# Patient Record
Sex: MALE | Race: WHITE | Employment: UNEMPLOYED | URBAN - METROPOLITAN AREA
[De-identification: names, ages, dates, MRNs, and addresses within clinical notes are randomized per-mention and may not be internally consistent; named-entity substitution may affect disease eponyms.]

---

## 2017-05-06 ENCOUNTER — HOSPITAL ENCOUNTER (EMERGENCY)
Age: 57
Discharge: HOME | End: 2017-05-06
Attending: EMERGENCY MEDICINE
Payer: MEDICARE

## 2017-05-06 ENCOUNTER — APPOINTMENT (OUTPATIENT)
Dept: GENERAL RADIOLOGY | Age: 57
End: 2017-05-06
Attending: EMERGENCY MEDICINE
Payer: MEDICARE

## 2017-05-06 VITALS
HEART RATE: 73 BPM | TEMPERATURE: 98.1 F | HEIGHT: 72 IN | RESPIRATION RATE: 20 BRPM | OXYGEN SATURATION: 96 % | BODY MASS INDEX: 42.66 KG/M2 | DIASTOLIC BLOOD PRESSURE: 71 MMHG | SYSTOLIC BLOOD PRESSURE: 162 MMHG | WEIGHT: 315 LBS

## 2017-05-06 DIAGNOSIS — R19.7 DIARRHEA, UNSPECIFIED TYPE: Primary | ICD-10-CM

## 2017-05-06 DIAGNOSIS — J44.9 CHRONIC OBSTRUCTIVE PULMONARY DISEASE, UNSPECIFIED COPD TYPE (HCC): ICD-10-CM

## 2017-05-06 LAB
ALBUMIN SERPL BCP-MCNC: 3 G/DL (ref 3.5–5)
ALBUMIN/GLOB SERPL: 0.7 {RATIO} (ref 1.1–2.2)
ALP SERPL-CCNC: 78 U/L (ref 45–117)
ALT SERPL-CCNC: 24 U/L (ref 12–78)
ANION GAP BLD CALC-SCNC: 8 MMOL/L (ref 5–15)
APPEARANCE UR: CLEAR
AST SERPL W P-5'-P-CCNC: 20 U/L (ref 15–37)
BACTERIA URNS QL MICRO: NEGATIVE /HPF
BASOPHILS # BLD AUTO: 0.1 K/UL (ref 0–0.1)
BASOPHILS # BLD: 1 % (ref 0–1)
BILIRUB SERPL-MCNC: 0.4 MG/DL (ref 0.2–1)
BILIRUB UR QL: NEGATIVE
BNP SERPL-MCNC: 472 PG/ML (ref 0–125)
BUN SERPL-MCNC: 14 MG/DL (ref 6–20)
BUN/CREAT SERPL: 14 (ref 12–20)
CALCIUM SERPL-MCNC: 9 MG/DL (ref 8.5–10.1)
CHLORIDE SERPL-SCNC: 94 MMOL/L (ref 97–108)
CO2 SERPL-SCNC: 34 MMOL/L (ref 21–32)
COLOR UR: ABNORMAL
CREAT SERPL-MCNC: 0.97 MG/DL (ref 0.7–1.3)
DIFFERENTIAL METHOD BLD: NORMAL
EOSINOPHIL # BLD: 0.2 K/UL (ref 0–0.4)
EOSINOPHIL NFR BLD: 2 % (ref 0–7)
EPITH CASTS URNS QL MICRO: ABNORMAL /LPF
ERYTHROCYTE [DISTWIDTH] IN BLOOD BY AUTOMATED COUNT: 13.6 % (ref 11.5–14.5)
GLOBULIN SER CALC-MCNC: 4.1 G/DL (ref 2–4)
GLUCOSE SERPL-MCNC: 178 MG/DL (ref 65–100)
GLUCOSE UR STRIP.AUTO-MCNC: NEGATIVE MG/DL
HCT VFR BLD AUTO: 46.6 % (ref 36.6–50.3)
HGB BLD-MCNC: 16.4 G/DL (ref 12.1–17)
HGB UR QL STRIP: ABNORMAL
KETONES UR QL STRIP.AUTO: NEGATIVE MG/DL
LEUKOCYTE ESTERASE UR QL STRIP.AUTO: NEGATIVE
LYMPHOCYTES # BLD AUTO: 23 % (ref 12–49)
LYMPHOCYTES # BLD: 2.3 K/UL
MCH RBC QN AUTO: 33.1 PG (ref 26–34)
MCHC RBC AUTO-ENTMCNC: 35.2 G/DL (ref 30–36.5)
MCV RBC AUTO: 94.1 FL (ref 80–99)
MONOCYTES # BLD: 0.8 K/UL (ref 0–1)
MONOCYTES NFR BLD AUTO: 7 % (ref 5–13)
NEUTS SEG # BLD: 6.9 K/UL (ref 1.8–8)
NEUTS SEG NFR BLD AUTO: 67 % (ref 32–75)
NITRITE UR QL STRIP.AUTO: NEGATIVE
PH UR STRIP: 7.5 [PH] (ref 5–8)
PLATELET # BLD AUTO: 160 K/UL (ref 150–400)
POTASSIUM SERPL-SCNC: 3.9 MMOL/L (ref 3.5–5.1)
PROT SERPL-MCNC: 7.1 G/DL (ref 6.4–8.2)
PROT UR STRIP-MCNC: 100 MG/DL
RBC # BLD AUTO: 4.95 M/UL (ref 4.1–5.7)
RBC #/AREA URNS HPF: ABNORMAL /HPF (ref 0–5)
SODIUM SERPL-SCNC: 136 MMOL/L (ref 136–145)
SP GR UR REFRACTOMETRY: 1.01 (ref 1–1.03)
TROPONIN I BLD-MCNC: <0.04 NG/ML (ref 0–0.08)
UROBILINOGEN UR QL STRIP.AUTO: 0.2 EU/DL (ref 0.2–1)
WBC # BLD AUTO: 10.2 K/UL (ref 4.1–11.1)
WBC URNS QL MICRO: ABNORMAL /HPF (ref 0–4)

## 2017-05-06 PROCEDURE — 81001 URINALYSIS AUTO W/SCOPE: CPT | Performed by: EMERGENCY MEDICINE

## 2017-05-06 PROCEDURE — 83880 ASSAY OF NATRIURETIC PEPTIDE: CPT | Performed by: EMERGENCY MEDICINE

## 2017-05-06 PROCEDURE — 85025 COMPLETE CBC W/AUTO DIFF WBC: CPT | Performed by: EMERGENCY MEDICINE

## 2017-05-06 PROCEDURE — 80053 COMPREHEN METABOLIC PANEL: CPT | Performed by: EMERGENCY MEDICINE

## 2017-05-06 PROCEDURE — 36415 COLL VENOUS BLD VENIPUNCTURE: CPT | Performed by: EMERGENCY MEDICINE

## 2017-05-06 PROCEDURE — 84484 ASSAY OF TROPONIN QUANT: CPT

## 2017-05-06 PROCEDURE — 99283 EMERGENCY DEPT VISIT LOW MDM: CPT

## 2017-05-06 PROCEDURE — 71020 XR CHEST PA LAT: CPT

## 2017-05-06 NOTE — ED TRIAGE NOTES
Triage note: Pt reports he started Monday morning w/ diarrhea and feeling like he can't get a deep breath. Pt reports this morning he woke up feeling better but still feels like he can't take a deep breath. Pt reports after having diarrhea on Monday he then started w/ constipation but is now back to diarrhea.

## 2017-05-06 NOTE — ED PROVIDER NOTES
HPI Comments: Bart Coyle is a 60 yo morbidly obese male with history of DM, HTN, CHF and COPD who presents to the ED with complaints of diarrhea and shortness of breath. He states that for the past 5 days he has had the feeling that he needed to take deep breaths. This seemed to start on Monday when he had diarrhea. The next day the diarrhea resolved but he continued to have shortness of breath. Yesterday he spoke with his PCP's nurse who recommended that he come to the ED but he did not want to so he waited til today. Last night he took an extra 2 doses of bumex (he normally takes 1/2 of a 2mg tablet at day) and today his shortness of breath felt much better until this evening when he started to have diarrhea again. His wife has also noticed that his urine is dark brown. He has home O2 which he states he only uses as needed but he has been wearing most of this week. Past Medical History:   Diagnosis Date    COPD     [facility] [address] [facility]     Heart failure ([facility] [address] [facility]     Hypertension     Morbidly obese ([facility] [address]        Past Surgical History:   Procedure Laterality Date    HX ORTHOPAEDIC      right leg surgery         History reviewed. No pertinent family history. Social History     Social History    Marital status:      Spouse name: N/A    Number of children: N/A    Years of education: N/A     Occupational History    Not on file. Social History Main Topics    Smoking status: Former Smoker    Smokeless tobacco: Never Used    Alcohol use No    Drug use: No    Sexual activity: Not on file     Other Topics Concern    Not on file     Social History Narrative         ALLERGIES: Codeine; Pcn [penicillins]; and Morphine    Review of Systems   Constitutional: Positive for fatigue. Negative for fever. HENT: Negative for sore throat. Eyes: Negative for visual disturbance. Respiratory: Positive for shortness of breath. Negative for cough. Cardiovascular: Negative for chest pain. Gastrointestinal: Positive for diarrhea. Negative for abdominal pain. Genitourinary: Negative for dysuria. Musculoskeletal: Negative for back pain. Skin: Negative for rash. Neurological: Negative for headaches. Vitals:    05/06/17 0136 05/06/17 0351   BP: 148/78 162/71   Pulse: 74 73   Resp: 24 20   Temp: 98.1 °F (36.7 °C)    SpO2: 94% 96%   Weight: 153.8 kg (339 lb)    Height: 6' (1.829 m)             Physical Exam   Constitutional: He appears well-developed. No distress. obese   HENT:   Head: Normocephalic and atraumatic. Mouth/Throat: Oropharynx is clear and moist.   Eyes: Conjunctivae and EOM are normal.   Neck: Normal range of motion and phonation normal.   Cardiovascular: Normal rate and intact distal pulses. Pulmonary/Chest: Effort normal and breath sounds normal. No respiratory distress. He has no wheezes. He has no rales. Abdominal: He exhibits no distension. There is no tenderness. There is no rebound. Musculoskeletal: Normal range of motion. He exhibits no tenderness. Neurological: He is alert. He is not disoriented. He exhibits normal muscle tone. Skin: Skin is warm and dry. Nursing note and vitals reviewed. MDM  ED Course   PAtient in no respiratory distress. LAbs including CBC, CMP and trop normal.  BNP mildly elevated but c/w previous values. CXR normal.  No episodes of diarrhea in ED and no wheezing.       Procedures

## 2017-05-06 NOTE — ED NOTES
Pt d/c'd by Dr. Nathanael Ralph. D/c instructions in wife's hand. Pt in motorized wheelchair out of ED w/ wife and son. Pt appears in no apparent distress at time of d/c.

## 2017-05-06 NOTE — DISCHARGE INSTRUCTIONS
Chronic Obstructive Pulmonary Disease (COPD): Care Instructions  Your Care Instructions    Chronic obstructive pulmonary disease (COPD) is a general term for a group of lung diseases, including emphysema and chronic bronchitis. People with COPD have decreased airflow in and out of the lungs, which makes it hard to breathe. The airways also can get clogged with thick mucus. Cigarette smoking is a major cause of COPD. Although there is no cure for COPD, you can slow its progress. Following your treatment plan and taking care of yourself can help you feel better and live longer. Follow-up care is a key part of your treatment and safety. Be sure to make and go to all appointments, and call your doctor if you are having problems. It's also a good idea to know your test results and keep a list of the medicines you take. How can you care for yourself at home? Staying healthy  · Do not smoke. This is the most important step you can take to prevent more damage to your lungs. If you need help quitting, talk to your doctor about stop-smoking programs and medicines. These can increase your chances of quitting for good. · Avoid colds and flu. Get a pneumococcal vaccine shot. If you have had one before, ask your doctor whether you need a second dose. Get the flu vaccine every fall. If you must be around people with colds or the flu, wash your hands often. · Avoid secondhand smoke, air pollution, and high altitudes. Also avoid cold, dry air and hot, humid air. Stay at home with your windows closed when air pollution is bad. Medicines and oxygen therapy  · Take your medicines exactly as prescribed. Call your doctor if you think you are having a problem with your medicine. · You may be taking medicines such as:  ¨ Bronchodilators. These help open your airways and make breathing easier. Bronchodilators are either short-acting (work for 6 to 9 hours) or long-acting (work for 24 hours).  You inhale most bronchodilators, so they start to act quickly. Always carry your quick-relief inhaler with you in case you need it while you are away from home. ¨ Corticosteroids (prednisone, budesonide). These reduce airway inflammation. They come in pill or inhaled form. You must take these medicines every day for them to work well. · A spacer may help you get more inhaled medicine to your lungs. Ask your doctor or pharmacist if a spacer is right for you. If it is, ask how to use it properly. · Do not take any vitamins, over-the-counter medicine, or herbal products without talking to your doctor first.  · If your doctor prescribed antibiotics, take them as directed. Do not stop taking them just because you feel better. You need to take the full course of antibiotics. · Oxygen therapy boosts the amount of oxygen in your blood and helps you breathe easier. Use the flow rate your doctor has recommended, and do not change it without talking to your doctor first.  Activity  · Get regular exercise. Walking is an easy way to get exercise. Start out slowly, and walk a little more each day. · Pay attention to your breathing. You are exercising too hard if you cannot talk while you are exercising. · Take short rest breaks when doing household chores and other activities. · Learn breathing methods--such as breathing through pursed lips--to help you become less short of breath. · If your doctor has not set you up with a pulmonary rehabilitation program, talk to him or her about whether rehab is right for you. Rehab includes exercise programs, education about your disease and how to manage it, help with diet and other changes, and emotional support. Diet  · Eat regular, healthy meals. Use bronchodilators about 1 hour before you eat to make it easier to eat. Eat several small meals instead of three large ones. Drink beverages at the end of the meal. Avoid foods that are hard to chew.   · Eat foods that contain protein so that you do not lose muscle mass.  Mental health  · Talk to your family, friends, or a therapist about your feelings. It is normal to feel frightened, angry, hopeless, helpless, and even guilty. Talking openly about bad feelings can help you cope. If these feelings last, talk to your doctor. When should you call for help? Call 911 anytime you think you may need emergency care. For example, call if:  · You have severe trouble breathing. Call your doctor now or seek immediate medical care if:  · You have new or worse trouble breathing. · You cough up blood. · You have a fever. Watch closely for changes in your health, and be sure to contact your doctor if:  · You cough more deeply or more often, especially if you notice more mucus or a change in the color of your mucus. · You have new or worse swelling in your legs or belly. · You are not getting better as expected. Where can you learn more? Go to http://kendyBueroservice24kristina.info/. Ej Yang in the search box to learn more about \"Chronic Obstructive Pulmonary Disease (COPD): Care Instructions. \"  Current as of: May 23, 2016  Content Version: 11.2  © 0116-3436 [employer]. Care instructions adapted under license by [employer] (which disclaims liability or warranty for this information). If you have questions about a medical condition or this instruction, always ask your healthcare professional. [place] [address] any warranty or liability for your use of this information. Diarrhea: Care Instructions  Your Care Instructions    Diarrhea is loose, watery stools (bowel movements). The exact cause is often hard to find. Sometimes diarrhea is your body's way of getting rid of what caused an upset stomach. Viruses, food poisoning, and many medicines can cause diarrhea. Some people get diarrhea in response to emotional stress, anxiety, or certain foods. Almost everyone has diarrhea now and then.  It usually isn't serious, and your stools will return to normal soon. The important thing to do is replace the fluids you have lost, so you can prevent dehydration. The doctor has checked you carefully, but problems can develop later. If you notice any problems or new symptoms, get medical treatment right away. Follow-up care is a key part of your treatment and safety. Be sure to make and go to all appointments, and call your doctor if you are having problems. It's also a good idea to know your test results and keep a list of the medicines you take. How can you care for yourself at home? · Watch for signs of dehydration, which means your body has lost too much water. Dehydration is a serious condition and should be treated right away. Signs of dehydration are:  ¨ Increasing thirst and dry eyes and mouth. ¨ Feeling faint or lightheaded. ¨ Darker urine, and a smaller amount of urine than normal.  · To prevent dehydration, drink plenty of fluids, enough so that your urine is light yellow or clear like water. Choose water and other caffeine-free clear liquids until you feel better. If you have kidney, heart, or liver disease and have to limit fluids, talk with your doctor before you increase the amount of fluids you drink. · Begin eating small amounts of mild foods the next day, if you feel like it. ¨ Try yogurt that has live cultures of Lactobacillus. (Check the label.)  ¨ Avoid spicy foods, fruits, alcohol, and caffeine until 48 hours after all symptoms are gone. ¨ Avoid chewing gum that contains sorbitol. ¨ Avoid dairy products (except for yogurt with Lactobacillus) while you have diarrhea and for 3 days after symptoms are gone. · The doctor may recommend that you take over-the-counter medicine, such as loperamide (Imodium), if you still have diarrhea after 6 hours. Read and follow all instructions on the label. Do not use this medicine if you have bloody diarrhea, a high fever, or other signs of serious illness.  Call your doctor if you think you are having a problem with your medicine. When should you call for help? Call 911 anytime you think you may need emergency care. For example, call if:  · You passed out (lost consciousness). · Your stools are maroon or very bloody. Call your doctor now or seek immediate medical care if:  · You are dizzy or lightheaded, or you feel like you may faint. · Your stools are black and look like tar, or they have streaks of blood. · You have new or worse belly pain. · You have symptoms of dehydration, such as:  ¨ Dry eyes and a dry mouth. ¨ Passing only a little dark urine. ¨ Feeling thirstier than usual.  · You have a new or higher fever. Watch closely for changes in your health, and be sure to contact your doctor if:  · Your diarrhea is getting worse. · You see pus in the diarrhea. · You are not getting better after 2 days (48 hours). Where can you learn more? Go to http://kendy-kristina.info/. Enter P129 in the search box to learn more about \"Diarrhea: Care Instructions. \"  Current as of: May 27, 2016  Content Version: 11.2  © 8035-2540 [employer]. Care instructions adapted under license by [employer] (which disclaims liability or warranty for this information). If you have questions about a medical condition or this instruction, always ask your healthcare professional. [place] [address] any warranty or liability for your use of this information. We hope that we have addressed all of your medical concerns. The examination and treatment you received in the Emergency Department were for an emergent problem and were not intended as complete care. It is important that you follow up with your healthcare provider(s) for ongoing care. If your symptoms worsen or do not improve as expected, and you are unable to reach your usual health care provider(s), you should return to the Emergency Department.       Today's healthcare is undergoing tremendous change, and patient satisfaction surveys are one of the many tools to assess the quality of medical care. You may receive a survey from the [employer] regarding your experience in the Emergency Department. I hope that your experience has been completely positive, particularly the medical care that I provided. As such, please participate in the survey; anything less than excellent does not meet my expectations or intentions. [facility] and [facility] participate in nationally recognized quality of care measures. If your blood pressure is greater than 120/80, as reported below, we urge that you seek medical care to address the potential of high blood pressure, commonly known as hypertension. Hypertension can be hereditary or can be caused by certain medical conditions, pain, stress, or \"white coat syndrome. \"       Please make an appointment with your health care provider(s) for follow up of your Emergency Department visit. VITALS:   Patient Vitals for the past 8 hrs:   Temp Pulse Resp BP SpO2   05/06/17 0136 98.1 °F (36.7 °C) 74 24 148/78 94 %          Thank you for allowing us to provide you with medical care today. We realize that you have many choices for your emergency care needs. Please choose us in the future for any continued health care needs. Nupur Clemente, [address]   Office: [phone]            Recent Results (from the past 24 hour(s))   CBC WITH AUTOMATED DIFF    Collection Time: 05/06/17  2:04 AM   Result Value Ref Range    WBC 10.2 4.1 - 11.1 K/uL    RBC 4.95 4.10 - 5.70 M/uL    HGB 16.4 12.1 - 17.0 g/dL    HCT 46.6 36.6 - 50.3 %    MCV 94.1 80.0 - 99.0 FL    MCH 33.1 26.0 - 34.0 PG    MCHC 35.2 30.0 - 36.5 g/dL    RDW 13.6 11.5 - 14.5 %    PLATELET 252 663 - 056 K/uL    NEUTROPHILS 67 32 - 75 %    LYMPHOCYTES 23 12 - 49 %    MONOCYTES 7 5 - 13 % EOSINOPHILS 2 0 - 7 %    BASOPHILS 1 0 - 1 %    ABS. NEUTROPHILS 6.9 1.8 - 8.0 K/UL    ABS. LYMPHOCYTES 2.3 K/UL    ABS. MONOCYTES 0.8 0.0 - 1.0 K/UL    ABS. EOSINOPHILS 0.2 0.0 - 0.4 K/UL    ABS. BASOPHILS 0.1 0.0 - 0.1 K/UL    DF AUTOMATED     METABOLIC PANEL, COMPREHENSIVE    Collection Time: 05/06/17  2:04 AM   Result Value Ref Range    Sodium 136 136 - 145 mmol/L    Potassium 3.9 3.5 - 5.1 mmol/L    Chloride 94 (L) 97 - 108 mmol/L    CO2 34 (H) 21 - 32 mmol/L    Anion gap 8 5 - 15 mmol/L    Glucose 178 (H) 65 - 100 mg/dL    BUN 14 6 - 20 MG/DL    Creatinine 0.97 0.70 - 1.30 MG/DL    BUN/Creatinine ratio 14 12 - 20      GFR est AA >60 >60 ml/min/1.73m2    GFR est non-AA >60 >60 ml/min/1.73m2    Calcium 9.0 8.5 - 10.1 MG/DL    Bilirubin, total 0.4 0.2 - 1.0 MG/DL    ALT (SGPT) 24 12 - 78 U/L    AST (SGOT) 20 15 - 37 U/L    Alk. phosphatase 78 45 - 117 U/L    Protein, total 7.1 6.4 - 8.2 g/dL    Albumin 3.0 (L) 3.5 - 5.0 g/dL    Globulin 4.1 (H) 2.0 - 4.0 g/dL    A-G Ratio 0.7 (L) 1.1 - 2.2     PRO-BNP    Collection Time: 05/06/17  2:04 AM   Result Value Ref Range    NT pro- (H) 0 - 125 PG/ML   POC TROPONIN-I    Collection Time: 05/06/17  2:06 AM   Result Value Ref Range    Troponin-I (POC) <0.04 0.00 - 0.08 ng/mL   URINALYSIS W/MICROSCOPIC    Collection Time: 05/06/17  2:22 AM   Result Value Ref Range    Color YELLOW/STRAW      Appearance CLEAR CLEAR      Specific gravity 1.015 1.003 - 1.030      pH (UA) 7.5 5.0 - 8.0      Protein 100 (A) NEG mg/dL    Glucose NEGATIVE  NEG mg/dL    Ketone NEGATIVE  NEG mg/dL    Bilirubin NEGATIVE  NEG      Blood MODERATE (A) NEG      Urobilinogen 0.2 0.2 - 1.0 EU/dL    Nitrites NEGATIVE  NEG      Leukocyte Esterase NEGATIVE  NEG      WBC 5-10 0 - 4 /hpf    RBC 5-10 0 - 5 /hpf    Epithelial cells FEW FEW /lpf    Bacteria NEGATIVE  NEG /hpf       Xr Chest Pa Lat    Result Date: 5/6/2017  INDICATION: shortness of breath EXAM: CXR 2 Views. COMPARISON: 2/25/2015.  FINDINGS: Frontal and lateral views of the chest show the lungs are free of acute disease. Heart size is normal. There is no overt pulmonary edema. There is no evident pneumothorax, adenopathy or pleural effusion. IMPRESSION: No acute disease. No significant interval change.

## 2017-06-18 ENCOUNTER — APPOINTMENT (OUTPATIENT)
Dept: GENERAL RADIOLOGY | Age: 57
End: 2017-06-18
Attending: EMERGENCY MEDICINE
Payer: MEDICARE

## 2017-06-18 ENCOUNTER — HOSPITAL ENCOUNTER (EMERGENCY)
Age: 57
Discharge: STILL ADMITTED OR ADMINISTRATIVE | End: 2017-06-18
Attending: EMERGENCY MEDICINE
Payer: MEDICARE

## 2017-06-18 ENCOUNTER — HOSPITAL ENCOUNTER (INPATIENT)
Age: 57
LOS: 5 days | Discharge: HOME HEALTH | DRG: 291 | End: 2017-06-23
Attending: INTERNAL MEDICINE | Admitting: FAMILY MEDICINE
Payer: MEDICARE

## 2017-06-18 VITALS
TEMPERATURE: 98.1 F | HEIGHT: 72 IN | WEIGHT: 315 LBS | BODY MASS INDEX: 42.66 KG/M2 | HEART RATE: 79 BPM | DIASTOLIC BLOOD PRESSURE: 106 MMHG | SYSTOLIC BLOOD PRESSURE: 140 MMHG | OXYGEN SATURATION: 94 % | RESPIRATION RATE: 21 BRPM

## 2017-06-18 DIAGNOSIS — I50.21 ACUTE SYSTOLIC CONGESTIVE HEART FAILURE (HCC): Primary | ICD-10-CM

## 2017-06-18 PROBLEM — E87.5 ACUTE HYPERKALEMIA: Status: ACTIVE | Noted: 2017-06-18

## 2017-06-18 PROBLEM — E11.65 TYPE 2 DIABETES MELLITUS WITH HYPERGLYCEMIA (HCC): Status: ACTIVE | Noted: 2017-06-18

## 2017-06-18 PROBLEM — I50.9 ACUTE CHF (CONGESTIVE HEART FAILURE) (HCC): Status: ACTIVE | Noted: 2017-06-18

## 2017-06-18 LAB
ALBUMIN SERPL BCP-MCNC: 2.6 G/DL (ref 3.5–5)
ALBUMIN/GLOB SERPL: 0.6 {RATIO} (ref 1.1–2.2)
ALP SERPL-CCNC: 79 U/L (ref 45–117)
ALT SERPL-CCNC: 50 U/L (ref 12–78)
ANION GAP BLD CALC-SCNC: 15 MMOL/L (ref 5–15)
ANION GAP BLD CALC-SCNC: 5 MMOL/L (ref 5–15)
ANION GAP BLD CALC-SCNC: 8 MMOL/L (ref 5–15)
ARTERIAL PATENCY WRIST A: YES
AST SERPL W P-5'-P-CCNC: 28 U/L (ref 15–37)
BASE EXCESS BLD CALC-SCNC: 3 MMOL/L
BASOPHILS # BLD AUTO: 0 K/UL (ref 0–0.1)
BASOPHILS # BLD AUTO: 0.1 K/UL (ref 0–0.1)
BASOPHILS # BLD: 0 % (ref 0–1)
BASOPHILS # BLD: 1 % (ref 0–1)
BDY SITE: ABNORMAL
BILIRUB SERPL-MCNC: 0.4 MG/DL (ref 0.2–1)
BNP SERPL-MCNC: 1648 PG/ML (ref 0–125)
BUN BLD-MCNC: 36 MG/DL (ref 9–20)
BUN SERPL-MCNC: 30 MG/DL (ref 6–20)
BUN SERPL-MCNC: 30 MG/DL (ref 6–20)
BUN/CREAT SERPL: 25 (ref 12–20)
BUN/CREAT SERPL: 31 (ref 12–20)
CA-I BLD-MCNC: 1.12 MMOL/L (ref 1.12–1.32)
CALCIUM SERPL-MCNC: 8.5 MG/DL (ref 8.5–10.1)
CALCIUM SERPL-MCNC: 8.9 MG/DL (ref 8.5–10.1)
CHLORIDE BLD-SCNC: 90 MMOL/L (ref 98–107)
CHLORIDE SERPL-SCNC: 93 MMOL/L (ref 97–108)
CHLORIDE SERPL-SCNC: 95 MMOL/L (ref 97–108)
CK MB CFR SERPL CALC: 3.8 % (ref 0–2.5)
CK MB SERPL-MCNC: 1.9 NG/ML (ref 5–25)
CK SERPL-CCNC: 50 U/L (ref 39–308)
CO2 BLD-SCNC: 36 MMOL/L (ref 21–32)
CO2 SERPL-SCNC: 33 MMOL/L (ref 21–32)
CO2 SERPL-SCNC: 34 MMOL/L (ref 21–32)
CREAT BLD-MCNC: 1 MG/DL (ref 0.6–1.3)
CREAT SERPL-MCNC: 0.97 MG/DL (ref 0.7–1.3)
CREAT SERPL-MCNC: 1.19 MG/DL (ref 0.7–1.3)
DIFFERENTIAL METHOD BLD: NORMAL
EOSINOPHIL # BLD: 0.1 K/UL (ref 0–0.4)
EOSINOPHIL # BLD: 0.2 K/UL (ref 0–0.4)
EOSINOPHIL NFR BLD: 1 % (ref 0–7)
EOSINOPHIL NFR BLD: 2 % (ref 0–7)
ERYTHROCYTE [DISTWIDTH] IN BLOOD BY AUTOMATED COUNT: 13.7 % (ref 11.5–14.5)
ERYTHROCYTE [DISTWIDTH] IN BLOOD BY AUTOMATED COUNT: 14.2 % (ref 11.5–14.5)
EST. AVERAGE GLUCOSE BLD GHB EST-MCNC: 192 MG/DL
GAS FLOW.O2 O2 DELIVERY SYS: ABNORMAL L/MIN
GLOBULIN SER CALC-MCNC: 4.1 G/DL (ref 2–4)
GLUCOSE BLD STRIP.AUTO-MCNC: 276 MG/DL (ref 65–100)
GLUCOSE BLD-MCNC: 330 MG/DL (ref 65–100)
GLUCOSE SERPL-MCNC: 248 MG/DL (ref 65–100)
GLUCOSE SERPL-MCNC: 417 MG/DL (ref 65–100)
HBA1C MFR BLD: 8.3 % (ref 4.2–6.3)
HCO3 BLD-SCNC: 28.9 MMOL/L (ref 22–26)
HCT VFR BLD AUTO: 42.3 % (ref 36.6–50.3)
HCT VFR BLD AUTO: 45.2 % (ref 36.6–50.3)
HCT VFR BLD CALC: 45 % (ref 36.6–50.3)
HGB BLD-MCNC: 14.2 G/DL (ref 12.1–17)
HGB BLD-MCNC: 15.2 G/DL (ref 12.1–17)
HGB BLD-MCNC: 15.3 GM/DL (ref 12.1–17)
LACTATE SERPL-SCNC: 2.3 MMOL/L (ref 0.4–2)
LYMPHOCYTES # BLD AUTO: 18 % (ref 12–49)
LYMPHOCYTES # BLD AUTO: 25 % (ref 12–49)
LYMPHOCYTES # BLD: 1.8 K/UL
LYMPHOCYTES # BLD: 2 K/UL (ref 0.8–3.5)
MAGNESIUM SERPL-MCNC: 1.7 MG/DL (ref 1.6–2.4)
MCH RBC QN AUTO: 32.5 PG (ref 26–34)
MCH RBC QN AUTO: 33.2 PG (ref 26–34)
MCHC RBC AUTO-ENTMCNC: 33.6 G/DL (ref 30–36.5)
MCHC RBC AUTO-ENTMCNC: 33.6 G/DL (ref 30–36.5)
MCV RBC AUTO: 96.8 FL (ref 80–99)
MCV RBC AUTO: 98.7 FL (ref 80–99)
MONOCYTES # BLD: 0.5 K/UL (ref 0–1)
MONOCYTES # BLD: 0.6 K/UL (ref 0–1)
MONOCYTES NFR BLD AUTO: 6 % (ref 5–13)
MONOCYTES NFR BLD AUTO: 7 % (ref 5–13)
NEUTS SEG # BLD: 5.3 K/UL (ref 1.8–8)
NEUTS SEG # BLD: 7.3 K/UL (ref 1.8–8)
NEUTS SEG NFR BLD AUTO: 66 % (ref 32–75)
NEUTS SEG NFR BLD AUTO: 74 % (ref 32–75)
O2/TOTAL GAS SETTING VFR VENT: 21 %
PCO2 BLD: 51.3 MMHG (ref 35–45)
PH BLD: 7.36 [PH] (ref 7.35–7.45)
PLATELET # BLD AUTO: 144 K/UL (ref 150–400)
PLATELET # BLD AUTO: 170 K/UL (ref 150–400)
PO2 BLD: 46 MMHG (ref 80–100)
POTASSIUM BLD-SCNC: 4.6 MMOL/L (ref 3.5–5.1)
POTASSIUM SERPL-SCNC: 4.2 MMOL/L (ref 3.5–5.1)
POTASSIUM SERPL-SCNC: 5.6 MMOL/L (ref 3.5–5.1)
PROT SERPL-MCNC: 6.7 G/DL (ref 6.4–8.2)
RBC # BLD AUTO: 4.37 M/UL (ref 4.1–5.7)
RBC # BLD AUTO: 4.58 M/UL (ref 4.1–5.7)
SAO2 % BLD: 79 % (ref 92–97)
SERVICE CMNT-IMP: ABNORMAL
SERVICE CMNT-IMP: ABNORMAL
SODIUM BLD-SCNC: 135 MMOL/L (ref 136–145)
SODIUM SERPL-SCNC: 133 MMOL/L (ref 136–145)
SODIUM SERPL-SCNC: 135 MMOL/L (ref 136–145)
SPECIMEN TYPE: ABNORMAL
TOTAL RESP. RATE, ITRR: 31
TROPONIN I BLD-MCNC: <0.04 NG/ML (ref 0–0.08)
TROPONIN I SERPL-MCNC: <0.04 NG/ML
WBC # BLD AUTO: 8 K/UL (ref 4.1–11.1)
WBC # BLD AUTO: 9.9 K/UL (ref 4.1–11.1)

## 2017-06-18 PROCEDURE — 80053 COMPREHEN METABOLIC PANEL: CPT | Performed by: EMERGENCY MEDICINE

## 2017-06-18 PROCEDURE — 51702 INSERT TEMP BLADDER CATH: CPT

## 2017-06-18 PROCEDURE — 84484 ASSAY OF TROPONIN QUANT: CPT

## 2017-06-18 PROCEDURE — 36600 WITHDRAWAL OF ARTERIAL BLOOD: CPT

## 2017-06-18 PROCEDURE — 83605 ASSAY OF LACTIC ACID: CPT | Performed by: FAMILY MEDICINE

## 2017-06-18 PROCEDURE — 96374 THER/PROPH/DIAG INJ IV PUSH: CPT

## 2017-06-18 PROCEDURE — 82550 ASSAY OF CK (CPK): CPT | Performed by: FAMILY MEDICINE

## 2017-06-18 PROCEDURE — 93005 ELECTROCARDIOGRAM TRACING: CPT

## 2017-06-18 PROCEDURE — 77030034850

## 2017-06-18 PROCEDURE — 96375 TX/PRO/DX INJ NEW DRUG ADDON: CPT

## 2017-06-18 PROCEDURE — 74011250636 HC RX REV CODE- 250/636: Performed by: EMERGENCY MEDICINE

## 2017-06-18 PROCEDURE — 83036 HEMOGLOBIN GLYCOSYLATED A1C: CPT | Performed by: FAMILY MEDICINE

## 2017-06-18 PROCEDURE — 36415 COLL VENOUS BLD VENIPUNCTURE: CPT | Performed by: FAMILY MEDICINE

## 2017-06-18 PROCEDURE — 85025 COMPLETE CBC W/AUTO DIFF WBC: CPT | Performed by: FAMILY MEDICINE

## 2017-06-18 PROCEDURE — 65660000000 HC RM CCU STEPDOWN

## 2017-06-18 PROCEDURE — 84484 ASSAY OF TROPONIN QUANT: CPT | Performed by: FAMILY MEDICINE

## 2017-06-18 PROCEDURE — 99285 EMERGENCY DEPT VISIT HI MDM: CPT

## 2017-06-18 PROCEDURE — 83735 ASSAY OF MAGNESIUM: CPT | Performed by: FAMILY MEDICINE

## 2017-06-18 PROCEDURE — 77030005520 HC CATH URETH FOL38 BARD -A

## 2017-06-18 PROCEDURE — 80047 BASIC METABLC PNL IONIZED CA: CPT

## 2017-06-18 PROCEDURE — 74011636637 HC RX REV CODE- 636/637: Performed by: EMERGENCY MEDICINE

## 2017-06-18 PROCEDURE — 80048 BASIC METABOLIC PNL TOTAL CA: CPT | Performed by: FAMILY MEDICINE

## 2017-06-18 PROCEDURE — 36415 COLL VENOUS BLD VENIPUNCTURE: CPT | Performed by: EMERGENCY MEDICINE

## 2017-06-18 PROCEDURE — 82803 BLOOD GASES ANY COMBINATION: CPT

## 2017-06-18 PROCEDURE — 71010 XR CHEST PORT: CPT

## 2017-06-18 PROCEDURE — 82962 GLUCOSE BLOOD TEST: CPT

## 2017-06-18 PROCEDURE — 83880 ASSAY OF NATRIURETIC PEPTIDE: CPT | Performed by: EMERGENCY MEDICINE

## 2017-06-18 PROCEDURE — 85025 COMPLETE CBC W/AUTO DIFF WBC: CPT | Performed by: EMERGENCY MEDICINE

## 2017-06-18 RX ORDER — METFORMIN HYDROCHLORIDE 500 MG/1
1500 TABLET ORAL
Status: DISCONTINUED | OUTPATIENT
Start: 2017-06-19 | End: 2017-06-18

## 2017-06-18 RX ORDER — GUAIFENESIN 100 MG/5ML
81 LIQUID (ML) ORAL DAILY
Status: DISCONTINUED | OUTPATIENT
Start: 2017-06-19 | End: 2017-06-23 | Stop reason: HOSPADM

## 2017-06-18 RX ORDER — GLIMEPIRIDE 2 MG/1
4 TABLET ORAL ONCE
Status: COMPLETED | OUTPATIENT
Start: 2017-06-19 | End: 2017-06-19

## 2017-06-18 RX ORDER — FUROSEMIDE 10 MG/ML
40 INJECTION INTRAMUSCULAR; INTRAVENOUS
Status: COMPLETED | OUTPATIENT
Start: 2017-06-18 | End: 2017-06-18

## 2017-06-18 RX ORDER — GABAPENTIN 300 MG/1
300 CAPSULE ORAL 3 TIMES DAILY
Status: DISCONTINUED | OUTPATIENT
Start: 2017-06-19 | End: 2017-06-20

## 2017-06-18 RX ORDER — PANTOPRAZOLE SODIUM 40 MG/1
40 TABLET, DELAYED RELEASE ORAL
Status: DISCONTINUED | OUTPATIENT
Start: 2017-06-19 | End: 2017-06-23 | Stop reason: HOSPADM

## 2017-06-18 RX ORDER — OMEPRAZOLE 20 MG/1
20 CAPSULE, DELAYED RELEASE ORAL 2 TIMES DAILY
Status: DISCONTINUED | OUTPATIENT
Start: 2017-06-19 | End: 2017-06-18 | Stop reason: CLARIF

## 2017-06-18 RX ORDER — GABAPENTIN 300 MG/1
300 CAPSULE ORAL 3 TIMES DAILY
Status: ON HOLD | COMMUNITY
End: 2018-01-01

## 2017-06-18 RX ORDER — GLIMEPIRIDE 2 MG/1
4 TABLET ORAL 2 TIMES DAILY
Status: DISCONTINUED | OUTPATIENT
Start: 2017-06-19 | End: 2017-06-23 | Stop reason: HOSPADM

## 2017-06-18 RX ORDER — METOPROLOL TARTRATE 50 MG/1
100 TABLET ORAL 2 TIMES DAILY
Status: DISCONTINUED | OUTPATIENT
Start: 2017-06-19 | End: 2017-06-23 | Stop reason: HOSPADM

## 2017-06-18 RX ORDER — IPRATROPIUM BROMIDE AND ALBUTEROL SULFATE 2.5; .5 MG/3ML; MG/3ML
3 SOLUTION RESPIRATORY (INHALATION)
Status: DISCONTINUED | OUTPATIENT
Start: 2017-06-18 | End: 2017-06-23 | Stop reason: HOSPADM

## 2017-06-18 RX ORDER — INSULIN LISPRO 100 [IU]/ML
INJECTION, SOLUTION INTRAVENOUS; SUBCUTANEOUS
Status: DISCONTINUED | OUTPATIENT
Start: 2017-06-18 | End: 2017-06-18

## 2017-06-18 RX ORDER — DEXTROSE 50 % IN WATER (D50W) INTRAVENOUS SYRINGE
12.5-25 AS NEEDED
Status: DISCONTINUED | OUTPATIENT
Start: 2017-06-18 | End: 2017-06-18 | Stop reason: CLARIF

## 2017-06-18 RX ORDER — MAGNESIUM SULFATE 100 %
4 CRYSTALS MISCELLANEOUS AS NEEDED
Status: DISCONTINUED | OUTPATIENT
Start: 2017-06-18 | End: 2017-06-23 | Stop reason: HOSPADM

## 2017-06-18 RX ORDER — METFORMIN HYDROCHLORIDE 500 MG/1
1000 TABLET ORAL EVERY EVENING
Status: DISCONTINUED | OUTPATIENT
Start: 2017-06-18 | End: 2017-06-18

## 2017-06-18 RX ADMIN — HUMAN INSULIN 5 UNITS: 100 INJECTION, SOLUTION SUBCUTANEOUS at 18:33

## 2017-06-18 RX ADMIN — FUROSEMIDE 40 MG: 10 INJECTION, SOLUTION INTRAMUSCULAR; INTRAVENOUS at 18:31

## 2017-06-18 NOTE — PROGRESS NOTES
Bedside shift change report given to [address] [place] [address] Street (oncoming nurse) by Andrea Chen (offgoing nurse). Report included the following information SBAR, Kardex, Intake/Output, MAR, Recent Results and Cardiac Rhythm NSR.

## 2017-06-18 NOTE — ED PROVIDER NOTES
Patient is a 61 y.o. male presenting with fever and fatigue. Fever      Fatigue      The patient is a 58-year-old white female with morbid obesity COPD and congestive heart failure and diabetes who presents today with acute onset of pain difficult to awaken this morning and some  increasing shortness of breath and peripheral edema. The patient's family states he has gained considerable amounts of weight recently. He usually uses oxygen at home but has not been using it. A chest x-ray reveals bilateral pleural effusions and new congestive heart failure compared to May of 2017 and his BNP is approximately 1600. Blood gas revealed a pO2 of 46 and he was placed on nasal cannula O2 and Lasix was administered as well. He denies any chest pain at this time or in the recent past. He denies fever or chills. I plan to admit him to the hospital for further evaluation and treatment of the CHF. Past Medical History:   Diagnosis Date    CAD (coronary artery disease)     pvc    COPD     Diabetes ([address]     [facility] [address]     Hypertension     Morbidly obese ([facility] [address]        Past Surgical History:   Procedure Laterality Date    HX ORTHOPAEDIC      right leg surgery         History reviewed. No pertinent family history. Social History     Social History    Marital status:      Spouse name: N/A    Number of children: N/A    Years of education: N/A     Occupational History    Not on file. Social History Main Topics    Smoking status: Former Smoker    Smokeless tobacco: Never Used    Alcohol use No    Drug use: No    Sexual activity: Not on file     Other Topics Concern    Not on file     Social History Narrative         ALLERGIES: Codeine; Pcn [penicillins]; and Morphine    Review of Systems   Constitutional: Positive for fatigue and fever. All other systems reviewed and are negative.       Vitals:    06/18/17 1522 06/18/17 1619 06/18/17 1630   BP: 186/81 166/80 162/83   Pulse: 79 77 76   Resp: (!) 32 23 17   Temp: 98.3 °F (36.8 °C)     SpO2: 92% 95% 95%   Weight: 142.9 kg (315 lb)     Height: 6' (1.829 m)              Physical Exam   Constitutional: He is oriented to person, place, and time. He appears well-developed and well-nourished. Morbidly obese   HENT:   Head: Normocephalic and atraumatic. Mouth/Throat: Oropharynx is clear and moist. No oropharyngeal exudate. Eyes: Conjunctivae are normal. No scleral icterus. Neck: Neck supple. No thyromegaly present. Cardiovascular: Normal rate, regular rhythm and normal heart sounds. Exam reveals no gallop and no friction rub. No murmur heard. s3 gallop   Pulmonary/Chest: Effort normal and breath sounds normal. No stridor. No respiratory distress. He has no wheezes. He has no rales. Rales 1/3 up bilat   Abdominal: Soft. Bowel sounds are normal. There is no tenderness. There is no rebound and no guarding. Musculoskeletal: Normal range of motion. He exhibits edema. Lymphadenopathy:     He has no cervical adenopathy. Neurological: He is alert and oriented to person, place, and time. Skin: Skin is warm and dry. Psychiatric: He has a normal mood and affect. [facility]  ED Course       Procedures      ED EKG interpretation:  Rhythm:nsr 75  rbbb  This EKG was interpreted by Lucita Barone MD,ED Provider.

## 2017-06-18 NOTE — PROGRESS NOTES
TRANSFER - IN REPORT:    Verbal report received from [facility]) on [facility] [place]  being received from [facility] (unit) for routine progression of care      Report consisted of patients Situation, Background, Assessment and   Recommendations(SBAR). Information from the following report(s) SBAR, Intake/Output, MAR, Recent Results and Cardiac Rhythm NSR was reviewed with the receiving nurse. Opportunity for questions and clarification was provided. Assessment completed upon patients arrival to unit and care assumed.

## 2017-06-18 NOTE — IP AVS SNAPSHOT
2700 [facility] [address] [place] [address] 
[phone] Patient: Laura Hull MRN: [id number] [id number] You are allergic to the following Allergen Reactions Codeine Anaphylaxis Insulin Regular Other (comments) Temp blindness and facial droop Pcn (Penicillins) Anaphylaxis Victoza (Liraglutide) Other (comments) Fascial droop, temp blindness Morphine Other (comments)  
 hallucinates Recent Documentation Height Weight BMI Smoking Status 1.829 m 151.7 kg 45.35 kg/m2 Former Smoker Unresulted Labs Order Current Status CULTURE, URINE Preliminary result Emergency Contacts Name Discharge Info Relation Home Work Mobile Mariangel Salas  Spouse [3] [phone] About your hospitalization You were admitted on:  June 18, 2017 You last received care in the:  [facility] 4 SURG/BARIATRICS You were discharged on:  June 23, 2017 Unit phone number:  [phone] Why you were hospitalized Your primary diagnosis was:  Acute Chf (Congestive Heart Failure) (Hcc) Your diagnoses also included:  Acute Hyperkalemia, Type 2 Diabetes Mellitus With Hyperglycemia (Hcc) Providers Seen During Your Hospitalizations Provider Role Specialty Primary office phone Carroll Herbert MD Attending Provider Internal Medicine [phone] Pricila Stark MD Attending Provider [facility] [phone] Gaurang Maldonado MD Attending Provider Hospitalist [phone] Your Primary Care Physician (PCP) Primary Care Physician Office Phone Office Fax Sanjiv [phone], [phone] [facility] [address] Follow-up Information Follow up With Details Comments Contact Info Lida Mon MD On 6/28/2017 Appt time 10am [address] 
[phone] Vish Quinones MD   [address] [address] [place] [address] [phone] Current Discharge Medication List  
  
START taking these medications Dose & Instructions Dispensing Information Comments Morning Noon Evening Bedtime  
 lisinopril 10 mg tablet Commonly known as:  Ugo Headings Your last dose was: Your next dose is:    
   
   
 Dose:  10 mg Take 1 Tab by mouth daily. Quantity:  30 Tab Refills:  0  
     
   
   
   
  
 spironolactone 25 mg tablet Commonly known as:  ALDACTONE Your last dose was: Your next dose is:    
   
   
 Dose:  25 mg Take 1 Tab by mouth daily. Quantity:  30 Tab Refills:  0 CONTINUE these medications which have CHANGED Dose & Instructions Dispensing Information Comments Morning Noon Evening Bedtime  
 bumetanide 2 mg tablet Commonly known as:  Bhargavi Sedrick What changed:   
- how much to take - when to take this Your last dose was: Your next dose is:    
   
   
 Dose:  2 mg Take 1 Tab by mouth two (2) times a day. Quantity:  60 Tab Refills:  0 CONTINUE these medications which have NOT CHANGED Dose & Instructions Dispensing Information Comments Morning Noon Evening Bedtime  
 aspirin 81 mg tablet Your last dose was: Your next dose is:    
   
   
 Dose:  81 mg Take 81 mg by mouth daily. Refills:  0  
     
   
   
   
  
 gabapentin 300 mg capsule Commonly known as:  NEURONTIN Your last dose was: Your next dose is:    
   
   
 Dose:  300 mg Take 300 mg by mouth three (3) times daily. Refills:  0  
     
   
   
   
  
 glimepiride 4 mg tablet Commonly known as:  AMARYL Your last dose was: Your next dose is:    
   
   
 Dose:  4 mg Take 4 mg by mouth two (2) times a day. Refills:  0  
     
   
   
   
  
 * metFORMIN 1,000 mg tablet Commonly known as:  GLUCOPHAGE Your last dose was: Your next dose is:    
   
   
 Dose:  1500 mg Take 1,500 mg by mouth daily (with breakfast). Refills:  0  
     
   
   
   
  
 * metFORMIN 1,000 mg tablet Commonly known as:  GLUCOPHAGE Your last dose was: Your next dose is:    
   
   
 Dose:  1000 mg Take 1,000 mg by mouth every evening. Refills:  0  
     
   
   
   
  
 metoprolol tartrate 100 mg IR tablet Commonly known as:  LOPRESSOR Your last dose was: Your next dose is:    
   
   
 Dose:  100 mg Take 100 mg by mouth two (2) times a day. Refills:  0  
     
   
   
   
  
 omeprazole 20 mg capsule Commonly known as:  PRILOSEC Your last dose was: Your next dose is:    
   
   
 Dose:  20 mg Take 20 mg by mouth two (2) times a day. Refills:  0  
     
   
   
   
  
 * Notice: This list has 2 medication(s) that are the same as other medications prescribed for you. Read the directions carefully, and ask your doctor or other care provider to review them with you. Where to Get Your Medications Information on where to get these meds will be given to you by the nurse or doctor. ! Ask your nurse or doctor about these medications  
  bumetanide 2 mg tablet  
 lisinopril 10 mg tablet  
 spironolactone 25 mg tablet Discharge Instructions Discharge Instructions PATIENT ID: Apolinar Segura MRN: [id number] YOB: 1960 DATE OF ADMISSION: 6/18/2017  8:29 PM   
DATE OF DISCHARGE: 6/23/2017 PRIMARY CARE PROVIDER: Niya Ruffin MD  
 
ATTENDING PHYSICIAN: Alejandra Rudolph MD 
DISCHARGING PROVIDER: Deepak Hicks NP To contact this individual call [phone] and ask the  to page. If unavailable ask to be transferred the Adult Hospitalist Department. DISCHARGE DIAGNOSES: 
Acute on chronic hypoxic respiratory failure Acute on chronic diastolic CHF Nephrotic range proteinuria Lower extremity and scrotal edema UTI 
 
CONSULTATIONS: IP CONSULT TO CARDIOLOGY 
IP CONSULT TO NEPHROLOGY PROCEDURES/SURGERIES: * No surgery found * PENDING TEST RESULTS:  
At the time of discharge the following test results are still pending: none FOLLOW UP APPOINTMENTS:  
Follow-up Information Follow up With Details Comments Contact Info Cathy Mckinney MD On 6/28/2017 Appt time 10am [address] [place] [address] [address] [place] [address] 
[phone] ADDITIONAL CARE RECOMMENDATIONS:  
Recommend home health PT/OT Need close follow-up with PCP and nephrology and cardiology Also would recommend elevation of legs and scrotum to help with reducing the swelling DIET: Cardiac Diet ACTIVITY: Activity as tolerated WOUND CARE: N/A 
 
EQUIPMENT needed: walker, scooter DISCHARGE MEDICATIONS: 
 See Medication Reconciliation Form · It is important that you take the medication exactly as they are prescribed. · Keep your medication in the bottles provided by the pharmacist and keep a list of the medication names, dosages, and times to be taken in your wallet. · Do not take other medications without consulting your doctor. NOTIFY YOUR PHYSICIAN FOR ANY OF THE FOLLOWING:  
Fever over 101 degrees for 24 hours. Chest pain, shortness of breath, fever, chills, nausea, vomiting, diarrhea, change in mentation, falling, weakness, bleeding. Severe pain or pain not relieved by medications. Or, any other signs or symptoms that you may have questions about. DISPOSITION: 
  Home With: 
x OT x PT x HH  RN  
  
 SNF/Inpatient Rehab/LTAC Independent/assisted living Hospice Other: CDMP Checked:  
Yes x PROBLEM LIST Updated: 
Yes x Signed:  
Delma Farah NP 
6/23/2017 
5:05 PM 
 
Discharge Instructions Attachments/References HEART FAILURE: AVOIDING TRIGGERS (ENGLISH) Discharge Orders None Introducing [facility]! Kate Blood introduces "[employer] patient portal. Now you can access parts of your medical record, email your doctor's office, and request medication refills online. 1. In your internet browser, go to [email or web address] 2. Click on the First Time User? Click Here link in the Sign In box. You will see the New Member Sign Up page. 3. Enter your "[employer] Access Code exactly as it appears below. You will not need to use this code after youve completed the sign-up process. If you do not sign up before the expiration date, you must request a new code. · "[employer] Access Code: [email or web address] Expires: 8/4/2017  1:29 AM 
 
4. Enter the last four digits of your Social Security Number (xxxx) and Date of Birth (mm/dd/yyyy) as indicated and click Submit. You will be taken to the next sign-up page. 5. Create a "[employer] ID. This will be your "[employer] login ID and cannot be changed, so think of one that is secure and easy to remember. 6. Create a "[employer] password. You can change your password at any time. 7. Enter your Password Reset Question and Answer. This can be used at a later time if you forget your password. 8. Enter your e-mail address. You will receive e-mail notification when new information is available in [address] 9. Click Sign Up. You can now view and download portions of your medical record. 10. Click the Download Summary menu link to download a portable copy of your medical information. If you have questions, please visit the Frequently Asked Questions section of the "[employer] website. Remember, "[employer] is NOT to be used for urgent needs. For medical emergencies, dial 911. Now available from your iPhone and Android! General Information Please provide this summary of care documentation to your next provider. Patient Signature:  ____________________________________________________________ Date:  ____________________________________________________________  
  
Shraddha Burn Provider Signature:  ____________________________________________________________ Date:  ____________________________________________________________ More Information Avoiding Triggers With Heart Failure: Care Instructions Your Care Instructions Triggers are anything that make your heart failure flare up. A flare-up is also called \"sudden heart failure\" or \"acute heart failure. \" When you have a flare-up, fluid builds up in your lungs, and you have problems breathing. You might need to go to the hospital. By watching for changes in your condition and avoiding triggers, you can prevent heart failure flare-ups. Follow-up care is a key part of your treatment and safety. Be sure to make and go to all appointments, and call your doctor if you are having problems. It's also a good idea to know your test results and keep a list of the medicines you take. How can you care for yourself at home? Watch for changes in your weight and condition · Weigh yourself without clothing at the same time each day. Record your weight. Call your doctor if you gain 3 pounds or more in 2 to 3 days. A sudden weight gain may mean that your heart failure is getting worse. · Keep a daily record of your symptoms. Write down any changes in how you feel, such as new shortness of breath, cough, or problems eating. Also record if your ankles are more swollen than usual and if you have to urinate in the night more often. Note anything that you ate or did that could have triggered these changes. Limit sodium Sodium causes your body to hold on to extra water. This may cause your heart failure symptoms to get worse. People get most of their sodium from processed foods. Fast food and restaurant meals also tend to be very high in sodium.  
· Your doctor may suggest that you limit sodium to 2,000 milligrams (mg) a day or less. That is less than 1 teaspoon of salt a day, including all the salt you eat in cooking or in packaged foods. · Read food labels on cans and food packages. They tell you how much sodium you get in one serving. Check the serving size. If you eat more than one serving, you are getting more sodium. · Be aware that sodium can come in forms other than salt, including monosodium glutamate (MSG), sodium citrate, and sodium bicarbonate (baking soda). MSG is often added to Asian food. You can sometimes ask for food without MSG or salt. · Slowly reducing salt will help you adjust to the taste. Take the salt shaker off the table. · Flavor your food with garlic, lemon juice, onion, vinegar, herbs, and spices instead of salt. Do not use soy sauce, steak sauce, onion salt, garlic salt, mustard, or ketchup on your food, unless it is labeled \"low-sodium\" or \"low-salt. \" 
· Make your own salad dressings, sauces, and ketchup without adding salt. · Use fresh or frozen ingredients, instead of canned ones, whenever you can. Choose low-sodium canned goods. · Eat less processed food and food from restaurants, including fast food. Exercise as directed Moderate, regular exercise is very good for your heart. It improves your blood flow and helps control your weight. But too much exercise can stress your heart and cause a heart failure flare-up. · Check with your doctor before you start an exercise program. 
· Walking is an easy way to get exercise. Start out slowly. Gradually increase the length and pace of your walk. Swimming, riding a bike, and using a treadmill are also good forms of exercise. · When you exercise, watch for signs that your heart is working too hard. You are pushing yourself too hard if you cannot talk while you are exercising. If you become short of breath or dizzy or have chest pain, stop, sit down, and rest. 
· Do not exercise when you do not feel well. Take medicines correctly · Take your medicines exactly as prescribed. Call your doctor if you think you are having a problem with your medicine. · Make a list of all the medicines you take. Include those prescribed to you by other doctors and any over-the-counter medicines, vitamins, or supplements you take. Take this list with you when you go to any doctor. · Take your medicines at the same time every day. It may help you to post a list of all the medicines you take every day and what time of day you take them. · Make taking your medicine as simple as you can. Plan times to take your medicines when you are doing other things, such as eating a meal or getting ready for bed. This will make it easier to remember to take your medicines. · Get organized. Use helpful tools, such as daily or weekly pill containers. When should you call for help? Call 911 if you have symptoms of sudden heart failure such as: 
· You have severe trouble breathing. · You cough up pink, foamy mucus. · You have a new irregular or rapid heartbeat. Call your doctor now or seek immediate medical care if: 
· You have new or increased shortness of breath. · You are dizzy or lightheaded, or you feel like you may faint. · You have sudden weight gain, such as 3 pounds or more in 2 to 3 days. · You have increased swelling in your legs, ankles, or feet. · You are suddenly so tired or weak that you cannot do your usual activities. Watch closely for changes in your health, and be sure to contact your doctor if you develop new symptoms. Where can you learn more? Go to http://kendy-kristina.info/. Enter G277 in the search box to learn more about \"Avoiding Triggers With Heart Failure: Care Instructions. \" Current as of: November 15, 2016 Content Version: 11.2 © 9873-7370 [employer].  Care instructions adapted under license by [employer] (which disclaims liability or warranty for this information). If you have questions about a medical condition or this instruction, always ask your healthcare professional. [place] [address] any warranty or liability for your use of this information.

## 2017-06-18 NOTE — ED TRIAGE NOTES
Pt wheels himself back to treatment area in his own wheelchair, he states that for the past couple of days he has had a cold and fever as well as feeling fatigued more than normal.  His spouse states that she gave him some Ibuprofen yesterday because he was hot to the touch. He states that he was constipated last night so he took something for it and this morning he had some diarrhea. Denies any N/V. He has had a sore throat with laryngitis for a week. He also passed 3-4 kidney stones last night.

## 2017-06-19 LAB
ANION GAP BLD CALC-SCNC: 7 MMOL/L (ref 5–15)
ATRIAL RATE: 75 BPM
BASOPHILS # BLD AUTO: 0.1 K/UL (ref 0–0.1)
BASOPHILS # BLD: 1 % (ref 0–1)
BUN SERPL-MCNC: 28 MG/DL (ref 6–20)
BUN/CREAT SERPL: 37 (ref 12–20)
CALCIUM SERPL-MCNC: 8.4 MG/DL (ref 8.5–10.1)
CALCULATED P AXIS, ECG09: 36 DEGREES
CALCULATED R AXIS, ECG10: 158 DEGREES
CALCULATED T AXIS, ECG11: 6 DEGREES
CHLORIDE SERPL-SCNC: 94 MMOL/L (ref 97–108)
CO2 SERPL-SCNC: 36 MMOL/L (ref 21–32)
CREAT SERPL-MCNC: 0.76 MG/DL (ref 0.7–1.3)
DIAGNOSIS, 93000: NORMAL
EOSINOPHIL # BLD: 0.2 K/UL (ref 0–0.4)
EOSINOPHIL NFR BLD: 2 % (ref 0–7)
ERYTHROCYTE [DISTWIDTH] IN BLOOD BY AUTOMATED COUNT: 14.1 % (ref 11.5–14.5)
GLUCOSE BLD STRIP.AUTO-MCNC: 118 MG/DL (ref 65–100)
GLUCOSE BLD STRIP.AUTO-MCNC: 187 MG/DL (ref 65–100)
GLUCOSE BLD STRIP.AUTO-MCNC: 227 MG/DL (ref 65–100)
GLUCOSE SERPL-MCNC: 139 MG/DL (ref 65–100)
HCT VFR BLD AUTO: 45 % (ref 36.6–50.3)
HGB BLD-MCNC: 15.6 G/DL (ref 12.1–17)
LACTATE SERPL-SCNC: 1 MMOL/L (ref 0.4–2)
LYMPHOCYTES # BLD AUTO: 26 % (ref 12–49)
LYMPHOCYTES # BLD: 2.2 K/UL (ref 0.8–3.5)
MCH RBC QN AUTO: 33.9 PG (ref 26–34)
MCHC RBC AUTO-ENTMCNC: 34.7 G/DL (ref 30–36.5)
MCV RBC AUTO: 97.8 FL (ref 80–99)
MONOCYTES # BLD: 0.8 K/UL (ref 0–1)
MONOCYTES NFR BLD AUTO: 9 % (ref 5–13)
NEUTS SEG # BLD: 5.4 K/UL (ref 1.8–8)
NEUTS SEG NFR BLD AUTO: 62 % (ref 32–75)
P-R INTERVAL, ECG05: 184 MS
PLATELET # BLD AUTO: 128 K/UL (ref 150–400)
POTASSIUM SERPL-SCNC: 4.1 MMOL/L (ref 3.5–5.1)
Q-T INTERVAL, ECG07: 450 MS
QRS DURATION, ECG06: 134 MS
QTC CALCULATION (BEZET), ECG08: 502 MS
RBC # BLD AUTO: 4.6 M/UL (ref 4.1–5.7)
SERVICE CMNT-IMP: ABNORMAL
SODIUM SERPL-SCNC: 137 MMOL/L (ref 136–145)
VENTRICULAR RATE, ECG03: 75 BPM
WBC # BLD AUTO: 8.6 K/UL (ref 4.1–11.1)

## 2017-06-19 PROCEDURE — 74011250636 HC RX REV CODE- 250/636: Performed by: FAMILY MEDICINE

## 2017-06-19 PROCEDURE — 84156 ASSAY OF PROTEIN URINE: CPT | Performed by: INTERNAL MEDICINE

## 2017-06-19 PROCEDURE — 82962 GLUCOSE BLOOD TEST: CPT

## 2017-06-19 PROCEDURE — 74011250636 HC RX REV CODE- 250/636: Performed by: INTERNAL MEDICINE

## 2017-06-19 PROCEDURE — 93306 TTE W/DOPPLER COMPLETE: CPT

## 2017-06-19 PROCEDURE — 83605 ASSAY OF LACTIC ACID: CPT | Performed by: FAMILY MEDICINE

## 2017-06-19 PROCEDURE — 36415 COLL VENOUS BLD VENIPUNCTURE: CPT | Performed by: FAMILY MEDICINE

## 2017-06-19 PROCEDURE — 85025 COMPLETE CBC W/AUTO DIFF WBC: CPT | Performed by: FAMILY MEDICINE

## 2017-06-19 PROCEDURE — 74011250637 HC RX REV CODE- 250/637: Performed by: FAMILY MEDICINE

## 2017-06-19 PROCEDURE — 77010033678 HC OXYGEN DAILY

## 2017-06-19 PROCEDURE — 74011000250 HC RX REV CODE- 250: Performed by: INTERNAL MEDICINE

## 2017-06-19 PROCEDURE — 80048 BASIC METABOLIC PNL TOTAL CA: CPT | Performed by: FAMILY MEDICINE

## 2017-06-19 PROCEDURE — 65660000000 HC RM CCU STEPDOWN

## 2017-06-19 PROCEDURE — 74011250637 HC RX REV CODE- 250/637: Performed by: INTERNAL MEDICINE

## 2017-06-19 RX ORDER — FUROSEMIDE 10 MG/ML
40 INJECTION INTRAMUSCULAR; INTRAVENOUS 2 TIMES DAILY
Status: DISCONTINUED | OUTPATIENT
Start: 2017-06-19 | End: 2017-06-23

## 2017-06-19 RX ORDER — SODIUM CHLORIDE 0.9 % (FLUSH) 0.9 %
5-10 SYRINGE (ML) INJECTION AS NEEDED
Status: DISCONTINUED | OUTPATIENT
Start: 2017-06-19 | End: 2017-06-23 | Stop reason: HOSPADM

## 2017-06-19 RX ORDER — SPIRONOLACTONE 25 MG/1
25 TABLET ORAL DAILY
Status: DISCONTINUED | OUTPATIENT
Start: 2017-06-19 | End: 2017-06-23 | Stop reason: HOSPADM

## 2017-06-19 RX ORDER — SODIUM CHLORIDE 0.9 % (FLUSH) 0.9 %
5-10 SYRINGE (ML) INJECTION EVERY 8 HOURS
Status: DISCONTINUED | OUTPATIENT
Start: 2017-06-19 | End: 2017-06-23 | Stop reason: HOSPADM

## 2017-06-19 RX ORDER — SODIUM CHLORIDE 0.9 % (FLUSH) 0.9 %
20 SYRINGE (ML) INJECTION
Status: COMPLETED | OUTPATIENT
Start: 2017-06-19 | End: 2017-06-19

## 2017-06-19 RX ADMIN — PANTOPRAZOLE SODIUM 40 MG: 40 TABLET, DELAYED RELEASE ORAL at 07:10

## 2017-06-19 RX ADMIN — SPIRONOLACTONE 25 MG: 25 TABLET, FILM COATED ORAL at 15:07

## 2017-06-19 RX ADMIN — PANTOPRAZOLE SODIUM 40 MG: 40 TABLET, DELAYED RELEASE ORAL at 16:41

## 2017-06-19 RX ADMIN — GLIMEPIRIDE 4 MG: 2 TABLET ORAL at 17:57

## 2017-06-19 RX ADMIN — GLIMEPIRIDE 4 MG: 2 TABLET ORAL at 00:10

## 2017-06-19 RX ADMIN — FUROSEMIDE 40 MG: 10 INJECTION, SOLUTION INTRAMUSCULAR; INTRAVENOUS at 17:57

## 2017-06-19 RX ADMIN — METOPROLOL TARTRATE 100 MG: 50 TABLET ORAL at 09:21

## 2017-06-19 RX ADMIN — FUROSEMIDE 40 MG: 10 INJECTION, SOLUTION INTRAMUSCULAR; INTRAVENOUS at 11:39

## 2017-06-19 RX ADMIN — METOPROLOL TARTRATE 100 MG: 50 TABLET ORAL at 17:57

## 2017-06-19 RX ADMIN — Medication 10 ML: at 07:10

## 2017-06-19 RX ADMIN — GLIMEPIRIDE 4 MG: 2 TABLET ORAL at 09:21

## 2017-06-19 RX ADMIN — Medication 20 ML: at 08:41

## 2017-06-19 RX ADMIN — GABAPENTIN 300 MG: 300 CAPSULE ORAL at 15:07

## 2017-06-19 RX ADMIN — GABAPENTIN 300 MG: 300 CAPSULE ORAL at 23:29

## 2017-06-19 RX ADMIN — GABAPENTIN 300 MG: 300 CAPSULE ORAL at 09:21

## 2017-06-19 RX ADMIN — PERFLUTREN 1.5 ML: 6.52 INJECTION, SUSPENSION INTRAVENOUS at 08:41

## 2017-06-19 RX ADMIN — ASPIRIN 81 MG CHEWABLE TABLET 81 MG: 81 TABLET CHEWABLE at 09:21

## 2017-06-19 RX ADMIN — Medication 10 ML: at 11:39

## 2017-06-19 NOTE — PROGRESS NOTES
Bedside shift change report given to [address] [place] [address] Street (oncoming nurse) by [facility] RN (offgoing nurse). Report included the following information SBAR, Kardex, Intake/Output, MAR and Recent Results.

## 2017-06-19 NOTE — PROGRESS NOTES
1600: Bedside shift change report given to Juan Long (oncoming nurse) by Estella Hodge RN (offgoing nurse). Report included the following information SBAR, Kardex, Intake/Output and MAR.     1630: Patient states he can't take aldactone because it has made his potassium critically high in the past. Notified Dr. Joao Roach, patient to continue with medication. Patient agrees.

## 2017-06-19 NOTE — H&P
[address] [facility] [place] [address]   HISTORY AND PHYSICAL       Name:  Annalise Crespo   MR#:  [id number]   :  1960   Account #:  [de-identified]        Date of Adm:  2017       CHIEF COMPLAINT: Shortness of breath, swelling, and fever. HISTORY OF PRESENT ILLNESS: This is a 65-year-old white male   with past medical history of coronary artery disease, COPD, type 2   diabetes mellitus, heart failure, hypertension, morbid obesity, right   bundle branch block, chronic hypoxic respiratory failure on home   oxygen, general debility, and gait abnormality. He presented as a   direct admission/transfer from [facility] [place] [facility] with reported   shortness of breath, swelling (edema), and fever. The history was   obtained from discussion with the patient, the patient's wife, as well as   review of his medical records. Per the collective reports, the patient   has chronic hypoxic respiratory failure. He is on 2.5 L oxygen via nasal   cannula at home as p.r.n. He notes that he had a fever this   morning. He also has, as noted, the onset of peripheral, as well as   scrotal, edema starting this past week, which he notes is of new onset. He notes that his last episode of this nature occurred approximately   \"10 years ago. \" The patient, however, is on Bumex, per my chart   review. He notes that he has worsening of shortness of breath, has   swelling of the scrotum, legs, and also started to have fluid weight   gain. The patient was last in the emergency department on 2017 with   reported diarrhea and shortness of breath. He had a chest x-ray, PA   and lateral, performed at that time that showed no acute disease.      Today he has not reported any chest pain, palpitations, abdominal   pain, nausea, vomiting, diarrhea, melena, dysuria, hematuria, calf pain,   dizziness, lightheadedness, focal weakness, new-onset numbness,   paresthesias, focal weakness, slurred speech, facial droop, headache,   neck pain, back pain, visual disturbance, other constitutional symptoms   mentioned. Workup at [facility] ER included labs showing   elevated potassium of 5.6, blood glucose of 417, and Pro-BNP of   1648. Regular insulin had been ordered per the ED MD, however, the patient   is stating adamantly that he has an ALLERGY TO INSULIN. As such,   the patient has not received any insulin treatments tonight. Later when   the patient arrived at [facility] for direct admission, I had a   long discussion with the patient and his wife. Initially orders had been   written for Humalog insulin sliding scale coverage. However, per   discussion with the nurse and then later with the patient, he refused   to take the medications. He gives a history that he was at another   hospital; while in that hospital, he had \"tunnel vision and blindness. \"   His wife notes he was discharged with Victoza. He notes that he had   some type of reaction (nonspecific). Later he relates that his doctor   placed him on insulin and when he went to take his medication he   started \"sweating. Unfortunately, as an outpatient, the patient has only   been on metformin and glimepiride and has refused to take insulin. After long discussion with the patient tonight, there are no reports that   spoke of any anaphylactic reaction such as throat/tongue swelling,   stridor, inability to swallow or breathe, angioedema, generalized   urticaria, or itching. At [facility] ER, the patient was given one dose of Lasix 40 mg IV. He   was then transferred to [facility] for further evaluation and treatments. The patient admits to having a dry cough; otherwise, has no other   symptoms than those mentioned. He notes he uses a motorized chair   at baseline due to some numbness (peripheral neuropathy). His only   request is that he wants to eat. PAST MEDICAL HISTORY    1. Coronary artery disease. 2. COPD.    3. Type 2 diabetes mellitus. 4. Heart failure, unspecified. 5. Hypertension. 6. Morbid obesity. 7. Right bundle branch block. 8. Chronic hypoxic respiratory failure on 2.5 liters oxygen by nasal   cannula at home. 9. Gait abnormality, uses a motorized chair. PAST SURGICAL HISTORY: Status post right leg surgery. MEDICATIONS       1. Aspirin 81 mg p.o. daily. 2. Bumex 2 mg 1/2 tablet p.o. daily. 3. Gabapentin 300 mg p.o. t.i.d.   4. glimepiride 4 mg p.o. b.i.d.    5. Metformin 1500 mg p.o. daily with breakfast.   6. Metformin 1000 mg p.o. daily in the evening. 7. Metoprolol 100 mg p.o. b.i.d.    8. Omeprazole 20 mg p.o. b.i.d. ALLERGIES:   1. CODEINE. 2. REGULAR INSULIN. 3. PENICILLIN. 4. VICTOZA. 5. MORPHINE. SOCIAL HISTORY: Former smoker. No reports of alcohol or illicit   drugs. . FAMILY HISTORY: Unknown in regard to heart attacks or strokes. REVIEW OF SYSTEMS: Eleven systems reviewed. Pertinent positives   as in HPI; otherwise, negative. PHYSICAL EXAMINATION   VITAL SIGNS: Temperature 98.8 degrees Fahrenheit, blood pressure   166/66, heart rate 78, respiratory rate 20, O2 saturation 95% on room   air. Recorded weight of 315 pounds (142.9 kg), height of 6 feet 0   inches tall. BMI of 42.7. GENERAL: Morbidly obese male in mild respiratory distress when   talking for long periods. PSYCHIATRIC: The patient is awake, alert, and oriented x3. Anxious. NEUROLOGIC: GCS of 15. Moves extremities x4 with generalized   weakness. Sensation grossly decreased in plantar surface of both feet   without slurred speech, facial droop. HEENT: Normocephalic, atraumatic. PERRLA is intact. Sclerae are   anicteric. Conjunctivae are clear. Nares are patent. Oropharynx clear. Tongue is midline, nonedematous. NECK: Supple, without lymphadenopathy, JVD, carotid bruits,   thyromegaly. LYMPH: Negative for cervical or supraclavicular adenopathy.    RESPIRATORY: Lungs show a few scattered wheezes bilaterally. CARDIOVASCULAR: Heart is regular rhythm, normal S1, S2, without   murmurs, rubs or gallops. GI: Abdomen soft, obese, nontender, nondistended. Normoactive   bowel sounds without rebound, guarding or rigidity. No auscultated   abdominal bruits. No pulsatile mass. BACK: No CVA tenderness. No step-off deformity. MUSCULOSKELETAL: No acute palpable bony deformity. Limited   range of motion, generalized weakness. Negative for calf tenderness. : Massive swelling/edema with involution of penis secondary to   edema. Blanco catheter is in place. VASCULAR: There are 2+ radial, 1+ dorsalis pedis pulses without   cyanosis or clubbing. There is nonpitting edema of the bilateral lower   extremities with extensive, chronic venous stasis discoloration of both   legs. SKIN: Warm and dry with erythema of the sacrum, buttock regions as   well as toes of feet with onychomycosis. LABORATORY DATA: I reviewed as follows: Sodium 133, potassium   5.6, chloride 95, CO2 of 33, BUN of 30, creatinine 1.19, glucose 417. Anion gap of 5. Calcium is 8.9. GFR greater than 60. Total bilirubin 0.4,   total protein 6.7, albumin is 2.6, ALT 50, AST of 28, alkaline phosphate   79. Pro-BNP of 1648. (Noted labs were reviewed from [facility]   ER). Lactic acid 2.3. CK total 50, CK-. Troponin I of less than   0.04. Hemoglobin A1c of 8.3, magnesium of 1.7. ABG: The pH is 7.358,   pCO2 of 51.3, pCO2 of 46, bicarbonate of 28.9, base excess of 3,   SaO2 of 79%. CHEST X-RAY: The portable chest x-ray shows small, bilateral   effusion with underlying atelectasis. ELECTROCARDIOGRAM: A 12-lead EKG shows normal sinus   rhythm, right bundle branch block, at 75 beats per minute. IMPRESSION AND PLAN    1. Acute congestive heart failure--unspecified: Admit the patient to   telemetry. Place on strict I's and O's, daily weights. Continue with Lasix   for IV diuresis. The patient has been on Bumex at home.  Needs   adjustment of medications with presentation of worsening   edema/congestive heart failure. Will order 2D echocardiogram, consult   with cardiology in the morning for other recommendations and   treatment. 2. Hyperkalemia: We ordered stat BNP to be performed now, so we   can reevaluate and order treatments if needed. In the interim, continue   the patient with telemetry and monitor closely. 3. Type 2 diabetes mellitus with hyperglycemia: Unfortunately, the   patient is adamant having an INSULIN ALLERGY. Based on the   history he provides, it does not sound like he had an actual allergic   reaction to insulin. As the patient initially refused insulin administration,   I had ordered metformin and glimepiride to be started, which are his   home meds, to which he has not had any allergic reaction. Unfortunately, lactic acid level has now resulted as 2.3, as metformin   can cause lactic acidosis. I discontinued the metformin. The patient   may continue to take a glimepiride tonight. Blood glucose level has   decreased to 276. The patient is repeatedly asking to eat. I have   informed the patient that I am trying to obtain better control of his blood   glucose level and that eating a dinner tonight will only worsen that   situation. In the interim, the patient may have a sugar-free diet, carb-  consistent, oral intake. Overall, the patient's management is being   complicated by noted allergy history and nonadherence to diabetic diet   and now in lactic acidosis preventing use of even the patient's home   medications to try to control his blood glucose levels. He will have to   be monitored closely. I have asked for the patient's cooperation while   we try to obtain better glucose control. He has elevated hemoglobin   A1c level of 8.3. Will recheck his blood glucose levels tonight. I think   the patient should be okay. He should be able to be restarted back on   insulin.  Would like to make sure we have control of circumstances once this is started, in case the patient has any questionable allergic   reactions, that we will have adequate support staff and monitoring. Recheck blood glucose after having glimepiride stat dose now. 4. Acute on chronic hypoxic respiratory failure. Continue with plan of   care as noted. His O2 levels are stable at current. As such, continue   on oxygen and continue pulse telemetry. Also treat with DuoNebs   nebulizer treatments p.r.n. chronic obstructive pulmonary   disease/wheezing. 5. Bilateral pleural effusions: Continue workup as noted above. 6. Bilateral lower extremity and scrotal edema: Continue with Bumex. Keep legs elevated while at rest. Patient on strict I's and O's. Monitor   fluid status closely. 7. Chronic obstructive pulmonary disease: Plan as noted above. 8. Morbid obesity: I have advised the patient on carb-  consistent, diabetic, low-calorie diet. Consult with nutrition services to   meet with patient tomorrow. Once the patient is more stable, I strongly   recommend diet, exercise, and weight reduction. 9. Thrombocytopenia: Repeat platelet count. 10. Hypertension: Resume home medications. 11. Right bundle branch block: Chronic. Seen on prior   electrocardiograms. 12. Venous thromboembolism prophylaxis: Sequential compression   devices, bilateral lower extremities. MD Moreno Macedo / Catalina Anand   D:  06/18/2017   23:36   T:  06/19/2017   02:07   Job #:  [id number]

## 2017-06-19 NOTE — PROGRESS NOTES
Problem: Diabetes Self-Management  Goal: *Incorporating nutritional management into lifestyle  Describe effect of type, amount and timing of food on blood glucose; list 3 methods for planning meals. Outcome: Progressing Towards Goal  Explain blood sugar results and tx to pt. Pt limited to sugar free beverages and popsicles at this time. Goal: *Using medications safely  State effect of diabetes medications on diabetes; name diabetes medication taking, action and side effects. Outcome: Progressing Towards Goal  Explained to patient that he can not take glucophage due to elevated lactic acid. Goal: *Monitoring blood glucose, interpreting and using results  Identify recommended blood glucose targets and personal targets. Outcome: Progressing Towards Goal  Blood sugar monitored AC/HS    Problem: Falls - Risk of  Goal: *Absence of falls  Outcome: Progressing Towards Goal  Pt alert and oriented. Pt educated that he must stay in bed and call out to nurse for assistance.     Problem: Pressure Injury - Risk of  Goal: *Prevention of pressure ulcer  Outcome: Progressing Towards Goal  Skin on pressure points intact and blanchable

## 2017-06-19 NOTE — PROGRESS NOTES
Hospitalist Progress Note          Barrett Scott MD  Please call  and page for questions. Call physician on-call through the  7pm-7am    Daily Progress Note: 6/19/2017    Primary care provider:Mohamud Haynes MD    Date of admission: 6/18/2017  8:29 PM    Admission summery and hospital course:  74-year-old white male with past medical history of coronary artery disease, COPD, type 2 diabetes mellitus, heart failure, hypertension, morbid obesity, right bundle branch block, chronic hypoxic respiratory failure on home oxygen, general debility, and gait abnormality. He presented as a direct admission/transfer from [facility] ER with reported shortness of breath, swelling (edema), and fever. Patient was found to be hyperglycemic, CHF and hyperkalemia. Subjective:   Patient said she/he is feeling better today. Assessment/Plan:   Acute congestive heart failure, with preserve EF, Acute diastolic heart failure:    Patient said his SOB is improving but still has gross swelling at the scrotum. TTE with reserved EF. Strict I's and O's, daily weights. Continue with Lasix and  O2,      Acute on chronic hypoxic respiratory failure. With CHF and COPD. Continue with PRN O2 at home as per patient. Continue breathing treatment for now. Hyperkalemia:   Resolved. Continue the patient with telemetry and monitor closely. Type 2 diabetes mellitus with hyperglycemia:   Patient refused insulin citing an INSULIN ALLERGY. Blood sugar is improving with his current medications. Continue to hold metformin. With lactic acid of 2.3. Hemoglobin A1c level was 8.3. Bilateral pleural effusions: Mild, continue current management. Bilateral lower extremity and scrotal edema:   Scrotums still grossly swollen with penis not being able to visualized. Continue lasix for now and Leave [place] today. COPD: Plan as noted above. Morbid obesity: Counseling done. Thrombocytopenia: Mild and monitor. .   Hypertension: BP is reasonable, continue current regimen. RBBB, Chronic. Seen on prior electrocardiograms. See orders for other plans. VTE prophylaxis: SCD  Code status: Full  Discussed plan of care with Patient/Family and Nurse. Patient wife is at the bedside. Pre-admission lived at home. Discharge planning: pending. Review of Systems:     Review of Systems:  Symptom  Y/N  Comments   Symptom  Y/N  Comments    Fever/Chills  n    Chest Pain  n    Poor Appetite  n    Edema  y     Cough  n   Abdominal Pain  n     Sputum  n   Joint Pain  n    SOB/MYRICK  y   Pruritis/Rash      Nausea/vomit  n   Tolerating PT/OT      Diarrhea     Tolerating Diet      Constipation     Other      Could not obtain due to:         Objective:   Physical Exam:     Visit Vitals    /58 (BP 1 Location: Left arm, BP Patient Position: At rest)    Pulse 77    Temp 97.9 °F (36.6 °C)    Resp 13    Wt (!) 159.5 kg (351 lb 11.2 oz)    SpO2 95%    BMI 47.7 kg/m2    O2 Flow Rate (L/min): 2 l/min O2 Device: Nasal cannula    Temp (24hrs), Av.2 °F (36.8 °C), Min:97.7 °F (36.5 °C), Max:98.8 °F (37.1 °C)     07 - 1900  In: -   Out: 300 [Urine:300]   1901 -  0700  In: -   Out: 1400 [Urine:1400]      General:  Alert, cooperative, no distress, appears stated age. Lungs:   Clear to auscultation bilaterally. Chest wall:  No tenderness or deformity. Heart:  Regular rate and rhythm, S1, S2 normal, no murmur, click, rub or gallop. Abdomen:   Soft, non-tender. Grossly obese and gross swelling of the scrotum. Extremities: Extremities normal, atraumatic, no cyanosis. Edema at the legs. Pulses: 2+ and symmetric all extremities. Skin: Skin color, texture, turgor normal. No rashes or lesions   Neurologic: CNII-XII intact.       Data Review:       Recent Days:  Recent Labs      17   0509  17   2149  17   1611   WBC  8.6  8.0  9.9   HGB  15.6 14.2  15.2   HCT  45.0  42.3  45.2   PLT  128*  144*  170     Recent Labs      06/19/17   0509  06/18/17   2149  06/18/17   1611   NA  137  135*  133*   K  4.1  4.2  5.6*   CL  94*  93*  95*   CO2  36*  34*  33*   GLU  139*  248*  417*   BUN  28*  30*  30*   CREA  0.76  0.97  1.19   CA  8.4*  8.5  8.9   MG   --   1.7   --    ALB   --    --   2.6*   SGOT   --    --   28   ALT   --    --   50     No results for input(s): PH, PCO2, PO2, HCO3, FIO2 in the last 72 hours. 24 Hour Results:  Recent Results (from the past 24 hour(s))   POC G3 - PUL    Collection Time: 06/18/17  4:09 PM   Result Value Ref Range    FIO2 (POC) 21 %    pH (POC) 7.358 7.35 - 7.45      pCO2 (POC) 51.3 (H) 35.0 - 45.0 MMHG    pO2 (POC) 46 (LL) 80 - 100 MMHG    HCO3 (POC) 28.9 (H) 22 - 26 MMOL/L    sO2 (POC) 79 (L) 92 - 97 %    Base excess (POC) 3 mmol/L    Site RIGHT RADIAL      Device: ROOM AIR      Allens test (POC) YES      Specimen type (POC) ARTERIAL      Total resp. rate 31     CBC WITH AUTOMATED DIFF    Collection Time: 06/18/17  4:11 PM   Result Value Ref Range    WBC 9.9 4.1 - 11.1 K/uL    RBC 4.58 4.10 - 5.70 M/uL    HGB 15.2 12.1 - 17.0 g/dL    HCT 45.2 36.6 - 50.3 %    MCV 98.7 80.0 - 99.0 FL    MCH 33.2 26.0 - 34.0 PG    MCHC 33.6 30.0 - 36.5 g/dL    RDW 14.2 11.5 - 14.5 %    PLATELET 805 480 - 670 K/uL    NEUTROPHILS 74 32 - 75 %    LYMPHOCYTES 18 12 - 49 %    MONOCYTES 6 5 - 13 %    EOSINOPHILS 1 0 - 7 %    BASOPHILS 1 0 - 1 %    ABS. NEUTROPHILS 7.3 1.8 - 8.0 K/UL    ABS. LYMPHOCYTES 1.8 K/UL    ABS. MONOCYTES 0.6 0.0 - 1.0 K/UL    ABS. EOSINOPHILS 0.1 0.0 - 0.4 K/UL    ABS.  BASOPHILS 0.1 0.0 - 0.1 K/UL    DF AUTOMATED     METABOLIC PANEL, COMPREHENSIVE    Collection Time: 06/18/17  4:11 PM   Result Value Ref Range    Sodium 133 (L) 136 - 145 mmol/L    Potassium 5.6 (H) 3.5 - 5.1 mmol/L    Chloride 95 (L) 97 - 108 mmol/L    CO2 33 (H) 21 - 32 mmol/L    Anion gap 5 5 - 15 mmol/L    Glucose 417 (H) 65 - 100 mg/dL    BUN 30 (H) 6 - 20 MG/DL    Creatinine 1.19 0.70 - 1.30 MG/DL    BUN/Creatinine ratio 25 (H) 12 - 20      GFR est AA >60 >60 ml/min/1.73m2    GFR est non-AA >60 >60 ml/min/1.73m2    Calcium 8.9 8.5 - 10.1 MG/DL    Bilirubin, total 0.4 0.2 - 1.0 MG/DL    ALT (SGPT) 50 12 - 78 U/L    AST (SGOT) 28 15 - 37 U/L    Alk.  phosphatase 79 45 - 117 U/L    Protein, total 6.7 6.4 - 8.2 g/dL    Albumin 2.6 (L) 3.5 - 5.0 g/dL    Globulin 4.1 (H) 2.0 - 4.0 g/dL    A-G Ratio 0.6 (L) 1.1 - 2.2     PRO-BNP    Collection Time: 06/18/17  4:11 PM   Result Value Ref Range    NT pro-BNP 1648 (H) 0 - 125 PG/ML   EKG, 12 LEAD, INITIAL    Collection Time: 06/18/17  6:08 PM   Result Value Ref Range    Ventricular Rate 75 BPM    Atrial Rate 75 BPM    P-R Interval 184 ms    QRS Duration 134 ms    Q-T Interval 450 ms    QTC Calculation (Bezet) 502 ms    Calculated P Axis 36 degrees    Calculated R Axis 158 degrees    Calculated T Axis 6 degrees    Diagnosis       Normal sinus rhythm  Indeterminate axis  Right bundle branch block  Abnormal ECG  When compared with ECG of 17-NOV-2016 22:07,  aberrant conduction is no longer present  Criteria for Lateral infarct are no longer present     POC TROPONIN-I    Collection Time: 06/18/17  6:16 PM   Result Value Ref Range    Troponin-I (POC) <0.04 0.00 - 0.08 ng/mL   POC CHEM8    Collection Time: 06/18/17  6:21 PM   Result Value Ref Range    Calcium, ionized (POC) 1.12 1.12 - 1.32 MMOL/L    Sodium (POC) 135 (L) 136 - 145 MMOL/L    Potassium (POC) 4.6 3.5 - 5.1 MMOL/L    Chloride (POC) 90 (L) 98 - 107 MMOL/L    CO2 (POC) 36 (H) 21 - 32 MMOL/L    Anion gap (POC) 15 5 - 15 mmol/L    Glucose (POC) 330 (H) 65 - 100 MG/DL    BUN (POC) 36 (H) 9 - 20 MG/DL    Creatinine (POC) 1.0 0.6 - 1.3 MG/DL    GFR-AA (POC) >60 >60 ml/min/1.73m2    GFR, non-AA (POC) >60 >60 ml/min/1.73m2    Hemoglobin (POC) 15.3 12.1 - 17.0 GM/DL    Hematocrit (POC) 45 36.6 - 50.3 %    Comment Notified RN or MD immediately by      CBC WITH AUTOMATED DIFF    Collection Time: 06/18/17  9:49 PM   Result Value Ref Range    WBC 8.0 4.1 - 11.1 K/uL    RBC 4.37 4.10 - 5.70 M/uL    HGB 14.2 12.1 - 17.0 g/dL    HCT 42.3 36.6 - 50.3 %    MCV 96.8 80.0 - 99.0 FL    MCH 32.5 26.0 - 34.0 PG    MCHC 33.6 30.0 - 36.5 g/dL    RDW 13.7 11.5 - 14.5 %    PLATELET 264 (L) 108 - 400 K/uL    NEUTROPHILS 66 32 - 75 %    LYMPHOCYTES 25 12 - 49 %    MONOCYTES 7 5 - 13 %    EOSINOPHILS 2 0 - 7 %    BASOPHILS 0 0 - 1 %    ABS. NEUTROPHILS 5.3 1.8 - 8.0 K/UL    ABS. LYMPHOCYTES 2.0 0.8 - 3.5 K/UL    ABS. MONOCYTES 0.5 0.0 - 1.0 K/UL    ABS. EOSINOPHILS 0.2 0.0 - 0.4 K/UL    ABS.  BASOPHILS 0.0 0.0 - 0.1 K/UL   LACTIC ACID, PLASMA    Collection Time: 06/18/17  9:49 PM   Result Value Ref Range    Lactic acid 2.3 (HH) 0.4 - 2.0 MMOL/L   MAGNESIUM    Collection Time: 06/18/17  9:49 PM   Result Value Ref Range    Magnesium 1.7 1.6 - 2.4 mg/dL   CK W/ CKMB & INDEX    Collection Time: 06/18/17  9:49 PM   Result Value Ref Range    CK 50 39 - 308 U/L    CK - MB 1.9 <3.6 NG/ML    CK-MB Index 3.8 (H) 0 - 2.5     TROPONIN I    Collection Time: 06/18/17  9:49 PM   Result Value Ref Range    Troponin-I, Qt. <0.04 <0.05 ng/mL   HEMOGLOBIN A1C WITH EAG    Collection Time: 06/18/17  9:49 PM   Result Value Ref Range    Hemoglobin A1c 8.3 (H) 4.2 - 6.3 %    Est. average glucose 769 mg/dL   METABOLIC PANEL, BASIC    Collection Time: 06/18/17  9:49 PM   Result Value Ref Range    Sodium 135 (L) 136 - 145 mmol/L    Potassium 4.2 3.5 - 5.1 mmol/L    Chloride 93 (L) 97 - 108 mmol/L    CO2 34 (H) 21 - 32 mmol/L    Anion gap 8 5 - 15 mmol/L    Glucose 248 (H) 65 - 100 mg/dL    BUN 30 (H) 6 - 20 MG/DL    Creatinine 0.97 0.70 - 1.30 MG/DL    BUN/Creatinine ratio 31 (H) 12 - 20      GFR est AA >60 >60 ml/min/1.73m2    GFR est non-AA >60 >60 ml/min/1.73m2    Calcium 8.5 8.5 - 10.1 MG/DL   GLUCOSE, POC    Collection Time: 06/18/17 10:02 PM   Result Value Ref Range    Glucose (POC) 276 (H) 65 - 100 mg/dL    Performed by [facility]    METABOLIC PANEL, BASIC    Collection Time: 06/19/17  5:09 AM   Result Value Ref Range    Sodium 137 136 - 145 mmol/L    Potassium 4.1 3.5 - 5.1 mmol/L    Chloride 94 (L) 97 - 108 mmol/L    CO2 36 (H) 21 - 32 mmol/L    Anion gap 7 5 - 15 mmol/L    Glucose 139 (H) 65 - 100 mg/dL    BUN 28 (H) 6 - 20 MG/DL    Creatinine 0.76 0.70 - 1.30 MG/DL    BUN/Creatinine ratio 37 (H) 12 - 20      GFR est AA >60 >60 ml/min/1.73m2    GFR est non-AA >60 >60 ml/min/1.73m2    Calcium 8.4 (L) 8.5 - 10.1 MG/DL   CBC WITH AUTOMATED DIFF    Collection Time: 06/19/17  5:09 AM   Result Value Ref Range    WBC 8.6 4.1 - 11.1 K/uL    RBC 4.60 4.10 - 5.70 M/uL    HGB 15.6 12.1 - 17.0 g/dL    HCT 45.0 36.6 - 50.3 %    MCV 97.8 80.0 - 99.0 FL    MCH 33.9 26.0 - 34.0 PG    MCHC 34.7 30.0 - 36.5 g/dL    RDW 14.1 11.5 - 14.5 %    PLATELET 477 (L) 959 - 400 K/uL    NEUTROPHILS 62 32 - 75 %    LYMPHOCYTES 26 12 - 49 %    MONOCYTES 9 5 - 13 %    EOSINOPHILS 2 0 - 7 %    BASOPHILS 1 0 - 1 %    ABS. NEUTROPHILS 5.4 1.8 - 8.0 K/UL    ABS. LYMPHOCYTES 2.2 0.8 - 3.5 K/UL    ABS. MONOCYTES 0.8 0.0 - 1.0 K/UL    ABS. EOSINOPHILS 0.2 0.0 - 0.4 K/UL    ABS.  BASOPHILS 0.1 0.0 - 0.1 K/UL   LACTIC ACID, PLASMA    Collection Time: 06/19/17  5:30 AM   Result Value Ref Range    Lactic acid 1.0 0.4 - 2.0 MMOL/L   GLUCOSE, POC    Collection Time: 06/19/17  7:22 AM   Result Value Ref Range    Glucose (POC) 118 (H) 65 - 100 mg/dL    Performed by Nayeli Rocha        Problem List:  Problem List as of 6/19/2017  Date Reviewed: 6/18/2017          Codes Class Noted - Resolved    Acute hyperkalemia ICD-10-CM: E87.5  ICD-9-CM: 276.7  6/18/2017 - Present        * (Principal)Acute CHF (congestive heart failure) ([facility] ICD-10-CM: I50.9  ICD-9-CM: 428.0  6/18/2017 - Present        Type 2 diabetes mellitus with hyperglycemia ([facility] ICD-10-CM: E11.65  ICD-9-CM: 250.00  6/18/2017 - Present              Medications reviewed  Current Facility-Administered Medications   Medication Dose Route Frequency    sodium chloride (NS) flush 5-10 mL  5-10 mL IntraVENous Q8H    sodium chloride (NS) flush 5-10 mL  5-10 mL IntraVENous PRN    glucose chewable tablet 16 g  4 Tab Oral PRN    glucagon (GLUCAGEN) injection 1 mg  1 mg IntraMUSCular PRN    dextrose 10 % infusion 125-250 mL  125-250 mL IntraVENous PRN    albuterol-ipratropium (DUO-NEB) 2.5 MG-0.5 MG/3 ML  3 mL Nebulization Q4H PRN    gabapentin (NEURONTIN) capsule 300 mg  300 mg Oral TID    glimepiride (AMARYL) tablet 4 mg  4 mg Oral BID    metoprolol tartrate (LOPRESSOR) tablet 100 mg  100 mg Oral BID    aspirin chewable tablet 81 mg  81 mg Oral DAILY    pantoprazole (PROTONIX) tablet 40 mg  40 mg Oral ACB&D       Care Plan discussed with: Patient/Family and Nurse    Total time spent with patient: 45 minutes.     Rito Velásquez MD

## 2017-06-19 NOTE — ROUTINE PROCESS
TRANSFER - IN REPORT:    Verbal report received from Susan(name) on Ghassan Stone  being received from [facility] [place] [place]) (unit) for routine progression of care      Report consisted of patients Situation, Background, Assessment and   Recommendations(SBAR). Information from the following report(s) SBAR, Intake/Output, MAR, Recent Results and Cardiac Rhythm nsr was reviewed with the receiving nurse. Opportunity for questions and clarification was provided. Assessment completed upon patients arrival to unit and care assumed.        Primary Nurse Radha Gutierrez and Melissa Pichardo RN performed a dual skin assessment on this patient No impairment noted

## 2017-06-19 NOTE — PROGRESS NOTES
CM reviewed chart. Patient was admitted from the [facility] for SOB, swelling, fever, hyperglycemic, CHF and hyperkalemia. He has significant medical history of coronary artery disease, COPD, type 2 diabetes mellitus, heart failure, hypertension, morbid obesity, right bundle branch block, chronic hypoxic respiratory failure on home oxygen, general debility, and gait abnormality. CM met with patient and wife, explained role and discussed discharge planning. Patient lives with his wife in their private residence. His wife assists him with his personal care needs. He is wheelchair bound and requires home oxygen at 21/2 L via [place] as needed. Patient sees his PCP as needed and uses their local [facility] pharmacy in [place], [place] for his prescriptions. His wife will provide transportation home. Patient is on disability. Patient and his wife did not voice any discharge barriers. Disposition plan is for patient to be discharged home in care of his wife. CM will continue to follow for any discharge needs. Elizabeth Gaffney MSA, RN,CRM. Care Management Interventions  PCP Verified by CM: Yes (Dr. Cristine Garrison)  Mode of Transport at Discharge:  Other (see comment) (Private car)  Transition of Care Consult (CM Consult): Discharge Planning  MyChart Signup: No  Discharge Durable Medical Equipment: No  Health Maintenance Reviewed: Yes  Physical Therapy Consult: No  Occupational Therapy Consult: No  Speech Therapy Consult: No  Current Support Network: Lives with Spouse  Confirm Follow Up Transport: Family  Plan discussed with Pt/Family/Caregiver: Yes  Discharge Location  Discharge Placement: Home with home health

## 2017-06-19 NOTE — CONSULTS
Cardiology Consult Note    Pt seen and examined. Have placed call to PCP  Full note dictated    Imp:  Anasarca   Diastolic heart failure. No history of CAD  Chronic respiratory failure secondary to COPD and ?[facility] ? DM  HTN  Chronic debility  Frequent falls    Rec:  Continue diuresis and add spironolactone  Eval right heart pressures-not obtained today  24 hour urine for creat and protein  Obtain more info from PCP    This is a 62 yo male with prior history of resp arrest 6 years ago at which time he stopped smoking and was put on O2. He denies history of CAD or arrhythmias. He has had progressive anasarca over the last month or so. States his breathing is now at baseline. No complaints of chest pain, palpitations or dizziness but frequent falls.      Kraig Ta MD

## 2017-06-19 NOTE — NURSE NAVIGATOR
Chart reviewed by Heart Failure Nurse Navigator. Heart Failure database completed. EF not documented on echo report, \"systolic function normal\"; Dr. Almaz Schafer notes \"preserved EF, acute diastolic HF\". ACEi/ARB: not currently indicated. BB: not currently indicated (on lopressor). CRT not currently indicated. NYHA Functional Class documentation requested via Provider Message on [place] [address]. Heart Failure Teach Back in Patient Education. Heart Failure Avoiding Triggers on Discharge Instructions. Cardiologist:  Dr. Kamran Taylor, ([facility]), consulted.

## 2017-06-19 NOTE — DIABETES MGMT
DTC Consult Note    Recommendations/ Comments:  No recommendations at this time. Will continue to follow. Consult received for:  [x]             Assessment of home management                   Chart reviewed and initial evaluation complete on [email or web address]. Patient is a 61 y.o. male with hx Type 2 Diabetes (30 years since diagnosis) on oral agents (dual therapy): glimepiride (Amaryl), metformin (generic)   at home. Pt was accompanied by wife and cousin. 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 Pt state to eat 2 meals a day. B: 2 eggs, 2 biscuits, tomato,coffee with Splenda and Lactaid  Lunch: skips lunch or would eat same as breakfast.  Dinner: [place]  [employer] [facility] sandwich on light [employer] bread (~10 grms of carb per slice), regular coke. Likes to eat cake, pies and cookies. Pt states to be allergic to insulin, he does not remember which type of insulin it was (? Regular), experiences tunnel vision for a few days after administration of insulin. Talked to patient about potential of needing insulin in the future and may be able to tolerate other types of insulin. Pt does not check BS at home. Has a meter that belonged to a friend. Provided pt with new meter (One Touch Verio Flex) and encouraged to check bs twice a day. Assessed and instructed patient on the following:   ·  interpretation of lab results, blood sugar goals, V7M target, complications of diabetes mellitus, hypoglycemia prevention and treatment, nutrition, importance of regular follow up with PCP and referred to Diabetes Educator (offered group classes or individual sessions).     Encouraged the following:   · dietary modifications: eat 3 meals a day, avoid concentrated sweets, add protein and vegetables with every meal (at least lunch and dinner), regular blood sugar monitorin times daily, follow up with PCP    Provided patient with the following: [x] Survival skills education materials               []             Insulin education materials               []             CHO counting education materials               [x]             Outpatient DTC contact number               [x]             Glucometer - One Touch Verio Flex                    Discussed with patient and family need for follow up appointment for diabetes management after discharge. A1c:   Lab Results   Component Value Date/Time    Hemoglobin A1c 8.3 06/18/2017 09:49 PM       Recent Glucose Results: Lab Results   Component Value Date/Time     (H) 06/19/2017 05:09 AM     (H) 06/18/2017 09:49 PM    GLUCPOC 187 (H) 06/19/2017 11:10 AM    GLUCPOC 118 (H) 06/19/2017 07:22 AM    GLUCPOC 276 (H) 06/18/2017 10:02 PM        Lab Results   Component Value Date/Time    Creatinine 0.76 06/19/2017 05:09 AM     Estimated Creatinine Clearance: 167.5 mL/min (based on Cr of 0.76). Active Orders   Diet    DIET DIABETIC CONSISTENT CARB Regular        PO intake: Patient Vitals for the past 72 hrs:   % Diet Eaten   06/19/17 0925 90 %       Current hospital DM medication: Glimepiride 4 mg BID     Will continue to follow as needed. Thank you.     Wilfredo Higgins RD

## 2017-06-19 NOTE — CONSULTS
[address] [place] [address]       Name:  Libby Wood   MR#:  [id number]   :  1960   Account #:  [de-identified]    Date of Consultation:  2017   Date of Adm:  2017       REASON FOR CONSULTATION: Heart failure. The patient was seen and examined, and the chart reviewed. HISTORY OF PRESENT ILLNESS: The patient is a 34-year-old male   who presented to outside emergency center and then transferred here   with increasing edema throughout. He has a reported history of heart   failure and coronary disease, though he denies specifically the latter. He also has COPD with hypoxic respiratory failure on home O2. He   has been feeling fairly well and had been stable up until about a month   prior to his admission. He began to develop slowly progressive edema   with associated shortness of breath and nonproductive cough. He also   reports a fever prior to his admission. He states his normal weight is   311 pounds, and he weighs 351 currently. He has swelling of his   scrotum, legs and arms as well as bloating in his abdomen. He   specifically denies any chest pain, palpitations or dizziness. No   localizing neurologic symptoms. No localized source of his fever. Fever not documented here either. Other past medical history notable   for hypertension and diabetes. ALLERGIES: HE HAS REPORTED ALLERGIES TO:    1. CODEINE. 2. INSULIN. 3. PENICILLIN. 4. VICTOZA. 5. MORPHINE. MEDICATIONS PRIOR TO ADMISSION INCLUDED:   1. Baby aspirin 1 a day. 2. Bumex 1 mg daily. 3. Gabapentin 300 t.i.d.   4. Glimepiride 4 mg b.i.d.   5. Metformin 1500 mg in the morning, 1000 in the evening. 6. Metoprolol 100 mg b.i.d.   7. Omeprazole 20 mg a day. 8. He reports that he was also recently placed on Reglan. I do not   know the dosage. 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 Former   smoker, quit 6 years ago.  Does not drink.    FAMILY HISTORY: Only remote family member with a pacemaker. REVIEW OF SYSTEMS   Most notable for the fact that he has frequent falls. Typically when he is   transferring out of a scooter into a vehicle or back into the home. As   noted above, he denies any cardiac symptoms. PHYSICAL EXAMINATION   GENERAL: On exam, his weight is 351. VITAL SIGNS: Blood pressure 151/53. He is afebrile and his pulse is   64 and regular, O2 saturation is 98% on 2 liters of O2. HEENT: Unremarkable. NECK: No JVD is appreciated. No carotid bruits. LUNGS: Fairly clear with a few crackles at the left base. No wheezing   is noted. CARDIAC: Reveals a regular rate and rhythm with normal S1 and S2,   no murmur or gallop. ABDOMEN: Soft, nontender. No bruits or arterial pulsations noted. EXTREMITIES: Show chronic skin changes of both lower extremities. There is intact pulses. He has very mild edema. His skin otherwise just   shows some flaking over his bilateral lower extremities. NEUROLOGIC: He is alert and oriented x3 and nonfocal. Mood and   affect are appropriate. MUSCULOSKELETAL: Gait was not assessed. LABORATORY DATA: Of note include a sodium of 133, his potassium   was minimally elevated on admission, but is normal currently. Creatinine is 0.76. His glucose was 139 today. His ProBNP was 1648. His albumin was 2.6 and troponin was normal. Hemoglobin A1c 8.3. His lactic acid was elevated at 2.3. Chest x-ray shows small bilateral effusions. EKG: Shows normal sinus rhythm with a right bundle branch block. Echocardiogram shows grossly normal LV function. RV systolic   pressure cannot be estimated. No significant valve abnormalities seen. IMPRESSION: The patient is admitted with anasarca. It seems hard to   believe this is all related to diastolic heart failure. Suspect some other   factors are contributing. Perhaps has low albumin state as well.  Not   sure what his prior to admission fever was about, but there has been   no evidence of any infectious process since he has been here   including a normal white count and differential. I agree with treatment   with diuretics. Would favor the addition of spironolactone if he can   handle this. Also would like to see a 24-hour urine done to assess his   degree of proteinuria, which may be contributing significantly to his   state. I also think a conversation with his primary care doctor may be   revealing given his past history, more than what he is able to provide   us. It is also of note that the patient had recently been started on both   Reglan and Gabapentin before he started to retain fluid. Further plans   pending those results. I appreciate the opportunity to assist you in his care.         Josh Louise MD CB / TIFFANIE   D:  06/19/2017   14:52   T:  06/19/2017   15:27   Job #:  [id number]

## 2017-06-19 NOTE — PROGRESS NOTES
Bedside shift change report given to Luz Cruz (oncoming nurse) by Keith Maldonado (offgoing nurse). Report included the following information SBAR, Procedure Summary, Intake/Output, MAR and Cardiac Rhythm NSR with Left BBB.

## 2017-06-20 LAB
ANION GAP BLD CALC-SCNC: 6 MMOL/L (ref 5–15)
BASOPHILS # BLD AUTO: 0 K/UL (ref 0–0.1)
BASOPHILS # BLD: 0 % (ref 0–1)
BUN SERPL-MCNC: 28 MG/DL (ref 6–20)
BUN/CREAT SERPL: 31 (ref 12–20)
CALCIUM SERPL-MCNC: 8.3 MG/DL (ref 8.5–10.1)
CHLORIDE SERPL-SCNC: 92 MMOL/L (ref 97–108)
CO2 SERPL-SCNC: 37 MMOL/L (ref 21–32)
COLLECT DURATION TIME UR: 24 HR
CREAT SERPL-MCNC: 0.91 MG/DL (ref 0.7–1.3)
EOSINOPHIL # BLD: 0.2 K/UL (ref 0–0.4)
EOSINOPHIL NFR BLD: 2 % (ref 0–7)
ERYTHROCYTE [DISTWIDTH] IN BLOOD BY AUTOMATED COUNT: 13.9 % (ref 11.5–14.5)
GLUCOSE BLD STRIP.AUTO-MCNC: 181 MG/DL (ref 65–100)
GLUCOSE BLD STRIP.AUTO-MCNC: 279 MG/DL (ref 65–100)
GLUCOSE BLD STRIP.AUTO-MCNC: 315 MG/DL (ref 65–100)
GLUCOSE BLD STRIP.AUTO-MCNC: 334 MG/DL (ref 65–100)
GLUCOSE SERPL-MCNC: 206 MG/DL (ref 65–100)
HCT VFR BLD AUTO: 42.7 % (ref 36.6–50.3)
HGB BLD-MCNC: 13.9 G/DL (ref 12.1–17)
LYMPHOCYTES # BLD AUTO: 17 % (ref 12–49)
LYMPHOCYTES # BLD: 1.7 K/UL (ref 0.8–3.5)
MCH RBC QN AUTO: 32.3 PG (ref 26–34)
MCHC RBC AUTO-ENTMCNC: 32.6 G/DL (ref 30–36.5)
MCV RBC AUTO: 99.3 FL (ref 80–99)
MONOCYTES # BLD: 1 K/UL (ref 0–1)
MONOCYTES NFR BLD AUTO: 11 % (ref 5–13)
NEUTS SEG # BLD: 6.9 K/UL (ref 1.8–8)
NEUTS SEG NFR BLD AUTO: 70 % (ref 32–75)
PLATELET # BLD AUTO: 147 K/UL (ref 150–400)
POTASSIUM SERPL-SCNC: 3.9 MMOL/L (ref 3.5–5.1)
PROT 24H UR-MRATE: 3846 MG/24HR
RBC # BLD AUTO: 4.3 M/UL (ref 4.1–5.7)
SERVICE CMNT-IMP: ABNORMAL
SODIUM SERPL-SCNC: 135 MMOL/L (ref 136–145)
SPECIMEN VOL ?TM UR: 4525 ML
WBC # BLD AUTO: 9.8 K/UL (ref 4.1–11.1)

## 2017-06-20 PROCEDURE — 74011250637 HC RX REV CODE- 250/637: Performed by: FAMILY MEDICINE

## 2017-06-20 PROCEDURE — 82962 GLUCOSE BLOOD TEST: CPT

## 2017-06-20 PROCEDURE — 80048 BASIC METABOLIC PNL TOTAL CA: CPT | Performed by: FAMILY MEDICINE

## 2017-06-20 PROCEDURE — 36415 COLL VENOUS BLD VENIPUNCTURE: CPT | Performed by: FAMILY MEDICINE

## 2017-06-20 PROCEDURE — 74011250636 HC RX REV CODE- 250/636: Performed by: FAMILY MEDICINE

## 2017-06-20 PROCEDURE — 65660000000 HC RM CCU STEPDOWN

## 2017-06-20 PROCEDURE — 85025 COMPLETE CBC W/AUTO DIFF WBC: CPT | Performed by: FAMILY MEDICINE

## 2017-06-20 PROCEDURE — 74011250637 HC RX REV CODE- 250/637: Performed by: NURSE PRACTITIONER

## 2017-06-20 PROCEDURE — 74011250637 HC RX REV CODE- 250/637: Performed by: INTERNAL MEDICINE

## 2017-06-20 RX ORDER — DILTIAZEM HYDROCHLORIDE 120 MG/1
120 CAPSULE, COATED, EXTENDED RELEASE ORAL DAILY
Status: DISCONTINUED | OUTPATIENT
Start: 2017-06-20 | End: 2017-06-20

## 2017-06-20 RX ORDER — FAMOTIDINE 20 MG/1
20 TABLET, FILM COATED ORAL 2 TIMES DAILY
Status: DISCONTINUED | OUTPATIENT
Start: 2017-06-20 | End: 2017-06-23 | Stop reason: HOSPADM

## 2017-06-20 RX ORDER — GABAPENTIN 100 MG/1
100 CAPSULE ORAL 3 TIMES DAILY
Status: DISCONTINUED | OUTPATIENT
Start: 2017-06-20 | End: 2017-06-23 | Stop reason: HOSPADM

## 2017-06-20 RX ADMIN — PANTOPRAZOLE SODIUM 40 MG: 40 TABLET, DELAYED RELEASE ORAL at 07:22

## 2017-06-20 RX ADMIN — SPIRONOLACTONE 25 MG: 25 TABLET, FILM COATED ORAL at 08:30

## 2017-06-20 RX ADMIN — FAMOTIDINE 20 MG: 20 TABLET ORAL at 22:12

## 2017-06-20 RX ADMIN — FUROSEMIDE 40 MG: 10 INJECTION, SOLUTION INTRAMUSCULAR; INTRAVENOUS at 17:02

## 2017-06-20 RX ADMIN — FUROSEMIDE 40 MG: 10 INJECTION, SOLUTION INTRAMUSCULAR; INTRAVENOUS at 08:29

## 2017-06-20 RX ADMIN — FAMOTIDINE 20 MG: 20 TABLET ORAL at 00:39

## 2017-06-20 RX ADMIN — FAMOTIDINE 20 MG: 20 TABLET ORAL at 12:25

## 2017-06-20 RX ADMIN — GABAPENTIN 100 MG: 100 CAPSULE ORAL at 16:25

## 2017-06-20 RX ADMIN — GLIMEPIRIDE 4 MG: 2 TABLET ORAL at 17:02

## 2017-06-20 RX ADMIN — PANTOPRAZOLE SODIUM 40 MG: 40 TABLET, DELAYED RELEASE ORAL at 16:25

## 2017-06-20 RX ADMIN — GABAPENTIN 300 MG: 300 CAPSULE ORAL at 08:30

## 2017-06-20 RX ADMIN — ASPIRIN 81 MG CHEWABLE TABLET 81 MG: 81 TABLET CHEWABLE at 08:30

## 2017-06-20 RX ADMIN — METOPROLOL TARTRATE 100 MG: 50 TABLET ORAL at 17:02

## 2017-06-20 RX ADMIN — Medication 10 ML: at 22:08

## 2017-06-20 RX ADMIN — METOPROLOL TARTRATE 100 MG: 50 TABLET ORAL at 08:29

## 2017-06-20 RX ADMIN — GLIMEPIRIDE 4 MG: 2 TABLET ORAL at 08:30

## 2017-06-20 NOTE — PROGRESS NOTES
Hospitalist Progress Note          Diana Strong MD  Please call  and page for questions. Call physician on-call through the  7pm-7am    Daily Progress Note: 6/20/2017    Primary care provider:Mohamud Haynes MD    Date of admission: 6/18/2017  8:29 PM    Admission summery and hospital course:  58-year-old white male with past medical history of coronary artery disease, COPD, type 2 diabetes mellitus, heart failure, hypertension, morbid obesity, right bundle branch block, chronic hypoxic respiratory failure on home oxygen, general debility, and gait abnormality. He presented as a direct admission/transfer from [facility] ER with reported shortness of breath, swelling (edema), and fever. Patient was found to be hyperglycemic, CHF and hyperkalemia. Subjective:   Patient said he is very confused after starting Neurontin yesterday. Patient could not sleep after he received gabapentin. Assessment/Plan:   Acute diastolic congestive heart failure:    Patient said his SOB is improving. Still has gross swelling at the scrotum. TTE with preserved EF. Strict I's and O's, daily weights. Continue with Lasix and O2 and spironolactone added this AM.   When I brought up about the Cardizem, patient and wife said he was on the medication before, but it was discontinued after repeated fall while on it. I will stop it for now and monitor. May need to start ACEi or ARB though there is good evidence for the diastolic HF.       Acute on chronic hypoxic respiratory failure: With CHF and COPD. Continue with PRN O2 at home as per patient. Continue breathing treatment for now.    Hyperkalemia:   Resolved. Continue the patient with telemetry and monitor closely.    Type 2 diabetes mellitus with hyperglycemia:   Patient refused insulin citing an INSULIN ALLERGY. Blood sugar is elevated, continue Amaryl on max dose and follow. Continue to hold metformin for now.   Hemoglobin A1c level was 8. 3.    Bilateral pleural effusions: Mild, continue current management.    Bilateral lower extremity and scrotal edema:   Scrotums still grossly swollen with penis not being able to visualized. Continue lasix for now and Leave Blanco today. Neuropathic pain: Patient is not tolerating the 300 mg of Neurontin well, which was initiated few days ago by PCP, I will decrease it to 100 and follow.      COPD: Plan as noted above. Morbid obesity: Counseling done. Thrombocytopenia: Mild and monitor. .   Hypertension: BP is reasonable, continue current regimen. RBBB, Chronic. Seen on prior electrocardiograms.      See orders for other plans. VTE prophylaxis: SCD  Code status: Full  Discussed plan of care with Patient/Family and Nurse. Patient wife is at the bedside. Pre-admission lived at home. Discharge planning: pending       Review of Systems:     Review of Systems:  Symptom  Y/N  Comments   Symptom  Y/N  Comments    Fever/Chills  n    Chest Pain  n    Poor Appetite  n    Edema  n     Cough  n   Abdominal Pain  n     Sputum  n   Joint Pain  n    SOB/MYRICK  n   Pruritis/Rash      Nausea/vomit  n   Tolerating PT/OT      Diarrhea     Tolerating Diet      Constipation     Other      Could not obtain due to:         Objective:   Physical Exam:     Visit Vitals    /46 (BP 1 Location: Left arm, BP Patient Position: At rest)    Pulse 65    Temp 97 °F (36.1 °C)    Resp 18    Ht 6' 0.01\" (1.829 m)    Wt 151.9 kg (334 lb 12.8 oz)    SpO2 93%    BMI 45.4 kg/m2    O2 Flow Rate (L/min): 3 l/min O2 Device: Room air    Temp (24hrs), Av.3 °F (36.8 °C), Min:97 °F (36.1 °C), Max:98.7 °F (37.1 °C)    701 - 1900  In: -   Out: 5139 [PPHRR:2490]   1901 -  0700  In: 480 [P.O.:480]  Out: 4761 [Urine:6125]    General:  Alert, cooperative, no distress, appears stated age. Lungs:  Clear to auscultation bilaterally. Chest wall:  No tenderness or deformity.    Heart:  Regular rate and rhythm, S1, S2 normal, no murmur, click, rub or gallop. Abdomen:  Soft, non-tender. Grossly obese and gross swelling of the scrotum. Extremities: Extremities normal, atraumatic, no cyanosis. Edema at the legs. Pulses: 2+ and symmetric all extremities. Skin: Skin color, texture, turgor normal. No rashes or lesions   Neurologic: CNII-XII intact. Data Review:       Recent Days:  Recent Labs      06/20/17 0317 06/19/17 0509 06/18/17 2149   WBC  9.8  8.6  8.0   HGB  13.9  15.6  14.2   HCT  42.7  45.0  42.3   PLT  147*  128*  144*     Recent Labs      06/20/17 0317 06/19/17 0509 06/18/17 2149 06/18/17   1611   NA  135*  137  135*  133*   K  3.9  4.1  4.2  5.6*   CL  92*  94*  93*  95*   CO2  37*  36*  34*  33*   GLU  206*  139*  248*  417*   BUN  28*  28*  30*  30*   CREA  0.91  0.76  0.97  1.19   CA  8.3*  8.4*  8.5  8.9   MG   --    --   1.7   --    ALB   --    --    --   2.6*   SGOT   --    --    --   28   ALT   --    --    --   50     No results for input(s): PH, PCO2, PO2, HCO3, FIO2 in the last 72 hours. 24 Hour Results:  Recent Results (from the past 24 hour(s))   GLUCOSE, POC    Collection Time: 06/19/17  4:33 PM   Result Value Ref Range    Glucose (POC) 227 (H) 65 - 100 mg/dL    Performed by [facility] DANY(CON)    METABOLIC PANEL, BASIC    Collection Time: 06/20/17  3:17 AM   Result Value Ref Range    Sodium 135 (L) 136 - 145 mmol/L    Potassium 3.9 3.5 - 5.1 mmol/L    Chloride 92 (L) 97 - 108 mmol/L    CO2 37 (H) 21 - 32 mmol/L    Anion gap 6 5 - 15 mmol/L    Glucose 206 (H) 65 - 100 mg/dL    BUN 28 (H) 6 - 20 MG/DL    Creatinine 0.91 0.70 - 1.30 MG/DL    BUN/Creatinine ratio 31 (H) 12 - 20      GFR est AA >60 >60 ml/min/1.73m2    GFR est non-AA >60 >60 ml/min/1.73m2    Calcium 8.3 (L) 8.5 - 10.1 MG/DL   CBC WITH AUTOMATED DIFF    Collection Time: 06/20/17  3:17 AM   Result Value Ref Range    WBC 9.8 4.1 - 11.1 K/uL    RBC 4.30 4. 10 - 5.70 M/uL    HGB 13.9 12.1 - 17.0 g/dL    HCT 42.7 36.6 - 50.3 %    MCV 99.3 (H) 80.0 - 99.0 FL    MCH 32.3 26.0 - 34.0 PG    MCHC 32.6 30.0 - 36.5 g/dL    RDW 13.9 11.5 - 14.5 %    PLATELET 926 (L) 065 - 400 K/uL    NEUTROPHILS 70 32 - 75 %    LYMPHOCYTES 17 12 - 49 %    MONOCYTES 11 5 - 13 %    EOSINOPHILS 2 0 - 7 %    BASOPHILS 0 0 - 1 %    ABS. NEUTROPHILS 6.9 1.8 - 8.0 K/UL    ABS. LYMPHOCYTES 1.7 0.8 - 3.5 K/UL    ABS. MONOCYTES 1.0 0.0 - 1.0 K/UL    ABS. EOSINOPHILS 0.2 0.0 - 0.4 K/UL    ABS.  BASOPHILS 0.0 0.0 - 0.1 K/UL   GLUCOSE, POC    Collection Time: 06/20/17  6:32 AM   Result Value Ref Range    Glucose (POC) 181 (H) 65 - 100 mg/dL    Performed by Rudy Santana    GLUCOSE, POC    Collection Time: 06/20/17 10:59 AM   Result Value Ref Range    Glucose (POC) 279 (H) 65 - 100 mg/dL    Performed by Leeanna STAFFORD(CON)        Problem List:  Problem List as of 6/20/2017  Date Reviewed: 6/18/2017          Codes Class Noted - Resolved    Acute hyperkalemia ICD-10-CM: E87.5  ICD-9-CM: 276.7  6/18/2017 - Present        * (Principal)Acute CHF (congestive heart failure) ([facility] ICD-10-CM: I50.9  ICD-9-CM: 428.0  6/18/2017 - Present        Type 2 diabetes mellitus with hyperglycemia ([facility] ICD-10-CM: E11.65  ICD-9-CM: 250.00  6/18/2017 - Present              Medications reviewed  Current Facility-Administered Medications   Medication Dose Route Frequency    famotidine (PEPCID) tablet 20 mg  20 mg Oral BID    sodium chloride (NS) flush 5-10 mL  5-10 mL IntraVENous Q8H    sodium chloride (NS) flush 5-10 mL  5-10 mL IntraVENous PRN    furosemide (LASIX) injection 40 mg  40 mg IntraVENous BID    spironolactone (ALDACTONE) tablet 25 mg  25 mg Oral DAILY    glucose chewable tablet 16 g  4 Tab Oral PRN    glucagon (GLUCAGEN) injection 1 mg  1 mg IntraMUSCular PRN    dextrose 10 % infusion 125-250 mL  125-250 mL IntraVENous PRN    albuterol-ipratropium (DUO-NEB) 2.5 MG-0.5 MG/3 ML  3 mL Nebulization Q4H PRN    gabapentin (NEURONTIN) capsule 300 mg  300 mg Oral TID    glimepiride (AMARYL) tablet 4 mg  4 mg Oral BID    metoprolol tartrate (LOPRESSOR) tablet 100 mg  100 mg Oral BID    aspirin chewable tablet 81 mg  81 mg Oral DAILY    pantoprazole (PROTONIX) tablet 40 mg  40 mg Oral ACB&D       Care Plan discussed with: Patient/Family and Nurse    Total time spent with patient: 30 minutes.     Calvin Lin MD

## 2017-06-20 NOTE — PROGRESS NOTES
Problem: Falls - Risk of  Goal: *Absence of falls  Outcome: Progressing Towards Goal  Pt. hasnt had any falls since hospitalization. Gripper socks on, call bell within reach, bed rails up 3x   Goal: *Knowledge of fall prevention  Outcome: Progressing Towards Goal  Pt. Calls out appropriately     Problem: Pressure Injury - Risk of  Goal: *Prevention of pressure ulcer  Outcome: Progressing Towards Goal  Pt. Repositions self frequently in bed     Problem: Heart Failure: Day 1  Goal: Activity/Safety  Outcome: Progressing Towards Goal  Pt. Confined to motorized scooter   Goal: Nutrition/Diet  Outcome: Progressing Towards Goal  Pt. Tolerating diabetic diet without nausea   Goal: Respiratory  Outcome: Progressing Towards Goal  Pt on 3L NC with O2 saturation at 94%  Goal: *Hemodynamically stable  Outcome: Progressing Towards Goal  VS stable   Goal: *Optimal pain control at patients stated goal  Outcome: Not Progressing Towards Goal  Pt.  Has no complaints of pain

## 2017-06-20 NOTE — PROGRESS NOTES
Bedside shift change report given to Radha Brown (oncoming nurse) by Alyse Lui (offgoing nurse). Report included the following information SBAR, Intake/Output, MAR, Recent Results and Cardiac Rhythm NSR.

## 2017-06-20 NOTE — PROGRESS NOTES
Problem: Diabetes Self-Management  Goal: *Disease process and treatment process  Define diabetes and identify own type of diabetes; list 3 options for treating diabetes. Outcome: Progressing Towards Goal  Blood glucose checked AC/HS, results and tx discussed with pt. Diabetes care team consulted. Problem: Falls - Risk of  Goal: *Absence of falls  Outcome: Progressing Towards Goal  Pt alert and oriented. Call bell and belongings within reach. Wife at bedside. Problem: Pressure Injury - Risk of  Goal: *Prevention of pressure ulcer  Outcome: Progressing Towards Goal  Pt turns self in bed. Skin cdi and blanchable at this time.     Problem: Heart Failure: Day 2  Goal: Diagnostic Test/Procedures  Outcome: Progressing Towards Goal  24 hr urine catch  Goal: Medications  Outcome: Progressing Towards Goal  Pt to diurese with lasix and spironalactone

## 2017-06-20 NOTE — CARDIO/PULMONARY
Cardiac Wellness: Heart Failure education folder was at the bedside of Damon Lerner. Nat Avoiding Hidden Sodium In Foods education added to folder. His family is present and involved in the education. Educated using teach back method. Discussed diagnosis definition and assessed patient understanding. Reviewed importance of daily weight monitoring and low sodium diet (less than 1500 mg. Daily). Damon Lerner uses a motorized wheelchair and is unable to stand for weight checks. Discussed close monitoring of signs of increased swelling and prompt notification with his physician. Damon Lerner does admit that he likes and uses salt liberally and this was discouraged by educator and family members. Encouraged activity and rest periods within symptom limitations and as ordered by physician. Reviewed lasix, purpose of medication, potential side effects and what to do if dose is missed. Discussed importance of reporting signs and symptoms of exacerbation and when to report them to the doctor to prevent re-hospitalization. Damon Lerner was encouraged to keep all appointments with doctor. Discussed ability to obtain prescription meds and encouraged conversations with physician if unable to do so. Smoking history assessed. Pt is a former smoker. HF teach back questions answered by patient. The pt. has DHF which is not covered by insurance for the Cardiac Wellness Program but limited mobility is present as well.     Grady Black RN

## 2017-06-20 NOTE — PROGRESS NOTES
Cardiology Progress Note    Pt reports he is feeling better    Imp:  HFpEF-acute on chronic  Anasarca  COPD with underlying resp insufficiency; ? OHS? Hx sleep apnea-intolerant of cpap  DM  Morbid obesity  HTN    Rec:  Con't spironolactone  Complete 24 hour urine collection; umana can come out once this is completed  Trial of cardizem for BP and diastolic HF  ? replace gabapentin if edema uncontrolled? Will retry to get a TR velocity    Pt reluctant to take spironolactone as he has had hyperkalemia in past. I explained we would watch his K while here. Spoke with PCP's office-sending notes; not much additional information gleaned. Blood pressure 154/52, pulse 76, temperature 98.7 °F (37.1 °C), resp. rate 18, weight 151.9 kg (334 lb 12.8 oz), SpO2 95 %.   UO -4200  Lungs with decreased BS  Cor reg with DALE  Mild LE edema and edema of hands    Labs: Cr 0.91; K 3.9    Cathy Mckinney MD

## 2017-06-20 NOTE — PROGRESS NOTES
4:30 PM Made Dr. Evert Montoya aware of pt. Blood glucose of 334. Per MD pt. Doesn't want to take insulin but is taking Glimepiride 4 mg twice a day. MD says to make pt. Aware of BG and give evening dose of Glimepiride.

## 2017-06-20 NOTE — PROGRESS NOTES
Bedside shift change report given to Mac (oncoming nurse) by Starr Harding (offgoing nurse). Report included the following information SBAR, OR Summary, Intake/Output, MAR and Cardiac Rhythm NSR.

## 2017-06-20 NOTE — PROGRESS NOTES
Contacted by RN    Pt complaining of upset stomach this evening. He reported to nurse that he takes reglan and pepcid at home in addition to prilosec. He is currently on protonix (substitution for prilosec). The other two meds are not on his PTA med list. Will add pepcid to start now but will need to confirm frequency and dose of reglan with his pharmacy before ordering.

## 2017-06-21 LAB
ANION GAP BLD CALC-SCNC: 6 MMOL/L (ref 5–15)
BASOPHILS # BLD AUTO: 0 K/UL (ref 0–0.1)
BASOPHILS # BLD: 0 % (ref 0–1)
BUN SERPL-MCNC: 29 MG/DL (ref 6–20)
BUN/CREAT SERPL: 34 (ref 12–20)
CALCIUM SERPL-MCNC: 8.7 MG/DL (ref 8.5–10.1)
CHLORIDE SERPL-SCNC: 93 MMOL/L (ref 97–108)
CO2 SERPL-SCNC: 34 MMOL/L (ref 21–32)
CREAT SERPL-MCNC: 0.85 MG/DL (ref 0.7–1.3)
CREAT UR-MCNC: 38.6 MG/DL
EOSINOPHIL # BLD: 0.2 K/UL (ref 0–0.4)
EOSINOPHIL NFR BLD: 2 % (ref 0–7)
ERYTHROCYTE [DISTWIDTH] IN BLOOD BY AUTOMATED COUNT: 14.1 % (ref 11.5–14.5)
GLUCOSE BLD STRIP.AUTO-MCNC: 236 MG/DL (ref 65–100)
GLUCOSE BLD STRIP.AUTO-MCNC: 254 MG/DL (ref 65–100)
GLUCOSE BLD STRIP.AUTO-MCNC: 277 MG/DL (ref 65–100)
GLUCOSE SERPL-MCNC: 155 MG/DL (ref 65–100)
HCT VFR BLD AUTO: 42.8 % (ref 36.6–50.3)
HGB BLD-MCNC: 14.1 G/DL (ref 12.1–17)
LYMPHOCYTES # BLD AUTO: 16 % (ref 12–49)
LYMPHOCYTES # BLD: 1.6 K/UL (ref 0.8–3.5)
MCH RBC QN AUTO: 32.4 PG (ref 26–34)
MCHC RBC AUTO-ENTMCNC: 32.9 G/DL (ref 30–36.5)
MCV RBC AUTO: 98.4 FL (ref 80–99)
MONOCYTES # BLD: 1.1 K/UL (ref 0–1)
MONOCYTES NFR BLD AUTO: 11 % (ref 5–13)
NEUTS SEG # BLD: 7 K/UL (ref 1.8–8)
NEUTS SEG NFR BLD AUTO: 71 % (ref 32–75)
PLATELET # BLD AUTO: 141 K/UL (ref 150–400)
POTASSIUM SERPL-SCNC: 4 MMOL/L (ref 3.5–5.1)
PROT UR-MCNC: 123 MG/DL (ref 0–11.9)
PROT/CREAT UR-RTO: 3.2
RBC # BLD AUTO: 4.35 M/UL (ref 4.1–5.7)
SERVICE CMNT-IMP: ABNORMAL
SODIUM SERPL-SCNC: 133 MMOL/L (ref 136–145)
WBC # BLD AUTO: 9.8 K/UL (ref 4.1–11.1)

## 2017-06-21 PROCEDURE — 84155 ASSAY OF PROTEIN SERUM: CPT | Performed by: INTERNAL MEDICINE

## 2017-06-21 PROCEDURE — 85025 COMPLETE CBC W/AUTO DIFF WBC: CPT | Performed by: FAMILY MEDICINE

## 2017-06-21 PROCEDURE — 65660000000 HC RM CCU STEPDOWN

## 2017-06-21 PROCEDURE — 74011250637 HC RX REV CODE- 250/637: Performed by: NURSE PRACTITIONER

## 2017-06-21 PROCEDURE — 87389 HIV-1 AG W/HIV-1&-2 AB AG IA: CPT | Performed by: INTERNAL MEDICINE

## 2017-06-21 PROCEDURE — 36415 COLL VENOUS BLD VENIPUNCTURE: CPT | Performed by: FAMILY MEDICINE

## 2017-06-21 PROCEDURE — 86038 ANTINUCLEAR ANTIBODIES: CPT | Performed by: INTERNAL MEDICINE

## 2017-06-21 PROCEDURE — 74011250637 HC RX REV CODE- 250/637: Performed by: INTERNAL MEDICINE

## 2017-06-21 PROCEDURE — 80048 BASIC METABOLIC PNL TOTAL CA: CPT | Performed by: FAMILY MEDICINE

## 2017-06-21 PROCEDURE — 84156 ASSAY OF PROTEIN URINE: CPT | Performed by: INTERNAL MEDICINE

## 2017-06-21 PROCEDURE — 82962 GLUCOSE BLOOD TEST: CPT

## 2017-06-21 PROCEDURE — 74011250637 HC RX REV CODE- 250/637: Performed by: FAMILY MEDICINE

## 2017-06-21 PROCEDURE — 82784 ASSAY IGA/IGD/IGG/IGM EACH: CPT | Performed by: INTERNAL MEDICINE

## 2017-06-21 PROCEDURE — 77010033678 HC OXYGEN DAILY

## 2017-06-21 PROCEDURE — 74011250636 HC RX REV CODE- 250/636: Performed by: FAMILY MEDICINE

## 2017-06-21 PROCEDURE — 80074 ACUTE HEPATITIS PANEL: CPT | Performed by: INTERNAL MEDICINE

## 2017-06-21 PROCEDURE — 74011250636 HC RX REV CODE- 250/636: Performed by: INTERNAL MEDICINE

## 2017-06-21 PROCEDURE — P9045 ALBUMIN (HUMAN), 5%, 250 ML: HCPCS | Performed by: INTERNAL MEDICINE

## 2017-06-21 RX ORDER — LISINOPRIL 10 MG/1
10 TABLET ORAL DAILY
Status: DISCONTINUED | OUTPATIENT
Start: 2017-06-21 | End: 2017-06-23 | Stop reason: HOSPADM

## 2017-06-21 RX ORDER — ACETAMINOPHEN 325 MG/1
650 TABLET ORAL
Status: DISCONTINUED | OUTPATIENT
Start: 2017-06-21 | End: 2017-06-23 | Stop reason: HOSPADM

## 2017-06-21 RX ORDER — ALBUMIN HUMAN 50 G/1000ML
25 SOLUTION INTRAVENOUS EVERY 6 HOURS
Status: COMPLETED | OUTPATIENT
Start: 2017-06-21 | End: 2017-06-22

## 2017-06-21 RX ORDER — HYDROCORTISONE 1 %
CREAM (GRAM) TOPICAL 2 TIMES DAILY
Status: DISCONTINUED | OUTPATIENT
Start: 2017-06-21 | End: 2017-06-23 | Stop reason: HOSPADM

## 2017-06-21 RX ORDER — ONDANSETRON 2 MG/ML
4 INJECTION INTRAMUSCULAR; INTRAVENOUS
Status: DISCONTINUED | OUTPATIENT
Start: 2017-06-21 | End: 2017-06-23 | Stop reason: HOSPADM

## 2017-06-21 RX ORDER — HYDRALAZINE HYDROCHLORIDE 10 MG/1
10 TABLET, FILM COATED ORAL 3 TIMES DAILY
Status: DISCONTINUED | OUTPATIENT
Start: 2017-06-21 | End: 2017-06-23 | Stop reason: HOSPADM

## 2017-06-21 RX ADMIN — GABAPENTIN 100 MG: 100 CAPSULE ORAL at 05:41

## 2017-06-21 RX ADMIN — FAMOTIDINE 20 MG: 20 TABLET ORAL at 13:44

## 2017-06-21 RX ADMIN — HYDROCORTISONE: 1 CREAM TOPICAL at 05:41

## 2017-06-21 RX ADMIN — ALBUMIN (HUMAN) 25 G: 12.5 INJECTION, SOLUTION INTRAVENOUS at 19:19

## 2017-06-21 RX ADMIN — PANTOPRAZOLE SODIUM 40 MG: 40 TABLET, DELAYED RELEASE ORAL at 17:41

## 2017-06-21 RX ADMIN — GLIMEPIRIDE 4 MG: 2 TABLET ORAL at 17:41

## 2017-06-21 RX ADMIN — GLIMEPIRIDE 4 MG: 2 TABLET ORAL at 08:45

## 2017-06-21 RX ADMIN — ACETAMINOPHEN 650 MG: 325 TABLET, FILM COATED ORAL at 11:48

## 2017-06-21 RX ADMIN — PANTOPRAZOLE SODIUM 40 MG: 40 TABLET, DELAYED RELEASE ORAL at 05:41

## 2017-06-21 RX ADMIN — ACETAMINOPHEN 650 MG: 325 TABLET, FILM COATED ORAL at 22:21

## 2017-06-21 RX ADMIN — HYDRALAZINE HYDROCHLORIDE 10 MG: 10 TABLET, FILM COATED ORAL at 17:41

## 2017-06-21 RX ADMIN — HYDROCORTISONE: 1 CREAM TOPICAL at 08:46

## 2017-06-21 RX ADMIN — HYDROCORTISONE: 1 CREAM TOPICAL at 17:42

## 2017-06-21 RX ADMIN — METOPROLOL TARTRATE 100 MG: 50 TABLET ORAL at 17:40

## 2017-06-21 RX ADMIN — ASPIRIN 81 MG CHEWABLE TABLET 81 MG: 81 TABLET CHEWABLE at 08:45

## 2017-06-21 RX ADMIN — GABAPENTIN 100 MG: 100 CAPSULE ORAL at 22:21

## 2017-06-21 RX ADMIN — LISINOPRIL 10 MG: 10 TABLET ORAL at 17:41

## 2017-06-21 RX ADMIN — FUROSEMIDE 40 MG: 10 INJECTION, SOLUTION INTRAMUSCULAR; INTRAVENOUS at 17:40

## 2017-06-21 RX ADMIN — Medication 10 ML: at 05:41

## 2017-06-21 RX ADMIN — GABAPENTIN 100 MG: 100 CAPSULE ORAL at 09:00

## 2017-06-21 RX ADMIN — METOPROLOL TARTRATE 100 MG: 50 TABLET ORAL at 08:45

## 2017-06-21 RX ADMIN — FUROSEMIDE 40 MG: 10 INJECTION, SOLUTION INTRAMUSCULAR; INTRAVENOUS at 08:44

## 2017-06-21 RX ADMIN — SPIRONOLACTONE 25 MG: 25 TABLET, FILM COATED ORAL at 08:45

## 2017-06-21 RX ADMIN — HYDRALAZINE HYDROCHLORIDE 10 MG: 10 TABLET, FILM COATED ORAL at 22:21

## 2017-06-21 RX ADMIN — ALBUMIN (HUMAN) 25 G: 12.5 INJECTION, SOLUTION INTRAVENOUS at 23:54

## 2017-06-21 RX ADMIN — GABAPENTIN 100 MG: 100 CAPSULE ORAL at 17:40

## 2017-06-21 NOTE — PROGRESS NOTES
Hospitalist Progress Note          Mirtha Marrufo MD  Please call  and page for questions. Call physician on-call through the  7pm-7am    Daily Progress Note: 6/21/2017    Primary care provider:Mohamud Haynes MD    Date of admission: 6/18/2017  8:29 PM    Admission summery and hospital course:  79-year-old white male with past medical history of coronary artery disease, COPD, type 2 diabetes mellitus, heart failure, hypertension, morbid obesity, right bundle branch block, chronic hypoxic respiratory failure on home oxygen, general debility, and gait abnormality. He presented as a direct admission/transfer from [facility] [place] by [facility] with reported shortness of breath, swelling (edema), and fever. Patient was found to be hyperglycemic, CHF and hyperkalemia.      Subjective:   Patient said she/he is feeling better today. Assessment/Plan:   Bilateral lower extremity and scrotal edema with bilateral pleural effusions:   Due to proteinuria, nephrotic range. Appreciated nephrology input. Albumin added. Work up as nephrology.     Continue lasix and spironolactone. Acute diastolic congestive heart failure:    Patient said his SOB is improving. Still has gross swelling at the scrotum. TTE with preserved EF. Strict I's and O's, daily weights. Continue with Lasix and O2 and spironolactone added this AM.   Did not wan to take Cardizem, due to repeated fall. Lisinopril and hydralazin added today. Discussed with Dr Florencio Lazo. Acute on chronic hypoxic respiratory failure: With CHF and COPD. Continue with PRN O2 at home as per patient. Continue breathing treatment for now.    Hyperkalemia:   Resolved. Continue the patient with telemetry and monitor closely.    Type 2 diabetes mellitus with hyperglycemia:   Patient refused insulin citing an INSULIN ALLERGY. Blood sugar is elevated, continue Amaryl on max dose and follow. Patient was recommended to watch is diet. Continue to hold metformin for now. Hemoglobin A1c level was 8. 3.    Neuropathic pain: Patient is could not 300 mg of Neurontin. Tolerating 100 mg now. Hypertension: BP is elevated, lisinopril and hydralazine added.     COPD: Plan as noted above. Morbid obesity: Counseling done. Thrombocytopenia: Mild and monitor. RBBB, Chronic. Seen on prior electrocardiograms. Diet; Mechanical soft diet due to absence of teeth.       See orders for other plans. VTE prophylaxis: SCD  Code status: Full  Discussed plan of care with Patient/Family and Nurse. Patient wife is at the bedside. Pre-admission lived at home. Discharge planning: pend       Review of Systems:     Review of Systems:  Symptom  Y/N  Comments   Symptom  Y/N  Comments    Fever/Chills   n   Chest Pain  n    Poor Appetite  n    Edema  y     Cough  n   Abdominal Pain  nn     Sputum  n   Joint Pain      SOB/MYRICK  n   Pruritis/Rash      Nausea/vomit     Tolerating PT/OT      Diarrhea     Tolerating Diet      Constipation     Other      Could not obtain due to:         Objective:   Physical Exam:     Visit Vitals    /85 (BP 1 Location: Left arm, BP Patient Position: At rest)    Pulse 72    Temp 99.5 °F (37.5 °C)    Resp 18    Ht 6' 0.01\" (1.829 m)    Wt 152.6 kg (336 lb 6.4 oz)    SpO2 94%    BMI 45.61 kg/m2    O2 Flow Rate (L/min): 3 l/min O2 Device: Nasal cannula    Temp (24hrs), Av.8 °F (37.1 °C), Min:98.4 °F (36.9 °C), Max:99.5 °F (37.5 °C)    701 -  1900  In: -   Out: 1300 [Urine:1300]   1901 -  0700  In: -   Out: 7361 [Urine:4375]    General:  Alert, cooperative, no distress, appears stated age. Lungs:  Clear to auscultation bilaterally. Chest wall:  No tenderness or deformity. Heart:  Regular rate and rhythm, S1, S2 normal, no murmur. Abdomen:  Soft, non-tender. Grossly obese and gross swelling of the scrotum. Extremities: Extremities normal, atraumatic, no cyanosis. Edema at the legs.    Pulses: 2+ and symmetric all extremities. Skin: Skin color, texture, turgor normal. No rashes or lesions   Neurologic: CNII-XII intact.           Data Review:       Recent Days:  Recent Labs      06/21/17   0402  06/20/17   0317  06/19/17   0509   WBC  9.8  9.8  8.6   HGB  14.1  13.9  15.6   HCT  42.8  42.7  45.0   PLT  141*  147*  128*     Recent Labs      06/21/17   0402  06/20/17   0317  06/19/17   0509  06/18/17   2149   NA  133*  135*  137  135*   K  4.0  3.9  4.1  4.2   CL  93*  92*  94*  93*   CO2  34*  37*  36*  34*   GLU  155*  206*  139*  248*   BUN  29*  28*  28*  30*   CREA  0.85  0.91  0.76  0.97   CA  8.7  8.3*  8.4*  8.5   MG   --    --    --   1.7     No results for input(s): PH, PCO2, PO2, HCO3, FIO2 in the last 72 hours. 24 Hour Results:  Recent Results (from the past 24 hour(s))   GLUCOSE, POC    Collection Time: 06/20/17  9:35 PM   Result Value Ref Range    Glucose (POC) 315 (H) 65 - 100 mg/dL    Performed by Martin Lpoes    METABOLIC PANEL, BASIC    Collection Time: 06/21/17  4:02 AM   Result Value Ref Range    Sodium 133 (L) 136 - 145 mmol/L    Potassium 4.0 3.5 - 5.1 mmol/L    Chloride 93 (L) 97 - 108 mmol/L    CO2 34 (H) 21 - 32 mmol/L    Anion gap 6 5 - 15 mmol/L    Glucose 155 (H) 65 - 100 mg/dL    BUN 29 (H) 6 - 20 MG/DL    Creatinine 0.85 0.70 - 1.30 MG/DL    BUN/Creatinine ratio 34 (H) 12 - 20      GFR est AA >60 >60 ml/min/1.73m2    GFR est non-AA >60 >60 ml/min/1.73m2    Calcium 8.7 8.5 - 10.1 MG/DL   CBC WITH AUTOMATED DIFF    Collection Time: 06/21/17  4:02 AM   Result Value Ref Range    WBC 9.8 4.1 - 11.1 K/uL    RBC 4.35 4.10 - 5.70 M/uL    HGB 14.1 12.1 - 17.0 g/dL    HCT 42.8 36.6 - 50.3 %    MCV 98.4 80.0 - 99.0 FL    MCH 32.4 26.0 - 34.0 PG    MCHC 32.9 30.0 - 36.5 g/dL    RDW 14.1 11.5 - 14.5 %    PLATELET 783 (L) 005 - 400 K/uL    NEUTROPHILS 71 32 - 75 %    LYMPHOCYTES 16 12 - 49 %    MONOCYTES 11 5 - 13 %    EOSINOPHILS 2 0 - 7 %    BASOPHILS 0 0 - 1 %    ABS.  NEUTROPHILS 7.0 1.8 - 8.0 K/UL    ABS. LYMPHOCYTES 1.6 0.8 - 3.5 K/UL    ABS. MONOCYTES 1.1 (H) 0.0 - 1.0 K/UL    ABS. EOSINOPHILS 0.2 0.0 - 0.4 K/UL    ABS.  BASOPHILS 0.0 0.0 - 0.1 K/UL   GLUCOSE, POC    Collection Time: 06/21/17 11:41 AM   Result Value Ref Range    Glucose (POC) 254 (H) 65 - 100 mg/dL    Performed by Carol STAFFORD(CON)    GLUCOSE, POC    Collection Time: 06/21/17  4:12 PM   Result Value Ref Range    Glucose (POC) 277 (H) 65 - 100 mg/dL    Performed by Heather Quigley        Problem List:  Problem List as of 6/21/2017  Date Reviewed: 6/18/2017          Codes Class Noted - Resolved    Acute hyperkalemia ICD-10-CM: E87.5  ICD-9-CM: 276.7  6/18/2017 - Present        * (Principal)Acute CHF (congestive heart failure) ([facility] ICD-10-CM: I50.9  ICD-9-CM: 428.0  6/18/2017 - Present        Type 2 diabetes mellitus with hyperglycemia (HCC) ICD-10-CM: E11.65  ICD-9-CM: 250.00  6/18/2017 - Present              Medications reviewed  Current Facility-Administered Medications   Medication Dose Route Frequency    hydrocortisone (CORTAID) 1 % cream   Topical BID    acetaminophen (TYLENOL) tablet 650 mg  650 mg Oral Q4H PRN    hydrALAZINE (APRESOLINE) tablet 10 mg  10 mg Oral TID    albumin human 5% (BUMINATE) solution 25 g  25 g IntraVENous Q6H    lisinopril (PRINIVIL, ZESTRIL) tablet 10 mg  10 mg Oral DAILY    famotidine (PEPCID) tablet 20 mg  20 mg Oral BID    gabapentin (NEURONTIN) capsule 100 mg  100 mg Oral TID    sodium chloride (NS) flush 5-10 mL  5-10 mL IntraVENous Q8H    sodium chloride (NS) flush 5-10 mL  5-10 mL IntraVENous PRN    furosemide (LASIX) injection 40 mg  40 mg IntraVENous BID    spironolactone (ALDACTONE) tablet 25 mg  25 mg Oral DAILY    glucose chewable tablet 16 g  4 Tab Oral PRN    glucagon (GLUCAGEN) injection 1 mg  1 mg IntraMUSCular PRN    dextrose 10 % infusion 125-250 mL  125-250 mL IntraVENous PRN    albuterol-ipratropium (DUO-NEB) 2.5 MG-0.5 MG/3 ML  3 mL Nebulization Q4H PRN    glimepiride (AMARYL) tablet 4 mg  4 mg Oral BID    metoprolol tartrate (LOPRESSOR) tablet 100 mg  100 mg Oral BID    aspirin chewable tablet 81 mg  81 mg Oral DAILY    pantoprazole (PROTONIX) tablet 40 mg  40 mg Oral ACB&D       Care Plan discussed with: Patient/Family and Nurse    Total time spent with patient: 30 minutes.     Darnell Tavarez MD

## 2017-06-21 NOTE — PROGRESS NOTES
2100 - educated pt regarding high blood sugars. Pt refuses insulin. Bedside shift change report given to Abdirizak Summers (oncoming nurse) by Jaycob Perera (offgoing nurse). Report included the following information SBAR, Kardex, ED Summary, Procedure Summary, Intake/Output, MAR, Recent Results and Cardiac Rhythm NSR.

## 2017-06-21 NOTE — PROGRESS NOTES
Cardiology Progress Note    Pt reports he is breathing better. Imp:  HFpEF-acute on chronic  Anasarca  Nephrotic range proteinuria  COPD with underlying resp insufficiency; ?OHS? Hx sleep apnea-intolerant of cpap  DM  Morbid obesity  HTN    Rec:  Trial of hydralazine for improved BP control-refused cardizem and did not tolerate ACEI  Renal consult re his proteinuria (though his diabetes is unlikely to be controlled without insulin)  Con't spironolactone for now. Con't diuresis    Blood pressure 153/56, pulse 66, temperature 98.8 °F (37.1 °C), resp. rate 18, height 6' 0.01\" (1.829 m), weight 152.6 kg (336 lb 6.4 oz), SpO2 94 %.   UO - 3075  Lungs with few scattered wheezes  Cor reg  Ext with mild edema    Labs: 24 hr protein-3846mg/24 hr  K 4.0    Catherene Pallas, MD

## 2017-06-21 NOTE — PROGRESS NOTES
Problem: Falls - Risk of  Goal: *Absence of falls  Outcome: Progressing Towards Goal  Pt. hasnt had any falls since hospitalization. Bed rails up 3x, gripper socks on, call bell within reach   Goal: *Knowledge of fall prevention  Outcome: Progressing Towards Goal  Pt. Calls out appropriately     Problem: Heart Failure: Day 2  Goal: Activity/Safety  Outcome: Progressing Towards Goal  Pt. Is chair fast at baseline   Goal: Nutrition/Diet  Outcome: Progressing Towards Goal  Pt. Was switched to a dental soft diet   Goal: Respiratory  Outcome: Progressing Towards Goal  VS stable. MD ordered Tylenol for pain   Goal: *Oxygen saturation within defined limits  Outcome: Progressing Towards Goal  Pt.  On 3L NC with O2 saturation at 95%

## 2017-06-21 NOTE — PROGRESS NOTES
Bedside shift change report given to [address] [place] [address],[address] (oncoming nurse) by Jaiden Lynne (offgoing nurse). Report included the following information SBAR, Intake/Output, MAR, Recent Results and Cardiac Rhythm NSR.

## 2017-06-21 NOTE — PROGRESS NOTES
56 Spoke with Dr. Jacques Ortiz about pt. Pain. MD order tylenol. Pt. States \"Gabapentin makes me confused and mean. \" MD made aware.

## 2017-06-21 NOTE — DIABETES MGMT
DTC Progress Note    Recommendations/ Comments: If appropriate, please consider adding [employer]pro [employer] insulin with normal sensitivity to help address Hyperglycemia. Bg >300 today and no coverage. Chart reviewed on Apolinar Segura. Patient is a 61 y.o. male with hx Type 2 Diabetes (30 years since diagnosis) on oral agents (dual therapy): glimepiride (Amaryl), metformin (generic) at home. A1c:   Lab Results   Component Value Date/Time    Hemoglobin A1c 8.3 06/18/2017 09:49 PM       Recent Glucose Results: Lab Results   Component Value Date/Time     (H) 06/21/2017 04:02 AM    GLUCPOC 254 (H) 06/21/2017 11:41 AM    GLUCPOC 315 (H) 06/20/2017 09:35 PM    GLUCPOC 334 (H) 06/20/2017 04:23 PM        Lab Results   Component Value Date/Time    Creatinine 0.85 06/21/2017 04:02 AM     Estimated Creatinine Clearance: 145.9 mL/min (based on Cr of 0.85). Active Orders   Diet    DIET DENTAL SOFT (SOFT SOLID)        PO intake: Patient Vitals for the past 72 hrs:   % Diet Eaten   06/19/17 0925 90 %       Current hospital DM medication: Glimepiride 4 mg BID     Will continue to follow as needed.     Thank you  Michael Chacko RD,CDE   [address]

## 2017-06-21 NOTE — CONSULTS
NEPHROLOGY CONSULT NOTE     Patient: Rose Hanna MRN: [id number]  PCP: Shannan Mane MD   :     1960  Age:   61 y.o. Sex:  male      Referring physician: Lalo Moss MD  Reason for consultation: 61 y.o. male with Fluid retention CHF  Acute CHF (congestive heart failure) ([facility]  Acute hyperkalemia  Type 2 diabetes mellitus with hyperglycemia ([facility] complicated by ASPEN   Admission Date: 2017  8:29 PM  LOS: 3 days      ASSESSMENT and PLAN :   Nephrotic-Range Proteinuria:  - differential for this includes AYDE, MN, FSGS, paraprotein-related disorders such as MM or diabetic nephropathy  - the timing of his edema in relation to his NSAIDs, raises the concern for minimal change disease  - check JACKELINE, hepB/C, HIV, SPEP/UPEP for now  - cont lasix  - will add IV albumin x 4 doses as well  - ok to add ace or arb which should help as well  - biopsy at this time is too high risk given his morbid obesity   - pt should be on a statin as well    Edema:  - likely 2/2 above  - ECHO with preserved EF  - cont IV lasix as above + albumin    HTN:  - BP elevated  - add lisinopril as above    DM2:  - on oral hypoglycemics    Morbid Obesity    COPD       Active Problems / Assessment AAActive  :   Principal Problem:    Acute CHF (congestive heart failure) ([facility] (2017)    Active Problems:    Acute hyperkalemia (2017)      Type 2 diabetes mellitus with hyperglycemia ([facility] (2017)         Subjective:   HPI: Rose Hanna is a 61 y.o.  male who has been admitted to the hospital for edema, weight gain and SOB. The patient states he had about 2 weeks of increased swelling and scrotal edema. He had an episode of this many years ago that resolved with diuretics. He was taking motrin, about 8 tabs daily, starting 2 weeks ago for neuropathic pain. He was also on bumex at home w/o relief. He had an ECHO that showed preserved EF. CXR showed small b/l pleural effusions.   He had a 24 hour urine collection that showed 3.8grams of proteinuria. He has gained several pounds since then, although, he cannot quantify the amount. He is morbidly obese with a hx of COPD, DM2, HTN. His Cr is normal.  Currently, he denies cp, sob, n/v/d. Past Medical Hx:   Past Medical History:   Diagnosis Date    CAD (coronary artery disease)     pvc    COPD     [facility]     Heart failure ([facility]     Hypertension     Morbidly obese ([facility]         Past Surgical Hx:     Past Surgical History:   Procedure Laterality Date    HX ORTHOPAEDIC      right leg surgery       Medications:  Prior to Admission medications    Medication Sig Start Date End Date Taking? Authorizing Provider   gabapentin (NEURONTIN) 300 mg capsule Take 300 mg by mouth three (3) times daily. Navneet Chavira MD   glimepiride (AMARYL) 4 mg tablet Take 4 mg by mouth two (2) times a day. Historical Provider   metoprolol (LOPRESSOR) 100 mg tablet Take 100 mg by mouth two (2) times a day. Historical Provider   bumetanide (BUMEX) 2 mg tablet Take 0.5 Tabs by mouth daily. 2/25/15   Valorie Luevano DO   metFORMIN (GLUCOPHAGE) 1,000 mg tablet Take 1,500 mg by mouth daily (with breakfast). Navneet Chaviar MD   metFORMIN (GLUCOPHAGE) 1,000 mg tablet Take 1,000 mg by mouth every evening. Navneet Chavira MD   omeprazole (PRILOSEC) 20 mg capsule Take 20 mg by mouth two (2) times a day. Navneet Chavira MD   aspirin 81 mg tablet Take 81 mg by mouth daily. Navneet Chavira MD       Allergies   Allergen Reactions    Codeine Anaphylaxis    Insulin Regular Other (comments)     Temp blindness and facial droop    Pcn [Penicillins] Anaphylaxis    Victoza [Liraglutide] Other (comments)     Fascial droop, temp blindness    Morphine Other (comments)     hallucinates       Social Hx:  reports that he has quit smoking. He has never used smokeless tobacco. He reports that he does not drink alcohol or use illicit drugs. No family history on file.     Review of Systems:  A twelve point review of system was performed today. Pertinent positives and negatives are mentioned in the HPI. The reminder of the ROS is negative and noncontributory. Objective:    Vitals:    Vitals:    06/21/17 0543 06/21/17 0723 06/21/17 1140 06/21/17 1519   BP:  142/58 153/56 173/85   Pulse:  94 66 72   Resp:  18 18 18   Temp:  99 °F (37.2 °C) 98.8 °F (37.1 °C) 99.5 °F (37.5 °C)   SpO2:  92% 94% 94%   Weight: 152.6 kg (336 lb 6.4 oz)      Height:         I&O's:  06/20 0701 - 06/21 0700  In: -   Out: 3075 [Urine:3075]  Visit Vitals    /85 (BP 1 Location: Left arm, BP Patient Position: At rest)    Pulse 72    Temp 99.5 °F (37.5 °C)    Resp 18    Ht 6' 0.01\" (1.829 m)    Wt 152.6 kg (336 lb 6.4 oz)    SpO2 94%    BMI 45.61 kg/m2       Physical Exam:  General:Alert, No distress, morbidly obese  HEENT: Eyes are PERRL. Conjunctiva without pallor ,erythema. The sclerae without icterus. .   Neck:Supple,no mass palpable  Lungs : Clears to auscultation Bilaterally, Normal respiratory effort  CVS: RRR, S1 S2 normal, No rub,  2+ b/l LE edema  Abdomen: Soft, Non tender, No hepatosplenomegaly, bowel sounds present, obese  Extremities: No cyanosis, No clubbing  Skin: No rash or lesions.   Lymph nodes: No palpable nodes  MS: No joint swelling, erythema, warmth  Neurologic: non focal, AAO x 3  Psych: normal affect    Laboratory Results:    Lab Results   Component Value Date    BUN 29 (H) 06/21/2017     (L) 06/21/2017    K 4.0 06/21/2017    CL 93 (L) 06/21/2017    CO2 34 (H) 06/21/2017       Lab Results   Component Value Date    BUN 29 (H) 06/21/2017    BUN 28 (H) 06/20/2017    BUN 28 (H) 06/19/2017    BUN 30 (H) 06/18/2017    BUN 30 (H) 06/18/2017    K 4.0 06/21/2017    K 3.9 06/20/2017    K 4.1 06/19/2017    K 4.2 06/18/2017    K 5.6 (H) 06/18/2017       Lab Results   Component Value Date    WBC 9.8 06/21/2017    RBC 4.35 06/21/2017    HGB 14.1 06/21/2017    HCT 42.8 06/21/2017    MCV 98.4 06/21/2017 MCH 32.4 06/21/2017    RDW 14.1 06/21/2017     (L) 06/21/2017       No results found for: PTH, PHOS    Urine dipstick:   Lab Results   Component Value Date/Time    Color YELLOW/STRAW 05/06/2017 02:22 AM    Appearance CLEAR 05/06/2017 02:22 AM    Specific gravity 1.015 05/06/2017 02:22 AM    Specific gravity 1.020 02/25/2015 06:50 PM    pH (UA) 7.5 05/06/2017 02:22 AM    Protein 100 05/06/2017 02:22 AM    Glucose NEGATIVE  05/06/2017 02:22 AM    Ketone NEGATIVE  05/06/2017 02:22 AM    Bilirubin NEGATIVE  05/06/2017 02:22 AM    Urobilinogen 0.2 05/06/2017 02:22 AM    Nitrites NEGATIVE  05/06/2017 02:22 AM    Leukocyte Esterase NEGATIVE  05/06/2017 02:22 AM    Epithelial cells FEW 05/06/2017 02:22 AM    Bacteria NEGATIVE  05/06/2017 02:22 AM    WBC 5-10 05/06/2017 02:22 AM    RBC 5-10 05/06/2017 02:22 AM       I have reviewed the following: All pertinent labs, microbiology data, radiology imaging for my assessment         Thank you for allowing us to participate in the care of this patient. We will follow patient.  Please dont hesitate to call with any questions    Ephraim Daley MD  6/21/2017    [address] [place] [address]

## 2017-06-22 LAB
ALBUMIN SERPL BCP-MCNC: 2.4 G/DL (ref 3.5–5)
ANION GAP BLD CALC-SCNC: 6 MMOL/L (ref 5–15)
BASOPHILS # BLD AUTO: 0 K/UL (ref 0–0.1)
BASOPHILS # BLD: 0 % (ref 0–1)
BUN SERPL-MCNC: 33 MG/DL (ref 6–20)
BUN/CREAT SERPL: 29 (ref 12–20)
CALCIUM SERPL-MCNC: 8.4 MG/DL (ref 8.5–10.1)
CHLORIDE SERPL-SCNC: 93 MMOL/L (ref 97–108)
CO2 SERPL-SCNC: 34 MMOL/L (ref 21–32)
CREAT SERPL-MCNC: 1.15 MG/DL (ref 0.7–1.3)
EOSINOPHIL # BLD: 0.2 K/UL (ref 0–0.4)
EOSINOPHIL NFR BLD: 3 % (ref 0–7)
ERYTHROCYTE [DISTWIDTH] IN BLOOD BY AUTOMATED COUNT: 13.9 % (ref 11.5–14.5)
GLUCOSE BLD STRIP.AUTO-MCNC: 271 MG/DL (ref 65–100)
GLUCOSE BLD STRIP.AUTO-MCNC: 334 MG/DL (ref 65–100)
GLUCOSE SERPL-MCNC: 211 MG/DL (ref 65–100)
HCT VFR BLD AUTO: 39.3 % (ref 36.6–50.3)
HGB BLD-MCNC: 12.7 G/DL (ref 12.1–17)
LYMPHOCYTES # BLD AUTO: 23 % (ref 12–49)
LYMPHOCYTES # BLD: 1.9 K/UL (ref 0.8–3.5)
MCH RBC QN AUTO: 31.9 PG (ref 26–34)
MCHC RBC AUTO-ENTMCNC: 32.3 G/DL (ref 30–36.5)
MCV RBC AUTO: 98.7 FL (ref 80–99)
MONOCYTES # BLD: 0.9 K/UL (ref 0–1)
MONOCYTES NFR BLD AUTO: 11 % (ref 5–13)
NEUTS SEG # BLD: 5.2 K/UL (ref 1.8–8)
NEUTS SEG NFR BLD AUTO: 63 % (ref 32–75)
PHOSPHATE SERPL-MCNC: 3.1 MG/DL (ref 2.6–4.7)
PLATELET # BLD AUTO: 144 K/UL (ref 150–400)
POTASSIUM SERPL-SCNC: 4 MMOL/L (ref 3.5–5.1)
RBC # BLD AUTO: 3.98 M/UL (ref 4.1–5.7)
SERVICE CMNT-IMP: ABNORMAL
SERVICE CMNT-IMP: ABNORMAL
SODIUM SERPL-SCNC: 133 MMOL/L (ref 136–145)
WBC # BLD AUTO: 8.2 K/UL (ref 4.1–11.1)

## 2017-06-22 PROCEDURE — 97116 GAIT TRAINING THERAPY: CPT

## 2017-06-22 PROCEDURE — 97165 OT EVAL LOW COMPLEX 30 MIN: CPT

## 2017-06-22 PROCEDURE — P9045 ALBUMIN (HUMAN), 5%, 250 ML: HCPCS | Performed by: INTERNAL MEDICINE

## 2017-06-22 PROCEDURE — 80048 BASIC METABOLIC PNL TOTAL CA: CPT | Performed by: FAMILY MEDICINE

## 2017-06-22 PROCEDURE — 82040 ASSAY OF SERUM ALBUMIN: CPT | Performed by: FAMILY MEDICINE

## 2017-06-22 PROCEDURE — 85025 COMPLETE CBC W/AUTO DIFF WBC: CPT | Performed by: FAMILY MEDICINE

## 2017-06-22 PROCEDURE — 74011250636 HC RX REV CODE- 250/636: Performed by: INTERNAL MEDICINE

## 2017-06-22 PROCEDURE — G8978 MOBILITY CURRENT STATUS: HCPCS

## 2017-06-22 PROCEDURE — 74011250637 HC RX REV CODE- 250/637: Performed by: INTERNAL MEDICINE

## 2017-06-22 PROCEDURE — 97161 PT EVAL LOW COMPLEX 20 MIN: CPT

## 2017-06-22 PROCEDURE — 87186 SC STD MICRODIL/AGAR DIL: CPT | Performed by: INTERNAL MEDICINE

## 2017-06-22 PROCEDURE — 87086 URINE CULTURE/COLONY COUNT: CPT | Performed by: INTERNAL MEDICINE

## 2017-06-22 PROCEDURE — 74011250637 HC RX REV CODE- 250/637: Performed by: FAMILY MEDICINE

## 2017-06-22 PROCEDURE — P9045 ALBUMIN (HUMAN), 5%, 250 ML: HCPCS | Performed by: FAMILY MEDICINE

## 2017-06-22 PROCEDURE — 74011250636 HC RX REV CODE- 250/636: Performed by: FAMILY MEDICINE

## 2017-06-22 PROCEDURE — G8979 MOBILITY GOAL STATUS: HCPCS

## 2017-06-22 PROCEDURE — 82962 GLUCOSE BLOOD TEST: CPT

## 2017-06-22 PROCEDURE — 36415 COLL VENOUS BLD VENIPUNCTURE: CPT | Performed by: FAMILY MEDICINE

## 2017-06-22 PROCEDURE — 84100 ASSAY OF PHOSPHORUS: CPT | Performed by: FAMILY MEDICINE

## 2017-06-22 PROCEDURE — 74011250637 HC RX REV CODE- 250/637: Performed by: NURSE PRACTITIONER

## 2017-06-22 PROCEDURE — 87077 CULTURE AEROBIC IDENTIFY: CPT | Performed by: INTERNAL MEDICINE

## 2017-06-22 PROCEDURE — 65660000000 HC RM CCU STEPDOWN

## 2017-06-22 RX ORDER — ATORVASTATIN CALCIUM 20 MG/1
20 TABLET, FILM COATED ORAL
Status: DISCONTINUED | OUTPATIENT
Start: 2017-06-22 | End: 2017-06-23 | Stop reason: HOSPADM

## 2017-06-22 RX ORDER — ALBUMIN HUMAN 50 G/1000ML
25 SOLUTION INTRAVENOUS EVERY 6 HOURS
Status: COMPLETED | OUTPATIENT
Start: 2017-06-22 | End: 2017-06-23

## 2017-06-22 RX ADMIN — ALBUMIN (HUMAN) 25 G: 12.5 INJECTION, SOLUTION INTRAVENOUS at 20:45

## 2017-06-22 RX ADMIN — PANTOPRAZOLE SODIUM 40 MG: 40 TABLET, DELAYED RELEASE ORAL at 08:07

## 2017-06-22 RX ADMIN — FUROSEMIDE 40 MG: 10 INJECTION, SOLUTION INTRAMUSCULAR; INTRAVENOUS at 17:52

## 2017-06-22 RX ADMIN — METOPROLOL TARTRATE 100 MG: 50 TABLET ORAL at 08:43

## 2017-06-22 RX ADMIN — PANTOPRAZOLE SODIUM 40 MG: 40 TABLET, DELAYED RELEASE ORAL at 15:43

## 2017-06-22 RX ADMIN — FUROSEMIDE 40 MG: 10 INJECTION, SOLUTION INTRAMUSCULAR; INTRAVENOUS at 08:43

## 2017-06-22 RX ADMIN — ALBUMIN (HUMAN) 25 G: 12.5 INJECTION, SOLUTION INTRAVENOUS at 15:36

## 2017-06-22 RX ADMIN — ALBUMIN (HUMAN) 25 G: 12.5 INJECTION, SOLUTION INTRAVENOUS at 08:07

## 2017-06-22 RX ADMIN — SPIRONOLACTONE 25 MG: 25 TABLET, FILM COATED ORAL at 08:43

## 2017-06-22 RX ADMIN — HYDROCORTISONE: 1 CREAM TOPICAL at 19:05

## 2017-06-22 RX ADMIN — Medication 10 ML: at 14:00

## 2017-06-22 RX ADMIN — FAMOTIDINE 20 MG: 20 TABLET ORAL at 12:09

## 2017-06-22 RX ADMIN — ACETAMINOPHEN 650 MG: 325 TABLET, FILM COATED ORAL at 19:03

## 2017-06-22 RX ADMIN — HYDRALAZINE HYDROCHLORIDE 10 MG: 10 TABLET, FILM COATED ORAL at 21:45

## 2017-06-22 RX ADMIN — METOPROLOL TARTRATE 100 MG: 50 TABLET ORAL at 17:53

## 2017-06-22 RX ADMIN — GABAPENTIN 100 MG: 100 CAPSULE ORAL at 21:45

## 2017-06-22 RX ADMIN — GLIMEPIRIDE 4 MG: 2 TABLET ORAL at 08:43

## 2017-06-22 RX ADMIN — ATORVASTATIN CALCIUM 20 MG: 20 TABLET, FILM COATED ORAL at 21:45

## 2017-06-22 RX ADMIN — GABAPENTIN 100 MG: 100 CAPSULE ORAL at 15:43

## 2017-06-22 RX ADMIN — GABAPENTIN 100 MG: 100 CAPSULE ORAL at 08:44

## 2017-06-22 RX ADMIN — ASPIRIN 81 MG CHEWABLE TABLET 81 MG: 81 TABLET CHEWABLE at 08:44

## 2017-06-22 RX ADMIN — LISINOPRIL 10 MG: 10 TABLET ORAL at 08:43

## 2017-06-22 RX ADMIN — GLIMEPIRIDE 4 MG: 2 TABLET ORAL at 17:53

## 2017-06-22 RX ADMIN — HYDROCORTISONE: 1 CREAM TOPICAL at 11:51

## 2017-06-22 RX ADMIN — FAMOTIDINE 20 MG: 20 TABLET ORAL at 00:00

## 2017-06-22 RX ADMIN — HYDRALAZINE HYDROCHLORIDE 10 MG: 10 TABLET, FILM COATED ORAL at 08:43

## 2017-06-22 RX ADMIN — HYDRALAZINE HYDROCHLORIDE 10 MG: 10 TABLET, FILM COATED ORAL at 15:43

## 2017-06-22 NOTE — PROGRESS NOTES
Hospitalist Progress Note          Joce Sosa MD  Please call  and page for questions. Call physician on-call through the  7pm-7am    Daily Progress Note: 6/22/2017    Primary care provider:Mohamud Haynes MD    Date of admission: 6/18/2017  8:29 PM    Admission summery and hospital course:  42-year-old white male with past medical history of coronary artery disease, COPD, type 2 diabetes mellitus, heart failure, hypertension, morbid obesity, right bundle branch block, chronic hypoxic respiratory failure on home oxygen, general debility, and gait abnormality. He presented as a direct admission/transfer from [facility] by [facility] with reported shortness of breath, swelling (edema), and fever. Patient was found to be hyperglycemic, CHF and hyperkalemia.    Subjective:   Patient said he is feeling better and would like to go home. Assessment/Plan:   Bilateral lower extremity and scrotal edema with bilateral pleural effusions:   Due to proteinuria, nephrotic range. Appreciated nephrology input. Albumin added. Work up as nephrology.     Continue lasix and spironolactone. Acute diastolic congestive heart failure:    SOB is improving. Still has gross swelling at the scrotum. TTE with preserved EF. Strict I's and O's, daily weights. Continue with Lasix and O2 and spironolactone. He did not want to take Cardizem, due to repeated fall. Lisinopril and hydralazine added, doing better. Acute on chronic hypoxic respiratory failure: With CHF and COPD. Continue with PRN O2 at home as per patient. Continue breathing treatment for now.    Hyperkalemia:   Resolved. Continue the patient with telemetry and monitor closely.    Type 2 diabetes mellitus with hyperglycemia:   Patient refused insulin citing an INSULIN ALLERGY. Blood sugar is elevated, continue Amaryl on max dose and follow. Patient is recommended to watch is diet. Continue to hold metformin for now.  Hemoglobin A1c level was 8. 3.    Neuropathic pain: Patient is could not 300 mg of Neurontin. Tolerating 100 mg now. Hypertension: BP is labile, Continue lisinopril and hydralazine for now.      COPD: Plan as noted above. Morbid obesity: Counseling done. Thrombocytopenia: Mild and monitor. RBBB, Chronic. Seen on prior electrocardiograms. Diet; Mechanical soft diet due to absence of teeth.       See orders for other plans. VTE prophylaxis: SCD  Code status: Full  Discussed plan of care with Patient/Family and Nurse. Patient wife is at the bedside. Pre-admission lived at home. Discharge planning: Order [facility]. Possibly to home in one or two days. Patient would like to go home tomorrow. Review of Systems:     Review of Systems:  Symptom  Y/N  Comments   Symptom  Y/N  Comments    Fever/Chills  n    Chest Pain  n    Poor Appetite   n   Edema  n     Cough  n   Abdominal Pain  n     Sputum  n   Joint Pain      SOB/MYRICK  n   Pruritis/Rash      Nausea/vomit  n   Tolerating PT/OT      Diarrhea     Tolerating Diet      Constipation     Other      Could not obtain due to:         Objective:   Physical Exam:     Visit Vitals    /47 (BP 1 Location: Left arm, BP Patient Position: Sitting)    Pulse 71    Temp 98.2 °F (36.8 °C)    Resp 18    Ht 6' 0.01\" (1.829 m)    Wt 152 kg (335 lb 1.6 oz)    SpO2 98%    BMI 45.44 kg/m2    O2 Flow Rate (L/min): 2 l/min O2 Device: Nasal cannula    Temp (24hrs), Av.6 °F (37 °C), Min:98.2 °F (36.8 °C), Max:98.9 °F (37.2 °C)        1901 -  0700  In: 1000 [I.V.:1000]  Out: 3700 [Urine:3700]    General:  Alert, cooperative, no distress, appears stated age. Lungs:  Clear to auscultation bilaterally. Chest wall:  No tenderness or deformity. Heart:  Regular rate and rhythm, S1, S2 normal, no murmur. Abdomen:  Soft, non-tender. Grossly obese and gross swelling of the scrotum. Extremities: Extremities normal, atraumatic, no cyanosis.  Edema at the legs.   Pulses: 2+ and symmetric all extremities. Skin: Skin color, texture, turgor normal. No rashes or lesions   Neurologic: CNII-XII intact.            Data Review:       Recent Days:  Recent Labs      06/22/17   0257  06/21/17 0402 06/20/17 0317   WBC  8.2  9.8  9.8   HGB  12.7  14.1  13.9   HCT  39.3  42.8  42.7   PLT  144*  141*  147*     Recent Labs      06/22/17   0257  06/21/17   0402  06/20/17 0317   NA  133*  133*  135*   K  4.0  4.0  3.9   CL  93*  93*  92*   CO2  34*  34*  37*   GLU  211*  155*  206*   BUN  33*  29*  28*   CREA  1.15  0.85  0.91   CA  8.4*  8.7  8.3*   PHOS  3.1   --    --    ALB  2.4*   --    --      No results for input(s): PH, PCO2, PO2, HCO3, FIO2 in the last 72 hours. 24 Hour Results:  Recent Results (from the past 24 hour(s))   GLUCOSE, POC    Collection Time: 06/21/17  4:12 PM   Result Value Ref Range    Glucose (POC) 277 (H) 65 - 100 mg/dL    Performed by Jai Grover    PROTEIN/CREATININE RATIO, URINE    Collection Time: 06/21/17  7:30 PM   Result Value Ref Range    Protein, urine random 123 (H) 0.0 - 11.9 mg/dL    Creatinine, urine 38.60 mg/dL    Protein/Creat.  urine Ratio 3.2     GLUCOSE, POC    Collection Time: 06/21/17 10:27 PM   Result Value Ref Range    Glucose (POC) 236 (H) 65 - 100 mg/dL    Performed by [facility]    METABOLIC PANEL, BASIC    Collection Time: 06/22/17  2:57 AM   Result Value Ref Range    Sodium 133 (L) 136 - 145 mmol/L    Potassium 4.0 3.5 - 5.1 mmol/L    Chloride 93 (L) 97 - 108 mmol/L    CO2 34 (H) 21 - 32 mmol/L    Anion gap 6 5 - 15 mmol/L    Glucose 211 (H) 65 - 100 mg/dL    BUN 33 (H) 6 - 20 MG/DL    Creatinine 1.15 0.70 - 1.30 MG/DL    BUN/Creatinine ratio 29 (H) 12 - 20      GFR est AA >60 >60 ml/min/1.73m2    GFR est non-AA >60 >60 ml/min/1.73m2    Calcium 8.4 (L) 8.5 - 10.1 MG/DL   CBC WITH AUTOMATED DIFF    Collection Time: 06/22/17  2:57 AM   Result Value Ref Range    WBC 8.2 4.1 - 11.1 K/uL    RBC 3.98 (L) 4.10 - 5.70 M/uL    HGB 12.7 12.1 - 17.0 g/dL    HCT 39.3 36.6 - 50.3 %    MCV 98.7 80.0 - 99.0 FL    MCH 31.9 26.0 - 34.0 PG    MCHC 32.3 30.0 - 36.5 g/dL    RDW 13.9 11.5 - 14.5 %    PLATELET 664 (L) 714 - 400 K/uL    NEUTROPHILS 63 32 - 75 %    LYMPHOCYTES 23 12 - 49 %    MONOCYTES 11 5 - 13 %    EOSINOPHILS 3 0 - 7 %    BASOPHILS 0 0 - 1 %    ABS. NEUTROPHILS 5.2 1.8 - 8.0 K/UL    ABS. LYMPHOCYTES 1.9 0.8 - 3.5 K/UL    ABS. MONOCYTES 0.9 0.0 - 1.0 K/UL    ABS. EOSINOPHILS 0.2 0.0 - 0.4 K/UL    ABS.  BASOPHILS 0.0 0.0 - 0.1 K/UL   ALBUMIN    Collection Time: 06/22/17  2:57 AM   Result Value Ref Range    Albumin 2.4 (L) 3.5 - 5.0 g/dL   PHOSPHORUS    Collection Time: 06/22/17  2:57 AM   Result Value Ref Range    Phosphorus 3.1 2.6 - 4.7 MG/DL   GLUCOSE, POC    Collection Time: 06/22/17 11:04 AM   Result Value Ref Range    Glucose (POC) 271 (H) 65 - 100 mg/dL    Performed by Amirah Lazo        Problem List:  Problem List as of 6/22/2017  Date Reviewed: 6/18/2017          Codes Class Noted - Resolved    Acute hyperkalemia ICD-10-CM: E87.5  ICD-9-CM: 276.7  6/18/2017 - Present        * (Principal)Ac[facility]estive heart failure) [facility] ICD-10-CM: I50.9  ICD-9-CM: 428.0  6/18/2017 - Present        Type 2 diabetes mellitus with hyperglycemia (HCC) ICD-10-CM: E11.65  ICD-9-CM: 250.00  6/18/2017 - Present              Medications reviewed  Current Facility-Administered Medications   Medication Dose Route Frequency    atorvastatin (LIPITOR) tablet 20 mg  20 mg Oral QHS    hydrocortisone (CORTAID) 1 % cream   Topical BID    acetaminophen (TYLENOL) tablet 650 mg  650 mg Oral Q4H PRN    hydrALAZINE (APRESOLINE) tablet 10 mg  10 mg Oral TID    lisinopril (PRINIVIL, ZESTRIL) tablet 10 mg  10 mg Oral DAILY    ondansetron (ZOFRAN) injection 4 mg  4 mg IntraVENous Q4H PRN    famotidine (PEPCID) tablet 20 mg  20 mg Oral BID    gabapentin (NEURONTIN) capsule 100 mg  100 mg Oral TID    sodium chloride (NS) flush 5-10 mL  5-10 mL IntraVENous Q8H    sodium chloride (NS) flush 5-10 mL  5-10 mL IntraVENous PRN    furosemide (LASIX) injection 40 mg  40 mg IntraVENous BID    spironolactone (ALDACTONE) tablet 25 mg  25 mg Oral DAILY    glucose chewable tablet 16 g  4 Tab Oral PRN    glucagon (GLUCAGEN) injection 1 mg  1 mg IntraMUSCular PRN    dextrose 10 % infusion 125-250 mL  125-250 mL IntraVENous PRN    albuterol-ipratropium (DUO-NEB) 2.5 MG-0.5 MG/3 ML  3 mL Nebulization Q4H PRN    glimepiride (AMARYL) tablet 4 mg  4 mg Oral BID    metoprolol tartrate (LOPRESSOR) tablet 100 mg  100 mg Oral BID    aspirin chewable tablet 81 mg  81 mg Oral DAILY    pantoprazole (PROTONIX) tablet 40 mg  40 mg Oral ACB&D       Care Plan discussed with: Patient/Family and Nurse    Total time spent with patient: 30 minutes.     Manish Hood MD

## 2017-06-22 NOTE — CARDIO/PULMONARY
Cardiac Wellness: Heart Failure education folder given to Jose Bullock. Educated using teach back method. Discussed diagnosis definition and assessed patient understanding. Reviewed importance of daily weight monitoring and low sodium diet (less than 1500 mg. daily). Encouraged activity and rest periods within symptom limitations and as ordered by physician. Reviewed the need for sodium restriction. Discussed the need for lisinopril and lopressor, purpose of medication, potential side effects and what to do if dose is missed. Discussed importance of reporting signs and symptoms of exacerbation and when to report them to the doctor to prevent re-hospitalization. Jose Risk was encouraged to keep all appointments with doctor. Discussed ability to obtain prescription meds and encouraged conversations with physician if unable to do so. Smoking history assessed. Pt is a former smoker - quit 6 years ago. HF teach back questions answered by patient. oJse Risk could benefit from further education on the following HF topics. Aristeo Barth RN

## 2017-06-22 NOTE — PROGRESS NOTES
Problem: Mobility Impaired (Adult and Pediatric)  Goal: *Acute Goals and Plan of Care (Insert Text)  Physical Therapy Goals  Initiated 6/22/2017  1. Patient will move from supine to sit and sit to supine , scoot up and down and roll side to side in bed with minimal assistance/contact guard assist within 7 day(s). 2. Patient will transfer from bed to chair and chair to bed with minimal assistance/contact guard assist using the least restrictive device within 7 day(s). 3. Patient will perform sit to stand with minimal assistance/contact guard assist within 7 day(s). 4. Patient will ambulate with minimal assistance/contact guard assist for 30 feet with the least restrictive device within 7 day(s). PHYSICAL THERAPY EVALUATION  Patient: Gerald Perera (11 y.o. male)  Date: 6/22/2017  Primary Diagnosis: Fluid retention CHF  Acute CHF (congestive heart failure) (HCC)  Acute hyperkalemia  Type 2 diabetes mellitus with hyperglycemia (HCC)        Precautions: fall         ASSESSMENT :  Based on the objective data described below, the patient presents with generalized weakness and decline in functional mobility as compared to baseline. Initially he deferred PT stating that his stomach was hurting, rated at 7/10. Alerted his nurse and encouraged him to at least dangle at EOB. He came to sitting with mod assist X 2, calling out for his wife to assist him. She assists him at home on a regular basis. He came to stand with min assist X 2 and side stepped to the [facility] using a rolling bariatric walker with contact guard X 2 and then stated that his legs were getting weak and he needed to sit. He was returned to supine with mod assist X 2 and left in modified chair position. Anticipate steady gains allowing for discharge to home with his wife. .     Patient will benefit from skilled intervention to address the above impairments.   Patients rehabilitation potential is considered to be Good to fair  Factors which may influence rehabilitation potential include:   [ ]         None noted  [ ]         Mental ability/status  [X]         Medical condition  [ ]         Home/family situation and support systems  [ ]         Safety awareness  [ ]         Pain tolerance/management  [ ]         Other:        PLAN :  Recommendations and Planned Interventions:  [X]           Bed Mobility Training             [ ]    Neuromuscular Re-Education  [X]           Transfer Training                   [ ]    Orthotic/Prosthetic Training  [X]           Gait Training                         [ ]    Modalities  [X]           Therapeutic Exercises           [ ]    Edema Management/Control  [X]           Therapeutic Activities            [X]    Patient and Family Training/Education  [ ]           Other (comment):     Frequency/Duration: Patient will be followed by physical therapy  5 times a week to address goals. Discharge Recommendations: Home Health  Further Equipment Recommendations for Discharge: none from PT       SUBJECTIVE:   Patient stated I really want to rest, my stomach is hurting.  Pt rated his pain at 7/10, alerted his nurse Mily      OBJECTIVE DATA SUMMARY:   Consult received, chart reviewed, pt cleared by nursing  HISTORY:    Past Medical History:   Diagnosis Date    CAD (coronary artery disease)       pvc    COPD      Diabetes [facility]      Heart failure ([facility]      Hypertension      Morbidly obese ([facility]       Past Surgical History:   Procedure Laterality Date    HX ORTHOPAEDIC         right leg surgery     Prior Level of Function/Home Situation: per pt and his wife, he amb short distances in the house using his rolling walker. His wife assists him with bed mobility and amb, level of assist varies depending on how pt is feeling. He also uses a power chair prn. Door frames in the home are 36 inches wide, ramp to enter. PT reports having what sounds liek an AFO for the left leg that he also reports not using for some time, years.   Personal factors and/or comorbidities impacting plan of care: MO. CHF, COPD, DM     Home Situation  Home Environment: Private residence  # Steps to Enter: 0  Wheelchair Ramp: Yes  One/Two Story Residence: One story  Living Alone: No  Support Systems: Spouse/Significant Other/Partner, Child(jamie)  Patient Expects to be Discharged to[de-identified] Private residence  Current DME Used/Available at Home: Wheelchair, power, Commode, bedside, Walker, rolling, Shower chair (recliner lift chair and home 02), AFO     EXAMINATION/PRESENTATION/DECISION MAKING:   Critical Behavior:  Neurologic State: Alert  Orientation Level: Oriented X4  Cognition: Follows commands     Hearing: Auditory  Auditory Impairment: None  Skin:  Refer to MD and nursing notes  Edema: yes bilateral LEs  Range Of Motion:  AROM: Generally decreased, functional                       Strength:    Strength: Generally decreased, functional, decreased dorsiflex bilaterally 2-/5                    Tone & Sensation:                  Sensation:  Impaired (at baseine LEs, knees distally)               Coordination:     Vision:      Functional Mobility:  Bed Mobility:     Supine to Sit: Assist x2; Moderate assistance  Sit to Supine: Assist x2; Moderate assistance     Transfers:  Sit to Stand: Assist x2;Minimum assistance  Stand to Sit: Assist x2;Contact guard assistance                       Balance:   Sitting: Intact  Standing: Impaired  Standing - Static: Constant support;Good  Standing - Dynamic : Good (with support)  Ambulation/Gait Training:  Distance (ft): 3 Feet (ft)  Assistive Device: Gait belt;Walker, rolling  Ambulation - Level of Assistance: Contact guard assistance;Assist x2        Gait Abnormalities: Decreased step clearance; Foot drop        Base of Support: Widened     Speed/Rosa: Slow  Step Length: Left shortened;Right shortened                                           Stairs:                           Therapeutic Exercises:         Functional Measure:  Timed up and go: Timed Get Up And Go Test: 30 (extrapolated)      Timed Up and Go and G-code impairment scale:  Percentage of Impairment CH     0%    CI     1-19% CJ     20-39% CK     40-59% CL     60-79% CM     80-99% CN      100%   Timed   Score 0-56 10 11-12 13-14 15-16 17-18 19 20          < than 10 seconds=Normal  Greater then 13.5 seconds (in elderly)=Increased fall risk   Rodolfo TOSCANO, Nohemy Mejia. Predicting the probability for falls in community dwelling older adults using the Timed Up and Go Test. Phys Ther. 2000;80:896-903. G codes: In compliance with CMSs Claims Based Outcome Reporting, the following G-code set was chosen for this patient based on their primary functional limitation being treated: The outcome measure chosen to determine the severity of the functional limitation was the TUG with a score of 30 which was correlated with the impairment scale. · Mobility - Walking and Moving Around:               - CURRENT STATUS:    CN - 100% impaired, limited or restricted               - GOAL STATUS:           CK - 40%-59% impaired, limited or restricted               - D/C STATUS:                       ---------------To be determined---------------      Physical Therapy Evaluation Charge Determination   History Examination Presentation Decision-Making   HIGH Complexity :3+ comorbidities / personal factors will impact the outcome/ POC  LOW Complexity : 1-2 Standardized tests and measures addressing body structure, function, activity limitation and / or participation in recreation  LOW Complexity : Stable, uncomplicated  LOW Complexity : FOTO score of       Based on the above components, the patient evaluation is determined to be of the following complexity level: LOW      Pain:  Pain Scale 1: Numeric (0 - 10)  Pain Intensity 1: 0              Activity Tolerance:   VSS  Please refer to the flowsheet for vital signs taken during this treatment.   After treatment: [ ]         Patient left in no apparent distress sitting up in chair  [X]         Patient left in no apparent distress in bed to modified chair position  [X]         Call bell left within reach  [X]         Nursing notified  [X]         Caregiver present  [ ]         Bed alarm activated      COMMUNICATION/EDUCATION:   The patients plan of care was discussed with: Occupational Therapist and Registered Nurse.  [X]         Fall prevention education was provided and the patient/caregiver indicated understanding. [X]         Patient/family have participated as able in goal setting and plan of care. [X]         Patient/family agree to work toward stated goals and plan of care. [ ]         Patient understands intent and goals of therapy, but is neutral about his/her participation. [ ]         Patient is unable to participate in goal setting and plan of care.      Thank you for this referral.  Jersey Zepeda   Time Calculation: 34 mins

## 2017-06-22 NOTE — PROGRESS NOTES
Consult received, chart reviewed, case discussed with pt's nurse. His IV has now infiltrated and he is awaiting placement of a new IV. PT will continue to follow and see when appropriate.  Thank you, Rubi Pierre, PT

## 2017-06-22 NOTE — PROGRESS NOTES
Cardiology Progress Note    Pt reports breathing better. C/O dysuria    Imp:  Anasarca related to nephrotic range proteinuria and HFpEF  HTN  DM  Morbid obesity  Low grade temp? COPD  Hx hyperkalemia on both spironolactone and benazepril    Rec: Will con't hydralazine for now but if tolerates lisinopril, then we can stop this. Watch K closely  I am not sure why not on a statin; it is definitely indicated. Urine culture    Blood pressure 177/69, pulse 77, temperature 98.8 °F (37.1 °C), resp. rate 18, height 6' 0.01\" (1.829 m), weight 152 kg (335 lb 1.6 oz), SpO2 92 %.   Exam unchanged  UO-1500    Labs reviewed: K 4.0; Cr 1.15    Britt Mejia MD

## 2017-06-22 NOTE — PROGRESS NOTES
Bedside and Verbal shift change report given to bj (oncoming nurse) by juliana (offgoing nurse). Report included the following information SBAR, Kardex, Intake/Output, Recent Results and Cardiac Rhythm nsr.

## 2017-06-22 NOTE — PROGRESS NOTES
Problem: Self Care Deficits Care Plan (Adult)  Goal: *Acute Goals and Plan of Care (Insert Text)  Occupational Therapy Goals  Initiated 6/22/2017    1. Patient will perform BSC transfers with overall SBA and RW within 7 days. 2. Patient will tolerate sitting EOB for 5 minutes to complete simple ADL task without complaint of fatigue within 7 days. 3. Patient will participate in anterior upper body bathing with overall supervision/setup within 7 days. 4. Patient will perform upper body therapeutic exercises/activities with overall independence for 5 minutes within 7 days. OCCUPATIONAL THERAPY EVALUATION  Patient: Laura Hull (48 y.o. male)  Date: 6/22/2017  Primary Diagnosis: Fluid retention CHF  Acute CHF (congestive heart failure) ([facility])  Acute hyperkalemia  Type 2 diabetes mellitus with hyperglycemia ([facility])        Precautions: Fall         ASSESSMENT :  Based on the objective data described below, the patient presents with overall CGA-Min A x2 with RW for functional mobility, up to Total A for lower body ADLs, and independent-Min A for upper body ADLs. Patient received supine in bed with wife and multiple family members present. Patient reporting he had just returned to supine after taking few steps up [facility] with PT. Patient has significant amount of DME at home, shower chair, BSC, grab bars, powered wheelchair, wide doorways, etc. Patient wife is present with him and assists with all ADLs as needed as well as mobility transfers. Patient appears to be close to functional baseline but will benefit from acute OT services to maximize return home as he has experienced a decline in functional strength and endurance. Patient will benefit from skilled intervention to address the above impairments.   Patients rehabilitation potential is considered to be Good  Factors which may influence rehabilitation potential include:   [ ]             None noted  [ ]             Mental ability/status  [ ]             Medical condition  [ ]             Home/family situation and support systems  [ ]             Safety awareness  [ ]             Pain tolerance/management  [ ]             Other:        PLAN :  Recommendations and Planned Interventions:  [X]               Self Care Training                  [X]        Therapeutic Activities  [X]               Functional Mobility Training    [ ]        Cognitive Retraining  [X]               Therapeutic Exercises           [X]        Endurance Activities  [X]               Balance Training                   [ ]        Neuromuscular Re-Education  [ ]               Visual/Perceptual Training     [X]   Home Safety Training  [X]               Patient Education                 [X]        Family Training/Education  [ ]               Other (comment):     Frequency/Duration: Patient will be followed by occupational therapy 3 times a week to address goals. Discharge Recommendations: Anticipate no needs  Further Equipment Recommendations for Discharge: None noted       SUBJECTIVE:   Patient stated If you could get my a radha lift and a big mac, that would be great.       OBJECTIVE DATA SUMMARY:   HISTORY:   Past Medical History:   Diagnosis Date    CAD (coronary artery disease)       pvc    COPD      [facility]      Heart failure ([facility]      Hypertension      Morbidly obese ([facility]       Past Surgical History:   Procedure Laterality Date    HX ORTHOPAEDIC         right leg surgery        Prior Level of Function/Home Situation: Per patient report, lives at home with wife. Completes SPT with RW from bed<>powered wheelchair with supervision from wife. Patient has assist for bathing and dressing. Ambulates short distances in home. Completes car transfers (had a fall 2 months ago and required 2 hours with assist from wife to get back into wheelchair and house).    Expanded or extensive additional review of patient history:      Home Situation  Home Environment: Private residence  # Steps to Enter: 0  Wheelchair Ramp: Yes  One/Two Story Residence: One story  Living Alone: No  Support Systems: Spouse/Significant Other/Partner, Child(jamie)  Patient Expects to be Discharged to[de-identified] Private residence  Current DME Used/Available at Home: Wheelchair, power, Commode, bedside, Walker, rolling, Shower chair (recliner lift chair and home 02)  [ ]  Right hand dominant   [ ]  Left hand dominant     EXAMINATION OF PERFORMANCE DEFICITS:  Cognitive/Behavioral Status:  Neurologic State: Alert  Orientation Level: Oriented X4  Cognition: Follows commands; Appropriate for age attention/concentration  Perception: Appears intact  Perseveration: No perseveration noted  Safety/Judgement: Fall prevention; Awareness of environment; Insight into deficits     Skin: Appears intact     Edema: None noted in BUEs     Hearing: Auditory  Auditory Impairment: None     Vision/Perceptual:       Acuity: Within Defined Limits          Range of Motion:  AROM: Within functional limits  PROM: Within functional limits        Strength:  Strength: Generally decreased, functional     Coordination:  Coordination: Within functional limits  Fine Motor Skills-Upper: Left Intact; Right Intact    Gross Motor Skills-Upper: Left Intact; Right Intact     Tone & Sensation:  Tone: Normal  Sensation: Intact     Balance:  Sitting: Intact  Standing: Impaired  Standing - Static: Constant support;Good  Standing - Dynamic : Good (with support)     Functional Mobility and Transfers for ADLs:  Bed Mobility:  Supine to Sit: Assist x2; Moderate assistance  Sit to Supine: Assist x2; Moderate assistance     Transfers:  Sit to Stand: Assist x2;Minimum assistance  Stand to Sit: Assist x2;Contact guard assistance  Toilet Transfer : Minimum assistance;Assist x2 (Inferred per PT report)     ADL Assessment:  Feeding: Independent     Oral Facial Hygiene/Grooming: Independent     Bathing: Maximum assistance (Baseline assist from wife)     Upper Body Dressing: Minimum assistance (Baseline from wife)     Lower Body Dressing: Total assistance     Toileting: Supervision                 ADL Intervention and task modifications:     Cognitive Retraining  Safety/Judgement: Fall prevention; Awareness of environment; Insight into deficits     Functional Measure:  Barthel Index:      Bathin  Bladder: 10  Bowels: 10  Groomin  Dressin  Feeding: 10  Mobility: 0  Stairs: 0  Toilet Use: 5  Transfer (Bed to Chair and Back): 5  Total: 50         Barthel and G-code impairment scale:  Percentage of impairment CH  0% CI  1-19% CJ  20-39% CK  40-59% CL  60-79% CM  80-99% CN  100%   Barthel Score 0-100 100 99-80 79-60 59-40 20-39 1-19    0   Barthel Score 0-20 20 17-19 13-16 9-12 5-8 1-4 0      The Barthel ADL Index: Guidelines  1. The index should be used as a record of what a patient does, not as a record of what a patient could do. 2. The main aim is to establish degree of independence from any help, physical or verbal, however minor and for whatever reason. 3. The need for supervision renders the patient not independent. 4. A patient's performance should be established using the best available evidence. Asking the patient, friends/relatives and nurses are the usual sources, but direct observation and common sense are also important. However direct testing is not needed. 5. Usually the patient's performance over the preceding 24-48 hours is important, but occasionally longer periods will be relevant. 6. Middle categories imply that the patient supplies over 50 per cent of the effort. 7. Use of aids to be independent is allowed. Keith Salinas., Barthel, D.W. (0108). Functional evaluation: the Barthel Index. [facility] (14)2. BRITTANY Cloud, Jennifer Robertson., Laura Johnson., Victor M Koehler, [address] [place] [address] (). Measuring the change indisability after inpatient rehabilitation; comparison of the responsiveness of the Barthel Index and Functional [place] Measure.  Journal of Neurology, Neurosurgery, and Psychiatry, 66(4), 251-267. MARIA L Ramirez, HECTOR Bailey, & Dao Ruff M.A. (2004.) Assessment of post-stroke quality of life in cost-effectiveness studies: The usefulness of the Barthel Index and the EuroQoL-5D. Quality of Life Research, 13, 660-56            G codes: In compliance with CMSs Claims Based Outcome Reporting, the following G-code set was chosen for this patient based on their primary functional limitation being treated: The outcome measure chosen to determine the severity of the functional limitation was the Barthel Index with a score of 50/100 which was correlated with the impairment scale. · Self Care:               - CURRENT STATUS:    CK - 40%-59% impaired, limited or restricted               - GOAL STATUS:           CJ - 20%-39% impaired, limited or restricted               - D/C STATUS:                       ---------------To be determined---------------      Occupational Therapy Evaluation Charge Determination   History Examination Decision-Making   LOW Complexity : Brief history review  MEDIUM Complexity : 3-5 performance deficits relating to physical, cognitive , or psychosocial skils that result in activity limitations and / or participation restrictions MEDIUM Complexity : Patient may present with comorbidities that affect occupational performnce. Miniml to moderate modification of tasks or assistance (eg, physical or verbal ) with assesment(s) is necessary to enable patient to complete evaluation       Based on the above components, the patient evaluation is determined to be of the following complexity level: LOW   Pain:  Pain Scale 1: Numeric (0 - 10)  Pain Intensity 1: 0              Activity Tolerance:   Fair. Please refer to the flowsheet for vital signs taken during this treatment.   After treatment:   [ ] Patient left in no apparent distress sitting up in chair  [X] Patient left in no apparent distress in bed  [X] Call bell left within reach  [X] Nursing notified  [ ] Caregiver present  [ ] Bed alarm activated      COMMUNICATION/EDUCATION:   The patients plan of care was discussed with: Physical Therapist and Registered Nurse.  [X] Home safety education was provided and the patient/caregiver indicated understanding. [X] Patient/family have participated as able in goal setting and plan of care. [ ] Patient/family agree to work toward stated goals and plan of care. [ ] Patient understands intent and goals of therapy, but is neutral about his/her participation. [ ] Patient is unable to participate in goal setting and plan of care. This patients plan of care is appropriate for delegation to [facility].      Thank you for this referral.  Sara Davis OT  Time Calculation: 16 mins

## 2017-06-22 NOTE — DIABETES MGMT
DTC Progress Note    Recommendations/ Comments: Chart review for hyperglycemia, BG's > 200. Noted that pt is refusing insulin due to insulin allergy. Chart reviewed on [facility]. Patient is a 61 y.o. male with hx Type 2 Diabetes (30 years since diagnosis) on oral agents (dual therapy): glimepiride (Amaryl), metformin (generic) at home. A1c:   Lab Results   Component Value Date/Time    Hemoglobin A1c 8.3 06/18/2017 09:49 PM       Recent Glucose Results:   Lab Results   Component Value Date/Time     (H) 06/22/2017 02:57 AM    GLUCPOC 271 (H) 06/22/2017 11:04 AM    GLUCPOC 236 (H) 06/21/2017 10:27 PM    GLUCPOC 277 (H) 06/21/2017 04:12 PM        Lab Results   Component Value Date/Time    Creatinine 1.15 06/22/2017 02:57 AM     Estimated Creatinine Clearance: 107.7 mL/min (based on Cr of 1.15). Active Orders   Diet    DIET DENTAL SOFT (SOFT SOLID)        PO intake: No data found. Current hospital DM medication: Glimepiride 4 mg BID     Will continue to follow as needed.     Thank you  Judithe Boeck RN, [address]

## 2017-06-23 ENCOUNTER — APPOINTMENT (OUTPATIENT)
Dept: GENERAL RADIOLOGY | Age: 57
DRG: 291 | End: 2017-06-23
Attending: NURSE PRACTITIONER
Payer: MEDICARE

## 2017-06-23 VITALS
HEART RATE: 72 BPM | HEIGHT: 72 IN | DIASTOLIC BLOOD PRESSURE: 85 MMHG | BODY MASS INDEX: 42.66 KG/M2 | RESPIRATION RATE: 22 BRPM | OXYGEN SATURATION: 93 % | SYSTOLIC BLOOD PRESSURE: 168 MMHG | TEMPERATURE: 98.2 F | WEIGHT: 315 LBS

## 2017-06-23 LAB
ALBUMIN MFR UR ELPH: 68.9 %
ALBUMIN SERPL BCP-MCNC: 3 G/DL (ref 3.5–5)
ALBUMIN SERPL ELPH-MCNC: 2.5 G/DL (ref 2.9–4.4)
ALBUMIN/GLOB SERPL: 0.8 {RATIO} (ref 0.7–1.7)
ALBUMIN/GLOB SERPL: 0.8 {RATIO} (ref 1.1–2.2)
ALP SERPL-CCNC: 86 U/L (ref 45–117)
ALPHA1 GLOB MFR UR ELPH: 7.2 %
ALPHA1 GLOB SERPL ELPH-MCNC: 0.4 G/DL (ref 0–0.4)
ALPHA2 GLOB 24H MFR UR ELPH: 5.9 %
ALPHA2 GLOB SERPL ELPH-MCNC: 1 G/DL (ref 0.4–1)
ALT SERPL-CCNC: 69 U/L (ref 12–78)
ANA SER QL: NEGATIVE
ANION GAP BLD CALC-SCNC: 8 MMOL/L (ref 5–15)
AST SERPL W P-5'-P-CCNC: 48 U/L (ref 15–37)
B-GLOBULIN 24H MFR UR ELPH: 12.3 %
B-GLOBULIN SERPL ELPH-MCNC: 1.2 G/DL (ref 0.7–1.3)
BASOPHILS # BLD AUTO: 0 K/UL (ref 0–0.1)
BASOPHILS # BLD: 0 % (ref 0–1)
BILIRUB SERPL-MCNC: 0.7 MG/DL (ref 0.2–1)
BNP SERPL-MCNC: 52 PG/ML (ref 0–100)
BUN SERPL-MCNC: 28 MG/DL (ref 6–20)
BUN/CREAT SERPL: 30 (ref 12–20)
CALCIUM SERPL-MCNC: 8.7 MG/DL (ref 8.5–10.1)
CHLORIDE SERPL-SCNC: 94 MMOL/L (ref 97–108)
CO2 SERPL-SCNC: 31 MMOL/L (ref 21–32)
CREAT SERPL-MCNC: 0.92 MG/DL (ref 0.7–1.3)
EOSINOPHIL # BLD: 0.3 K/UL (ref 0–0.4)
EOSINOPHIL NFR BLD: 4 % (ref 0–7)
ERYTHROCYTE [DISTWIDTH] IN BLOOD BY AUTOMATED COUNT: 13.7 % (ref 11.5–14.5)
GAMMA GLOB 24H MFR UR ELPH: 5.7 %
GAMMA GLOB SERPL ELPH-MCNC: 0.7 G/DL (ref 0.4–1.8)
GLOBULIN SER CALC-MCNC: 3.3 G/DL (ref 2.2–3.9)
GLOBULIN SER CALC-MCNC: 3.6 G/DL (ref 2–4)
GLUCOSE BLD STRIP.AUTO-MCNC: 229 MG/DL (ref 65–100)
GLUCOSE BLD STRIP.AUTO-MCNC: 282 MG/DL (ref 65–100)
GLUCOSE SERPL-MCNC: 248 MG/DL (ref 65–100)
HAV IGM SERPL QL IA: NONREACTIVE
HBV CORE IGM SER QL: NONREACTIVE
HBV SURFACE AG SER QL: <0.1 INDEX
HBV SURFACE AG SER QL: NEGATIVE
HCT VFR BLD AUTO: 38.3 % (ref 36.6–50.3)
HCV AB SERPL QL IA: NONREACTIVE
HCV COMMENT,HCGAC: NORMAL
HGB BLD-MCNC: 12.7 G/DL (ref 12.1–17)
HIV 1+2 AB+HIV1 P24 AG SERPL QL IA: NONREACTIVE
HIV12 RESULT COMMENT, HHIVC: NORMAL
IGA SERPL-MCNC: 266 MG/DL (ref 90–386)
IGG SERPL-MCNC: 633 MG/DL (ref 700–1600)
IGM SERPL-MCNC: 59 MG/DL (ref 20–172)
LYMPHOCYTES # BLD AUTO: 23 % (ref 12–49)
LYMPHOCYTES # BLD: 1.5 K/UL (ref 0.8–3.5)
M PROTEIN MFR UR ELPH: NORMAL %
M PROTEIN SERPL ELPH-MCNC: ABNORMAL G/DL
MAGNESIUM SERPL-MCNC: 1.9 MG/DL (ref 1.6–2.4)
MCH RBC QN AUTO: 32.4 PG (ref 26–34)
MCHC RBC AUTO-ENTMCNC: 33.2 G/DL (ref 30–36.5)
MCV RBC AUTO: 97.7 FL (ref 80–99)
MONOCYTES # BLD: 0.7 K/UL (ref 0–1)
MONOCYTES NFR BLD AUTO: 11 % (ref 5–13)
NEUTS SEG # BLD: 4.1 K/UL (ref 1.8–8)
NEUTS SEG NFR BLD AUTO: 62 % (ref 32–75)
PLATELET # BLD AUTO: 139 K/UL (ref 150–400)
PLEASE NOTE:, 133800: NORMAL
POTASSIUM SERPL-SCNC: 4 MMOL/L (ref 3.5–5.1)
PROT PATTERN SERPL IFE-IMP: ABNORMAL
PROT SERPL-MCNC: 5.8 G/DL (ref 6–8.5)
PROT SERPL-MCNC: 6.6 G/DL (ref 6.4–8.2)
PROT UR-MCNC: 118.4 MG/DL
RBC # BLD AUTO: 3.92 M/UL (ref 4.1–5.7)
SERVICE CMNT-IMP: ABNORMAL
SERVICE CMNT-IMP: ABNORMAL
SODIUM SERPL-SCNC: 133 MMOL/L (ref 136–145)
SP1: NORMAL
SP2: NORMAL
SP3: NORMAL
TROPONIN I SERPL-MCNC: <0.04 NG/ML
WBC # BLD AUTO: 6.6 K/UL (ref 4.1–11.1)

## 2017-06-23 PROCEDURE — 71010 XR CHEST PORT: CPT

## 2017-06-23 PROCEDURE — 84484 ASSAY OF TROPONIN QUANT: CPT | Performed by: NURSE PRACTITIONER

## 2017-06-23 PROCEDURE — 74011250637 HC RX REV CODE- 250/637: Performed by: FAMILY MEDICINE

## 2017-06-23 PROCEDURE — P9045 ALBUMIN (HUMAN), 5%, 250 ML: HCPCS | Performed by: FAMILY MEDICINE

## 2017-06-23 PROCEDURE — 83880 ASSAY OF NATRIURETIC PEPTIDE: CPT | Performed by: NURSE PRACTITIONER

## 2017-06-23 PROCEDURE — 74011250637 HC RX REV CODE- 250/637: Performed by: INTERNAL MEDICINE

## 2017-06-23 PROCEDURE — 74011250636 HC RX REV CODE- 250/636: Performed by: NURSE PRACTITIONER

## 2017-06-23 PROCEDURE — 74011250636 HC RX REV CODE- 250/636: Performed by: FAMILY MEDICINE

## 2017-06-23 PROCEDURE — 74011000250 HC RX REV CODE- 250: Performed by: NURSE PRACTITIONER

## 2017-06-23 PROCEDURE — 85025 COMPLETE CBC W/AUTO DIFF WBC: CPT | Performed by: NURSE PRACTITIONER

## 2017-06-23 PROCEDURE — 36415 COLL VENOUS BLD VENIPUNCTURE: CPT | Performed by: NURSE PRACTITIONER

## 2017-06-23 PROCEDURE — 93041 RHYTHM ECG TRACING: CPT

## 2017-06-23 PROCEDURE — 82962 GLUCOSE BLOOD TEST: CPT

## 2017-06-23 PROCEDURE — 74011250637 HC RX REV CODE- 250/637: Performed by: NURSE PRACTITIONER

## 2017-06-23 PROCEDURE — 80053 COMPREHEN METABOLIC PANEL: CPT | Performed by: NURSE PRACTITIONER

## 2017-06-23 PROCEDURE — 83735 ASSAY OF MAGNESIUM: CPT | Performed by: NURSE PRACTITIONER

## 2017-06-23 RX ORDER — FUROSEMIDE 40 MG/1
40 TABLET ORAL 2 TIMES DAILY
Status: DISCONTINUED | OUTPATIENT
Start: 2017-06-23 | End: 2017-06-23 | Stop reason: HOSPADM

## 2017-06-23 RX ORDER — BUMETANIDE 2 MG/1
2 TABLET ORAL 2 TIMES DAILY
Qty: 60 TAB | Refills: 0 | Status: SHIPPED | OUTPATIENT
Start: 2017-06-23 | End: 2017-06-24

## 2017-06-23 RX ORDER — SPIRONOLACTONE 25 MG/1
25 TABLET ORAL DAILY
Qty: 30 TAB | Refills: 0 | Status: ON HOLD | OUTPATIENT
Start: 2017-06-23 | End: 2018-01-01

## 2017-06-23 RX ORDER — LISINOPRIL 10 MG/1
10 TABLET ORAL DAILY
Qty: 30 TAB | Refills: 0 | Status: ON HOLD | OUTPATIENT
Start: 2017-06-23 | End: 2018-01-01 | Stop reason: DRUGHIGH

## 2017-06-23 RX ADMIN — NITROGLYCERIN 1 INCH: 20 OINTMENT TOPICAL at 05:00

## 2017-06-23 RX ADMIN — SPIRONOLACTONE 25 MG: 25 TABLET, FILM COATED ORAL at 09:05

## 2017-06-23 RX ADMIN — LIDOCAINE HYDROCHLORIDE 40 ML: 20 SOLUTION ORAL; TOPICAL at 03:11

## 2017-06-23 RX ADMIN — PANTOPRAZOLE SODIUM 40 MG: 40 TABLET, DELAYED RELEASE ORAL at 06:46

## 2017-06-23 RX ADMIN — FUROSEMIDE 40 MG: 40 TABLET ORAL at 14:39

## 2017-06-23 RX ADMIN — GABAPENTIN 100 MG: 100 CAPSULE ORAL at 09:06

## 2017-06-23 RX ADMIN — FAMOTIDINE 20 MG: 20 TABLET ORAL at 09:30

## 2017-06-23 RX ADMIN — FAMOTIDINE 20 MG: 20 TABLET ORAL at 00:26

## 2017-06-23 RX ADMIN — Medication 10 ML: at 14:40

## 2017-06-23 RX ADMIN — METOPROLOL TARTRATE 100 MG: 50 TABLET ORAL at 09:05

## 2017-06-23 RX ADMIN — ALBUMIN (HUMAN) 25 G: 12.5 INJECTION, SOLUTION INTRAVENOUS at 00:26

## 2017-06-23 RX ADMIN — LISINOPRIL 10 MG: 10 TABLET ORAL at 09:30

## 2017-06-23 RX ADMIN — HYDRALAZINE HYDROCHLORIDE 10 MG: 10 TABLET, FILM COATED ORAL at 09:05

## 2017-06-23 RX ADMIN — FUROSEMIDE 40 MG: 10 INJECTION, SOLUTION INTRAMUSCULAR; INTRAVENOUS at 09:05

## 2017-06-23 RX ADMIN — ASPIRIN 81 MG CHEWABLE TABLET 81 MG: 81 TABLET CHEWABLE at 09:06

## 2017-06-23 RX ADMIN — ONDANSETRON 4 MG: 2 INJECTION INTRAMUSCULAR; INTRAVENOUS at 14:40

## 2017-06-23 RX ADMIN — ALBUMIN (HUMAN) 25 G: 12.5 INJECTION, SOLUTION INTRAVENOUS at 06:47

## 2017-06-23 RX ADMIN — HYDROCORTISONE: 1 CREAM TOPICAL at 09:08

## 2017-06-23 RX ADMIN — GLIMEPIRIDE 4 MG: 2 TABLET ORAL at 09:06

## 2017-06-23 NOTE — NURSE NAVIGATOR
[facility] contacted [facility] office and post discharge followup appt scheduled for 6/28/17 @ 10AM with Dr. Dhara Yi.   Appt placed on AVS.  Christy Zazueta RN-CHFN/PAOLONN

## 2017-06-23 NOTE — PROGRESS NOTES
Spiritual Care Partner Volunteer visited patient in [facility] on 6.22.17. Documented by:  Rev. Rach Justin M.Div, [facility]

## 2017-06-23 NOTE — DISCHARGE INSTRUCTIONS
Discharge Instructions       PATIENT ID: Damon Lerner  MRN: [id number]   YOB: 1960    DATE OF ADMISSION: 6/18/2017  8:29 PM    DATE OF DISCHARGE: 6/23/2017    PRIMARY CARE PROVIDER: Pamela Zabala MD     ATTENDING PHYSICIAN: Michael Garces MD  DISCHARGING PROVIDER: Jazmin Cruz NP    To contact this individual call [phone] and ask the  to page. If unavailable ask to be transferred the Adult Hospitalist Department. DISCHARGE DIAGNOSES:  Acute on chronic hypoxic respiratory failure  Acute on chronic diastolic CHF  Nephrotic range proteinuria  Lower extremity and scrotal edema  UTI    CONSULTATIONS: IP CONSULT TO CARDIOLOGY  IP CONSULT TO NEPHROLOGY    PROCEDURES/SURGERIES: * No surgery found *    PENDING TEST RESULTS:   At the time of discharge the following test results are still pending: none    FOLLOW UP APPOINTMENTS:   Follow-up Information     Follow up With Details Comments [address] Johnson MD On 6/28/2017 Appt time 10am [address] [address]  [phone]             ADDITIONAL CARE RECOMMENDATIONS:   Recommend home health PT/OT  Need close follow-up with PCP and nephrology and cardiology  Also would recommend elevation of legs and scrotum to help with reducing the swelling    DIET: Cardiac Diet    ACTIVITY: Activity as tolerated    WOUND CARE: N/A    EQUIPMENT needed: walker, scooter      DISCHARGE MEDICATIONS:   See Medication Reconciliation Form    · It is important that you take the medication exactly as they are prescribed. · Keep your medication in the bottles provided by the pharmacist and keep a list of the medication names, dosages, and times to be taken in your wallet. · Do not take other medications without consulting your doctor. NOTIFY YOUR PHYSICIAN FOR ANY OF THE FOLLOWING:   Fever over 101 degrees for 24 hours.    Chest pain, shortness of breath, fever, chills, nausea, vomiting, diarrhea, change in mentation, falling, weakness, bleeding. Severe pain or pain not relieved by medications. Or, any other signs or symptoms that you may have questions about.       DISPOSITION:    Home With:  x OT x PT x HH  RN       SNF/Inpatient Rehab/LTAC    Independent/assisted living    Hospice    Other:     CDMP Checked:   Yes x     PROBLEM LIST Updated:  Yes x       Signed:   Rosa Guillory NP  6/23/2017  5:05 PM

## 2017-06-23 NOTE — PROGRESS NOTES
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 Feeling ok. No cp or sob. UOP stable. Wt down only slightly. Wants to go home. Current Medications: all current  Medications have been eviewed in EPIC  Review of Systems: Pertinent items are noted in HPI. Objective:  Vitals:    Vitals:    06/23/17 0358 06/23/17 0621 06/23/17 0719 06/23/17 1111   BP: 191/56 177/67 173/56 168/85   Pulse: 70 72 77 72   Resp: 18  20 22   Temp: 98.2 °F (36.8 °C)  98.1 °F (36.7 °C) 98.2 °F (36.8 °C)   SpO2: 95%  96% 93%   Weight: 151.7 kg (334 lb 7 oz)      Height:         Intake and Output:  06/23 0701 - 06/23 1900  In: 120 [P.O.:120]  Out: 3835 [Urine:1125]  06/21 1901 - 06/23 0700  In: 3670 [P.O.:720;  I.V.:3000]  Out: 4300 [Urine:4300]    Physical Examination:    General: NAD,Conversant, morbidly obese  Neck:  Supple, no mass  Resp:  Lungs CTA B/L, no wheezing , normal respiratory effort  CV:  RRR,  no murmur or rub, 2+ LE edema  GI:  Soft, NT, + Bowel sounds, no hepatosplenomegaly  Neurologic:  Non focal  Psych:             AAO x 3 appropriate affect   Skin:  Chronic venous stasis changes in LEs  :  No umana, + scrotal edema    []    High complexity decision making was performed  []    Patient is at high-risk of decompensation with multiple organ involvement    Lab Data Personally Reviewed: I have reviewed all the pertinent labs, microbiology data and radiology studies during assessment.     Recent Labs      06/23/17 0313 06/22/17 0257 06/21/17   0402   NA  133*  133*  133*   K  4.0  4.0  4.0   CL  94*  93*  93*   CO2  31  34*  34*   GLU  248*  211*  155*   BUN  28*  33*  29*   CREA  0.92  1.15  0.85   CA  8.7  8.4*  8.7   MG  1.9   --    --    PHOS   --   3.1   --    ALB  3.0*  2.4*   --    SGOT  48*   --    --    ALT  69   --    --      Recent Labs      06/23/17 0313 06/22/17 0257 06/21/17   0402   WBC  6.6  8.2  9.8   HGB  12.7  12.7  14.1   HCT  38.3  39.3  42.8   PLT  139*  144*  141*     No results found for: [facility]  Lab Results   Component Value Date/Time    Culture result: GRAM NEGATIVE RODS 06/22/2017 06:57 PM     Recent Results (from the past 24 hour(s))   CULTURE, URINE    Collection Time: 06/22/17  6:57 PM   Result Value Ref Range    Special Requests: NO SPECIAL REQUESTS      [place] Count >100,000  COLONIES/mL        Culture result: GRAM NEGATIVE RODS (A)     GLUCOSE, POC    Collection Time: 06/22/17 10:08 PM   Result Value Ref Range    Glucose (POC) 334 (H) 65 - 100 mg/dL    Performed by Anna Guevara    ECG RHYTHM ANALYSIS ADULT    Collection Time: 06/23/17  2:53 AM   Result Value Ref Range    Ventricular Rate 67 BPM    Atrial Rate 67 BPM    P-R Interval 188 ms    QRS Duration 140 ms    Q-T Interval 460 ms    QTC Calculation (Bezet) 486 ms    Calculated P Axis 31 degrees    Calculated R Axis -23 degrees    Calculated T Axis 23 degrees    Diagnosis       Normal sinus rhythm  Right bundle branch block  When compared with ECG of 18-JUN-2017 18:08,  Questionable change in QRS axis     CBC WITH AUTOMATED DIFF    Collection Time: 06/23/17  3:13 AM   Result Value Ref Range    WBC 6.6 4.1 - 11.1 K/uL    RBC 3.92 (L) 4.10 - 5.70 M/uL HGB 12.7 12.1 - 17.0 g/dL    HCT 38.3 36.6 - 50.3 %    MCV 97.7 80.0 - 99.0 FL    MCH 32.4 26.0 - 34.0 PG    MCHC 33.2 30.0 - 36.5 g/dL    RDW 13.7 11.5 - 14.5 %    PLATELET 782 (L) 550 - 400 K/uL    NEUTROPHILS 62 32 - 75 %    LYMPHOCYTES 23 12 - 49 %    MONOCYTES 11 5 - 13 %    EOSINOPHILS 4 0 - 7 %    BASOPHILS 0 0 - 1 %    ABS. NEUTROPHILS 4.1 1.8 - 8.0 K/UL    ABS. LYMPHOCYTES 1.5 0.8 - 3.5 K/UL    ABS. MONOCYTES 0.7 0.0 - 1.0 K/UL    ABS. EOSINOPHILS 0.3 0.0 - 0.4 K/UL    ABS. BASOPHILS 0.0 0.0 - 0.1 K/UL   METABOLIC PANEL, COMPREHENSIVE    Collection Time: 06/23/17  3:13 AM   Result Value Ref Range    Sodium 133 (L) 136 - 145 mmol/L    Potassium 4.0 3.5 - 5.1 mmol/L    Chloride 94 (L) 97 - 108 mmol/L    CO2 31 21 - 32 mmol/L    Anion gap 8 5 - 15 mmol/L    Glucose 248 (H) 65 - 100 mg/dL    BUN 28 (H) 6 - 20 MG/DL    Creatinine 0.92 0.70 - 1.30 MG/DL    BUN/Creatinine ratio 30 (H) 12 - 20      GFR est AA >60 >60 ml/min/1.73m2    GFR est non-AA >60 >60 ml/min/1.73m2    Calcium 8.7 8.5 - 10.1 MG/DL    Bilirubin, total 0.7 0.2 - 1.0 MG/DL    ALT (SGPT) 69 12 - 78 U/L    AST (SGOT) 48 (H) 15 - 37 U/L    Alk.  phosphatase 86 45 - 117 U/L    Protein, total 6.6 6.4 - 8.2 g/dL    Albumin 3.0 (L) 3.5 - 5.0 g/dL    Globulin 3.6 2.0 - 4.0 g/dL    A-G Ratio 0.8 (L) 1.1 - 2.2     TROPONIN I    Collection Time: 06/23/17  3:13 AM   Result Value Ref Range    Troponin-I, Qt. <0.04 <0.05 ng/mL   BNP    Collection Time: 06/23/17  3:13 AM   Result Value Ref Range    BNP 52 0 - 100 pg/mL   MAGNESIUM    Collection Time: 06/23/17  3:13 AM   Result Value Ref Range    Magnesium 1.9 1.6 - 2.4 mg/dL   GLUCOSE, POC    Collection Time: 06/23/17  6:39 AM   Result Value Ref Range    Glucose (POC) 229 (H) 65 - 100 mg/dL    Performed by [facility] , POC    Collection Time: 06/23/17 11:10 AM   Result Value Ref Range    Glucose (POC) 282 (H) 65 - 100 mg/dL    Performed by [address] [place] [address] MD [facility]   [address] [facility] [place] Deal [address] [place] [address]  Phone - [phone]   Fax - [phone]  [email or web address]

## 2017-06-23 NOTE — PROGRESS NOTES
Attempted to see patient x2 and he defers stating he didn't have a good night and needs to rest. Wife in room stating she has assisted him to sitting side of bed and will do so again later. Will f/u on Monday.  Jeanine Parikh, PT

## 2017-06-23 NOTE — PROGRESS NOTES
asked to set up home health by hospitalist.  Patient had been decling therapy during his hospital stay. I met with patient in his room. His wife is the caretaker and his son Otilio Peabody comes to help and provide transportation. Patient is in a power driven wheelchair and has bilateral lower extremity edema. He is sitting in his chair and does not want any home PT at this time. I discussed situation with patient and wife and patient uses a walker to get from his bedroom to bathroom and then to his easy chair in the living room. He has been on disability for some time and depends on son to provide transportation to MD offices. He lives closer to [place] but does not wish to use [facility] for home care. Melchor Araujo Per wife patient uses the [facility] in [facility] for his prescriptions at the local [place]. I discussed case with Dr Isabel Polo and she is in agreement at this time. I did offer a scale to wife for patient, howevershe stated he would not use it.   Update given to April patient's nurse

## 2017-06-23 NOTE — PROGRESS NOTES
Discharge instructions reviewed with patient and spouse, opportunity for questions was given. Patient discharged.

## 2017-06-23 NOTE — PROGRESS NOTES
Bedside shift change report given to Sylvester epperson RN  (oncoming nurse) by Kimo Vang RN  (offgoing nurse). Report included the following information SBAR and MAR.   Cardiac Rhythm: Sinus

## 2017-06-23 NOTE — DISCHARGE SUMMARY
Discharge Summary       PATIENT ID: Rosa Orozco  MRN: [id number]   YOB: 1960    DATE OF ADMISSION: 6/18/2017  8:29 PM    DATE OF DISCHARGE: 06/23/17 5:23 PM   PRIMARY CARE PROVIDER: Jacinto Montgomery MD     ATTENDING PHYSICIAN: Briana Duff MD  DISCHARGING PROVIDER: Briana Duff MD    To contact this individual call [phone] and ask the  to page. If unavailable ask to be transferred the Adult Hospitalist Department. CONSULTATIONS: IP CONSULT TO CARDIOLOGY  IP CONSULT TO NEPHROLOGY    PROCEDURES/SURGERIES: * No surgery found *    [id number] COURSE:   Mr. Lawyer Zheng is a 66yo WM with PMH including CAD, COPD/chronic hypoxemia respiratory failure on HOT 2.5L prn, DM2, chronic diastolic CHF, HTN, morbid obesity, physical debility using motorized scooter at baseline, and RBBB, originally presenting as direct admission/transfer from [facility] ER with complaints of SOB, edema, and fevers. At time of admission, was found to be hyperglycemic to 400s, with mild hyperkalemia (K 5.6), and with acute on chronic diastolic CHF. Cardiology and nephrology were consulted. Issues by problem per below:    1. Acute on chronic hypoxemic respiratory failure  2/2 acute on chronic diastolic CHF with pulmonary edema and b/l small pleural effusions on imaging  Underlying COPD  -treated with O2, weaned down to room air at rest by discharge, pt on home oxygen prn at baseline  -treated with IV diuresis, increased home PO bumex at discharge, started on lisinopril and spironolactone per cardiology --> will need f/u BMP in ~4-7 days to monitor renal function and potassium  -TTE with preserved EF  -nephrotic range proteinuria / hypoalbuminemia also likely contributing, nephrology consulted, see below  -pt to f/u with cardiology as outpatient    2.  Nephrotic range proteinuria  Hypoalbuminemia  With general anasarca, including significant scrotal swelling  -nephrology consulted, seen by Dr. 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 Ultimately discharged on PO bumex 2mg BID and started on lisinopril 10mg daily. -patient is to f/u with Dr. Chet Germain in 1 week for repeat labs and f/u    3. Hyperkalemia  -resolved without specific treatment (although pt was diuresed here)    4. Uncontrolled DM2 with hyperglycemia  -only on oral meds at home. Pt refuses insulin due to supposed insulin allergy. Pt to f/u with PCP. 5. Hypertensive Urgency  -BPs uncontrolled, pt continued on home meds and lisinopril and spironolactone were initiated. -will need f/u BMP in ~4-7 days to monitor renal function and potassium // Also needs BP monitoring, possibly uptitration of BP meds  -also of note, we attempted to start pt on statin here, but pt refused prescription due to reported allergy (\"swelling\")    6. Asymptomatic bacteriuria  -of note, urine reflex culture was apparently sent, growing gram negative rods at time of discharge. Pt is asymptomatic, no fevers, no leukocytosis. Not treated. 7. Chronic physical debility  Morbid obesity  -pt uses motorized scooter at baseline, PT/OT recommend SNF but pt refuses. Discharged home with plans for [facility]. DISCHARGE DIAGNOSES / PLAN:      1.  Acute on chronic hypoxemic respiratory failure  2/2 acute on chronic diastolic CHF with pulmonary edema and b/l small pleural effusions on imaging  Underlying COPD  -increased home PO bumex at discharge, started on lisinopril and spironolactone per cardiology --> will need f/u BMP in ~4-7 days to monitor renal function and potassium  -needs outpatient f/u with cardiology    2. Nephrotic range proteinuria  Hypoalbuminemia  With general anasarca, including significant scrotal swelling  -patient is to f/u with Dr. Rowan Jewell (nephrology) in 1 week for repeat labs and f/u  -discharged on increased dose PO bumex, started on lisinopril as well, needs f/u labs for monitoring    3. Hyperkalemia  -f/u BMP as outpatient in 4-7 days    4. Uncontrolled DM2 with hyperglycemia  -f/u with PCP    5. Hypertensive Urgency  -BPs uncontrolled, pt continued on home meds and lisinopril and spironolactone were initiated. -will need f/u BMP in ~4-7 days to monitor renal function and potassium // Also needs BP monitoring, possibly uptitration of BP meds    6. Asymptomatic bacteriuria  -of note, urine reflex culture was apparently sent, growing gram negative rods at time of discharge. Pt is asymptomatic, no fevers, no leukocytosis. Not treated. 7. Chronic physical debility  Morbid obesity  -Discharged home with plans for [facility]. PENDING TEST RESULTS:   At the time of discharge the following test results are still pending: URINE CULTURE PENDING AT DISCHARGE, also pending were JACKELINE, hepatitis and HIV neg, SPEP/UPEP    FOLLOW UP APPOINTMENTS:    Follow-up Information     Follow up With Details Comments [id number] Stiven Johnson MD On 6/28/2017 Appt time 10am [address] [place] [address]  [address]  [facility], [facility]  [phone]             ADDITIONAL CARE RECOMMENDATIONS: elevate legs and scrotum to help reduce edema    DIET: soft, cardiac/low fat recommended    ACTIVITY: Activity as tolerated    WOUND CARE: N/A    EQUIPMENT needed: Has home scooter. Walker. Bedside commode.       DISCHARGE MEDICATIONS:  Current Discharge Medication List      START taking these medications    Details   lisinopril (PRINIVIL, ZESTRIL) 10 mg tablet Take 1 Tab by mouth daily. Qty: 30 Tab, Refills: 0      spironolactone (ALDACTONE) 25 mg tablet Take 1 Tab by mouth daily. Qty: 30 Tab, Refills: 0         CONTINUE these medications which have CHANGED    Details   bumetanide (BUMEX) 2 mg tablet Take 1 Tab by mouth two (2) times a day. Qty: 60 Tab, Refills: 0         CONTINUE these medications which have NOT CHANGED    Details   gabapentin (NEURONTIN) 300 mg capsule Take 300 mg by mouth three (3) times daily. glimepiride (AMARYL) 4 mg tablet Take 4 mg by mouth two (2) times a day. metoprolol (LOPRESSOR) 100 mg tablet Take 100 mg by mouth two (2) times a day. !! metFORMIN (GLUCOPHAGE) 1,000 mg tablet Take 1,500 mg by mouth daily (with breakfast). !! metFORMIN (GLUCOPHAGE) 1,000 mg tablet Take 1,000 mg by mouth every evening. omeprazole (PRILOSEC) 20 mg capsule Take 20 mg by mouth two (2) times a day. aspirin 81 mg tablet Take 81 mg by mouth daily. !! - Potential duplicate medications found. Please discuss with provider. NOTIFY YOUR PHYSICIAN FOR ANY OF THE FOLLOWING:   Fever over 101 degrees for 24 hours. Chest pain, shortness of breath, fever, chills, nausea, vomiting, diarrhea, change in mentation, falling, weakness, bleeding. Severe pain or pain not relieved by medications. Or, any other signs or symptoms that you may have questions about.     DISPOSITION:    Home With:  x OT x PT x HH  RN       Long term SNF/Inpatient Rehab    Independent/assisted living    Hospice    Other:       PATIENT CONDITION AT DISCHARGE:     Functional status   x Poor     Deconditioned     Independent      Cognition   x  Lucid     Forgetful     Dementia      Catheters/lines (plus indication)    Blanco     PICC     PEG    x None      Code status   x  Full code     DNR      PHYSICAL EXAMINATION AT DISCHARGE:  /85 (BP 1 Location: Right arm, BP Patient Position: At rest)  Pulse 72  Temp 98.2 °F (36.8 °C)  Resp 22  Ht 6' 0.01\" (1.829 m)  Wt 151.7 kg (334 lb 7 oz)  SpO2 93%  BMI 45.35 kg/m2  General:  Alert, cooperative, no distress, appears stated age. Lungs:  Clear to auscultation bilaterally. Chest wall:  No tenderness or deformity. Heart:  Regular rate and rhythm, S1, S2 normal, no murmur. Abdomen:  Soft, non-tender. Grossly obese with swelling of the scrotum. Extremities: Extremities normal, atraumatic, no cyanosis. 1-2+ Edema at the b/l legs. Pulses: 2+ and symmetric all extremities. Skin: Skin color, texture, turgor normal. No rashes or lesions   Neurologic: CNII-XII intact.  Moving all extremities       CHRONIC MEDICAL DIAGNOSES:  Problem List as of 6/23/2017  Date Reviewed: 6/18/2017          Codes Class Noted - Resolved    Acute hyperkalemia ICD-10-CM: E87.5  ICD-9-CM: 276.7  6/18/2017 - Present        * (Principal)Acute CHF (congestive heart failure) ([facility] ICD-10-CM: I50.9  ICD-9-CM: 428.0  6/18/2017 - Present        Type 2 diabetes mellitus with hyperglycemia ([facility].) ICD-10-CM: E11.65  ICD-9-CM: 250.00  6/18/2017 - Present              Greater than 30 minutes were spent with the patient on counseling and coordination of care    Signed:   Ladarius Cortes MD  6/23/2017  5:19 PM

## 2017-06-23 NOTE — PROGRESS NOTES
Bedside and Verbal shift change report given to april (oncoming nurse) by Bushra Boles (offgoing nurse). Report included the following information SBAR, Kardex, Intake/Output, MAR, Recent Results and Cardiac Rhythm nsr.

## 2017-06-23 NOTE — PROGRESS NOTES
Cardiology Progress Note    Pt reports he had stomach and chest pain last pm.  Still feeling blah but did eat breakfast.     Imp:  Chest and epigastric pain ?gastric upset-lying almost flat in bed after eating; doubt cardiac as EKG normal; BNP and troponin also normal.   HFpEF- on treatment; BNP low  HTN  Nephrotic range proteinuria  HTN  COPD/OHS  Morbid obesity    Rec:  Con't current meds-BP not ideal yet, but only two doses of lisinopril so far  Follow up on urine culture   CXR pending    Blood pressure 173/56, pulse 77, temperature 98.1 °F (36.7 °C), resp. rate 20, height 6' 0.01\" (1.829 m), weight 151.7 kg (334 lb 7 oz), SpO2 96 %.   [id number]  Exam without change    Labs: K 4.0    Rachael Valadez MD

## 2017-06-24 ENCOUNTER — TELEPHONE (OUTPATIENT)
Dept: INTERNAL MEDICINE | Age: 57
End: 2017-06-24

## 2017-06-24 LAB
ATRIAL RATE: 67 BPM
BACTERIA SPEC CULT: ABNORMAL
CALCULATED P AXIS, ECG09: 31 DEGREES
CALCULATED R AXIS, ECG10: -23 DEGREES
CALCULATED T AXIS, ECG11: 23 DEGREES
CC UR VC: ABNORMAL
DIAGNOSIS, 93000: NORMAL
P-R INTERVAL, ECG05: 188 MS
Q-T INTERVAL, ECG07: 460 MS
QRS DURATION, ECG06: 140 MS
QTC CALCULATION (BEZET), ECG08: 486 MS
SERVICE CMNT-IMP: ABNORMAL
VENTRICULAR RATE, ECG03: 67 BPM

## 2017-06-24 RX ORDER — FUROSEMIDE 40 MG/1
40 TABLET ORAL 2 TIMES DAILY
Qty: 60 TAB | Refills: 0 | Status: ON HOLD | OUTPATIENT
Start: 2017-06-24 | End: 2018-01-01

## 2018-01-01 ENCOUNTER — APPOINTMENT (OUTPATIENT)
Dept: ULTRASOUND IMAGING | Age: 58
DRG: 683 | End: 2018-01-01
Attending: INTERNAL MEDICINE
Payer: MEDICARE

## 2018-01-01 ENCOUNTER — HOSPITAL ENCOUNTER (INPATIENT)
Age: 58
LOS: 3 days | Discharge: HOME HEALTH | DRG: 683 | End: 2018-07-18
Attending: EMERGENCY MEDICINE | Admitting: INTERNAL MEDICINE
Payer: MEDICARE

## 2018-01-01 ENCOUNTER — APPOINTMENT (OUTPATIENT)
Dept: GENERAL RADIOLOGY | Age: 58
DRG: 683 | End: 2018-01-01
Attending: EMERGENCY MEDICINE
Payer: MEDICARE

## 2018-01-01 VITALS
TEMPERATURE: 98.2 F | OXYGEN SATURATION: 97 % | WEIGHT: 306.5 LBS | SYSTOLIC BLOOD PRESSURE: 147 MMHG | DIASTOLIC BLOOD PRESSURE: 57 MMHG | BODY MASS INDEX: 41.52 KG/M2 | HEART RATE: 83 BPM | RESPIRATION RATE: 16 BRPM | HEIGHT: 72 IN

## 2018-01-01 DIAGNOSIS — N17.9 AKI (ACUTE KIDNEY INJURY) (HCC): Primary | ICD-10-CM

## 2018-01-01 LAB
ALBUMIN SERPL-MCNC: 2.6 G/DL (ref 3.5–5)
ALBUMIN SERPL-MCNC: 2.7 G/DL (ref 3.5–5)
ALBUMIN/GLOB SERPL: 0.6 {RATIO} (ref 1.1–2.2)
ALBUMIN/GLOB SERPL: 0.6 {RATIO} (ref 1.1–2.2)
ALP SERPL-CCNC: 101 U/L (ref 45–117)
ALP SERPL-CCNC: 102 U/L (ref 45–117)
ALT SERPL-CCNC: 22 U/L (ref 12–78)
ALT SERPL-CCNC: 24 U/L (ref 12–78)
ANION GAP SERPL CALC-SCNC: 10 MMOL/L (ref 5–15)
ANION GAP SERPL CALC-SCNC: 7 MMOL/L (ref 5–15)
ANION GAP SERPL CALC-SCNC: 7 MMOL/L (ref 5–15)
ANION GAP SERPL CALC-SCNC: 9 MMOL/L (ref 5–15)
APPEARANCE UR: CLEAR
AST SERPL-CCNC: 23 U/L (ref 15–37)
AST SERPL-CCNC: 25 U/L (ref 15–37)
ATRIAL RATE: 82 BPM
BACTERIA SPEC CULT: NORMAL
BACTERIA SPEC CULT: NORMAL
BACTERIA URNS QL MICRO: NEGATIVE /HPF
BASOPHILS # BLD: 0.1 K/UL (ref 0–0.1)
BASOPHILS NFR BLD: 1 % (ref 0–1)
BILIRUB DIRECT SERPL-MCNC: <0.1 MG/DL (ref 0–0.2)
BILIRUB SERPL-MCNC: 0.2 MG/DL (ref 0.2–1)
BILIRUB SERPL-MCNC: 0.2 MG/DL (ref 0.2–1)
BILIRUB UR QL: NEGATIVE
BUN SERPL-MCNC: 54 MG/DL (ref 6–20)
BUN SERPL-MCNC: 73 MG/DL (ref 6–20)
BUN SERPL-MCNC: 77 MG/DL (ref 6–20)
BUN SERPL-MCNC: 80 MG/DL (ref 6–20)
BUN/CREAT SERPL: 25 (ref 12–20)
BUN/CREAT SERPL: 26 (ref 12–20)
BUN/CREAT SERPL: 28 (ref 12–20)
BUN/CREAT SERPL: 28 (ref 12–20)
C-ANCA TITR SER IF: NORMAL TITER
C3 SERPL-MCNC: 155 MG/DL (ref 82–167)
C4 SERPL-MCNC: 42 MG/DL (ref 14–44)
CALCIUM SERPL-MCNC: 10.5 MG/DL (ref 8.5–10.1)
CALCIUM SERPL-MCNC: 8.9 MG/DL (ref 8.5–10.1)
CALCIUM SERPL-MCNC: 9.1 MG/DL (ref 8.5–10.1)
CALCIUM SERPL-MCNC: 9.4 MG/DL (ref 8.5–10.1)
CALCULATED R AXIS, ECG10: 31 DEGREES
CALCULATED T AXIS, ECG11: 37 DEGREES
CHLORIDE SERPL-SCNC: 93 MMOL/L (ref 97–108)
CHLORIDE SERPL-SCNC: 93 MMOL/L (ref 97–108)
CHLORIDE SERPL-SCNC: 96 MMOL/L (ref 97–108)
CHLORIDE SERPL-SCNC: 99 MMOL/L (ref 97–108)
CHOLEST SERPL-MCNC: 187 MG/DL
CK MB CFR SERPL CALC: ABNORMAL % (ref 0–2.5)
CK MB SERPL-MCNC: <1 NG/ML (ref 5–25)
CK SERPL-CCNC: 31 U/L (ref 39–308)
CO2 SERPL-SCNC: 23 MMOL/L (ref 21–32)
CO2 SERPL-SCNC: 26 MMOL/L (ref 21–32)
CO2 SERPL-SCNC: 30 MMOL/L (ref 21–32)
CO2 SERPL-SCNC: 30 MMOL/L (ref 21–32)
COLOR UR: ABNORMAL
CREAT SERPL-MCNC: 1.92 MG/DL (ref 0.7–1.3)
CREAT SERPL-MCNC: 2.62 MG/DL (ref 0.7–1.3)
CREAT SERPL-MCNC: 3 MG/DL (ref 0.7–1.3)
CREAT SERPL-MCNC: 3.14 MG/DL (ref 0.7–1.3)
CREAT UR-MCNC: 13.65 MG/DL
CREAT UR-MCNC: 45.48 MG/DL
CRYOGLOB SER QL 1D COLD INC: POSITIVE
DIAGNOSIS, 93000: NORMAL
DIFFERENTIAL METHOD BLD: ABNORMAL
EOSINOPHIL # BLD: 0.4 K/UL (ref 0–0.4)
EOSINOPHIL #/AREA URNS HPF: NEGATIVE /[HPF]
EOSINOPHIL NFR BLD: 4 % (ref 0–7)
EPITH CASTS URNS QL MICRO: ABNORMAL /LPF
ERYTHROCYTE [DISTWIDTH] IN BLOOD BY AUTOMATED COUNT: 13.5 % (ref 11.5–14.5)
ERYTHROCYTE [DISTWIDTH] IN BLOOD BY AUTOMATED COUNT: 13.5 % (ref 11.5–14.5)
ERYTHROCYTE [DISTWIDTH] IN BLOOD BY AUTOMATED COUNT: 13.8 % (ref 11.5–14.5)
EST. AVERAGE GLUCOSE BLD GHB EST-MCNC: 166 MG/DL
FERRITIN SERPL-MCNC: 253 NG/ML (ref 26–388)
FOLATE SERPL-MCNC: 18.8 NG/ML (ref 5–21)
GLOBULIN SER CALC-MCNC: 4.3 G/DL (ref 2–4)
GLOBULIN SER CALC-MCNC: 4.3 G/DL (ref 2–4)
GLUCOSE BLD STRIP.AUTO-MCNC: 144 MG/DL (ref 65–100)
GLUCOSE BLD STRIP.AUTO-MCNC: 180 MG/DL (ref 65–100)
GLUCOSE BLD STRIP.AUTO-MCNC: 186 MG/DL (ref 65–100)
GLUCOSE BLD STRIP.AUTO-MCNC: 208 MG/DL (ref 65–100)
GLUCOSE BLD STRIP.AUTO-MCNC: 223 MG/DL (ref 65–100)
GLUCOSE BLD STRIP.AUTO-MCNC: 233 MG/DL (ref 65–100)
GLUCOSE BLD STRIP.AUTO-MCNC: 236 MG/DL (ref 65–100)
GLUCOSE BLD STRIP.AUTO-MCNC: 254 MG/DL (ref 65–100)
GLUCOSE BLD STRIP.AUTO-MCNC: 255 MG/DL (ref 65–100)
GLUCOSE BLD STRIP.AUTO-MCNC: 260 MG/DL (ref 65–100)
GLUCOSE BLD STRIP.AUTO-MCNC: 289 MG/DL (ref 65–100)
GLUCOSE SERPL-MCNC: 164 MG/DL (ref 65–100)
GLUCOSE SERPL-MCNC: 171 MG/DL (ref 65–100)
GLUCOSE SERPL-MCNC: 178 MG/DL (ref 65–100)
GLUCOSE SERPL-MCNC: 298 MG/DL (ref 65–100)
GLUCOSE UR STRIP.AUTO-MCNC: NEGATIVE MG/DL
HAPTOGLOB SERPL-MCNC: 205 MG/DL (ref 30–200)
HBA1C MFR BLD: 7.4 % (ref 4.2–6.3)
HCT VFR BLD AUTO: 28.5 % (ref 36.6–50.3)
HCT VFR BLD AUTO: 30.8 % (ref 36.6–50.3)
HCT VFR BLD AUTO: 32.4 % (ref 36.6–50.3)
HDLC SERPL-MCNC: 27 MG/DL
HDLC SERPL: 6.9 {RATIO} (ref 0–5)
HGB BLD-MCNC: 10.2 G/DL (ref 12.1–17)
HGB BLD-MCNC: 11 G/DL (ref 12.1–17)
HGB BLD-MCNC: 9.5 G/DL (ref 12.1–17)
HGB UR QL STRIP: NEGATIVE
HYALINE CASTS URNS QL MICRO: ABNORMAL /LPF (ref 0–5)
IMM GRANULOCYTES # BLD: 0 K/UL (ref 0–0.04)
IMM GRANULOCYTES NFR BLD AUTO: 0 % (ref 0–0.5)
IRON SATN MFR SERPL: 18 % (ref 20–50)
IRON SERPL-MCNC: 52 UG/DL (ref 35–150)
KETONES UR QL STRIP.AUTO: NEGATIVE MG/DL
LDH SERPL L TO P-CCNC: 116 U/L (ref 85–241)
LDLC SERPL CALC-MCNC: ABNORMAL MG/DL (ref 0–100)
LDLC SERPL DIRECT ASSAY-MCNC: 95 MG/DL (ref 0–100)
LEUKOCYTE ESTERASE UR QL STRIP.AUTO: NEGATIVE
LIPID PROFILE,FLP: ABNORMAL
LYMPHOCYTES # BLD: 2.6 K/UL (ref 0.8–3.5)
LYMPHOCYTES NFR BLD: 25 % (ref 12–49)
MAGNESIUM SERPL-MCNC: 2 MG/DL (ref 1.6–2.4)
MAGNESIUM SERPL-MCNC: 2 MG/DL (ref 1.6–2.4)
MAGNESIUM SERPL-MCNC: 2.1 MG/DL (ref 1.6–2.4)
MCH RBC QN AUTO: 29.6 PG (ref 26–34)
MCH RBC QN AUTO: 30.1 PG (ref 26–34)
MCH RBC QN AUTO: 30.2 PG (ref 26–34)
MCHC RBC AUTO-ENTMCNC: 33.1 G/DL (ref 30–36.5)
MCHC RBC AUTO-ENTMCNC: 33.3 G/DL (ref 30–36.5)
MCHC RBC AUTO-ENTMCNC: 34 G/DL (ref 30–36.5)
MCV RBC AUTO: 88.5 FL (ref 80–99)
MCV RBC AUTO: 89.3 FL (ref 80–99)
MCV RBC AUTO: 90.5 FL (ref 80–99)
MONOCYTES # BLD: 1 K/UL (ref 0–1)
MONOCYTES NFR BLD: 9 % (ref 5–13)
MYELOPEROXIDASE AB SER IA-ACNC: <9 U/ML (ref 0–9)
NEUTS SEG # BLD: 6.4 K/UL (ref 1.8–8)
NEUTS SEG NFR BLD: 61 % (ref 32–75)
NITRITE UR QL STRIP.AUTO: NEGATIVE
NRBC # BLD: 0 K/UL (ref 0–0.01)
NRBC BLD-RTO: 0 PER 100 WBC
P-ANCA ATYPICAL TITR SER IF: NORMAL TITER
P-ANCA TITR SER IF: NORMAL TITER
P-R INTERVAL, ECG05: 216 MS
PH UR STRIP: 7 [PH] (ref 5–8)
PHOSPHATE SERPL-MCNC: 4.9 MG/DL (ref 2.6–4.7)
PHOSPHATE SERPL-MCNC: 5.9 MG/DL (ref 2.6–4.7)
PLATELET # BLD AUTO: 167 K/UL (ref 150–400)
PLATELET # BLD AUTO: 185 K/UL (ref 150–400)
PLATELET # BLD AUTO: 216 K/UL (ref 150–400)
PMV BLD AUTO: 9.4 FL (ref 8.9–12.9)
PMV BLD AUTO: 9.6 FL (ref 8.9–12.9)
PMV BLD AUTO: 9.8 FL (ref 8.9–12.9)
POTASSIUM SERPL-SCNC: 4.8 MMOL/L (ref 3.5–5.1)
POTASSIUM SERPL-SCNC: 4.8 MMOL/L (ref 3.5–5.1)
POTASSIUM SERPL-SCNC: 4.9 MMOL/L (ref 3.5–5.1)
POTASSIUM SERPL-SCNC: 5.7 MMOL/L (ref 3.5–5.1)
PROT SERPL-MCNC: 6.9 G/DL (ref 6.4–8.2)
PROT SERPL-MCNC: 7 G/DL (ref 6.4–8.2)
PROT UR STRIP-MCNC: ABNORMAL MG/DL
PROT UR-MCNC: 31 MG/DL (ref 0–11.9)
PROTEINASE3 AB SER IA-ACNC: <3.5 U/ML (ref 0–3.5)
Q-T INTERVAL, ECG07: 400 MS
QRS DURATION, ECG06: 94 MS
QTC CALCULATION (BEZET), ECG08: 467 MS
RBC # BLD AUTO: 3.15 M/UL (ref 4.1–5.7)
RBC # BLD AUTO: 3.45 M/UL (ref 4.1–5.7)
RBC # BLD AUTO: 3.66 M/UL (ref 4.1–5.7)
RBC #/AREA URNS HPF: ABNORMAL /HPF (ref 0–5)
RETICS # AUTO: 0.08 M/UL (ref 0.03–0.1)
RETICS/RBC NFR AUTO: 2.7 % (ref 0.7–2.1)
RHEUMATOID FACT SERPL-ACNC: <10 IU/ML
SERVICE CMNT-IMP: ABNORMAL
SERVICE CMNT-IMP: NORMAL
SODIUM SERPL-SCNC: 130 MMOL/L (ref 136–145)
SODIUM SERPL-SCNC: 130 MMOL/L (ref 136–145)
SODIUM SERPL-SCNC: 131 MMOL/L (ref 136–145)
SODIUM SERPL-SCNC: 132 MMOL/L (ref 136–145)
SODIUM UR-SCNC: 88 MMOL/L
SP GR UR REFRACTOMETRY: 1.01 (ref 1–1.03)
T3 SERPL-MCNC: 99 NG/DL (ref 71–180)
T4 SERPL-MCNC: 10.6 UG/DL (ref 4.5–12.1)
TIBC SERPL-MCNC: 294 UG/DL (ref 250–450)
TRIGL SERPL-MCNC: 461 MG/DL (ref ?–150)
TROPONIN I SERPL-MCNC: <0.05 NG/ML
TROPONIN I SERPL-MCNC: <0.05 NG/ML
TSH SERPL DL<=0.05 MIU/L-ACNC: 0.09 UIU/ML (ref 0.36–3.74)
UROBILINOGEN UR QL STRIP.AUTO: 0.2 EU/DL (ref 0.2–1)
VENTRICULAR RATE, ECG03: 82 BPM
VIT B12 SERPL-MCNC: 382 PG/ML (ref 193–986)
VLDLC SERPL CALC-MCNC: ABNORMAL MG/DL
WBC # BLD AUTO: 10.5 K/UL (ref 4.1–11.1)
WBC # BLD AUTO: 7.3 K/UL (ref 4.1–11.1)
WBC # BLD AUTO: 9.5 K/UL (ref 4.1–11.1)
WBC URNS QL MICRO: ABNORMAL /HPF (ref 0–4)

## 2018-01-01 PROCEDURE — 84484 ASSAY OF TROPONIN QUANT: CPT | Performed by: INTERNAL MEDICINE

## 2018-01-01 PROCEDURE — 74011250637 HC RX REV CODE- 250/637: Performed by: INTERNAL MEDICINE

## 2018-01-01 PROCEDURE — 65660000000 HC RM CCU STEPDOWN

## 2018-01-01 PROCEDURE — 74011000250 HC RX REV CODE- 250: Performed by: EMERGENCY MEDICINE

## 2018-01-01 PROCEDURE — 82962 GLUCOSE BLOOD TEST: CPT

## 2018-01-01 PROCEDURE — 84480 ASSAY TRIIODOTHYRONINE (T3): CPT | Performed by: INTERNAL MEDICINE

## 2018-01-01 PROCEDURE — 74011250636 HC RX REV CODE- 250/636: Performed by: INTERNAL MEDICINE

## 2018-01-01 PROCEDURE — 83540 ASSAY OF IRON: CPT | Performed by: INTERNAL MEDICINE

## 2018-01-01 PROCEDURE — 80048 BASIC METABOLIC PNL TOTAL CA: CPT | Performed by: EMERGENCY MEDICINE

## 2018-01-01 PROCEDURE — 84436 ASSAY OF TOTAL THYROXINE: CPT | Performed by: INTERNAL MEDICINE

## 2018-01-01 PROCEDURE — 82607 VITAMIN B-12: CPT | Performed by: INTERNAL MEDICINE

## 2018-01-01 PROCEDURE — 83735 ASSAY OF MAGNESIUM: CPT | Performed by: INTERNAL MEDICINE

## 2018-01-01 PROCEDURE — 77030011256 HC DRSG MEPILEX <16IN NO BORD MOLN -A

## 2018-01-01 PROCEDURE — 80053 COMPREHEN METABOLIC PANEL: CPT | Performed by: INTERNAL MEDICINE

## 2018-01-01 PROCEDURE — 74011636637 HC RX REV CODE- 636/637: Performed by: INTERNAL MEDICINE

## 2018-01-01 PROCEDURE — 82728 ASSAY OF FERRITIN: CPT | Performed by: INTERNAL MEDICINE

## 2018-01-01 PROCEDURE — 94640 AIRWAY INHALATION TREATMENT: CPT

## 2018-01-01 PROCEDURE — 82746 ASSAY OF FOLIC ACID SERUM: CPT | Performed by: INTERNAL MEDICINE

## 2018-01-01 PROCEDURE — 84100 ASSAY OF PHOSPHORUS: CPT | Performed by: INTERNAL MEDICINE

## 2018-01-01 PROCEDURE — 83735 ASSAY OF MAGNESIUM: CPT | Performed by: EMERGENCY MEDICINE

## 2018-01-01 PROCEDURE — 80061 LIPID PANEL: CPT | Performed by: INTERNAL MEDICINE

## 2018-01-01 PROCEDURE — 82570 ASSAY OF URINE CREATININE: CPT | Performed by: INTERNAL MEDICINE

## 2018-01-01 PROCEDURE — 93005 ELECTROCARDIOGRAM TRACING: CPT

## 2018-01-01 PROCEDURE — 86160 COMPLEMENT ANTIGEN: CPT | Performed by: INTERNAL MEDICINE

## 2018-01-01 PROCEDURE — 71045 X-RAY EXAM CHEST 1 VIEW: CPT

## 2018-01-01 PROCEDURE — 77030013140 HC MSK NEB VYRM -A

## 2018-01-01 PROCEDURE — 84443 ASSAY THYROID STIM HORMONE: CPT | Performed by: INTERNAL MEDICINE

## 2018-01-01 PROCEDURE — 81001 URINALYSIS AUTO W/SCOPE: CPT | Performed by: INTERNAL MEDICINE

## 2018-01-01 PROCEDURE — 83036 HEMOGLOBIN GLYCOSYLATED A1C: CPT | Performed by: INTERNAL MEDICINE

## 2018-01-01 PROCEDURE — 86431 RHEUMATOID FACTOR QUANT: CPT | Performed by: INTERNAL MEDICINE

## 2018-01-01 PROCEDURE — 99284 EMERGENCY DEPT VISIT MOD MDM: CPT

## 2018-01-01 PROCEDURE — 84300 ASSAY OF URINE SODIUM: CPT | Performed by: INTERNAL MEDICINE

## 2018-01-01 PROCEDURE — 36415 COLL VENOUS BLD VENIPUNCTURE: CPT | Performed by: EMERGENCY MEDICINE

## 2018-01-01 PROCEDURE — 82550 ASSAY OF CK (CPK): CPT | Performed by: INTERNAL MEDICINE

## 2018-01-01 PROCEDURE — 83721 ASSAY OF BLOOD LIPOPROTEIN: CPT | Performed by: INTERNAL MEDICINE

## 2018-01-01 PROCEDURE — 83615 LACTATE (LD) (LDH) ENZYME: CPT | Performed by: INTERNAL MEDICINE

## 2018-01-01 PROCEDURE — 85045 AUTOMATED RETICULOCYTE COUNT: CPT | Performed by: INTERNAL MEDICINE

## 2018-01-01 PROCEDURE — 87205 SMEAR GRAM STAIN: CPT | Performed by: INTERNAL MEDICINE

## 2018-01-01 PROCEDURE — 36415 COLL VENOUS BLD VENIPUNCTURE: CPT | Performed by: INTERNAL MEDICINE

## 2018-01-01 PROCEDURE — 74011250637 HC RX REV CODE- 250/637: Performed by: EMERGENCY MEDICINE

## 2018-01-01 PROCEDURE — 80048 BASIC METABOLIC PNL TOTAL CA: CPT | Performed by: INTERNAL MEDICINE

## 2018-01-01 PROCEDURE — 85027 COMPLETE CBC AUTOMATED: CPT | Performed by: INTERNAL MEDICINE

## 2018-01-01 PROCEDURE — 83520 IMMUNOASSAY QUANT NOS NONAB: CPT | Performed by: INTERNAL MEDICINE

## 2018-01-01 PROCEDURE — 82595 ASSAY OF CRYOGLOBULIN: CPT | Performed by: INTERNAL MEDICINE

## 2018-01-01 PROCEDURE — C8929 TTE W OR WO FOL WCON,DOPPLER: HCPCS

## 2018-01-01 PROCEDURE — 83010 ASSAY OF HAPTOGLOBIN QUANT: CPT | Performed by: INTERNAL MEDICINE

## 2018-01-01 PROCEDURE — 84484 ASSAY OF TROPONIN QUANT: CPT | Performed by: EMERGENCY MEDICINE

## 2018-01-01 PROCEDURE — 77030012856

## 2018-01-01 PROCEDURE — 80076 HEPATIC FUNCTION PANEL: CPT | Performed by: INTERNAL MEDICINE

## 2018-01-01 PROCEDURE — 85025 COMPLETE CBC W/AUTO DIFF WBC: CPT | Performed by: EMERGENCY MEDICINE

## 2018-01-01 PROCEDURE — 76770 US EXAM ABDO BACK WALL COMP: CPT

## 2018-01-01 PROCEDURE — 77030029211 HC GEL MEDIH TU INLC -B

## 2018-01-01 PROCEDURE — 84156 ASSAY OF PROTEIN URINE: CPT | Performed by: INTERNAL MEDICINE

## 2018-01-01 RX ORDER — NALOXONE HYDROCHLORIDE 0.4 MG/ML
0.4 INJECTION, SOLUTION INTRAMUSCULAR; INTRAVENOUS; SUBCUTANEOUS AS NEEDED
Status: DISCONTINUED | OUTPATIENT
Start: 2018-01-01 | End: 2018-01-01 | Stop reason: HOSPADM

## 2018-01-01 RX ORDER — SODIUM CHLORIDE 0.9 % (FLUSH) 0.9 %
5-10 SYRINGE (ML) INJECTION EVERY 8 HOURS
Status: DISCONTINUED | OUTPATIENT
Start: 2018-01-01 | End: 2018-01-01 | Stop reason: HOSPADM

## 2018-01-01 RX ORDER — IPRATROPIUM BROMIDE AND ALBUTEROL SULFATE 2.5; .5 MG/3ML; MG/3ML
3 SOLUTION RESPIRATORY (INHALATION)
Status: DISCONTINUED | OUTPATIENT
Start: 2018-01-01 | End: 2018-01-01 | Stop reason: HOSPADM

## 2018-01-01 RX ORDER — ASPIRIN 325 MG
325 TABLET ORAL DAILY
Status: DISCONTINUED | OUTPATIENT
Start: 2018-01-01 | End: 2018-01-01

## 2018-01-01 RX ORDER — ACETAMINOPHEN 325 MG/1
650 TABLET ORAL
Status: DISCONTINUED | OUTPATIENT
Start: 2018-01-01 | End: 2018-01-01 | Stop reason: HOSPADM

## 2018-01-01 RX ORDER — INSULIN GLARGINE 100 [IU]/ML
10 INJECTION, SOLUTION SUBCUTANEOUS DAILY
Status: DISCONTINUED | OUTPATIENT
Start: 2018-01-01 | End: 2018-01-01

## 2018-01-01 RX ORDER — FAMOTIDINE 20 MG/1
20 TABLET, FILM COATED ORAL 2 TIMES DAILY
Status: ON HOLD | COMMUNITY
End: 2019-01-01

## 2018-01-01 RX ORDER — SODIUM POLYSTYRENE SULFONATE 15 G/60ML
15 SUSPENSION ORAL; RECTAL
Status: COMPLETED | OUTPATIENT
Start: 2018-01-01 | End: 2018-01-01

## 2018-01-01 RX ORDER — HYDROMORPHONE HYDROCHLORIDE 2 MG/1
2 TABLET ORAL
Status: DISCONTINUED | OUTPATIENT
Start: 2018-01-01 | End: 2018-01-01 | Stop reason: HOSPADM

## 2018-01-01 RX ORDER — FACIAL-BODY WIPES
10 EACH TOPICAL DAILY PRN
Status: DISCONTINUED | OUTPATIENT
Start: 2018-01-01 | End: 2018-01-01 | Stop reason: HOSPADM

## 2018-01-01 RX ORDER — POLYETHYLENE GLYCOL 3350 17 G/17G
17 POWDER, FOR SOLUTION ORAL
Status: DISCONTINUED | OUTPATIENT
Start: 2018-01-01 | End: 2018-01-01

## 2018-01-01 RX ORDER — HEPARIN SODIUM 5000 [USP'U]/ML
5000 INJECTION, SOLUTION INTRAVENOUS; SUBCUTANEOUS EVERY 8 HOURS
Status: DISCONTINUED | OUTPATIENT
Start: 2018-01-01 | End: 2018-01-01 | Stop reason: HOSPADM

## 2018-01-01 RX ORDER — DEXTROSE 50 % IN WATER (D50W) INTRAVENOUS SYRINGE
12.5-25 AS NEEDED
Status: DISCONTINUED | OUTPATIENT
Start: 2018-01-01 | End: 2018-01-01 | Stop reason: HOSPADM

## 2018-01-01 RX ORDER — INSULIN GLARGINE 100 [IU]/ML
20 INJECTION, SOLUTION SUBCUTANEOUS DAILY
Qty: 6 ML | Refills: 0 | Status: SHIPPED | OUTPATIENT
Start: 2018-01-01 | End: 2018-01-01

## 2018-01-01 RX ORDER — SIMETHICONE 80 MG
80 TABLET,CHEWABLE ORAL
Status: DISCONTINUED | OUTPATIENT
Start: 2018-01-01 | End: 2018-01-01 | Stop reason: HOSPADM

## 2018-01-01 RX ORDER — SULFAMETHOXAZOLE AND TRIMETHOPRIM 400; 80 MG/1; MG/1
1 TABLET ORAL 2 TIMES DAILY
Status: ON HOLD | COMMUNITY
End: 2018-01-01 | Stop reason: DRUGHIGH

## 2018-01-01 RX ORDER — DOCUSATE SODIUM 100 MG/1
100 CAPSULE, LIQUID FILLED ORAL
COMMUNITY
End: 2019-01-01

## 2018-01-01 RX ORDER — SODIUM CHLORIDE 0.9 % (FLUSH) 0.9 %
5-10 SYRINGE (ML) INJECTION AS NEEDED
Status: DISCONTINUED | OUTPATIENT
Start: 2018-01-01 | End: 2018-01-01 | Stop reason: HOSPADM

## 2018-01-01 RX ORDER — POLYETHYLENE GLYCOL 3350 17 G/17G
17 POWDER, FOR SOLUTION ORAL
COMMUNITY
End: 2019-01-01

## 2018-01-01 RX ORDER — DOCUSATE SODIUM 100 MG/1
100 CAPSULE, LIQUID FILLED ORAL 2 TIMES DAILY
Status: DISCONTINUED | OUTPATIENT
Start: 2018-01-01 | End: 2018-01-01 | Stop reason: HOSPADM

## 2018-01-01 RX ORDER — INSULIN LISPRO 100 [IU]/ML
INJECTION, SOLUTION INTRAVENOUS; SUBCUTANEOUS
Status: DISCONTINUED | OUTPATIENT
Start: 2018-01-01 | End: 2018-01-01 | Stop reason: HOSPADM

## 2018-01-01 RX ORDER — ALBUTEROL SULFATE 0.83 MG/ML
5 SOLUTION RESPIRATORY (INHALATION)
Status: COMPLETED | OUTPATIENT
Start: 2018-01-01 | End: 2018-01-01

## 2018-01-01 RX ORDER — ONDANSETRON 2 MG/ML
4 INJECTION INTRAMUSCULAR; INTRAVENOUS
Status: DISCONTINUED | OUTPATIENT
Start: 2018-01-01 | End: 2018-01-01 | Stop reason: HOSPADM

## 2018-01-01 RX ORDER — INSULIN LISPRO 100 [IU]/ML
INJECTION, SOLUTION INTRAVENOUS; SUBCUTANEOUS
Status: DISCONTINUED | OUTPATIENT
Start: 2018-01-01 | End: 2018-01-01

## 2018-01-01 RX ORDER — HYDROMORPHONE HYDROCHLORIDE 2 MG/ML
1 INJECTION, SOLUTION INTRAMUSCULAR; INTRAVENOUS; SUBCUTANEOUS
Status: DISCONTINUED | OUTPATIENT
Start: 2018-01-01 | End: 2018-01-01

## 2018-01-01 RX ORDER — GLIPIZIDE 5 MG/1
5 TABLET ORAL
Status: ON HOLD | COMMUNITY
End: 2019-01-01

## 2018-01-01 RX ORDER — BUMETANIDE 2 MG/1
2 TABLET ORAL 2 TIMES DAILY
COMMUNITY
End: 2018-01-01

## 2018-01-01 RX ORDER — LISINOPRIL 20 MG/1
20 TABLET ORAL DAILY
COMMUNITY
End: 2018-01-01

## 2018-01-01 RX ORDER — GLIPIZIDE 5 MG/1
10 TABLET ORAL DAILY
Status: ON HOLD | COMMUNITY
End: 2019-01-01

## 2018-01-01 RX ORDER — SULFAMETHOXAZOLE AND TRIMETHOPRIM 800; 160 MG/1; MG/1
1 TABLET ORAL 2 TIMES DAILY
COMMUNITY
End: 2018-01-01

## 2018-01-01 RX ORDER — GLIPIZIDE 5 MG/1
10 TABLET ORAL DAILY
Status: DISCONTINUED | OUTPATIENT
Start: 2018-01-01 | End: 2018-01-01

## 2018-01-01 RX ORDER — SODIUM CHLORIDE 9 MG/ML
75 INJECTION, SOLUTION INTRAVENOUS CONTINUOUS
Status: DISCONTINUED | OUTPATIENT
Start: 2018-01-01 | End: 2018-01-01 | Stop reason: HOSPADM

## 2018-01-01 RX ORDER — POLYETHYLENE GLYCOL 3350 17 G/17G
17 POWDER, FOR SOLUTION ORAL 2 TIMES DAILY
Status: DISCONTINUED | OUTPATIENT
Start: 2018-01-01 | End: 2018-01-01 | Stop reason: HOSPADM

## 2018-01-01 RX ORDER — FAMOTIDINE 20 MG/1
20 TABLET, FILM COATED ORAL 2 TIMES DAILY
Status: DISCONTINUED | OUTPATIENT
Start: 2018-01-01 | End: 2018-01-01 | Stop reason: HOSPADM

## 2018-01-01 RX ORDER — ASPIRIN 81 MG/1
81 TABLET ORAL DAILY
Status: DISCONTINUED | OUTPATIENT
Start: 2018-01-01 | End: 2018-01-01

## 2018-01-01 RX ORDER — ASPIRIN 325 MG
325 TABLET ORAL DAILY
COMMUNITY
End: 2019-01-01

## 2018-01-01 RX ORDER — MAGNESIUM SULFATE 100 %
4 CRYSTALS MISCELLANEOUS AS NEEDED
Status: DISCONTINUED | OUTPATIENT
Start: 2018-01-01 | End: 2018-01-01 | Stop reason: HOSPADM

## 2018-01-01 RX ORDER — DIPHENHYDRAMINE HYDROCHLORIDE 50 MG/ML
12.5 INJECTION, SOLUTION INTRAMUSCULAR; INTRAVENOUS
Status: DISCONTINUED | OUTPATIENT
Start: 2018-01-01 | End: 2018-01-01 | Stop reason: HOSPADM

## 2018-01-01 RX ORDER — INSULIN GLARGINE 100 [IU]/ML
20 INJECTION, SOLUTION SUBCUTANEOUS DAILY
Status: DISCONTINUED | OUTPATIENT
Start: 2018-01-01 | End: 2018-01-01 | Stop reason: HOSPADM

## 2018-01-01 RX ADMIN — POLYETHYLENE GLYCOL (3350) 17 G: 17 POWDER, FOR SOLUTION ORAL at 09:52

## 2018-01-01 RX ADMIN — PERFLUTREN 2 ML: 6.52 INJECTION, SUSPENSION INTRAVENOUS at 12:04

## 2018-01-01 RX ADMIN — GLIPIZIDE 10 MG: 5 TABLET ORAL at 08:48

## 2018-01-01 RX ADMIN — Medication 10 ML: at 05:44

## 2018-01-01 RX ADMIN — FAMOTIDINE 20 MG: 20 TABLET ORAL at 08:48

## 2018-01-01 RX ADMIN — SODIUM POLYSTYRENE SULFONATE 15 G: 15 SUSPENSION ORAL; RECTAL at 21:38

## 2018-01-01 RX ADMIN — SODIUM CHLORIDE 75 ML/HR: 900 INJECTION, SOLUTION INTRAVENOUS at 17:18

## 2018-01-01 RX ADMIN — HEPARIN SODIUM 5000 UNITS: 5000 INJECTION INTRAVENOUS; SUBCUTANEOUS at 21:35

## 2018-01-01 RX ADMIN — BISACODYL 10 MG: 10 SUPPOSITORY RECTAL at 03:27

## 2018-01-01 RX ADMIN — Medication 10 ML: at 06:34

## 2018-01-01 RX ADMIN — FAMOTIDINE 20 MG: 20 TABLET ORAL at 08:09

## 2018-01-01 RX ADMIN — DOCUSATE SODIUM 100 MG: 100 CAPSULE, LIQUID FILLED ORAL at 08:48

## 2018-01-01 RX ADMIN — ACETAMINOPHEN 650 MG: 325 TABLET ORAL at 23:37

## 2018-01-01 RX ADMIN — DOCUSATE SODIUM 100 MG: 100 CAPSULE, LIQUID FILLED ORAL at 09:52

## 2018-01-01 RX ADMIN — POLYETHYLENE GLYCOL (3350) 17 G: 17 POWDER, FOR SOLUTION ORAL at 21:21

## 2018-01-01 RX ADMIN — Medication 10 ML: at 23:42

## 2018-01-01 RX ADMIN — INSULIN LISPRO 2 UNITS: 100 INJECTION, SOLUTION INTRAVENOUS; SUBCUTANEOUS at 16:12

## 2018-01-01 RX ADMIN — HEPARIN SODIUM 5000 UNITS: 5000 INJECTION INTRAVENOUS; SUBCUTANEOUS at 06:33

## 2018-01-01 RX ADMIN — DOCUSATE SODIUM 100 MG: 100 CAPSULE, LIQUID FILLED ORAL at 08:09

## 2018-01-01 RX ADMIN — Medication 10 ML: at 13:11

## 2018-01-01 RX ADMIN — FAMOTIDINE 20 MG: 20 TABLET ORAL at 09:52

## 2018-01-01 RX ADMIN — SODIUM CHLORIDE 75 ML/HR: 900 INJECTION, SOLUTION INTRAVENOUS at 08:11

## 2018-01-01 RX ADMIN — SODIUM CHLORIDE 75 ML/HR: 900 INJECTION, SOLUTION INTRAVENOUS at 23:37

## 2018-01-01 RX ADMIN — INSULIN LISPRO 2 UNITS: 100 INJECTION, SOLUTION INTRAVENOUS; SUBCUTANEOUS at 11:44

## 2018-01-01 RX ADMIN — SIMETHICONE 80 MG: 80 TABLET, CHEWABLE ORAL at 21:34

## 2018-01-01 RX ADMIN — Medication 10 ML: at 21:35

## 2018-01-01 RX ADMIN — INSULIN GLARGINE 10 UNITS: 100 INJECTION, SOLUTION SUBCUTANEOUS at 09:51

## 2018-01-01 RX ADMIN — ASPIRIN 325 MG: 325 TABLET ORAL at 08:48

## 2018-01-01 RX ADMIN — HYDROMORPHONE HYDROCHLORIDE 1 MG: 2 INJECTION, SOLUTION INTRAMUSCULAR; INTRAVENOUS; SUBCUTANEOUS at 08:53

## 2018-01-01 RX ADMIN — HEPARIN SODIUM 5000 UNITS: 5000 INJECTION INTRAVENOUS; SUBCUTANEOUS at 05:22

## 2018-01-01 RX ADMIN — HEPARIN SODIUM 5000 UNITS: 5000 INJECTION INTRAVENOUS; SUBCUTANEOUS at 21:22

## 2018-01-01 RX ADMIN — SODIUM CHLORIDE 75 ML/HR: 900 INJECTION, SOLUTION INTRAVENOUS at 05:43

## 2018-01-01 RX ADMIN — FAMOTIDINE 20 MG: 20 TABLET ORAL at 17:18

## 2018-01-01 RX ADMIN — INSULIN LISPRO 3 UNITS: 100 INJECTION, SOLUTION INTRAVENOUS; SUBCUTANEOUS at 08:08

## 2018-01-01 RX ADMIN — POLYETHYLENE GLYCOL (3350) 17 G: 17 POWDER, FOR SOLUTION ORAL at 08:08

## 2018-01-01 RX ADMIN — INSULIN GLARGINE 10 UNITS: 100 INJECTION, SOLUTION SUBCUTANEOUS at 13:43

## 2018-01-01 RX ADMIN — HEPARIN SODIUM 5000 UNITS: 5000 INJECTION INTRAVENOUS; SUBCUTANEOUS at 13:44

## 2018-01-01 RX ADMIN — SODIUM CHLORIDE 75 ML/HR: 900 INJECTION, SOLUTION INTRAVENOUS at 22:01

## 2018-01-01 RX ADMIN — HEPARIN SODIUM 5000 UNITS: 5000 INJECTION INTRAVENOUS; SUBCUTANEOUS at 13:11

## 2018-01-01 RX ADMIN — FAMOTIDINE 20 MG: 20 TABLET ORAL at 17:09

## 2018-01-01 RX ADMIN — INSULIN GLARGINE 10 UNITS: 100 INJECTION, SOLUTION SUBCUTANEOUS at 08:09

## 2018-01-01 RX ADMIN — HEPARIN SODIUM 5000 UNITS: 5000 INJECTION INTRAVENOUS; SUBCUTANEOUS at 05:43

## 2018-01-01 RX ADMIN — Medication: at 21:59

## 2018-01-01 RX ADMIN — ALBUTEROL SULFATE 5 MG: 2.5 SOLUTION RESPIRATORY (INHALATION) at 21:39

## 2018-01-01 RX ADMIN — DOCUSATE SODIUM 100 MG: 100 CAPSULE, LIQUID FILLED ORAL at 17:18

## 2018-01-01 RX ADMIN — Medication 10 ML: at 13:44

## 2018-01-01 RX ADMIN — POLYETHYLENE GLYCOL (3350) 17 G: 17 POWDER, FOR SOLUTION ORAL at 17:18

## 2018-01-01 RX ADMIN — Medication 10 ML: at 21:22

## 2018-01-01 RX ADMIN — Medication 10 ML: at 05:22

## 2018-01-01 RX ADMIN — DOCUSATE SODIUM 100 MG: 100 CAPSULE, LIQUID FILLED ORAL at 17:09

## 2018-07-15 PROBLEM — N17.9 ACUTE RENAL FAILURE (ARF) (HCC): Status: ACTIVE | Noted: 2018-01-01

## 2018-07-15 PROBLEM — E66.01 MORBID OBESITY (HCC): Status: ACTIVE | Noted: 2018-01-01

## 2018-07-15 PROBLEM — E11.29 DM TYPE 2 CAUSING RENAL DISEASE, NOT AT GOAL (HCC): Chronic | Status: ACTIVE | Noted: 2018-01-01

## 2018-07-15 PROBLEM — R00.1 BRADYCARDIA: Status: ACTIVE | Noted: 2018-01-01

## 2018-07-15 PROBLEM — J44.9 COPD (CHRONIC OBSTRUCTIVE PULMONARY DISEASE) (HCC): Chronic | Status: ACTIVE | Noted: 2018-01-01

## 2018-07-15 PROBLEM — E87.1 HYPONATREMIA: Status: ACTIVE | Noted: 2018-01-01

## 2018-07-15 NOTE — IP AVS SNAPSHOT
303 [facility] [address] [place] [address] 
[phone] Patient: Carson Marlow MRN: [id number] MUO:[id number] About your hospitalization You were admitted on:  July 15, 2018 You last received care in the:  [facility] 3 100 [facility] 2 You were discharged on:  July 18, 2018 Why you were hospitalized Your primary diagnosis was:  Acute Renal Failure (Arf) ([facility]) Your diagnoses also included:  Acute Hyperkalemia, Dm Type 2 Causing Renal Disease, Not At Goal (Hcc), Bradycardia, Hyponatremia, Morbid Obesity (Hcc), Copd (Chronic Obstructive Pulmonary Disease) ([facility]), Anemia, Hypertriglyceridemia, Mitral Regurgitation Follow-up Information Follow up With Details Comments Contact Info Paolo Blandon MD   [address] [facility] [address] [place] [phone] Lalo Chan MD Schedule an appointment as soon as possible for a visit in 1 week  [address] [place] [address] 
[phone] Shelton Du MD Schedule an appointment as soon as possible for a visit As needed [facility] [address] [place] [address] 
[phone] Discharge Orders None A check greta indicates which time of day the medication should be taken. My Medications START taking these medications Instructions Each Dose to Equal  
 Morning Noon Evening Bedtime  
 insulin glargine 100 unit/mL injection Commonly known as:  LANTUS Your last dose was:  10 am 7/18 Your next dose is:  719 Notes to Patient:  Long acting insulin 20 Units by SubCUTAneous route daily for 30 days. 20 Units  
    
  
   
   
   
  
 insulin syringe,safetyneedle 0.3 mL 29 x 1/2\" Syrg Use as directed to inject Lantus insulin CONTINUE taking these medications Instructions Each Dose to Equal  
 Morning Noon Evening Bedtime  
 aspirin 325 mg tablet Commonly known as:  ASPIRIN Your last dose was:  9 am 7/16 Your next dose is:  7/19 Take 325 mg by mouth daily. 325 mg  
    
  
   
   
   
  
 COMBIVENT RESPIMAT  mcg/actuation inhaler Generic drug:  ipratropium-albuterol Your last dose was:  Not given while in the hospital - resume as needed at discharge Take 1 Puff by inhalation every six (6) hours as needed for Wheezing. 1 Puff  
    
   
   
   
  
 docusate sodium 100 mg capsule Commonly known as:  Lucetta Kessler Your last dose was:  10 am 7/18 Take 100 mg by mouth daily as needed for Constipation. 100 mg  
    
   
   
   
  
 famotidine 20 mg tablet Commonly known as:  PEPCID Your last dose was:  10 am 7/18 Take 20 mg by mouth two (2) times a day. 20 mg  
    
  
   
   
  
   
  
 * glipiZIDE 5 mg tablet Commonly known as:  Tamie Daydina Your last dose was:  8:40 7/16 Your next dose is:  7/19 Take 10 mg by mouth daily. In addition to 5 mg every night  
 10 mg  
    
  
   
   
   
  
 * glipiZIDE 5 mg tablet Commonly known as:  Tamie Dayhoff Your next dose is:  7/18 Take 5 mg by mouth nightly. In addition to 10 mg every morning 5 mg MIRALAX 17 gram packet Generic drug:  polyethylene glycol Your last dose was:  10 am 7/18 Your next dose is:  7/19 Take 17 g by mouth nightly. 17 g * Notice: This list has 2 medication(s) that are the same as other medications prescribed for you. Read the directions carefully, and ask your doctor or other care provider to review them with you. STOP taking these medications BACTRIM  mg per tablet Generic drug:  trimethoprim-sulfamethoxazole BACTRIM -800 mg per tablet Generic drug:  trimethoprim-sulfamethoxazole  
   
  
 bumetanide 2 mg tablet Commonly known as:  BUMEX  
   
  
 lisinopril 10 mg tablet Commonly known as:  Nicole Knott  
   
  
 lisinopril 20 mg tablet Commonly known as:  Emma Adams Where to Get Your Medications Information on where to get these meds will be given to you by the nurse or doctor. ! Ask your nurse or doctor about these medications  
  insulin glargine 100 unit/mL injection  
 insulin syringe,safetyneedle 0.3 mL 29 x 1/2\" Syrg Discharge Instructions Patient Discharge Instructions Toni Kam / [id number] : 1960 Admitted 7/15/2018 Discharged: 2018 Primary Diagnoses Problem List as of 2018  Date Reviewed: 7/15/2018 Codes Class Noted - Resolved Mitral regurgitation Anemia Hypertriglyceridemia * (Principal)Acute renal failure (ARF) ([facility] [address] DM type 2 causing renal disease, not at goal [facility]) (Chronic) Bradycardia Hyponatremia Morbid obesity ([facility] COPD (chronic obstructive pulmonary disease) (HCC) (Chronic) Acute hyperkalemia Take Home Medications · It is important that you take the medication exactly as they are prescribed. · Keep your medication in the bottles provided by the pharmacist and keep a list of the medication names, dosages, and times to be taken in your wallet. · Do not take other medications without consulting your doctor. What to do at [facility] Recommended diet: Cardiac Diet, Diabetic Diet and Low fat, Low cholesterol Recommended activity: Activity as tolerated and PT/OT per Home Health If you experience worse symptoms, please follow up with your PCP, surgeon or endocrinologist. 
 
Clary Cancino with your PCP and endocrinology in a few weeks Information obtained by : 
I understand that if any problems occur once I am at home I am to contact my physician. I understand and acknowledge receipt of the instructions indicated above. Physician's or R.N.'s Signature                                                                  Date/Time Patient or Representative Signature                                                          Date/Time Inter[employer] Announcement We are excited to announce that we are making your provider's discharge notes available to you in Digital H2O. You will see these notes when they are completed and signed by the physician that discharged you from your recent hospital stay. If you have any questions or concerns about any information you see in NanoOptot, please call the Health Information Department where you were seen or reach out to your Primary Care Provider for more information about your plan of care. Introducing [facility]! [employer] introduces Digital H2O patient portal. Now you can access parts of your medical record, email your doctor's office, and request medication refills online. 1. In your internet browser, go to [email or web address] 2. Click on the First Time User? Click Here link in the Sign In box. You will see the New Member Sign Up page. 3. Enter your Digital H2O Access Code exactly as it appears below. You will not need to use this code after youve completed the sign-up process. If you do not sign up before the expiration date, you must request a new code. · Digital H2O Access Code: [email or web address] Expires: 10/13/2018  7:51 PM 
 
4. Enter the last four digits of your Social Security Number (xxxx) and Date of Birth (mm/dd/yyyy) as indicated and click Submit. You will be taken to the next sign-up page. 5. Create a NanoOptot ID. This will be your Digital H2O login ID and cannot be changed, so think of one that is secure and easy to remember. 6. Create a Nano[employer]ot password. You can change your password at any time. 7. Enter your Password Reset Question and Answer. This can be used at a later time if you forget your password. 8. Enter your e-mail address. You will receive e-mail notification when new information is available in [address] 9. Click Sign Up. You can now view and download portions of your medical record. 10. Click the Download Summary menu link to download a portable copy of your medical information. If you have questions, please visit the Frequently Asked Questions section of the [employer] website. Remember, [employer] is NOT to be used for urgent needs. For medical emergencies, dial 911. Now available from your iPhone and Android! Introducing Carlos Jose As a [employer] patient, I wanted to make you aware of our electronic visit tool called Carlos Jose. [employer] 24/7 allows you to connect within minutes with a medical provider 24 hours a day, seven days a week via a mobile device or tablet or logging into a secure website from your computer. You can access Carlos Jose from anywhere in the [place]. A virtual visit might be right for you when you have a simple condition and feel like you just dont want to get out of bed, or cant get away from work for an appointment, when your regular [employer] provider is not available (evenings, weekends or holidays), or when youre out of town and need minor care. Electronic visits cost only $49 and if the [employer] 24/7 provider determines a prescription is needed to treat your condition, one can be electronically transmitted to a nearby pharmacy*. Please take a moment to enroll today if you have not already done so. The enrollment process is free and takes just a few minutes. To enroll, please download the [employer] 24/7 bruce to your tablet or phone, or visit [email or web address] to enroll on your computer.    
And, as an [address] [place] [address] patient with a [employer] account, the results of your visits will be scanned into your electronic medical record and your primary care provider will be able to view the scanned results. We urge you to continue to see your regular [address] [place] [address] provider for your ongoing medical care. And while your primary care provider may not be the one available when you seek a Carlos Christensenfin virtual visit, the peace of mind you get from getting a real diagnosis real time can be priceless. For more information on [employer], view our Frequently Asked Questions (FAQs) at [email or web address]. Sincerely, 
 
Cleve Smith MD 
Chief Medical Officer [employer] *:  certain medications cannot be prescribed via [employer] Unresulted Labs-Please follow up with your PCP about these lab tests Order Current Status ANCA PANEL In process CRYOGLOBULIN, QL In process Providers Seen During Your Hospitalization Provider Specialty Primary office phone Jong Oconnor. Anastasia Choudhury, [facility] [phone] Rama Archibald MD Internal Medicine [phone] Your Primary Care Physician (PCP) Primary Care Physician Office Phone Office Fax Sanjiv 008, [address] You are allergic to the following Allergen Reactions Codeine Anaphylaxis Insulin Regular Other (comments) Temp blindness and facial droop Pcn (Penicillins) Anaphylaxis Victoza (Liraglutide) Other (comments) Fascial droop, temp blindness Morphine Other (comments)  
 hallucinates Statins-Hmg-Coa Reductase Inhibitors Hives Itching Nausea and Vomiting Recent Documentation Height Weight BMI Smoking Status 1.829 m 139 kg 41.57 kg/m2 Former Smoker Emergency Contacts Name Discharge Info Relation Home Work Mobile Mariangel Salas DISCHARGE CAREGIVER [3] Spouse [3] [phone] Patient Belongings The following personal items are in your possession at time of discharge: 
  Dental Appliances: None  Visual Aid: None      Home Medications: None   Jewelry: None  Clothing: Shirt, Shorts, Socks    Other Valuables: None Discharge Instructions Attachments/References INSULIN GLARGINE (BY INJECTION) (ENGLISH) Patient Handouts Insulin Glargine (By injection) Insulin Glargine, Recombinant (IN-shabazz-alicja GLAR-rikki, shanelleBrandonKOM-brandi) Treats diabetes. Brand Name(s): Basaglar KwikPen, Lantus, Lantus Novaplus, Lantus SoloStar, Lantus SoloStar Novaplus, Toujeo There may be other brand names for this medicine. When This Medicine Should Not Be Used: This medicine is not right for everyone. Do not use it if you had an allergic reaction to insulin glargine. How to Use This Medicine:  
Injectable · Your healthcare provider will work with you to personalize your dose and treatment based on your insulin needs and lifestyle. You will be taught how to give yourself the injections. Make sure you understand all instructions. Ask the doctor, nurse, or pharmacist if you have questions. · If you use insulin once a day, it is best to use it at about the same time every day. · Always double-check both the concentration (strength) of your insulin and your dose. Concentration and dose are not the same. The dose is how many units of insulin you will use. The concentration tells how many units of insulin are in each milliliter (mL), such as 100 units/mL (U-100), but this does not mean you will use 100 units at a time. · Read and follow the patient instructions that come with this medicine. Talk to your doctor or pharmacist if you have any questions. · This medicine should look clear before you use it. Do not shake the vial. Do not mix this medicine with any other insulin or with water. · You will be shown the body areas where this shot can be given.  Use a different body area each time you give yourself a shot. Keep track of where you give each shot to make sure you rotate body areas. · Use a new needle and syringe each time you inject your medicine. If you use a syringe, use only the kind that is made for insulin injections. Some insulin must be given with a specific type of syringe or needle. Ask your pharmacist if you are not sure which one to use. · Always check the label before use, to make sure you have the correct type of insulin. Do not change the brand, type, or concentration unless your doctor tells you to. If you use a pump or other device, make sure the insulin is made for that device. · Unopened medicine: Store the vials, cartridges, and SoloStar® pens in the refrigerator. Protect from light. Do not freeze. · Opened medicine: ¨ Vials: Store in the refrigerator or at room temperature in a cool place, away from sunlight and heat. Use within 28 days. ¨ Cartridge or [employer]: Store at room temperature, away from direct heat and light. Do not refrigerate. Throw away any opened cartridge or Lantus® SoloStar® pen after 28 days. Throw away any opened [employer] after 42 days. · Throw away used needles in a hard, closed container that the needles cannot poke through. Keep this container away from children and pets. Drugs and Foods to Avoid: Ask your doctor or pharmacist before using any other medicine, including over-the-counter medicines, vitamins, and herbal products. · Some medicines can change the amount of insulin you need to use and make it harder for you to control your diabetes. Tell your doctor about all other medicines that you are using. · Do not drink alcohol while you are using this medicine. Warnings While Using This Medicine: · Tell your doctor if you are pregnant or breastfeeding, or if you have kidney disease, liver disease, heart disease, or heart failure. · This medicine may cause the following problems: ¨ Low blood sugar or low potassium levels in the blood ¨ Fluid retention or heart failure (when used with thiazolidinedione [TZD] medicine) · Never share insulin pens, needles, or cartridges with anyone. Sharing these can pass hepatitis viruses, HIV, or other illnesses from one person to another. · Keep all medicine out of the reach of children. Never share your medicine with anyone. Possible Side Effects While Using This Medicine:  
Call your doctor right away if you notice any of these side effects: · Allergic reaction: Itching or hives, swelling in your face or hands, swelling or tingling in your mouth or throat, chest tightness, trouble breathing · Dry mouth, increased thirst, muscle cramps, nausea or vomiting, uneven heartbeat · Rapid weight gain, swelling in your hands, ankles, or feet, trouble breathing, tiredness · Shaking, trembling, sweating, fast or pounding heartbeat, lightheadedness, hunger, confusion If you notice these less serious side effects, talk with your doctor: · Redness, pain, itching, swelling, or any skin changes where the shot was given If you notice other side effects that you think are caused by this medicine, tell your doctor. Call your doctor for medical advice about side effects. You may report side effects to FDA at 2-183-SEL-9712 © 2017 [facility] Information is for End User's use only and may not be sold, redistributed or otherwise used for commercial purposes. The above information is an  only. It is not intended as medical advice for individual conditions or treatments. Talk to your doctor, nurse or pharmacist before following any medical regimen to see if it is safe and effective for you. Please provide this summary of care documentation to your next provider. Signatures-by signing, you are acknowledging that this After Visit Summary has been reviewed with you and you have received a copy. Patient Signature:  ____________________________________________________________ Date:  ____________________________________________________________  
  
Aloma Snellen Provider Signature:  ____________________________________________________________ Date:  ____________________________________________________________

## 2018-07-15 NOTE — IP AVS SNAPSHOT
[address] 
[phone] Patient: Sheppard Aase MRN: [id number] VZI:[birth year] A check greta indicates which time of day the medication should be taken. My Medications START taking these medications Instructions Each Dose to Equal  
 Morning Noon Evening Bedtime  
 insulin glargine 100 unit/mL injection Commonly known as:  LANTUS Your last dose was:  10 am 7/18 Your next dose is:  719 Notes to Patient:  Long acting insulin 20 Units by SubCUTAneous route daily for 30 days. 20 Units  
    
  
   
   
   
  
 insulin syringe,safetyneedle 0.3 mL 29 x 1/2\" Syrg Use as directed to inject Lantus insulin CONTINUE taking these medications Instructions Each Dose to Equal  
 Morning Noon Evening Bedtime  
 aspirin 325 mg tablet Commonly known as:  ASPIRIN Your last dose was:  9 am 7/16 Your next dose is:  7/19 Take 325 mg by mouth daily. 325 mg  
    
  
   
   
   
  
 COMBIVENT RESPIMAT  mcg/actuation inhaler Generic drug:  ipratropium-albuterol Your last dose was:  Not given while in the hospital - resume as needed at discharge Take 1 Puff by inhalation every six (6) hours as needed for Wheezing. 1 Puff  
    
   
   
   
  
 docusate sodium 100 mg capsule Commonly known as:  Topher Call Your last dose was:  10 am 7/18 Take 100 mg by mouth daily as needed for Constipation. 100 mg  
    
   
   
   
  
 famotidine 20 mg tablet Commonly known as:  PEPCID Your last dose was:  10 am 7/18 Take 20 mg by mouth two (2) times a day. 20 mg  
    
  
   
   
  
   
  
 * glipiZIDE 5 mg tablet Commonly known as:  Noelle Smallwood Your last dose was:  8:40 7/16 Your next dose is:  7/19 Take 10 mg by mouth daily. In addition to 5 mg every night  
 10 mg  
    
  
   
   
   
  
 * glipiZIDE 5 mg tablet Commonly known as:  Eric Gomez Your next dose is:  7/18 Take 5 mg by mouth nightly. In addition to 10 mg every morning 5 mg MIRALAX 17 gram packet Generic drug:  polyethylene glycol Your last dose was:  10 am 7/18 Your next dose is:  7/19 Take 17 g by mouth nightly. 17 g * Notice: This list has 2 medication(s) that are the same as other medications prescribed for you. Read the directions carefully, and ask your doctor or other care provider to review them with you. STOP taking these medications BACTRIM  mg per tablet Generic drug:  trimethoprim-sulfamethoxazole BACTRIM -800 mg per tablet Generic drug:  trimethoprim-sulfamethoxazole  
   
  
 bumetanide 2 mg tablet Commonly known as:  BUMEX  
   
  
 lisinopril 10 mg tablet Commonly known as:  PRINIVIL, ZESTRIL  
   
  
 lisinopril 20 mg tablet Commonly known as:  Felice Shutters Where to Get Your Medications Information on where to get these meds will be given to you by the nurse or doctor. ! Ask your nurse or doctor about these medications  
  insulin glargine 100 unit/mL injection  
 insulin syringe,safetyraynale 0.3 mL 29 x 1/2\" Syrg

## 2018-07-16 PROBLEM — D64.9 ANEMIA: Status: ACTIVE | Noted: 2018-01-01

## 2018-07-16 PROBLEM — E11.65 TYPE 2 DIABETES MELLITUS WITH HYPERGLYCEMIA (HCC): Status: RESOLVED | Noted: 2017-06-18 | Resolved: 2018-01-01

## 2018-07-16 PROBLEM — E78.1 HYPERTRIGLYCERIDEMIA: Status: ACTIVE | Noted: 2018-01-01

## 2018-07-16 PROBLEM — I50.9 ACUTE CHF (CONGESTIVE HEART FAILURE) (HCC): Status: RESOLVED | Noted: 2017-06-18 | Resolved: 2018-01-01

## 2018-07-16 NOTE — CONSULTS
[address] [facility]  MR#: [id number]  : 1960  ACCOUNT #: [de-identified]   DATE OF SERVICE: 2018    REQUESTING PHYSICIAN:  Breann Melchor MD     CHIEF COMPLAINT:  Elevated creatinine. HISTORY OF PRESENT ILLNESS:  The patient is a 59-year-old white male with type 2 diabetes mellitus, who is admitted to the hospital with acute renal failure among other issues. He recently underwent left lower extremity below-knee amputation and was discharged to home. He had been on vancomycin and Zosyn while in the hospital, but says he was not on antibiotics at discharge. He then developed a rash and went to the hospital and was eventually transferred to [facility]. 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 He was discharged from the hospital on Bactrim. He came to the hospital at [facility] feeling poorly. Potassium was elevated. He was bradycardic. Creatinine was 3 and had been normal at 0.92 just over a year ago at [facility].  I do not know what his recent lab work showed during his hospitalizations at other institutions. He was treated for hyperkalemia with potassium binding resin and the potassium has improved. His heart rate is now normal.  He is on intravenous fluids and is making urine. Urinalysis shows no blood. He does have a prior history of proteinuria with a negative workup by one of my partners in 2017. REVIEW OF SYSTEMS:  CONSTITUTIONAL:  No fevers or chills. GASTROINTESTINAL:  He has had some nausea, no vomiting. GENITOURINARY:  No hematuria or difficulty voiding. CARDIOVASCULAR:  No chest pain or shortness of breath. SKIN:  Positive for rash. NEUROLOGIC:  No numbness, weakness, seizures. All other systems reviewed and are negative except as per history of present illness.     PAST MEDICAL HISTORY:  Hypertension, obesity, type 2 diabetes mellitus, hyperlipidemia, diabetic neuropathy, COPD. FAMILY HISTORY:  No significant family history of renal disease. SOCIAL HISTORY:  He is a former smoker. PHYSICAL EXAMINATION:  VITAL SIGNS:  Temperature 97.8, pulse 77, blood pressure 108/37. GENERAL:  Obese white male in no acute distress. EYES:  Anicteric. Extraocular movements intact. ENMT:  Mucous membranes are moist.  There is no epistaxis. NECK:  Nontender, no masses. Thyroid is not enlarged. CARDIOVASCULAR:  Heart is regular. There is no lower extremity edema. RESPIRATORY:  Lungs are clear with fair effort. GASTROINTESTINAL:  Antibiotics is soft and nontender. Bowel sounds are active. Hepatosplenomegaly is not appreciated. SKIN:  Warm with normal turgor. There are palpable purpura on the distal right lower extremity and some fading purpuric lesions on both upper extremities. MUSCULOSKELETAL:  There is no cyanosis or clubbing. There is no synovitis of the fingers or wrists bilaterally. LABORATORY DATA:  Sodium 130, potassium 4.9, chloride 93, bicarb 30, BUN 80, creatinine 3.14. White count 9.5, hemoglobin 10.2, platelets 743. RADIOGRAPHIC STUDIES:  Renal ultrasound shows right renal cyst.    ASSESSMENT:  This is a 50-year-old white male with acute renal failure and an interesting recent history. 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 However, he does not appear to have renal involvement, as his urinalysis shows no hematuria, which is a cardinal feature of renal vasculitis. More likely, his renal dysfunction is due to prerenal azotemia as a consequence of bradycardia and perhaps volume depletion coupled with a decrease in tubular secretion of creatinine related to Bactrim. He could have some other lingering renal toxicity from his antibiotic regimen, as vancomycin can cause tubular necrosis, which could have a prolonged course. There does not appear to be any evidence of obstruction.     There is no current indication for hemodialysis. His potassium is improved. PLAN:  1. Continue to avoid Bactrim and nephrotoxins for now. 2.  Continue with intravenous fluids as you are doing. 3.  Monitor serial labs. 4. Low potassium diet. 5.  Will consider additional diagnostic workup if renal function does not improve with supportive care. I do not think we need to proceed with a renal biopsy right at the moment in light of his benign appearing urine.       MD YENI Cho / WALDO  D: 07/16/2018 11:20     T: 07/16/2018 11:47  JOB #: [id number]

## 2018-07-16 NOTE — PROGRESS NOTES
Problem: Falls - Risk of  Goal: *Absence of Falls  Document Austyn Fall Risk and appropriate interventions in the flowsheet. Outcome: Progressing Towards Goal  Fall Risk Interventions:  Mobility Interventions: Assess mobility with egress test, Bed/chair exit alarm, Communicate number of staff needed for ambulation/transfer, Mechanical lift, OT consult for ADLs, Patient to call before getting OOB, PT Consult for mobility concerns, PT Consult for assist device competence         Medication Interventions: Assess postural VS orthostatic hypotension, Bed/chair exit alarm, Evaluate medications/consider consulting pharmacy, Patient to call before getting OOB, Teach patient to arise slowly    Elimination Interventions: Bed/chair exit alarm, Call light in reach, Elevated toilet seat, Patient to call for help with toileting needs, Toilet paper/wipes in reach, Toileting schedule/hourly rounds, Urinal in reach             Problem: Pressure Injury - Risk of  Goal: *Prevention of pressure injury  Document Jesus Scale and appropriate interventions in the flowsheet. Outcome: Progressing Towards Goal  Pressure Injury Interventions: Activity Interventions: Assess need for specialty bed, Chair cushion, Increase time out of bed, Pressure redistribution bed/mattress(bed type), PT/OT evaluation    Mobility Interventions: Assess need for specialty bed, Chair cushion, Float heels, HOB 30 degrees or less, Pressure redistribution bed/mattress (bed type), PT/OT evaluation, Turn and reposition approx.  every two hours(pillow and wedges)    Nutrition Interventions: Document food/fluid/supplement intake, Discuss nutritional consult with provider, Offer support with meals,snacks and hydration    Friction and Shear Interventions: Apply protective barrier, creams and emollients, Feet elevated on foot rest, Foam dressings/transparent film/skin sealants, HOB 30 degrees or less, Minimize layers, Transferring/repositioning devices

## 2018-07-16 NOTE — PROGRESS NOTES
7- Reason for Admission:   Acute Renal Failure               RRAT Score:   24               Resources/supports as identified by patient/family:  Good family support                 Top Challenges facing patient (as identified by patient/family and CM): Finances/Medication cost? No                   Transportation? No              Support system or lack thereof? No                   Living arrangements? No             Self-care/ADLs/Cognition? Family assists          Current Advanced Directive/Advance Care Plan:  Full Code                          Plan for utilizing home health:  Return to [address] [place] [address] of readmission: High                 Transition of Care Plan:   Home with home health    I met with the pt and his wife, Lion Hunter ([phone]), to determine potential discharge needs. The pt and his wife live in a one story house with an entry ramp. He has had multiple hospitalizations and recently (last week) was discharged from [facility] following a Left BKA. He has returned home after the hospitalizations and is open to [facility] for RN,PT and will need an order to resume these services. His wife assists with most of his ADL's although he can feed himself. Their son and daughter live near-by and also assist. He has a hospital bed, Jena lift, Hovaround, bedside commode, rolling walker and rollator. His PCP is Dr. Nabil Segovia in [facility]. He has prescription drug coverage and gets his medications from [place]. He is open to [facility] for RN and PT and will need an order to resume these services. Care Management Interventions  PCP Verified by CM:  Yes (Dr. Nabil Segovia in [facility])  Transition of Care Consult (CM Consult): Discharge Planning  Discharge Durable Medical Equipment: No (Has a hospital bed, Jena lift, Hovaround, bedside commode, rolling walker and rolllator)  Physical Therapy Consult: Yes  Occupational Therapy Consult: Yes  Speech Therapy Consult: No  Current Support Network: Lives with Spouse, Own Home  Confirm Follow Up Transport: Family  Plan discussed with Pt/Family/Caregiver: Yes  Discharge Location  Discharge Placement:  ([address] [place] [address])    [facility]

## 2018-07-16 NOTE — CONSULTS
Consult Date: 7/16/2018    IP CONSULT TO RHEUMATOLOGY  Consult performed by: Wallace Hale  Consult ordered by: Niko Li  Reason for consult: Vasculitis evaluation  Assessment/Recommendations: 1. Cutaneous vasculitis:  Petichial lesion on all extremites, consistent with small-vessel cutaneous vasculitis, corroborated by skin biopsy of left forearm performed at [facility] last week. These lesions developed one week ago after initiation of Bactrim). 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  -avoid Bactrim. Also was recently exposed to Vancomycin and Zosyn but most likely to see cutaneous vasculitis from Bactrim  -discontinuation of offending agent is recommended treatment. Do not advise corticosteroids as his blood glucose is poorly controlled and do not wish to delay healing of LLE BKA, but also because he states lesions are improving already  -agree with wound care dressing of large lesions to prevent secondary infection  -will complete workup by sending ANCA panel, RF, Cryoglobulin level, C3, C4 (JACKELINE was negative 06/21/17). 2. Acute Renal Injury  -suspect secondary to Bactrim, agree with Nephrology assessment that this is unlikely to be from renal vasculitis given lack of hematuria, casts.   -avoid nephrotoxic medications    3. DM2  Most proximate cause of medical complications, including angiopathy, vascular disease resulting in LLE BKA two weeks ago, possible retinopathy (unclear if he truly had insulin reaction in past that caused vision loss or if he has had poor vision from fluid shifting in lens of eye from hyperglycemia), and poor wound healing  -followed by endocrinology, starting with low-doses of insulin and monitoring closely, patient is on board with this plan    4.  Bradycardia  Possibly related to 1-degree AV block with hyperkalemia secondary to ARF  -followed by cardiology, improved with potassium correction, no chest pain at this time          Subjective   61year old male admitted 07/15/18 for evaluation of weakness and found upon arrival to have acute renal insufficiency, hyperkalemia, and hyponatremia. Recent medical history includes a LLE BKA performed two weeks prior to admission related to complications of diabetes. Perioperatively he was placed on Vancomycin and Zosyn. A week ago he developed scattered violacious maculopapular lesions on both arms and both legs. He went to [facility] and had a skin biopsy reportedly consistent with cutanous vasculitis, and at that time was placed on Bactrim. Per chart review and labs available it seems the Bactrim is most likely etiology of the GUME and it has been stopped. Today patient notes the skin lesions are improving off of all antibiotics for the past 1.5 days. He has never had such skin issues prior to these antibiotics. He denies any headaches. He has vision changes of blurring which he relates to both blood glucose as well as from insulin, but these are somewhat chronic and he denies any diplopia or amaurosis fugax. He denies jaw claudication. There is no chest pain at this time (felt a \"hollowness\" in his chest upon admission but this has resolved). He denies abdominal pain, hematuria, dysuria, or swollen painful joints. He has no fevers. Past Medical History:  No date: CAD (coronary artery disease)     Comment: pvc  No date: COPD  No date: Diabetes ([facility] [address]  No date: Heart failure ([facility] [address]  No date: Hypertension  No date:  Morbidly obese ([facility] [address]   Past Surgical History:  No date: HX ORTHOPAEDIC     Comment: right leg surgery    Family History    Hypertension Father          Smoking status: Former Smoker     Smokeless tobacco: Never Used    Alcohol use No       Current Facility-Administered Medications:  famotidine (PEPCID) tablet 20 mg 20 mg Oral BID Catarino BRITO Do, MD 20 mg at 07/16/18 2795 polyethylene glycol (MIRALAX) packet 17 g 17 g Oral QHS Catarino BRITO Do, MD     insulin glargine (LANTUS) injection 10 Units 10 Units SubCUTAneous DAILY Lisandra Zaidi MD 10 Units at 07/16/18 1343    insulin lispro (HUMALOG) injection  SubCUTAneous AC&HS Lisandra Zaidi MD 2 Units at 07/16/18 1612    dextrose (D50W) injection syrg 12.5-25 g 12.5-25 g IntraVENous PRN Lisandra Zaidi MD     HYDROmorphone (DILAUDID) tablet 2 mg 2 mg Oral Q6H PRN Paige Motley MD     0.9% sodium chloride infusion 75 mL/hr IntraVENous CONTINUOUS Jose Easton MD Last Rate: 75 mL/hr at 07/15/18 2337 75 mL/hr at 07/15/18 2337   sodium chloride (NS) flush 5-10 mL 5-10 mL IntraVENous Q8H Catarino BRITO Do, MD 10 mL at 07/16/18 1344    sodium chloride (NS) flush 5-10 mL 5-10 mL IntraVENous PRN Catarino BRITO Do, MD     acetaminophen (TYLENOL) tablet 650 mg 650 mg Oral Q4H PRN Jose Easton  mg at 07/15/18 2337    naloxone [facility]) injection 0.4 mg 0.4 mg IntraVENous PRN Catarino BRITO Do, MD     diphenhydrAMINE (BENADRYL) injection 12.5 mg 12.5 mg IntraVENous Q4H PRN Catarino BRITO Do, MD     ondansetron (ZOFRAN) injection 4 mg 4 mg IntraVENous Q6H PRN Catarino BRITO Do, MD     docusate sodium (COLACE) capsule 100 mg 100 mg Oral BID Catarino BRITO Do,  mg at 07/16/18 1709    heparin (porcine) injection 5,000 Units 5,000 Units SubCUTAneous Olga Mo MD 5,000 Units at 07/16/18 1344    albuterol-ipratropium (DUO-NEB) 2.5 MG-0.5 MG/3 ML 3 mL Nebulization Q4H PRN Catarino BRITO Do, MD     glucose chewable tablet 16 g 4 Tab Oral PRN Catarino BRITO Do, MD     dextrose (D50W) injection syrg 12.5-25 g 12.5-25 g IntraVENous PRN Catarino BRITO Do, MD     glucagon (GLUCAGEN) injection 1 mg 1 mg IntraMUSCular PRN Catarino BRITO Do, MD          Review of Systems   Constitutional: Positive for fatigue. Negative for diaphoresis and fever. HENT: Negative for congestion, mouth sores, nosebleeds and sore throat. Eyes: Positive for visual disturbance. Negative for pain and redness.    Respiratory: Negative for cough, chest tightness and shortness of breath. Cardiovascular: Negative for chest pain and palpitations. Gastrointestinal: Negative for abdominal pain, constipation, diarrhea, nausea and vomiting. Endocrine: Negative for cold intolerance, heat intolerance and polyuria. Genitourinary: Negative for dysuria and hematuria. Musculoskeletal: Negative for arthralgias, joint swelling and myalgias. Skin: Positive for rash and wound. Allergic/Immunologic: Negative for immunocompromised state. Neurological: Negative for dizziness, numbness and headaches. Hematological: Negative for adenopathy. Psychiatric/Behavioral: Negative for agitation and confusion. Objective    Vital signs for last 24 hours:  /51 (BP 1 Location: Right arm, BP Patient Position: At rest)  Pulse 85  Temp 97.8 °F (36.6 °C)  Resp 17  Ht 6' (1.829 m)  Wt 137.3 kg (302 lb 11.2 oz)  SpO2 98%  BMI 41.05 kg/m2    Intake/Output this shift:  Current Shift: 07/16 0701 - 07/16 1900  In: 1546.3 [P.O.:480; I.V.:1066.3]  Out: 800 [Urine:800]  Last 3 Shifts: 07/14 1901 - 07/16 0700  In: 670 [P.O.:240;  I.V.:430]  Out: 725 [Urine:725]    Data Review:   Recent Results (from the past 24 hour(s))  -EKG, 12 LEAD, INITIAL   Collection Time: 07/15/18  7:58 PM   Result Value Ref Range   Ventricular Rate 82 BPM   Atrial Rate 82 BPM   P-R Interval 216 ms   QRS Duration 94 ms   Q-T Interval 400 ms   QTC Calculation (Bezet) 467 ms   Calculated R Axis 31 degrees   Calculated T Axis 37 degrees   Diagnosis Sinus rhythm with 1st degree AV block with frequent premature ventricular   complexes  Abnormal ECG  When compared with ECG of 23-JUN-2017 02:53,  premature ventricular complexes are now present  Right bundle branch block is no longer present  Confirmed by London Bueno MD., [place] ([id number]) on 7/16/2018 3:26:44 PM      -CBC WITH AUTOMATED DIFF   Collection Time: 07/15/18  8:36 PM   Result Value Ref Range   WBC 10.5 4.1 - 11.1 K/uL   RBC 3.66 (L) 4.10 - 5.70 M/uL   HGB 11.0 (L) 12.1 - 17.0 g/dL   HCT 32.4 (L) 36.6 - 50.3 %   MCV 88.5 80.0 - 99.0 FL   MCH 30.1 26.0 - 34.0 PG   MCHC 34.0 30.0 - 36.5 g/dL   RDW 13.5 11.5 - 14.5 %   PLATELET 378 489 - 425 K/uL   MPV 9.8 8.9 - 12.9 FL   NRBC 0.0 0  WBC   ABSOLUTE NRBC 0.00 0.00 - 0.01 K/uL   NEUTROPHILS 61 32 - 75 %   LYMPHOCYTES 25 12 - 49 %   MONOCYTES 9 5 - 13 %   EOSINOPHILS 4 0 - 7 %   BASOPHILS 1 0 - 1 %   IMMATURE GRANULOCYTES 0 0.0 - 0.5 %   ABS. NEUTROPHILS 6.4 1.8 - 8.0 K/UL   ABS. LYMPHOCYTES 2.6 0.8 - 3.5 K/UL   ABS. MONOCYTES 1.0 0.0 - 1.0 K/UL   ABS. EOSINOPHILS 0.4 0.0 - 0.4 K/UL   ABS. BASOPHILS 0.1 0.0 - 0.1 K/UL   ABS. IMM.  GRANS. 0.0 0.00 - 0.04 K/UL   DF AUTOMATED    -METABOLIC PANEL, BASIC   Collection Time: 07/15/18  8:36 PM   Result Value Ref Range   Sodium 130 (L) 136 - 145 mmol/L   Potassium 5.7 (H) 3.5 - 5.1 mmol/L   Chloride 93 (L) 97 - 108 mmol/L   CO2 30 21 - 32 mmol/L   Anion gap 7 5 - 15 mmol/L   Glucose 164 (H) 65 - 100 mg/dL   BUN 77 (H) 6 - 20 MG/DL   Creatinine 3.00 (H) 0.70 - 1.30 MG/DL   BUN/Creatinine ratio 26 (H) 12 - 20     GFR est AA 26 (L) >60 ml/min/1.73m2   GFR est non-AA 22 (L) >60 ml/min/1.73m2   Calcium 10.5 (H) 8.5 - 10.1 MG/DL   -MAGNESIUM   Collection Time: 07/15/18  8:36 PM   Result Value Ref Range   Magnesium 2.1 1.6 - 2.4 mg/dL   -TROPONIN I   Collection Time: 07/15/18  8:36 PM   Result Value Ref Range   Troponin-I, Qt. <0.05 <0.05 ng/mL   -URINALYSIS W/MICROSCOPIC   Collection Time: 07/15/18 10:06 PM   Result Value Ref Range   Color YELLOW/STRAW    Appearance CLEAR CLEAR     Specific gravity 1.007 1.003 - 1.030     pH (UA) 7.0 5.0 - 8.0     Protein TRACE (A) NEG mg/dL   Glucose NEGATIVE  NEG mg/dL   Ketone NEGATIVE  NEG mg/dL   Bilirubin NEGATIVE  NEG     Blood NEGATIVE  NEG     Urobilinogen 0.2 0.2 - 1.0 EU/dL   Nitrites NEGATIVE  NEG     Leukocyte Esterase NEGATIVE  NEG     WBC 0-4 0 - 4 /hpf   RBC 0-5 0 - 5 /hpf   Epithelial cells FEW FEW /lpf   Bacteria NEGATIVE  NEG /hpf   Hyaline cast 0-2 0 - 5 /lpf   -SODIUM, UR, RANDOM   Collection Time: 07/15/18 10:06 PM   Result Value Ref Range   Sodium urine, random 88 MMOL/L   -CREATININE, UR, RANDOM   Collection Time: 07/15/18 10:06 PM   Result Value Ref Range   Creatinine, urine 13.65 mg/dL   -EOSINOPHILS, URINE   Collection Time: 07/15/18 10:06 PM   Result Value Ref Range   Eosinophils,urine NEGATIVE     -GLUCOSE, POC   Collection Time: 07/15/18 11:34 PM   Result Value Ref Range   Glucose (POC) 186 (H) 65 - 100 mg/dL   Performed by Linda Smallwood    -TSH 3RD GENERATION   Collection Time: 07/16/18  1:58 AM   Result Value Ref Range   TSH 0.09 (L) 0.36 - 3.74 uIU/mL   -METABOLIC PANEL, BASIC   Collection Time: 07/16/18  1:58 AM   Result Value Ref Range   Sodium 130 (L) 136 - 145 mmol/L   Potassium 4.9 3.5 - 5.1 mmol/L   Chloride 93 (L) 97 - 108 mmol/L   CO2 30 21 - 32 mmol/L   Anion gap 7 5 - 15 mmol/L   Glucose 178 (H) 65 - 100 mg/dL   BUN 80 (H) 6 - 20 MG/DL   Creatinine 3.14 (H) 0.70 - 1.30 MG/DL   BUN/Creatinine ratio 25 (H) 12 - 20     GFR est AA 25 (L) >60 ml/min/1.73m2   GFR est non-AA 20 (L) >60 ml/min/1.73m2   Calcium 9.4 8.5 - 10.1 MG/DL   -LIPID PANEL   Collection Time: 07/16/18  1:58 AM   Result Value Ref Range   LIPID PROFILE       Cholesterol, total 187 <200 MG/DL   Triglyceride 461 (H) <150 MG/DL   HDL Cholesterol 27 MG/DL   LDL, calculated Not calculated due to elevated triglyceride level 0 - 100 MG/DL   VLDL, calculated Calculation not valid with this patient's other Lipid values.  MG/DL   CHOL/HDL Ratio 6.9 (H) 0 - 5.0     -CBC W/O DIFF   Collection Time: 07/16/18  1:58 AM   Result Value Ref Range   WBC 9.5 4.1 - 11.1 K/uL   RBC 3.45 (L) 4.10 - 5.70 M/uL   HGB 10.2 (L) 12.1 - 17.0 g/dL   HCT 30.8 (L) 36.6 - 50.3 %   MCV 89.3 80.0 - 99.0 FL   MCH 29.6 26.0 - 34.0 PG   MCHC 33.1 30.0 - 36.5 g/dL   RDW 13.5 11.5 - 14.5 %   PLATELET 010 963 - 439 K/uL   MPV 9.6 8.9 - 12.9 FL   NRBC 0.0 0  WBC   ABSOLUTE NRBC 0.00 0.00 - 0.01 K/uL   -HEMOGLOBIN A1C WITH EAG   Collection Time: 07/16/18  1:58 AM   Result Value Ref Range   Hemoglobin A1c 7.4 (H) 4.2 - 6.3 %   Est. average glucose 166 mg/dL   -MAGNESIUM   Collection Time: 07/16/18  1:58 AM   Result Value Ref Range   Magnesium 2.0 1.6 - 2.4 mg/dL   -PHOSPHORUS   Collection Time: 07/16/18  1:58 AM   Result Value Ref Range   Phosphorus 5.9 (H) 2.6 - 4.7 MG/DL   -HEPATIC FUNCTION PANEL   Collection Time: 07/16/18  1:58 AM   Result Value Ref Range   Protein, total 7.0 6.4 - 8.2 g/dL   Albumin 2.7 (L) 3.5 - 5.0 g/dL   Globulin 4.3 (H) 2.0 - 4.0 g/dL   A-G Ratio 0.6 (L) 1.1 - 2.2     Bilirubin, total 0.2 0.2 - 1.0 MG/DL   Bilirubin, direct <0.1 0.0 - 0.2 MG/DL   Alk.  phosphatase 102 45 - 117 U/L   AST (SGOT) 23 15 - 37 U/L   ALT (SGPT) 24 12 - 78 U/L   -CK W/ CKMB & INDEX   Collection Time: 07/16/18  1:58 AM   Result Value Ref Range   CK 31 (L) 39 - 308 U/L   CK - MB <1.0 <3.6 NG/ML   CK-MB Index Cannot be calculated 0 - 2.5     -TROPONIN I   Collection Time: 07/16/18  1:58 AM   Result Value Ref Range   Troponin-I, Qt. <0.05 <0.05 ng/mL   -LDL, DIRECT   Collection Time: 07/16/18  1:58 AM   Result Value Ref Range   LDL,Direct 95 0 - 100 mg/dl   -GLUCOSE, POC   Collection Time: 07/16/18  7:32 AM   Result Value Ref Range   Glucose (POC) 144 (H) 65 - 100 mg/dL   Performed by Tyra Ruelas (PCT)    -GLUCOSE, POC   Collection Time: 07/16/18 11:54 AM   Result Value Ref Range   Glucose (POC) 208 (H) 65 - 100 mg/dL   Performed by Tyra Ruelas (PCT)    -T4 (THYROXINE)   Collection Time: 07/16/18 12:44 PM   Result Value Ref Range   T4, Total 10.6 4.5 - 12.1 ug/dL   -VITAMIN B12   Collection Time: 07/16/18 12:44 PM   Result Value Ref Range   Vitamin B12 382 193 - 986 pg/mL   -RETICULOCYTE COUNT   Collection Time: 07/16/18 12:44 PM   Result Value Ref Range   Reticulocyte count 2.7 (H) 0.7 - 2.1 %   Absolute Retic Cnt. 0.0838 0.0260 - 0.0950 M/ul   -LD   Collection Time: 07/16/18 12:44 PM Result Value Ref Range    85 - 241 U/L   -IRON PROFILE   Collection Time: 07/16/18 12:44 PM   Result Value Ref Range   Iron 52 35 - 150 ug/dL   TIBC 294 250 - 450 ug/dL   Iron % saturation 18 (L) 20 - 50 %   -HAPTOGLOBIN   Collection Time: 07/16/18 12:44 PM   Result Value Ref Range   Haptoglobin 205 (H) 30 - 200 mg/dL   -FOLATE   Collection Time: 07/16/18 12:44 PM   Result Value Ref Range   Folate 18.8 5.0 - 21.0 ng/mL   -FERRITIN   Collection Time: 07/16/18 12:44 PM   Result Value Ref Range   Ferritin 253 26 - 388 NG/ML   -PROTEIN URINE, RANDOM   Collection Time: 07/16/18  2:27 PM   Result Value Ref Range   Protein, urine random 31 (H) 0.0 - 11.9 mg/dL   -CREATININE, UR, RANDOM   Collection Time: 07/16/18  2:27 PM   Result Value Ref Range   Creatinine, urine 45.48 mg/dL   -GLUCOSE, POC   Collection Time: 07/16/18  4:02 PM   Result Value Ref Range   Glucose (POC) 236 (H) 65 - 100 mg/dL   Performed by Alvina Sharpe (PCT)        Physical Exam   Constitutional: He is oriented to person, place, and time. No distress. Eyes: Conjunctivae and EOM are normal. Pupils are equal, round, and reactive to light. No scleral icterus. Neck: Normal range of motion. Cardiovascular: Normal rate, regular rhythm and normal heart sounds. Pulmonary/Chest: Effort normal and breath sounds normal. No stridor. He has no wheezes. Abdominal: Soft. Bowel sounds are normal. There is no tenderness. Musculoskeletal:   Shoulders non-tender with abduction. Elbows no effusion nontender. Wrists non-tender with flexion/extion, no effusion. MCP/PIP/DIPs no effusion, tenderness. Knees are non-tender, no swelling. Left lower extremity s/p amputation below knee, stump incision appears well approximated without drainage. Right lower extrmeity shows non-tender ankle or MTPs in toes. Neurological: He is alert and oriented to person, place, and time. Skin: Rash noted. He is not diaphoretic.    Scattered non-blanching maculopapular red-purple lesions along bilateral forearms (biopsy site covered on left forearm), and along bilateral lower extremities from thighs to foot on right lower extremity. Psychiatric: He has a normal mood and affect.  His behavior is normal.

## 2018-07-16 NOTE — PROGRESS NOTES
[facility]  [address] [facility], [address] [place] [address]  [phone]      Medical Progress Note      NAME: Heath Sacks   :  1960  MRM:  [id number]    Date/Time: 2018  10:25 AM       Assessment and Plan:     Acute renal failure / Acute hyperkalemia / Hyponatremia - POA, unclear etiology, but I suspect vasculitis compounded by Rx of Bactrim. Stop Bactrim. Consult rheumatology. Cannot start steroids yet, as he refuses insulin, and steroids may send him into DKA. Hold ASA today. DM type 2 causing renal, vascular, neurological disease, not at goal - Diabetic diet and counseling. He refuses SSI per protocol. He is convinced that insulin makes him blind. Holding home metformin due to ARF. Continue glipizide. A1c useless in setting of anemia. Consult endocrinology    Anemia - POA, moderate, likely due to surgical blood loss, CKD and perhaps Abx effects. Monitor. Hypertriglyceridemia - Severe, POA, due to DM and no insulin. He refuses insulin. Bradycardia - Noted on admit. Likely related to ARF and HyperK, now better, holding all HTN meds. Cards following. Morbid obesity - Advise weight loss. Likely JEROD, needs testing and treatment. COPD (chronic obstructive pulmonary disease) - POA, possible, vs hypoventilation. Oxygen as needed. Prn duonebs. Subjective:     Chief Complaint:  GERD after eating 2 breakfast trays. ROS:  (bold if positive, if negative)      Tolerating minimal PT Tolerating Diet        Objective:     Last 24hrs VS reviewed since prior progress note.  Most recent are:    Visit Vitals    BP (!) 108/37 (BP 1 Location: Left arm)    Pulse 77    Temp 97.8 °F (36.6 °C)    Resp 18    Ht 6' (1.829 m)    Wt 137.3 kg (302 lb 11.2 oz)    SpO2 100%    BMI 41.05 kg/m2     SpO2 Readings from Last 6 Encounters:   18 100%   17 93%   17 94%   17 96%   16 98%   02/25/15 98%          Intake/Output Summary (Last 24 hours) at 07/16/18 1025  Last data filed at 07/16/18 2042   Gross per 24 hour   Intake             1895 ml   Output              725 ml   Net             1170 ml        Physical Exam:    Gen:  Morbid obese, in no acute distress  HEENT:  Pink conjunctivae, PERRL, hearing intact to voice, moist mucous membranes  Neck:  Supple, without masses, thyroid non-tender  Resp:  No accessory muscle use, distant breath sounds without wheezes rales or rhonchi  Card:  No murmurs, normal S1, S2 without thrills, bruits or peripheral edema  Abd:  Soft, non-tender, non-distended, normoactive bowel sounds are present, no mass  Lymph:  No cervical or inguinal adenopathy  Musc:  No cyanosis or clubbing  Skin:  Scattered vasculitic ulcer on al limbs, skin turgor is good  Neuro:  Cranial nerves are grossly intact, general motor weakness, follows commands   Psych:  Poor insight, oriented to person, place and time, alert, argumentative    Telemetry reviewed:   normal sinus rhythm  __________________________________________________________________  Medications Reviewed: (see below)  Medications:     Current Facility-Administered Medications   Medication Dose Route Frequency    aspirin (ASPIRIN) tablet 325 mg  325 mg Oral DAILY    famotidine (PEPCID) tablet 20 mg  20 mg Oral BID    glipiZIDE (GLUCOTROL) tablet 10 mg  10 mg Oral DAILY    polyethylene glycol (MIRALAX) packet 17 g  17 g Oral QHS    HYDROmorphone (PF) (DILAUDID) injection 1 mg  1 mg IntraVENous Q4H PRN    0.9% sodium chloride infusion  75 mL/hr IntraVENous CONTINUOUS    sodium chloride (NS) flush 5-10 mL  5-10 mL IntraVENous Q8H    sodium chloride (NS) flush 5-10 mL  5-10 mL IntraVENous PRN    acetaminophen (TYLENOL) tablet 650 mg  650 mg Oral Q4H PRN    naloxone (NARCAN) injection 0.4 mg  0.4 mg IntraVENous PRN    diphenhydrAMINE (BENADRYL) injection 12.5 mg  12.5 mg IntraVENous Q4H PRN    ondansetron (ZOFRAN) injection 4 mg  4 mg IntraVENous Q6H PRN    docusate sodium (COLACE) capsule 100 mg  100 mg Oral BID    heparin (porcine) injection 5,000 Units  5,000 Units SubCUTAneous Q8H    albuterol-ipratropium (DUO-NEB) 2.5 MG-0.5 MG/3 ML  3 mL Nebulization Q4H PRN    insulin lispro (HUMALOG) injection   SubCUTAneous AC&HS    glucose chewable tablet 16 g  4 Tab Oral PRN    dextrose (D50W) injection syrg 12.5-25 g  12.5-25 g IntraVENous PRN    glucagon (GLUCAGEN) injection 1 mg  1 mg IntraMUSCular PRN        Lab Data Reviewed: (see below)  Lab Review:     Recent Labs      07/16/18   0158  07/15/18   2036   WBC  9.5  10.5   HGB  10.2*  11.0*   HCT  30.8*  32.4*   PLT  185  216     Recent Labs      07/16/18   0158  07/15/18   2036   NA  130*  130*   K  4.9  5.7*   CL  93*  93*   CO2  30  30   GLU  178*  164*   BUN  80*  77*   CREA  3.14*  3.00*   CA  9.4  10.5*   MG  2.0  2.1   PHOS  5.9*   --    ALB  2.7*   --    TBILI  0.2   --    SGOT  23   --    ALT  24   --      Lab Results   Component Value Date/Time    Glucose (POC) 144 (H) 07/16/2018 07:32 AM    Glucose (POC) 186 (H) 07/15/2018 11:34 PM    Glucose (POC) 282 (H) 06/23/2017 11:10 AM    Glucose (POC) 229 (H) 06/23/2017 06:39 AM    Glucose (POC) 334 (H) 06/22/2017 10:08 PM     No results for input(s): PH, PCO2, PO2, HCO3, FIO2 in the last 72 hours. No results for input(s): INR in the last 72 hours. No lab exists for component: INREXT  All Micro Results     Procedure Component Value Units Date/Time    MRSA CULTURE [[id number]] Collected:  07/16/18 0158    Order Status:  Completed Specimen:  Nasal Updated:  07/16/18 0900          I have reviewed notes of prior 24hr.     Other pertinent lab: none    Total time spent with patient: [address] [place] [address] discussed with: Patient, Family, Care Manager, Nursing Staff, Consultant/Specialist and >50% of time spent in counseling and coordination of care    Discussed:  Care Plan    Prophylaxis:  Hep SQ and H2B/PPI    Disposition:  Home w/Family           ___________________________________________________    Attending Physician: Brittany Dawson MD

## 2018-07-16 NOTE — ROUTINE PROCESS
TRANSFER - IN REPORT:    Verbal report received from Wilmar Angulo RN(name) on Davey Penhook  being received from ER(unit) for routine progression of care      Report consisted of patients Situation, Background, Assessment and   Recommendations(SBAR). Information from the following report(s) SBAR, Kardex, ED Summary, Intake/Output, MAR, Accordion, Recent Results, Cardiac Rhythm SR with trigeminal PVC's and Alarm Parameters  was reviewed with the receiving nurse. Opportunity for questions and clarification was provided. Assessment completed upon patients arrival to unit and care assumed.

## 2018-07-16 NOTE — PROGRESS NOTES
[facility]: MED RECONCILIATION    Comments/Recommendations:   · Med rec completed with patient and spouse and verified against Rx Query  · He was started on a 15-day course of Bactrim for vasculitis last Tuesday evening    Medications added:     · Aspirin 325 mg  · Famotidine  · Bumex  · Glipizide  · Combivent  · Bactrim    Medications removed:    · Aspirin 81 mg  · Furosemide  · Gabapentin  · Glimepiride  · Metformin  · Metoprolol - caused severe bradycardia  · Omeprazole  · Spironolactone    Medications adjusted:    · Lisinopril changed from 10 mg daily to 20 mg daily    Information obtained from: Patient, Spouse, Rx Query    Allergies: Codeine; Insulin regular; Pcn [penicillins]; Victoza [liraglutide]; Morphine; and Statins-hmg-coa reductase inhibitors    Prior to Admission Medications:  Medication Documentation Review Audit       Reviewed by Pricila [facility]all[facility] (Pharmacist) on 07/15/18 at 2357         Medication Sig Documenting Provider Last Dose Status Taking?      aspirin (ASPIRIN) 325 mg tablet Take 325 mg by mouth daily. Historical Provider 7/15/2018 AM Active Yes    bumetanide (BUMEX) 2 mg tablet Take 2 mg by mouth two (2) times a day. Historical Provider 7/15/2018 AM Active Yes    docusate sodium (COLACE) 100 mg capsule Take 100 mg by mouth daily as needed for Constipation. Historical Provider  Active Yes    famotidine (PEPCID) 20 mg tablet Take 20 mg by mouth two (2) times a day. Historical Provider 7/15/2018 AM Active Yes    glipiZIDE (GLUCOTROL) 5 mg tablet Take 10 mg by mouth daily. In addition to 5 mg every night Historical Provider 7/15/2018 AM Active Yes    glipiZIDE (GLUCOTROL) 5 mg tablet Take 5 mg by mouth nightly. In addition to 10 mg every morning Historical Provider 7/14/2018 HS Active Yes    ipratropium-albuterol (COMBIVENT RESPIMAT)  mcg/actuation inhaler Take 1 Puff by inhalation every six (6) hours as needed for Wheezing.  Historical Provider  Active Yes    lisinopril (Diamante Asiya) 20 mg tablet Take 20 mg by mouth daily. Historical Provider 7/15/2018 AM Active Yes    polyethylene glycol (MIRALAX) 17 gram packet Take 17 g by mouth nightly. Historical Provider 7/14/2018 HS Active Yes    trimethoprim-sulfamethoxazole (BACTRIM DS) 160-800 mg per tablet Take 1 Tab by mouth two (2) times a day.  Indications: Vasculitis Historical Provider 7/15/2018 AM Active Yes             Med Raj Vernon Jul 15, 2018 11:56 PM): Started 15-day course (+ two additional refills for 45-days total) on Tuesday evening 7/10/18                        Thank you,  Kalina Howard, [facility]          Contact: [phone]

## 2018-07-16 NOTE — PROGRESS NOTES
Wound care note  Initial visit for wound assessment, alert, no distress. Family at bedside. Assessment  Left BKA incision approximated with staples, small open areas, no drainage, kayleigh wound skin intact and dry. Right lower leg scattered areas of red and purple blistered like areas- vasculitis, painful, no drainage  Right lateral ankle - stage 2 pressure injury present on admission- 2x2 cm red base, no drainage, kayleigh wound skin dry. Treatment  Mepilex to vasculitic areas on medial leg for comfort, medihoney and mepilex to right ankle. Leg elevated. BKA incision left open to air per patient request.    Plan of care and orders obtained from Dr Garfield Cross, will follow.   Maye Acosta RN [facility], [facility].

## 2018-07-16 NOTE — CONSULTS
Consult Patient: Anoop Figueredo MRN: [id number]  SSN: [id number] YOB: 1960  Age: 61 y.o. Sex: male Subjective:  
  
Anoop Figueredo is a 61 y.o. male with PMHx significant for HTN, DM, COPD, CAD, heart failure, vasculitis, and morbid obesity admitted 7/15/18 complaining of a slow heart rate, found to have significant acute kidney injury (per renal attributed to \"pre-renal azotemia due to IVVD and bradycardia complicated by reduced tubular secretion of creatinine due to Bactrim\"). 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 Pt is concerned that insulin causes blindness so has been refusing insulin. Specifically, he has had diabetes for years. Has tried Victoza without improvement. Was given insulin in the past and developed \"tunnel vision\" that worsened; was informed by pharmacy that insulin rarely causes blindness (case studies). Has refused since, but knows he \"needs something\" so willing to carefully retry. Thinks he used \"regular insulin\" so would like to try something else. Overall, has been in bed for the last 2 mo since starting foot surgery. Developing pressure sores on L leg. Wife has been helping, has been trying to eat low carb. Sugars at home in am 140s-270s. He has been on metformin which has had to be discontinued due to acute kidney injury so continues only on a sulfonylurea (glip 10mg in am and 5mg in pm.). Pt also with high TG to 400s on admit. Endocrine consulted to convince him to start insulin ideally (to treat hyperglycemia and hypertriglyceridemia) or consider other meds; and to establish outpatient endo care. Past Medical History:  
Diagnosis Date  CAD (coronary artery disease)   
 pvc  COPD  Diabetes ([address]  Heart failure [facility]  Hypertension  Morbidly obese ([facility] Past Surgical History:  
Procedure Laterality Date  HX ORTHOPAEDIC    
 right leg surgery Family History Problem Relation Age of Onset  Hypertension Father Social History Substance Use Topics  Smoking status: Former Smoker  Smokeless tobacco: Never Used  Alcohol use No  
  
Current Facility-Administered Medications Medication Dose Route Frequency Provider Last Rate Last Dose  famotidine (PEPCID) tablet 20 mg  20 mg Oral BID Catarino BRITO Do, MD   20 mg at 07/16/18 0848  
 glipiZIDE (GLUCOTROL) tablet 10 mg  10 mg Oral DAILY Catarino BRITO Do, MD   10 mg at 07/16/18 5261  polyethylene glycol (MIRALAX) packet 17 g  17 g Oral QHS Catarino BRITO Do, MD      
 HYDROmorphone (PF) (DILAUDID) injection 1 mg  1 mg IntraVENous Q4H PRN Catarino BRITO Do, MD   1 mg at 07/16/18 0853  
 0.9% sodium chloride infusion  75 mL/hr IntraVENous CONTINUOUS Catarino BRITO Do, MD 75 mL/hr at 07/15/18 2337 75 mL/hr at 07/15/18 2337  sodium chloride (NS) flush 5-10 mL  5-10 mL IntraVENous Q8H Catarino BRITO Do, MD   10 mL at 07/16/18 0522  sodium chloride (NS) flush 5-10 mL  5-10 mL IntraVENous PRN Mariacrmen Handy MD      
 acetaminophen (TYLENOL) tablet 650 mg  650 mg Oral Q4H PRN Catarino BRITO Do, MD   650 mg at 07/15/18 2337  
 naloxone (NARCAN) injection 0.4 mg  0.4 mg IntraVENous PRN Jcoy Rosas Do, MD      
 diphenhydrAMINE (BENADRYL) injection 12.5 mg  12.5 mg IntraVENous Q4H PRN Catarino BRITO Do, MD      
 ondansetron (ZOFRAN) injection 4 mg  4 mg IntraVENous Q6H PRN Catarino BRITO Do, MD      
 docusate sodium (COLACE) capsule 100 mg  100 mg Oral BID Catarino BRITO Do, MD   100 mg at 07/16/18 0930  heparin (porcine) injection 5,000 Units  5,000 Units SubCUTAneous Q8H Catarino BRITO Do, MD   5,000 Units at 07/16/18 0522  albuterol-ipratropium (DUO-NEB) 2.5 MG-0.5 MG/3 ML  3 mL Nebulization Q4H PRN Catarino BRITO Do, MD      
 insulin lispro (HUMALOG) injection   SubCUTAneous AC&HS Catarino BRITO Do, MD      
 glucose chewable tablet 16 g  4 Tab Oral PRN Catarino BRITO Do, MD      
 dextrose (D50W) injection syrg 12.5-25 g  12.5-25 g IntraVENous PRN Catarino BRITO Do, MD      
 glucagon (GLUCAGEN) injection 1 mg  1 mg IntraMUSCular PRN Jose Sen MD      
  
 
Allergies Allergen Reactions  Codeine Anaphylaxis  Insulin Regular Other (comments) Temp blindness and facial droop  Pcn [Penicillins] Anaphylaxis  Victoza [Liraglutide] Other (comments) Fascial droop, temp blindness  Morphine Other (comments)  
  hallucinates  Statins-Hmg-Coa Reductase Inhibitors Hives, Itching and Nausea and Vomiting Review of Systems: as per HPI Objective:  
 
Vitals:  
 07/16/18 5415 07/16/18 9547 07/16/18 0845 07/16/18 2704 BP:   135/57 (!) 108/37 Pulse:  72 78 77 Resp:   18 18 Temp:   97.8 °F (36.6 °C) SpO2:   98% 100% Weight: 137.3 kg (302 lb 11.2 oz) Height:      
  
 
Pertinent review of Labs June 2017 [facility]: Hgb A1C 8.3%; CBC with WBC 6.6, Hgb 12.7 with MCV 97.7; CMP with BUN/crat 28/0.92, sodium 133, pot 4.0, glcusoe 248 7/15/18 [facility]: TSH 0.09; CBC with WBC 10.5, Hgb 11.0 with MCv 88.5, plts 216; BMP with BUN/creat 77/3.0 (eGFR 22), sodium 130, pot 5.7, glucose 164 
7/16/18 [facility]: Hgb A1C 7.4%; CMP with BUN/creat 80/3.14, glucose 178; sodium 130, pot 4.9, ALT 24, AST 23; lipids total 187, , HDL 27, LDL cant be calcd; TSH 0.09 POCT Glucoses 144 (most recent) to 186 during this admit Physical Exam: 
Visit Vitals  /50 (BP 1 Location: Right arm, BP Patient Position: At rest)  Pulse 77  Temp 98.1 °F (36.7 °C)  Resp 16  
 Ht 6' (1.829 m)  Wt 137.3 kg (302 lb 11.2 oz)  SpO2 100%  BMI 41.05 kg/m2 Gen: Morbidly obese  male appears discheveled and older than stated age Eye: PERRL, EOMI HEENT: Full beard, moist mucous membranes Neck: thyroid not palpable, exam limited by body habitus CV: Distant heart sounds Resp: Distant breath sound Abd: Soft, NT/ND, obses Extrem: R lower extremity s/p R foot amputation, stump at about mid calf, wound is not closed but no drainage, stitches in place. Both right stump and L leg with hyperpigmented areas attributed to vasculitis; left leg also with dressings on and areas of peeling skin. L foot DP pulse not appreciated Neuro: CN III-XII grossly intake, A&Ox 4, plesanat and appropriate Assessment:  
 
Hospital Problems  Date Reviewed: 7/15/2018 Codes Class Noted POA Anemia ICD-10-CM: D64.9 ICD-9-CM: 285.9  7/16/2018 Yes Hypertriglyceridemia ICD-10-CM: E78.1 ICD-9-CM: 272.1  7/16/2018 Yes * (Principal)Acute renal failure (ARF) ([facility] ICD-10-CM: N17.9 ICD-9-CM: 584.9  7/15/2018 Yes DM type 2 causing renal disease, not at goal [facility]) (Chronic) ICD-10-CM: E11.29 ICD-9-CM: 250.40  7/15/2018 Yes Bradycardia ICD-10-CM: R00.1 ICD-9-CM: 427.89  7/15/2018 Yes Hyponatremia ICD-10-CM: E87.1 ICD-9-CM: 276.1  7/15/2018 Yes Morbid obesity ([facility] ICD-10-CM: E66.01 
ICD-9-CM: 278.01  7/15/2018 Yes COPD (chronic obstructive pulmonary disease) ([facility]) (Chronic) ICD-10-CM: J44.9 ICD-9-CM: 632  7/15/2018 Yes Acute hyperkalemia ICD-10-CM: E87.5 ICD-9-CM: 276.7  6/18/2017 Yes Plan:  
 
62yo obese male with diabetes, not controlled but A1C unreliable, with acute kidney insufficiency, s/p R lower leg amputation (10 surgeries in last 2 mo), here with diabetes and hyperglycemia, on sulfonylureas only. Notes insulin gave him tunnel vision in the past; metformin and SGLT2s c/i d/t renal insufficiency; Victoza has not helped in the past. 
 
Given renal insufficiency, would like him to be off sulfonyureas. After our discussion he is amenable to starting insulin, going \"low and slow,\" and monitoring his glucoses closely and noting any vision complaints.  
 
Also noted low TSH, suspect he has \"nonthyroidal illness\" (sick euthyroid syndrome = thyroid is fine but labs affected by other illness) and will confirm with T4 and T3. 
 
- Stop glip 
- Start Lantus 10 units and adjust to fastings 100-140 
- Use \"sliding scale\" for now sensitive scale, adjust to more as kidneys improve - Check sugars at least 4x a day  
- check T4 and T3 Have given pt our card, will consider if he wants to see lilibeth here in [place] (lives close to [place]). If so will call us [phone] to set up followup, currently I have a space on my schedule held for Monday 7/23/18. Lilibeth will continue to follow, thank you Signed By: Rosalind Zaragoza MD   
 July 16, 2018

## 2018-07-16 NOTE — PROGRESS NOTES
Dr. Gypsy Haskins notified of the same message left on his voice mail, given update on pt's current medical status including lab work results and VS. MD to place orders into computer.

## 2018-07-16 NOTE — H&P
Ramses Patel dimitri Shok[address]  [address] [facility], [address] [place] [address]  [phone]    Admission History and Physical      NAME:  Gab Krishnamurthy   :   1960   MRN:  [id number]     PCP:  Riaz Nathan MD     Date/Time:  7/15/2018         Subjective:     CHIEF COMPLAINT: weakness, low heart rate     HISTORY OF PRESENT ILLNESS:     The patient is a 60 yo hx of HTN, DM, CAD, CHF, COPD, vasculitis, recent LLE BKA, presented w/ weakness, bradycardia, ARF. The patient stated that he received a LLE BKA at [facility] last week for a gangrenous foot. He was subsequently sent home and placed on oral Bactrim for \"vasculitis. \"  Today, the patient c/o weakness, fatigue, and low heart rate (~40s) at home. He denied chest pain, SOB, fevers, chills, nausea, vomiting, diarrhea. In the ED, HR ~42, WBC 10.5, Cr 3.0 (unclear baseline), K 5.7. Allergies   Allergen Reactions    Codeine Anaphylaxis    Insulin Regular Other (comments)     Temp blindness and facial droop    Pcn [Penicillins] Anaphylaxis    Victoza [Liraglutide] Other (comments)     Fascial droop, temp blindness    Morphine Other (comments)     hallucinates    Statins-Hmg-Coa Reductase Inhibitors Hives, Itching and Nausea and Vomiting       Prior to Admission medications    Medication Sig Start Date End Date Taking? Authorizing Provider   furosemide (LASIX) 40 mg tablet Take 1 Tab by mouth two (2) times a day. 17   Autry Heimlich, MD   lisinopril (PRINIVIL, ZESTRIL) 10 mg tablet Take 1 Tab by mouth daily. 17   Autry Heimlich, MD   spironolactone (ALDACTONE) 25 mg tablet Take 1 Tab by mouth daily. 17   Autry Heimlich, MD   gabapentin (NEURONTIN) 300 mg capsule Take 300 mg by mouth three (3) times daily. Navneet Chavira MD   glimepiride (AMARYL) 4 mg tablet Take 4 mg by mouth two (2) times a day. Historical Provider   metoprolol (LOPRESSOR) 100 mg tablet Take 100 mg by mouth two (2) times a day. Historical Provider   metFORMIN (GLUCOPHAGE) 1,000 mg tablet Take 1,500 mg by mouth daily (with breakfast). Navneet Chavira MD   metFORMIN (GLUCOPHAGE) 1,000 mg tablet Take 1,000 mg by mouth every evening. Navneet Chavira MD   omeprazole (PRILOSEC) 20 mg capsule Take 20 mg by mouth two (2) times a day. Navneet Chavira MD   aspirin 81 mg tablet Take 81 mg by mouth daily.     Navneet Chavira MD       Past Medical History:   Diagnosis Date    CAD (coronary artery disease)     pvc    COPD     [facility]     Heart failure ([facility]     Hypertension     [facility]         Past Surgical History:   Procedure Laterality Date    HX ORTHOPAEDIC      right leg surgery       Social History   Substance Use Topics    Smoking status: Former Smoker    Smokeless tobacco: Never Used    Alcohol use No        Family History   Problem Relation Age of Onset    Hypertension Father         Review of Systems:  (bold if positive, if negative)    Gen:  , fatigueEyes:  ENT:  CVS:  Pulm:  GI:    :    MS:  Skin:  Psych:  Endo:    Hem:  Renal:    Neuro:          Objective:      VITALS:    Vital signs reviewed; most recent are:    Visit Vitals    /57 (BP 1 Location: Right arm, BP Patient Position: At rest)    Pulse 79    Temp 98.1 °F (36.7 °C)    Resp 18    Ht 6' (1.829 m)    Wt 142.9 kg (315 lb)    SpO2 99%    BMI 42.72 kg/m2     SpO2 Readings from Last 6 Encounters:   07/15/18 99%   06/23/17 93%   06/18/17 94%   05/06/17 96%   11/17/16 98%   02/25/15 98%        No intake or output data in the 24 hours ending 07/15/18 7967     Exam:     Physical Exam:    Gen:  Disheveled, ill-appearing, super morbidly obese, NAD  HEENT:  Pink conjunctivae, PERRL, hearing intact to voice, dry mucous membranes  Neck:  Supple, without masses, thyroid non-tender  Resp:  No accessory muscle use, clear breath sounds without wheezes rales or rhonchi  Card:  No murmurs, normal S1, S2 without thrills, trace edema  Abd:  Soft, non-tender, non-distended, normoactive bowel sounds are present  Lymph:  No cervical adenopathy  Musc:  No cyanosis or clubbing, LLE BKA  Skin:  Multiple areas of raised, erythematous, vasculitic rash  Neuro:  Cranial nerves 3-12 are grossly intact, follows commands appropriately  Psych:  Alert with fair insight. Oriented to person, place, and time    Labs:    Recent Labs      07/15/18   2036   WBC  10.5   HGB  11.0*   HCT  32.4*   PLT  216     Recent Labs      07/15/18   2036   NA  130*   K  5.7*   CL  93*   CO2  30   GLU  164*   BUN  77*   CREA  3.00*   CA  10.5*   MG  2.1     Lab Results   Component Value Date/Time    Glucose (POC) 282 (H) 06/23/2017 11:10 AM    Glucose (POC) 229 (H) 06/23/2017 06:39 AM     No results for input(s): PH, PCO2, PO2, HCO3, FIO2 in the last 72 hours. No results for input(s): INR in the last 72 hours. No lab exists for component: INREXT    Radiology and EKG reviewed:   pending    **Old Records reviewed in [facility]**       Assessment/Plan:       Principal Problem:    62 yo hx of HTN, DM, CAD, CHF, COPD, vasculitis, recent LLE BKA, presented w/ weakness, bradycardia, ARF    1) Acute renal failure (ARF)/CKD: Cr 3.0 on admission, unclear baseline. Could be due to ATN from recent BKA surgery vs AIN from recent bactrim vs hypovolemia. Will hold ACEi, aldactone, lasix. Check urine lytes, eos, U/A, renal U/S. Start low dose IVF, consult Renal.  Will try to obtain old records from [place]    2) Acute hyperkalemia: K 5.7 on admission, likely from ARF. Will monitor on Tele. Give nebs, insulin/glucose, kayexalate. Monitor BMP closely    3) Bradycardia: HR~42 on admission. Will monitor on Tele. Hold metoprolol. Check Echo, TSH. Consult Cards    4) Hyponatremia: likely due to hypovolemia. Will start IVF, monitor    5) HTN: BP stable. Hold BB, ACEi, aldactone due to above    6) DM type 2 causing renal disease, not at goal: check A1C, hold metformin.   Start SSI    7) Recent LLE BKA: consult wound care    8) COPD: stable, use duo nebs prn    9) Super morbid obesity: consult nutrition    10) Hx of vasculitis: unclear type. Will need to get old records.   Not on steroids    Code: Full     Risk of deterioration: high      Total time spent with patient: [address] discussed with: Patient, family, nursing    Discussed:  Care Plan    Prophylaxis:  Hep SQ    Probable Disposition:  SNF/LTC           ___________________________________________________    Attending Physician: Gaurav Martinez MD

## 2018-07-16 NOTE — PROGRESS NOTES
In pt's room to take VS and pt stated that his lower left leg really hurts and took one 2 mg po dilaudid pill, given to him by his wife that is in the room.  Both pt and wife warned about giving home medications without attending MD approval. Voice mail left with Dr. Savi Serrato.

## 2018-07-16 NOTE — PROGRESS NOTES
Problem: Acute Renal Failure: Day 1  Goal: Consults, if ordered  Outcome: Progressing Towards Goal  PT OT ordered and patient advised them that he knows the exercises. Goal: Nutrition/Diet  Outcome: Progressing Towards Goal  Pt has a good appetite. Problem: Falls - Risk of  Goal: *Absence of Falls  Document Austyn Fall Risk and appropriate interventions in the flowsheet. Outcome: Progressing Towards Goal  Fall Risk Interventions:  Mobility Interventions: PT Consult for assist device competence         Medication Interventions: Bed/chair exit alarm    Elimination Interventions: Call light in reach             Problem: Patient Education: Go to Patient Education Activity  Goal: Patient/Family Education  Outcome: Progressing Towards Goal  Wife is again found providing home medications for this patient's complaints of symptoms. Dr Stacy Rosas made aware. Wife and patient advised to not provide any medications from home unless otherwise ordered by MD.  They verbalized understanding.

## 2018-07-16 NOTE — PROGRESS NOTES
Physical Therapy orders acknowledged, chart reviewed and discussed with nurse Patient at his base line level of function. Recent L BKA 2 weeks ago and has been bed bound, use Jena lift for transfer to and from power wheelchair, has a ramp to enter the house. Patient currently getting HHPT for maintenance exercise. Patient and spouse declining Therapy stated they all have DME's and does not need any therapy for now. No further acute Physical Therapy needs. Educate patient and spouse to continue HEP given to them, patient and spouse agreed. Notified nurse, we will complete therapy order for now.  Thank you.

## 2018-07-16 NOTE — ED NOTES
TRANSFER - OUT REPORT:    Verbal report given to Uyen Yuan RN(name) on Gab Krishnamurthy  being transferred to 3rd floor(unit) for routine progression of care       Report consisted of patients Situation, Background, Assessment and   Recommendations(SBAR). Information from the following report(s) SBAR, Kardex, ED Summary, Procedure Summary, Recent Results and Cardiac Rhythm Vent Trigeminy was reviewed with the receiving nurse. Lines:   Peripheral IV 07/15/18 Right Antecubital (Active)   Site Assessment Clean, dry, & intact 7/15/2018  8:33 PM   Phlebitis Assessment 0 7/15/2018  8:33 PM   Infiltration Assessment 0 7/15/2018  8:33 PM   Dressing Status Clean, dry, & intact 7/15/2018  8:33 PM   Dressing Type Transparent 7/15/2018  8:33 PM   Hub Color/Line Status Pink 7/15/2018  8:33 PM        Opportunity for questions and clarification was provided.       Patient transported with:   Monitor  Tech

## 2018-07-16 NOTE — PROGRESS NOTES
Bedside and Verbal shift change report given to Izabella (oncoming nurse) by Maximino Jacobs (offgoing nurse). Report included the following information SBAR, ED Summary, Procedure Summary, Intake/Output, MAR and Recent Results. 9347: Called to room as patient reports he feels like he cannot get a deep breath. Pt is found to be 100% on room air and his vitals are documented. Frequent PVC's are seen on the monitor. Patient states he ate 2 breakfast trays and thinks this may be the cause. Wife at bedside provided Tums from home and is strongly advised to stop providing medications to her  from home. She again verbalized understanding. Dr Pattie Higuera is on the floor and notified. No new orders received. 1906: Bedside and Verbal shift change report given to Maximino Jacobs (oncoming nurse) by Izabella (offgoing nurse). Report included the following information SBAR, Kardex, OR Summary, Procedure Summary, Intake/Output, MAR and Med Rec Status.

## 2018-07-16 NOTE — CONSULTS
C/s dict    Interesting history with recent LLE BKA followed by development of cutaneous vasculitis (reportedly confirmed by bx at [facility]). He is on Bactrim, I presume because his previous abx were felt to be the cause of his cutaneous vasculitis. He presents with ARF, Hyperkalemia and bradycardia. UA shows no blood. Overall, do not think he has renal vasculitis. More likely, pre-renal azotemia due to IVVD and bradycardia complicated by reduced tubular secretion of creatinine due to Bactrim.     Rec:  Hold bactrim  IVF  Avoid nephrotoxins  Review records when available  Consider further workup if not improving with supportive measures

## 2018-07-16 NOTE — ED PROVIDER NOTES
HPI Comments: 61 y.o. male with past medical history significant for HTN, DM, COPD, CAD, heart failure, and morbid obesity who presents via private vehicle from home accompanied by a relative with chief complaint of slow heart rate. Patient arrives c/o low heart rate (recorded 42 PTA) and chest \"weakness. \" Patient has Hx of vasculitis. Patient is bed-bound at home. Patient notes he is no longer on metoprolol. Patient is not on beta-blockers. Patient denies being followed by a cardiologist. Patient denies chest pain, SOB, lightheadedness, dizziness, and leg swelling. There are no other acute medical concerns at this time. Social hx: Former tobacco smoker; Denies EtOH use; Denies illicit drug use  PCP: Nicole Mcguire MD    Note written by Kelly Yeboah, as dictated by Jose L Garber. Ada Apgar, MD 8:13 PM    The history is provided by the patient. No  was used. Past Medical History:   Diagnosis Date    CAD (coronary artery disease)     pvc    COPD     [facility]     [facility] failure ([facility]     Hypertension     Morbidly obese ([facility]        Past Surgical History:   Procedure Laterality Date    HX ORTHOPAEDIC      right leg surgery         No family history on file. Social History     Social History    Marital status:      Spouse name: N/A    Number of children: N/A    Years of education: N/A     Occupational History    Not on file. Social History Main Topics    Smoking status: Former Smoker    Smokeless tobacco: Never Used    Alcohol use No    Drug use: No    Sexual activity: Not on file     Other Topics Concern    Not on file     Social History Narrative         ALLERGIES: Codeine; Insulin regular; Pcn [penicillins]; Victoza [liraglutide]; Morphine; and Statins-hmg-coa reductase inhibitors    Review of Systems   Constitutional: Negative for chills and fever. HENT: Negative for ear pain and sore throat. Eyes: Negative for pain.    Respiratory: Negative for chest tightness and shortness of breath. Cardiovascular: Negative for chest pain and leg swelling.        +chest \"weakness\"   Gastrointestinal: Negative for abdominal pain, nausea and vomiting. Genitourinary: Negative for dysuria and flank pain. Musculoskeletal: Negative for back pain. Skin: Negative for rash. Neurological: Negative for dizziness, light-headedness and headaches. All other systems reviewed and are negative. Vitals:    07/15/18 1952   BP: 140/57   Pulse: (!) 50   Resp: 20   Temp: 98.1 °F (36.7 °C)   SpO2: 98%   Weight: 142.9 kg (315 lb)   Height: 6' (1.829 m)            Physical Exam   Constitutional: No distress. Morbidly obese, chronically ill appearing. HENT:   Head: Normocephalic and atraumatic. Eyes: Conjunctivae are normal. Pupils are equal, round, and reactive to light. No scleral icterus. Neck: No tracheal deviation present. Cardiovascular: Normal rate, regular rhythm and normal heart sounds. Pulmonary/Chest: Effort normal and breath sounds normal. No stridor. No respiratory distress. Abdominal: Soft. He exhibits no distension. There is no tenderness. Musculoskeletal: He exhibits no edema or deformity. Left BKA. Neurological: He is alert. Skin: Skin is warm and dry. Psychiatric: He has a normal mood and affect. Nursing note and vitals reviewed. Note written by Kelly Wilkins, as dictated by Kraig Weinberg.  Priscilla Escudero MD 8:13 PM    MDM  Number of Diagnoses or Management Options  ASPEN (acute kidney injury) [facility]):   Diagnosis management comments: 60 yo chronically ill obese bed bound male p/w concerns for bradycardia  - labs remarkable for Na 130, K 5.7, Cre 3 (baseline 1)  - treated with kayexalate, albuterol, allergic to insulin, no hyperkalemic ekg changes to require calcium  - admit to hospitalist    Critical Care  Total time providing critical care: (Total critical care time spent exclusive of procedures: 31 minutes  )        ED Course   uncomplicated    Procedures    ED EKG interpretation:  Rhythm: sinus rhythm with 1st degree AV block and frequent PVCs; and regular . Rate (approx.): 82. Note written by Kelly Garcia, as dictated by Liv Storey. Rosalind Avila MD 7:58 PM    Liv Avila MD

## 2018-07-16 NOTE — PROGRESS NOTES
Occupational Therapy Note 1133    Orders acknowledged, chart reviewed. Met with patient and wife at the bedside. Patient reports recent L BKA 2 weeks ago. States he has been bed bound prior, using radha lift for transfers to/from power wheelchair and lift chair. Does not ambulate. Has received PT in the past for exercises and bed mobility (has been unable to get to recently d/t repeated hospitalizations). Patient states he is unable to feed himself without universal cuff and built up  d/t chronic neuropathy and arthritis causing digital contractures. Utilizes urinal and bedpan at home. Wife completes all bathing and dressing. Patient and wife declining no change in functional status since this admission, no further acute OT needs. Encouraged to participate in ROM exercises and HEP as tolerated, wife and patient in agreement. Will complete orders. Thank you.     Arabella Gutiérrez, OT

## 2018-07-16 NOTE — CARDIO/PULMONARY
Consult    Patient: Sheppard Aase MRN: [id number]  SSN: [id number]    YOB: 1960  Age: 61 y.o. Sex: male       Subjective:      Date of  Admission: 7/15/2018     Admission type: Emergency    Sheppard Aase is a 61 y.o. male admitted for Acute renal failure ([facility]F) ([facility] Patient complains of fatigue. This consultation was requested by ER MD. Patient's symptoms last approximately few days days, and are gradual onset. He describes the symptoms as fatigue and tiredness, occurring all the time. The symptoms have been associated with treatment received and are worsened by nothing. Previous treatment/evaluation includes echocardiogram and it showed normal EF in 2017 with normal valve function. . Cardiac risk factors: smoking/ tobacco exposure.     Primary Care Provider: Ruiz Wood MD  Past Medical History:   Diagnosis Date    CAD (coronary artery disease)     pvc    COPD     [facility] ([facility]     [facility]     Hypertension     Morbidly obese ([facility]       Past Surgical History:   Procedure Laterality Date    HX ORTHOPAEDIC      right leg surgery     Family History   Problem Relation Age of Onset    Hypertension Father       Social History   Substance Use Topics    Smoking status: Former Smoker    Smokeless tobacco: Never Used    Alcohol use No      Current Facility-Administered Medications   Medication Dose Route Frequency    aspirin (ASPIRIN) tablet 325 mg  325 mg Oral DAILY    famotidine (PEPCID) tablet 20 mg  20 mg Oral BID    glipiZIDE (GLUCOTROL) tablet 10 mg  10 mg Oral DAILY    polyethylene glycol (MIRALAX) packet 17 g  17 g Oral QHS    HYDROmorphone (PF) (DILAUDID) injection 1 mg  1 mg IntraVENous Q4H PRN    0.9% sodium chloride infusion  75 mL/hr IntraVENous CONTINUOUS    sodium chloride (NS) flush 5-10 mL  5-10 mL IntraVENous Q8H    sodium chloride (NS) flush 5-10 mL  5-10 mL IntraVENous PRN    acetaminophen (TYLENOL) tablet 650 mg  650 mg Oral Q4H PRN    naloxone [facility]) injection 0.4 mg  0.4 mg IntraVENous PRN    diphenhydrAMINE (BENADRYL) injection 12.5 mg  12.5 mg IntraVENous Q4H PRN    ondansetron (ZOFRAN) injection 4 mg  4 mg IntraVENous Q6H PRN    docusate sodium (COLACE) capsule 100 mg  100 mg Oral BID    heparin (porcine) injection 5,000 Units  5,000 Units SubCUTAneous Q8H    albuterol-ipratropium (DUO-NEB) 2.5 MG-0.5 MG/3 ML  3 mL Nebulization Q4H PRN    insulin lispro (HUMALOG) injection   SubCUTAneous AC&HS    glucose chewable tablet 16 g  4 Tab Oral PRN    dextrose (D50W) injection syrg 12.5-25 g  12.5-25 g IntraVENous PRN    glucagon (GLUCAGEN) injection 1 mg  1 mg IntraMUSCular PRN        Allergies   Allergen Reactions    Codeine Anaphylaxis    Insulin Regular Other (comments)     Temp blindness and facial droop    Pcn [Penicillins] Anaphylaxis    Victoza [Liraglutide] Other (comments)     Fascial droop, temp blindness    Morphine Other (comments)     hallucinates    Statins-Hmg-Coa Reductase Inhibitors Hives, Itching and Nausea and Vomiting        Review of Systems:  A comprehensive review of systems was negative except for that written in the History of Present Illness. Subjective:     Visit Vitals    /57 (BP 1 Location: Left arm, BP Patient Position: At rest)    Pulse 78    Temp 97.8 °F (36.6 °C)    Resp 18    Ht 6' (1.829 m)    Wt 137.3 kg (302 lb 11.2 oz)    SpO2 98%    BMI 41.05 kg/m2        Physical Exam:  Visit Vitals    /57 (BP 1 Location: Left arm, BP Patient Position: At rest)    Pulse 78    Temp 97.8 °F (36.6 °C)    Resp 18    Ht 6' (1.829 m)    Wt 137.3 kg (302 lb 11.2 oz)    SpO2 98%    BMI 41.05 kg/m2     General Appearance:  Unkepmt, obese male, alert and oriented x 3, in no acute distress. Ears/Nose/Mouth/Throat:   Hearing grossly normal.         Neck: Supple. Chest:   Lungs clear to auscultation bilaterally. Cardiovascular:  Regular rate and rhythm, S1, S2 normal, no murmur.    Abdomen: Soft, non-tender, bowel sounds are active. Extremities: No edema. LBK amputation. .    Skin: Warm and dry. Multiple skin rashes. Cardiographics:  Telemetry: normal sinus rhythm  ECG: normal EKG, normal sinus rhythm, unchanged from previous tracings, PVCs resolved. Echocardiogram: Not done    Data Reviewed: All lab results for the last 24 hours reviewed. Assessment:         Hospital Problems  Date Reviewed: 7/15/2018          Codes Class Noted POA    * (Principal)Acute renal failure (ARF) ([facility] ICD-10-CM: N17.9  ICD-9-CM: 584.9  7/15/2018 Unknown        DM type 2 causing renal disease, not at goal [facility]) (Chronic) ICD-10-CM: E11.29  ICD-9-CM: 250.40  7/15/2018 Yes        Bradycardia ICD-10-CM: R00.1  ICD-9-CM: 427.89  7/15/2018 Yes        Hyponatremia ICD-10-CM: E87.1  ICD-9-CM: 276.1  7/15/2018 Yes        Morbid obesity ([facility] ICD-10-CM: E66.01  ICD-9-CM: 278.01  7/15/2018 Yes        COPD (chronic obstructive pulmonary disease) (H[facility]) (Chronic) ICD-10-CM: J44.9  ICD-9-CM: 465  7/15/2018 Yes        Acute hyperkalemia ICD-10-CM: E87.5  ICD-9-CM: 276.7  6/18/2017 Yes               Plan:     Bradycardia has improved  Since admission. Will check  Echocardiogram  Acute Renal Failure  Unchanged. Hold nephrotoxic drugs, IV hydration, Plan per Renal.  High K. Improved. Hypotension improved   Hold antihypertensive meds, IV fluid  Plan discussed with Nurse and wife present on the bedside. Will review echo and follow with further recommendations. Will request old records from Dr. Mario Bah office. Thank you for your kind consult.    Signed By: Ashley Tadeo MD     July 16, 2018

## 2018-07-17 NOTE — PROGRESS NOTES
Chart and results reviewed. DIABETES  Pt given 10 units lantus midday yesterday and to begin usual am dosing this morning. Did received 2 units corrective lispro before dinner last night. Bedtime glucose 181, on 2am labs 171. Appears to be safe dosing for now. Note creatinine improved slightly from 3 to 2.62 - anticipate will need increasing insulin dosing as renal function improves. THYROID LABS  TSH of 0.09 with lack of hyperthyroid symptoms in ill pt raises likelihood of \"nonthyroidal illness. \" Total T4 returned normal at 10.6. Total T3 still pending in lab. Do not believe pt has significant thyroid pathology, would recommend outpatient thyroid labs in 6-8 weeks.     Will continue to follow

## 2018-07-17 NOTE — PROGRESS NOTES
Ramses Patel [facility]  [address] [facility], [address] [place] [address]  [phone]      Medical Progress Note      NAME: Davey Albert   :  1960  MRM:  [id number]    Date/Time: 2018  11:26 AM       Assessment and Plan:     Acute renal failure / Acute hyperkalemia / Hyponatremia - POA, unclear etiology. Renal consulted. They think due to prior Abx, and Bactrim, and perhaps underlying CKD related to DM. Cr improved overnight with IVF. Monitor one more day. So far no role for Bx or dialysis. Vasculitis - POA, unclear etiology, but I suspect Bactrim. Stop Bactrim. Consulted rheumatology. Rheumatology did not recommend steroids, as he refuses insulin, and steroids may send him into DKA. Hold ASA today.     DM type 2 causing renal, vascular, neurological disease, not at goal - Diabetic diet and counseling. He initially refused SSI per protocol. He is convinced that insulin makes him blind. Holding home metformin due to ARF. Continue glipizide, though that is also not ideal with ARF. A1c useless in setting of anemia. Consult endocrinology, an they convinced him to try some insulin. Today, he states his vision became \"blurry\". This is different than the \"tunnel vision\" he claims from prior insulin. I think he wants to ascribe any daily trouble to his insulin.     Anemia - POA, moderate, likely due to surgical blood loss, CKD and perhaps Abx effects. Monitor.     Hypertriglyceridemia - Severe, POA, due to DM and no insulin. He refused insulin up to now.     Bradycardia / Mitral regurgitation - Bradycardia noted on admit. Likely related to ARF and HyperK, now better, holding all HTN meds. Cards following. ECHO only with MR, L atrium dilated.     Morbid obesity - Advise weight loss. Likely JEROD, needs testing and treatment.     Stage 2 Right lateral ankle pressure ulcer - POA treated with medihoney and mepilex to right ankle     COPD (chronic obstructive pulmonary disease) - POA, possible, vs hypoventilation. Oxygen as needed. Prn duonebs.        Subjective:     Chief Complaint:  Vision blurry, severe constipation overnight, but overall near baseline    ROS:  (bold if positive, if negative)    Constipation   Tolerating usual PT  Tolerating some Diet        Objective:     Last 24hrs VS reviewed since prior progress note.  Most recent are:    Visit Vitals    /63 (BP 1 Location: Right arm, BP Patient Position: At rest)    Pulse 96    Temp 97.7 °F (36.5 °C)    Resp 18    Ht 6' (1.829 m)    Wt 138.1 kg (304 lb 6.4 oz)    SpO2 97%    BMI 41.28 kg/m2     SpO2 Readings from Last 6 Encounters:   07/17/18 97%   06/23/17 93%   06/18/17 94%   05/06/17 96%   11/17/16 98%   02/25/15 98%          Intake/Output Summary (Last 24 hours) at 07/17/18 1126  Last data filed at 07/17/18 3426   Gross per 24 hour   Intake             1000 ml   Output             2200 ml   Net            -1200 ml        Physical Exam:    Gen:  Morbid obese, in no acute distress  HEENT:  Pink conjunctivae, PERRL, hearing intact to voice, moist mucous membranes  Neck:  Supple, without masses, thyroid non-tender  Resp:  No accessory muscle use, distant breath sounds without wheezes rales or rhonchi  Card:  No murmurs, normal S1, S2 without thrills, bruits or peripheral edema  Abd:  Soft, non-tender, non-distended, normoactive bowel sounds are present, no mass  Lymph:  No cervical or inguinal adenopathy  Musc:  No cyanosis or clubbing  Skin:  Scattered vasculitic ulcer on al limbs, skin turgor is good  Neuro:  Cranial nerves are grossly intact, general motor weakness, follows commands   Psych:  Poor insight, oriented to person, place and time, alert, argumentative    Telemetry reviewed:   normal sinus rhythm  __________________________________________________________________  Medications Reviewed: (see below)  Medications:     Current Facility-Administered Medications   Medication Dose Route Frequency    bisacodyl (DULCOLAX) suppository 10 mg  10 mg Rectal DAILY PRN    polyethylene glycol (MIRALAX) packet 17 g  17 g Oral BID    famotidine (PEPCID) tablet 20 mg  20 mg Oral BID    insulin glargine (LANTUS) injection 10 Units  10 Units SubCUTAneous DAILY    insulin lispro (HUMALOG) injection   SubCUTAneous AC&HS    dextrose (D50W) injection syrg 12.5-25 g  12.5-25 g IntraVENous PRN    HYDROmorphone (DILAUDID) tablet 2 mg  2 mg Oral Q6H PRN    phenyleph-shark jud oil-mo-pet (PREPARATION H) ointment   PeriANAL QID PRN    0.9% sodium chloride infusion  75 mL/hr IntraVENous CONTINUOUS    sodium chloride (NS) flush 5-10 mL  5-10 mL IntraVENous Q8H    sodium chloride (NS) flush 5-10 mL  5-10 mL IntraVENous PRN    acetaminophen (TYLENOL) tablet 650 mg  650 mg Oral Q4H PRN    naloxone (NARCAN) injection 0.4 mg  0.4 mg IntraVENous PRN    diphenhydrAMINE (BENADRYL) injection 12.5 mg  12.5 mg IntraVENous Q4H PRN    ondansetron (ZOFRAN) injection 4 mg  4 mg IntraVENous Q6H PRN    docusate sodium (COLACE) capsule 100 mg  100 mg Oral BID    heparin (porcine) injection 5,000 Units  5,000 Units SubCUTAneous Q8H    albuterol-ipratropium (DUO-NEB) 2.5 MG-0.5 MG/3 ML  3 mL Nebulization Q4H PRN    glucose chewable tablet 16 g  4 Tab Oral PRN    dextrose (D50W) injection syrg 12.5-25 g  12.5-25 g IntraVENous PRN    glucagon (GLUCAGEN) injection 1 mg  1 mg IntraMUSCular PRN        Lab Data Reviewed: (see below)  Lab Review:     Recent Labs      07/17/18 0157 07/16/18   0158  07/15/18   2036   WBC  7.3  9.5  10.5   HGB  9.5*  10.2*  11.0*   HCT  28.5*  30.8*  32.4*   PLT  167  185  216     Recent Labs      07/17/18   0157 07/16/18   0158  07/15/18   2036   NA  132*  130*  130*   K  4.8  4.9  5.7*   CL  96*  93*  93*   CO2  26  30  30   GLU  171*  178*  164*   BUN  73*  80*  77*   CREA  2.62*  3.14*  3.00*   CA  8.9  9.4  10.5*   MG  2.0  2.0  2.1   PHOS  4.9*  5.9*   --    ALB  2.6*  2.7*   --    TBILI  0.2  0.2   --    SGOT 25  23   --    ALT  22  24   --      Lab Results   Component Value Date/Time    Glucose (POC) 223 (H) 07/17/2018 11:23 AM    Glucose (POC) 260 (H) 07/17/2018 07:36 AM    Glucose (POC) 180 (H) 07/16/2018 09:13 PM    Glucose (POC) 236 (H) 07/16/2018 04:02 PM    Glucose (POC) 208 (H) 07/16/2018 11:54 AM     No results for input(s): PH, PCO2, PO2, HCO3, FIO2 in the last 72 hours. No results for input(s): INR in the last 72 hours. No lab exists for component: INREXT  All Micro Results     Procedure Component Value Units Date/Time    MRSA CULTURE [[id number]] Collected:  07/16/18 0158    Order Status:  Completed Specimen:  Nares Updated:  07/17/18 0727     Special Requests: NO SPECIAL REQUESTS        Culture result:         MRSA NOT PRESENT AT 22 HOURS          I have reviewed notes of prior 24hr.     Other pertinent lab: none    Total time spent with patient: [address] [place] [address] discussed with: Patient, Family, Nursing Staff, Consultant/Specialist and >50% of time spent in counseling and coordination of care    Discussed:  Care Plan    Prophylaxis:  H2B/PPI    Disposition:  Home w/Family           ___________________________________________________    Attending Physician: Michelle Hamm MD

## 2018-07-17 NOTE — CDMP QUERY
=> Stage 2 Right lateral ankle pressure ulcer POA treated with medihoney and mepilex to right ankle  => Other explanation of clinical findings   => Clinically Undetermined (no explanation for clinical findings)    The medical record reflects the following clinical findings, treatment, and risk factors. Risk Factors:  60 yo m admitted ASPEN   Clinical Indicators:  7/16 wound care consult \" Right lateral ankle - stage 2 pressure injury present on admission- 2x2 cm red base, no drainage, kayleigh wound skin dry, medihoney and mepilex to right ankle. \"  Treatment: medihoney and mepilex to right ankle. \"    Please clarify and document your clinical opinion in the progress notes and discharge summary including the definitive and/or presumptive diagnosis, (suspected or probable), related to the above clinical findings. Please include clinical findings supporting your diagnosis.     Thank you for your time   [facility] RN/BSN, [address]  Desk:   [phone]   Other:  [phone]

## 2018-07-17 NOTE — PROGRESS NOTES
Bedside shift change report given to Izabella (oncoming nurse) by Skyler Tejada (offgoing nurse). Report included the following information SBAR, ED Summary, Procedure Summary, Intake/Output, MAR, Recent Results and Med Rec Status. 1200: Dr Shadia Chase notified of blood pressure and that he complains of \"going blind\". No orders received from him. Diabetes educator consulted for teaching needs. 1630: Dr Shadia Chase notified patient is refusing insulin for blood glucose of 233. No orders received, awaiting reply. 1919: Bedside and Verbal shift change report given to Dee Gordillo (oncoming nurse) by Izabella (offgoing nurse). Report included the following information SBAR, Kardex, ED Summary, Procedure Summary, Intake/Output, MAR, Recent Results and Cardiac Rhythm SR Bigeminy - Trigeminy.

## 2018-07-17 NOTE — DIABETES MGMT
Progress Note Chart reviewed for elevated blood glucose ( > 180 mg/dL x 2 in the past 24 hours). Recommendations/ Comments: Dr. Sharda Lowery (endocrinology) consulted. Morning glucose: 260 mg/dL (per am POCT Glucose). Required 5 units of correction in the last 24 hours for glucose > 199. Inpatient medications for glucose management: 1. Correction Scale: Lispro (Humalog) High Sensitivity scale (thin, ESRD) to cover for glucose > 199 mg/dL  before meals and for glucose >199 at bedtime 2. Lantus 10 units daily POC Glucose last 24hrs:  
Lab Results Component Value Date/Time  (H) 07/17/2018 01:57 AM  
 GLUCPOC 260 (H) 07/17/2018 07:36 AM  
 GLUCPOC 180 (H) 07/16/2018 09:13 PM  
 GLUCPOC 236 (H) 07/16/2018 04:02 PM  
  
 
Estimated Creatinine Clearance: 44.3 mL/min (based on Cr of 2.62). Diet order: Active Orders Diet DIET DIABETIC CONSISTENT CARB Regular; Low Potassium PO intake: Patient Vitals for the past 72 hrs: 
 % Diet Eaten  
07/16/18 0933 100 % History of Diabetes: 
 Toni Kam is a 61 y.o. male with a past medical history significant for DM per  Bessie Garcia MD's H&P dated 7/15/2018. Prior to admission medications for glucose management: per past medical records 
-Glipizide 10mg every morning; 5mg every evening A1C:  
Lab Results Component Value Date/Time Hemoglobin A1c 7.4 (H) 07/16/2018 01:58 AM  
 Hemoglobin A1c 8.3 (H) 06/18/2017 09:49 PM  
 
 
Reference range*: 
Increased risk for diabetes: 5.7 - 6.4% Diabetes: >6.4% Glycemic control for adults with diabetes: <7.0 % *KAVEH DAVIS (2014). Diagnosis and classification of diabetes mellitus. Diabetes care, 37, S81. Thank you. Alivia Gillis. Lyly Jaffe MPH, RN, BSN, [facility] [phone] 
 
-A target glucose range of 140180 mg/dL (7.810.0 mmol/L) is recommended for the majority of critically ill patients and noncritically ill patients.   
 
-More stringent goals, such as 148028 mg/dL (6.17.8 mmol/L), may be appropriate for selected patients, if this can be achieved without significant hypoglycemia. * *American Diabetes Association. 14. Diabetes care in the hospital:Standards of Medical Care in [id number]. Diabetes Care 2018;41(Suppl. 1):S149S122

## 2018-07-17 NOTE — ROUTINE PROCESS
Bedside shift change report given to Jenniffer Pool (oncoming nurse) by Jasmin Chacon (offgoing nurse). Report included the following information SBAR, Kardex, Intake/Output, MAR, Accordion and Recent Results.

## 2018-07-17 NOTE — PROGRESS NOTES
Cardiology Progress Note      7/17/2018 6:26 PM    Admit Date: 7/15/2018    Admit Diagnosis: Acute renal failure (ARF) ([address]   Denies cp sob or palpitations  Creatinine improved      Subjective:     Toni Kam denies chest pain, chest pressure/discomfort, palpitations.     Visit Vitals    /50 (BP 1 Location: Right arm, BP Patient Position: At rest)    Pulse 84    Temp 97.7 °F (36.5 °C)    Resp 18    Ht 6' (1.829 m)    Wt 138.1 kg (304 lb 6.4 oz)    SpO2 97%    BMI 41.28 kg/m2     Current Facility-Administered Medications   Medication Dose Route Frequency    bisacodyl (DULCOLAX) suppository 10 mg  10 mg Rectal DAILY PRN    polyethylene glycol (MIRALAX) packet 17 g  17 g Oral BID    famotidine (PEPCID) tablet 20 mg  20 mg Oral BID    insulin glargine (LANTUS) injection 10 Units  10 Units SubCUTAneous DAILY    insulin lispro (HUMALOG) injection   SubCUTAneous AC&HS    dextrose (D50W) injection syrg 12.5-25 g  12.5-25 g IntraVENous PRN    HYDROmorphone (DILAUDID) tablet 2 mg  2 mg Oral Q6H PRN    phenyleph-shark jud oil-mo-pet (PREPARATION H) ointment   PeriANAL QID PRN    0.9% sodium chloride infusion  75 mL/hr IntraVENous CONTINUOUS    sodium chloride (NS) flush 5-10 mL  5-10 mL IntraVENous Q8H    sodium chloride (NS) flush 5-10 mL  5-10 mL IntraVENous PRN    acetaminophen (TYLENOL) tablet 650 mg  650 mg Oral Q4H PRN    naloxone (NARCAN) injection 0.4 mg  0.4 mg IntraVENous PRN    diphenhydrAMINE (BENADRYL) injection 12.5 mg  12.5 mg IntraVENous Q4H PRN    ondansetron (ZOFRAN) injection 4 mg  4 mg IntraVENous Q6H PRN    docusate sodium (COLACE) capsule 100 mg  100 mg Oral BID    heparin (porcine) injection 5,000 Units  5,000 Units SubCUTAneous Q8H    albuterol-ipratropium (DUO-NEB) 2.5 MG-0.5 MG/3 ML  3 mL Nebulization Q4H PRN    glucose chewable tablet 16 g  4 Tab Oral PRN    dextrose (D50W) injection syrg 12.5-25 g  12.5-25 g IntraVENous PRN    glucagon (GLUCAGEN) injection 1 mg  1 mg IntraMUSCular PRN         Objective:      Physical Exam:  Visit Vitals    /50 (BP 1 Location: Right arm, BP Patient Position: At rest)    Pulse 84    Temp 97.7 °F (36.5 °C)    Resp 18    Ht 6' (1.829 m)    Wt 138.1 kg (304 lb 6.4 oz)    SpO2 97%    BMI 41.28 kg/m2     General Appearance: Morbidly obese,alert and oriented x 3, and individual in no acute distress. Ears/Nose/Mouth/Throat:   Hearing grossly normal.         Neck: Supple. Chest:   Lungs clear to auscultation bilaterally. Cardiovascular:  Regular rate and rhythm, S1, S2 normal, no murmur. Abdomen:   Soft, non-tender, bowel sounds are active. Extremities: +edema bilaterally. L BKA   Skin: Multiple skin rashes+.                Data Review:   Labs:    Recent Results (from the past 24 hour(s))   GLUCOSE, POC    Collection Time: 07/16/18  9:13 PM   Result Value Ref Range    Glucose (POC) 180 (H) 65 - 100 mg/dL    Performed by Niko Boyer (PCT)    RHEUMATOID FACTOR, QT    Collection Time: 07/17/18  1:57 AM   Result Value Ref Range    Rheumatoid factor <10 <15 IU/mL   CBC W/O DIFF    Collection Time: 07/17/18  1:57 AM   Result Value Ref Range    WBC 7.3 4.1 - 11.1 K/uL    RBC 3.15 (L) 4.10 - 5.70 M/uL    HGB 9.5 (L) 12.1 - 17.0 g/dL    HCT 28.5 (L) 36.6 - 50.3 %    MCV 90.5 80.0 - 99.0 FL    MCH 30.2 26.0 - 34.0 PG    MCHC 33.3 30.0 - 36.5 g/dL    RDW 13.8 11.5 - 14.5 %    PLATELET 650 452 - 591 K/uL    MPV 9.4 8.9 - 12.9 FL    NRBC 0.0 0  WBC    ABSOLUTE NRBC 0.00 0.00 - 0.01 K/uL   MAGNESIUM    Collection Time: 07/17/18  1:57 AM   Result Value Ref Range    Magnesium 2.0 1.6 - 2.4 mg/dL   METABOLIC PANEL, COMPREHENSIVE    Collection Time: 07/17/18  1:57 AM   Result Value Ref Range    Sodium 132 (L) 136 - 145 mmol/L    Potassium 4.8 3.5 - 5.1 mmol/L    Chloride 96 (L) 97 - 108 mmol/L    CO2 26 21 - 32 mmol/L    Anion gap 10 5 - 15 mmol/L    Glucose 171 (H) 65 - 100 mg/dL    BUN 73 (H) 6 - 20 MG/DL    Creatinine 2.62 (H) 0.70 - 1.30 MG/DL    BUN/Creatinine ratio 28 (H) 12 - 20      GFR est AA 31 (L) >60 ml/min/1.73m2    GFR est non-AA 25 (L) >60 ml/min/1.73m2    Calcium 8.9 8.5 - 10.1 MG/DL    Bilirubin, total 0.2 0.2 - 1.0 MG/DL    ALT (SGPT) 22 12 - 78 U/L    AST (SGOT) 25 15 - 37 U/L    Alk. phosphatase 101 45 - 117 U/L    Protein, total 6.9 6.4 - 8.2 g/dL    Albumin 2.6 (L) 3.5 - 5.0 g/dL    Globulin 4.3 (H) 2.0 - 4.0 g/dL    A-G Ratio 0.6 (L) 1.1 - 2.2     PHOSPHORUS    Collection Time: 07/17/18  1:57 AM   Result Value Ref Range    Phosphorus 4.9 (H) 2.6 - 4.7 MG/DL   GLUCOSE, POC    Collection Time: 07/17/18  7:36 AM   Result Value Ref Range    Glucose (POC) 260 (H) 65 - 100 mg/dL    Performed by Dhaval Lizama (PCT)    GLUCOSE, POC    Collection Time: 07/17/18 11:23 AM   Result Value Ref Range    Glucose (POC) 223 (H) 65 - 100 mg/dL    Performed by Dhaval Lizama (PCT)    GLUCOSE, POC    Collection Time: 07/17/18  4:18 PM   Result Value Ref Range    Glucose (POC) 233 (H) 65 - 100 mg/dL    Performed by Dhaval Lizama (PCT)        Telemetry: normal sinus rhythm      Assessment:     Principal Problem:    Acute renal failure (ARF) ([facility] (7/15/2018)    Active Problems:    Acute hyperkalemia (6/18/2017)      DM type 2 causing renal disease, not at goal [facility]) (7/15/2018)      Bradycardia (7/15/2018)      Hyponatremia (7/15/2018)      Morbid obesity ([facility] (7/15/2018)      COPD (chronic obstructive pulmonary disease) ([facility] (7/15/2018)      Anemia (7/16/2018)      Hypertriglyceridemia (7/16/2018)      Mitral regurgitation ()        Plan:     Abnormal Cardiovascular Study  unchanged - Echocardiogram is unchanged and no significant valve abnormality is seen. Continue current supportive care. Needs to continue home meds as tolerated. His creatinine is improved. Please call with questions.

## 2018-07-17 NOTE — PROGRESS NOTES
0300- patient complains of abdominal pain related to constipation. Miralax given at 9:21pm earlier this evening. Called Dr. Faheem Javed and got order for bisacodyl suppository. Will continue to monitor. Left leg amputation incision site is left open to air per patient request.    0400- received order for tap water enema. Patient yelling out that \"something must be done! \" Catheter will only advance 2-3 inches inside of patients rectum, only  mL able to go into rectum. Wife states that \"this has happened before- and I had to pick the stool out of him. \" Wife is in the room and insists that she manually disimpact patient. Intermittently giving tap water enema in small dose between disimpaction. Patient is very anxious and yelling to \"get it out! \" Dr. Faheem Javed has been notified. Will continue to monitor. 0600- Wife has been able to remove stool from patients rectum, along withinfusing small amounts of enema water. Patient states that he still feels as if he has stool. 0630- patient is now resting in bed and says that he feels much better. Patient says that he is not in any pain at this time, just feels a pulling sensation on his L stump. At home the patient uses miralax daily to help with chronic constipation. Somedays he says that he takes miralax twice a day, depending on the consistency of his stool. I told him and his wife to let the nursing staff know when they feel like he needs miralax twice a day to avoid this constipation problem.

## 2018-07-17 NOTE — PROGRESS NOTES
Ramses Patel [address] [place] YOB: 1960 Assessment & Plan: ARF · Presumably due to Pre-renal azotemia · Improving · Continue IVF · No indication for RRT. Does not need renal bx Cutaneous vasculitis · Presumably drug reaction · Abx held Diabetes · Insulin · Has proteinuria that pre-dates current episode. Recent baseline Cr unknown but was normal in 2017. Subjective:  
CC: f/u ARF 
HPI: Renal function improving ROS: no n/v/sob Current Facility-Administered Medications Medication Dose Route Frequency  bisacodyl (DULCOLAX) suppository 10 mg  10 mg Rectal DAILY PRN  polyethylene glycol (MIRALAX) packet 17 g  17 g Oral BID  famotidine (PEPCID) tablet 20 mg  20 mg Oral BID  insulin glargine (LANTUS) injection 10 Units  10 Units SubCUTAneous DAILY  insulin lispro (HUMALOG) injection   SubCUTAneous AC&HS  
 dextrose (D50W) injection syrg 12.5-25 g  12.5-25 g IntraVENous PRN  
 HYDROmorphone (DILAUDID) tablet 2 mg  2 mg Oral Q6H PRN  phenyleph-shark jud oil-mo-pet (PREPARATION H) ointment   PeriANAL QID PRN  
 0.9% sodium chloride infusion  75 mL/hr IntraVENous CONTINUOUS  
 sodium chloride (NS) flush 5-10 mL  5-10 mL IntraVENous Q8H  
 sodium chloride (NS) flush 5-10 mL  5-10 mL IntraVENous PRN  
 acetaminophen (TYLENOL) tablet 650 mg  650 mg Oral Q4H PRN  
 naloxone (NARCAN) injection 0.4 mg  0.4 mg IntraVENous PRN  
 diphenhydrAMINE (BENADRYL) injection 12.5 mg  12.5 mg IntraVENous Q4H PRN  
 ondansetron (ZOFRAN) injection 4 mg  4 mg IntraVENous Q6H PRN  
 docusate sodium (COLACE) capsule 100 mg  100 mg Oral BID  heparin (porcine) injection 5,000 Units  5,000 Units SubCUTAneous Q8H  
 albuterol-ipratropium (DUO-NEB) 2.5 MG-0.5 MG/3 ML  3 mL Nebulization Q4H PRN  
 glucose chewable tablet 16 g  4 Tab Oral PRN  
 dextrose (D50W) injection syrg 12.5-25 g  12.5-25 g IntraVENous PRN  
 glucagon (GLUCAGEN) injection 1 mg  1 mg IntraMUSCular PRN Objective:  
 
Vitals: 
Blood pressure 133/63, pulse 96, temperature 97.7 °F (36.5 °C), resp. rate 18, height 6' (1.829 m), weight 138.1 kg (304 lb 6.4 oz), SpO2 97 %. Temp (24hrs), Av.9 °F (36.6 °C), Min:97.4 °F (36.3 °C), Max:98.6 °F (37 °C) Intake and Output: 
701 - 1900 In: 678.8 [I.V.:678.8] Out: 300 [Urine:300] 07/15 1901 - 700 In: 2216.3 [P.O.:720; I.V.:1496.3] Out: 2625 [Urine:2625] Physical Exam:              
GENERAL ASSESSMENT: NAD 
CHEST: CTA HEART: S1S2 ABDOMEN: Soft,NT 
EXTREMITY: no EDEMA 
 
   
ECG/rhythm: 
 
Data Review No results for input(s): TNIPOC in the last 72 hours. No lab exists for component: ITNL Recent Labs  
   07/16/18 
 0158  07/15/18 
 2036 CPK  31*   --   
CKMB  <1.0   --   
TROIQ  <0.05  <0.05 Recent Labs  
   07/17/18 
 0157  07/16/18 
 0158  07/15/18 
 2036 NA  132*  130*  130*  
K  4.8  4.9  5.7* CL  96*  93*  93* CO2  26  30  30 BUN  73*  80*  77* CREA  2.62*  3.14*  3.00* GLU  171*  178*  164* PHOS  4.9*  5.9*   --   
MG  2.0  2.0  2.1 CA  8.9  9.4  10.5* ALB  2.6*  2.7*   --   
WBC  7.3  9.5  10.5 HGB  9.5*  10.2*  11.0*  
HCT  28.5*  30.8*  32.4*  
PLT  167  185  216 No results for input(s): INR, PTP, APTT in the last 72 hours. No lab exists for component: INREXT Needs: urine analysis, urine sodium, protein and creatinine Lab Results Component Value Date/Time Sodium urine, random 88 07/15/2018 10:06 PM  
 Creatinine, urine 45.48 2018 02:27 PM  
 
 
 
: Russell Bustamante MD 
2018 [facility]: 
[email or web address] [place] office: 
[address], [facility] [address] Phone: [phone] Fax :     [phone] [place] office: 
[address] [address] [address] Phone - [phone] Fax - [phone]

## 2018-07-17 NOTE — DIABETES MGMT
DTC Consult Note         Recommendations/ Comments: Requested by Shanna Miguel RN to see pt. Mr. Maryanne You was refusing insulin and states that he is losing his vision because of insulin. We discussed chronic complications, which include retinopathy. He now states that Dr. Amanda Genao twisted his words and that he would only take 1 shot a day. He said he would take insulin only on the condition that someone checks his eyes periodically. He states that nursing comes in to give him insulin instead. Glucose > 200 mg consistently. Evert Brewer was not agreeable to suggestions, appeared agitated during interview. Was given an opportunity to ask questions. Wife was also present at interview. POC Glucose last 24hrs:   Lab Results   Component Value Date/Time     (H) 07/17/2018 01:57 AM    GLUCPOC 223 (H) 07/17/2018 11:23 AM    GLUCPOC 260 (H) 07/17/2018 07:36 AM    GLUCPOC 180 (H) 07/16/2018 09:13 PM        Estimated Creatinine Clearance: 44.3 mL/min (based on Cr of 2.62). Diet order:    Active Orders   Diet    DIET DIABETIC CONSISTENT CARB Regular; Low Potassium        PO intake: Patient Vitals for the past 72 hrs:   % Diet Eaten   07/16/18 0933 100 %       Inpatient medications for glucose management:  1. Correction Scale: Lispro (Humalog) High Sensitivity scale (thin, ESRD) to cover for glucose > 199 mg/dL  before meals and for glucose >199 at bedtime    2.  Lantus  10 Units       Consult received from Lisa Garcia MD for  []    Assessment of home management  []    Orthopedic Protocol  []    Meter / Monitoring  []    New Diagnosis  [x]    Insulin education  []    Insulin pump notification  []    Medication recommendations  []    Hospital glucose management  []    Francisco Manriquez  []    Outpatient Education     History of Diabetes:   Evert Brewer is a 61 y.o. male with a past medical history significant for DM per  Gaurav Martinez MD's H&P dated 7/15/2018.      Prior to admission medications for glucose management: per past medical records  -Glipizide 10mg every morning; 5mg every evening        A1C  Lab Results   Component Value Date/Time    Hemoglobin A1c 7.4 (H) 07/16/2018 01:58 AM    Hemoglobin A1c 8.3 (H) 06/18/2017 09:49 PM         Thank you. Alivia Gillis.  Lyly Jaffe, MPH, RN, BSN, [address]   [phone] (office)  [phone] (pager)

## 2018-07-18 NOTE — PROGRESS NOTES
Bedside and Verbal shift change report given to KIRK Tran (oncoming nurse) by Maycol Guteirrez (offgoing nurse). Report included the following information SBAR, Kardex and Recent Results.

## 2018-07-18 NOTE — PROGRESS NOTES
[facility] follow-up appointment on Tuesday July 24,2018 @ 3:15 p.m. with Dr. Bob Luna.   Added to AVS.  Alva Worley CM Specialist

## 2018-07-18 NOTE — PROGRESS NOTES
[facility] resumption order received and faxed to [facility] (f: [phone]). AVS has also been faxed.   Christina Fitzpatrick LCSW

## 2018-07-18 NOTE — PROGRESS NOTES
Ramses Patel [facility]  [address] [facility], [address] [place] [address]  [phone]      Medical Progress Note      NAME: Fortino Herrera   :  1960  MRM:  [id number]    Date/Time: 2018  12:28 PM       Assessment and Plan:     Acute renal failure / Acute hyperkalemia / Hyponatremia - POA, unclear etiology. Renal consulted. They think due to prior Abx, and Bactrim, and perhaps underlying CKD related to DM. Cr improved overnight with IVF. No role for Bx or dialysis. Renal cleared him for discharge     Vasculitis - POA, unclear etiology, but I suspect Bactrim.  Stop Bactrim. Consulted rheumatology. Rheumatology did not recommend steroids, as he refuses insulin, and steroids may send him into DKA.     DM type 2 causing renal, vascular, neurological disease, not at goal - Diabetic diet and counseling. Kim Raya refuses SSI per protocol. He is convinced that insulin makes him blind.  Held home metformin due to ARF.   Continue glipizide, though that is also not ideal with ARF.  A1c useless in setting of anemia. Consult endocrinology, an they convinced him to try some insulin. He states his vision became \"blurry\". This is different than the \"tunnel vision\" he claims from prior insulin. I think he wants to ascribe any daily trouble to his insulin. DC on new Rx for Lantus, which he seems to accept for now.      Anemia - POA, moderate, likely due to surgical blood loss, CKD and perhaps Abx effects.  Monitor.      Hypertriglyceridemia - Severe, POA, due to DM and no insulin.  He refused insulin up to now.      Bradycardia / Mitral regurgitation - Bradycardia noted on admit.  Likely related to ARF and HyperK, now better, holding all HTN meds. Cards following.   ECHO only with MR, L atrium dilated.      Morbid obesity - Advise weight loss.  Likely JEROD, needs testing and treatment.     Stage 2 Right lateral ankle pressure ulcer - POA treated with medihoney and mepilex to right ankle      COPD (chronic obstructive pulmonary disease) - POA, possible, vs hypoventilation.  Oxygen as needed. Prn duonebs.        Subjective:     Chief Complaint:  Feels okay, ready to go home, still refusing SSI    ROS:  (bold if positive, if negative)      Tolerating usual PT  Tolerating Diet        Objective:     Last 24hrs VS reviewed since prior progress note.  Most recent are:    Visit Vitals    /57 (BP 1 Location: Left arm, BP Patient Position: Sitting)    Pulse 83    Temp 98.2 °F (36.8 °C)    Resp 16    Ht 6' (1.829 m)    Wt 139 kg (306 lb 8 oz)    SpO2 97%    BMI 41.57 kg/m2     SpO2 Readings from Last 6 Encounters:   07/18/18 97%   06/23/17 93%   06/18/17 94%   05/06/17 96%   11/17/16 98%   02/25/15 98%          Intake/Output Summary (Last 24 hours) at 07/18/18 1228  Last data filed at 07/18/18 0815   Gross per 24 hour   Intake             1860 ml   Output             3475 ml   Net            -1615 ml        Physical Exam:    Gen:  Morbid obese, in no acute distress  HEENT:  Pink conjunctivae, PERRL, hearing intact to voice, moist mucous membranes  Neck:  Supple, without masses, thyroid non-tender  Resp:  No accessory muscle use, distant breath sounds without wheezes rales or rhonchi  Card:  No murmurs, normal S1, S2 without thrills, bruits or peripheral edema  Abd:  Soft, non-tender, non-distended, normoactive bowel sounds are present, no mass  Lymph:  No cervical or inguinal adenopathy  Musc:  No cyanosis or clubbing  Skin:  Scattered vasculitic ulcer on al limbs, skin turgor is good  Neuro:  Cranial nerves are grossly intact, general motor weakness, follows commands   Psych:  Poor insight, oriented to person, place and time, alert, argumentative    Telemetry reviewed:   normal sinus rhythm  __________________________________________________________________  Medications Reviewed: (see below)  Medications:     Current Facility-Administered Medications   Medication Dose Route Frequency    [START ON 7/19/2018] insulin glargine (LANTUS) injection 20 Units  20 Units SubCUTAneous DAILY    bisacodyl (DULCOLAX) suppository 10 mg  10 mg Rectal DAILY PRN    polyethylene glycol (MIRALAX) packet 17 g  17 g Oral BID    simethicone (MYLICON) tablet 80 mg  80 mg Oral QID PRN    famotidine (PEPCID) tablet 20 mg  20 mg Oral BID    insulin lispro (HUMALOG) injection   SubCUTAneous AC&HS    dextrose (D50W) injection syrg 12.5-25 g  12.5-25 g IntraVENous PRN    HYDROmorphone (DILAUDID) tablet 2 mg  2 mg Oral Q6H PRN    phenyleph-shark jud oil-mo-pet (PREPARATION H) ointment   PeriANAL QID PRN    0.9% sodium chloride infusion  75 mL/hr IntraVENous CONTINUOUS    sodium chloride (NS) flush 5-10 mL  5-10 mL IntraVENous Q8H    sodium chloride (NS) flush 5-10 mL  5-10 mL IntraVENous PRN    acetaminophen (TYLENOL) tablet 650 mg  650 mg Oral Q4H PRN    naloxone (NARCAN) injection 0.4 mg  0.4 mg IntraVENous PRN    diphenhydrAMINE (BENADRYL) injection 12.5 mg  12.5 mg IntraVENous Q4H PRN    ondansetron (ZOFRAN) injection 4 mg  4 mg IntraVENous Q6H PRN    docusate sodium (COLACE) capsule 100 mg  100 mg Oral BID    heparin (porcine) injection 5,000 Units  5,000 Units SubCUTAneous Q8H    albuterol-ipratropium (DUO-NEB) 2.5 MG-0.5 MG/3 ML  3 mL Nebulization Q4H PRN    glucose chewable tablet 16 g  4 Tab Oral PRN    dextrose (D50W) injection syrg 12.5-25 g  12.5-25 g IntraVENous PRN    glucagon (GLUCAGEN) injection 1 mg  1 mg IntraMUSCular PRN        Lab Data Reviewed: (see below)  Lab Review:     Recent Labs      07/17/18   0157 07/16/18   0158  07/15/18   2036   WBC  7.3  9.5  10.5   HGB  9.5*  10.2*  11.0*   HCT  28.5*  30.8*  32.4*   PLT  167  185  216     Recent Labs      07/18/18   0958  07/17/18   0157  07/16/18   0158  07/15/18   2036   NA  131*  132*  130*  130*   K  4.8  4.8  4.9  5.7*   CL  99  96*  93*  93*   CO2  23  26  30  30   GLU  298*  171*  178*  164*   BUN  54*  73*  80*  77*   CREA  1.92*  2.62*  3.14*  3.00* CA  9.1  8.9  9.4  10.5*   MG   --   2.0  2.0  2.1   PHOS   --   4.9*  5.9*   --    ALB   --   2.6*  2.7*   --    TBILI   --   0.2  0.2   --    SGOT   --   25 23   --    ALT   --   22 24   --      Lab Results   Component Value Date/Time    Glucose (POC) 289 (H) 07/18/2018 11:36 AM    Glucose (POC) 254 (H) 07/18/2018 07:29 AM    Glucose (POC) 255 (H) 07/17/2018 09:11 PM    Glucose (POC) 233 (H) 07/17/2018 04:18 PM    Glucose (POC) 223 (H) 07/17/2018 11:23 AM     No results for input(s): PH, PCO2, PO2, HCO3, FIO2 in the last 72 hours. No results for input(s): INR in the last 72 hours. No lab exists for component: INREXT  All Micro Results     Procedure Component Value Units Date/Time    MRSA CULTURE [[id number]] Collected:  07/16/18 0158    Order Status:  Completed Specimen:  Nares Updated:  07/17/18 1246     Special Requests: NO SPECIAL REQUESTS        Culture result: MRSA NOT PRESENT                     Screening of patient nares for MRSA is for surveillance purposes and, if positive, to facilitate isolation considerations in high risk settings. It is not intended for automatic decolonization interventions per se as regimens are not sufficiently effective to warrant routine use. I have reviewed notes of prior 24hr.     Other pertinent lab: none    Total time spent with patient: [address] discussed with: Patient, Family, Care Manager, Nursing Staff, Consultant/Specialist and >50% of time spent in counseling and coordination of care    Discussed:  Care Plan and D/C Planning    Prophylaxis:  H2B/PPI    Disposition:   PT, OT, RN           ___________________________________________________    Attending Physician: Oxana Gilbert MD

## 2018-07-18 NOTE — CDMP QUERY
2) => Type 2 diabetes mellitus with hyperglycemia AEB Blood glucose levels >200 & patient refusing SSI  => Other explanation of clinical findings   => Clinically Undetermined (no explanation for clinical findings)    The medical record reflects the following clinical findings, treatment, and risk factors. Risk Factors: 62 yo M  with Type 2 DM was admitted with Acute renal failure    Clinical Indicators:Elevated  Blood Glucose: 236, 260, 255    7/17 pn \" He initially refused SSI per protocol. He is convinced that insulin makes him blind.  \"  Treatment: Lantus 10 units SC daily and Humalog sliding scale insulin     Please clarify and document your clinical opinion in the progress notes and discharge summary including the definitive and/or presumptive diagnosis, (suspected or probable), related to the above clinical findings. Please include clinical findings supporting your diagnosis.     Thank you for your time   [facility] RN/BSN, [address]  Desk:   [phone]   Other:  [phone]

## 2018-07-18 NOTE — PROGRESS NOTES
Discharge instructions, including information on new medications, were reviewed with patient and his wife. All questions were answered. IV and heart monitor have been removed. Patient has received a copy of his discharge papers and 2 prescriptions and will be discharged home with his wife. Case Management has resumed his home health through [facility].

## 2018-07-18 NOTE — DISCHARGE SUMMARY
Physician Discharge Summary     Patient ID:  He Love  [id number]  49 y.o.  1960    Admit date: 7/15/2018    Discharge date and time: 7/18/2018    Admission Diagnoses: Acute renal failure (ARF) ([facility]    Discharge Diagnoses:    Principal Diagnosis   Acute renal failure (ARF) ([facility]                                             Other Diagnoses    Acute hyperkalemia (6/18/2017)    DM type 2 causing renal disease, not at goal [facility]) (7/15/2018)    Bradycardia (7/15/2018)    Hyponatremia (7/15/2018)    Morbid obesity ([facility] (7/15/2018)    COPD (chronic obstructive pulmonary disease) ([facility] (7/15/2018)    Anemia (7/16/2018)    Hypertriglyceridemia (7/16/2018)    Mitral regurgitation ()     Hospital Course:   Acute renal failure / Acute hyperkalemia / Hyponatremia - POA, unclear etiology.  Renal consulted. Richard Pierre think due to ARB, diuretics, prior Abx, and Bactrim, and perhaps underlying CKD related to DM. Cr improved overnight with IVF.  Stopped his lisinopril and bumex. No role for Bx or dialysis. Renal cleared him for discharge      Vasculitis - POA, unclear etiology, but I suspect Bactrim.  Stop Bactrim. Consulted rheumatology. Rheumatology did not recommend steroids, as he refuses insulin, and steroids may send him into DKA.     DM type 2 causing renal, vascular, neurological disease, not at goal - Diabetic diet and counseling.  He refuses SSI per protocol. He is convinced that insulin makes him blind.  Held home metformin due to ARF.   Continue glipizide, though that is also not ideal with ARF.  A1c useless in setting of anemia. Consult endocrinology, an they convinced him to try some insulin.  He states his vision became \"blurry\".  This is different than the \"tunnel vision\" he claims from prior insulin.  I think he wants to ascribe any daily trouble to his insulin. DC on new Rx for Lantus, which he seems to accept for now.   Exceptionally high risk of deterioration and death, and readmission, related to his persistent non-compliance.      Anemia - POA, moderate, likely due to surgical blood loss, CKD and perhaps Abx effects.  Monitor.      Hypertriglyceridemia - Severe, POA, due to DM and no insulin.  He refused insulin up to now.      Bradycardia / Mitral regurgitation - Bradycardia noted on admit.  Likely related to ARF and HyperK, now better, holding all HTN meds. Cards following. Kimberly Bolus only with MR, L atrium dilated.      Morbid obesity - Advise weight loss.  Likely JEROD, needs testing and treatment.      Stage 2 Right lateral ankle pressure ulcer - POA treated with medihoney and mepilex to right ankle      COPD (chronic obstructive pulmonary disease) - POA, possible, vs hypoventilation.  Oxygen as needed. Prn duonebs.     PCP: Sharad Haile MD    Consults: Nephrology and Endocrine    Significant Diagnostic Studies: See Hospital Course    Discharged home in improved condition.     Discharge Exam:   /57 (BP 1 Location: Left arm, BP Patient Position: Sitting)    Pulse 83    Temp 98.2 °F (36.8 °C)    Resp 16    Ht 6' (1.829 m)    Wt 139 kg (306 lb 8 oz)    SpO2 97%    BMI 41.57 kg/m2      Gen:  Morbid obese, in no acute distress  HEENT:  Pink conjunctivae, PERRL, hearing intact to voice, moist mucous membranes  Neck:  Supple, without masses, thyroid non-tender  Resp:  No accessory muscle use, distant breath sounds without wheezes rales or rhonchi  Card:  No murmurs, normal S1, S2 without thrills, bruits or peripheral edema  Abd:  Soft, non-tender, non-distended, normoactive bowel sounds are present, no mass  Lymph:  No cervical or inguinal adenopathy  Musc:  No cyanosis or clubbing  Skin:  Scattered vasculitic ulcer on al limbs, skin turgor is good  Neuro:  Cranial nerves are grossly intact, general motor weakness, follows commands   Psych:  Poor insight, oriented to person, place and time, alert, argumentative    Patient Instructions:   Current Discharge Medication List      START taking these medications    Details   insulin glargine (LANTUS) 100 unit/mL injection 20 Units by SubCUTAneous route daily for 30 days. Qty: 6 mL, Refills: 0      insulin syringe,safetyneedle 0.3 mL 29 x 1/2\" syrg Use as directed to inject Lantus insulin  Qty: 30 Syringe, Refills: 0         CONTINUE these medications which have NOT CHANGED    Details   aspirin (ASPIRIN) 325 mg tablet Take 325 mg by mouth daily. famotidine (PEPCID) 20 mg tablet Take 20 mg by mouth two (2) times a day. !! glipiZIDE (GLUCOTROL) 5 mg tablet Take 10 mg by mouth daily. In addition to 5 mg every night      !! glipiZIDE (GLUCOTROL) 5 mg tablet Take 5 mg by mouth nightly. In addition to 10 mg every morning      ipratropium-albuterol (COMBIVENT RESPIMAT)  mcg/actuation inhaler Take 1 Puff by inhalation every six (6) hours as needed for Wheezing. polyethylene glycol (MIRALAX) 17 gram packet Take 17 g by mouth nightly. docusate sodium (COLACE) 100 mg capsule Take 100 mg by mouth daily as needed for Constipation. !! - Potential duplicate medications found. Please discuss with provider. STOP taking these medications       bumetanide (BUMEX) 2 mg tablet Comments:   Reason for Stopping:         lisinopril (PRINIVIL, ZESTRIL) 20 mg tablet Comments:   Reason for Stopping:         trimethoprim-sulfamethoxazole (BACTRIM DS) 160-800 mg per tablet Comments:   Reason for Stopping:         trimethoprim-sulfamethoxazole (BACTRIM)  mg per tablet Comments:   Reason for Stopping:         lisinopril (PRINIVIL, ZESTRIL) 10 mg tablet Comments:   Reason for Stopping:             Activity: Activity as tolerated and PT/OT per Home Health  Diet: Cardiac Diet, Diabetic Diet, Low fat, Low cholesterol and reduce calorie intake  Wound Care: Reinforce dressing PRN and As directed    Follow-up with your PCP, surgeon, endocrinologist and kidney doctor in a few weeks.   Follow-up tests/labs - kidney and diabetes    Signed:  Amarilys Jiang MD  7/18/2018  12:34 PM

## 2018-07-18 NOTE — DISCHARGE INSTRUCTIONS
Patient Discharge Instructions    Nahum Wood / [id number] : 1960    Admitted 7/15/2018 Discharged: 2018     Primary Diagnoses  Problem List as of 2018  Date Reviewed: 7/15/2018          Codes Class Noted - Resolved   Mitral regurgitation   Anemia   Hypertriglyceridemia   * (Principal)Acute renal failure (ARF) ([facility]   DM type 2 causing renal disease, not at goal [facility]) (Chronic)   Bradycardia   Hyponatremia   Morbid obesity ([facility]   COPD (chronic obstructive pulmonary disease) ([facility]) (Chronic)   Acute hyperkalemia          Take Home Medications     · It is important that you take the medication exactly as they are prescribed. · Keep your medication in the bottles provided by the pharmacist and keep a list of the medication names, dosages, and times to be taken in your wallet. · Do not take other medications without consulting your doctor. What to do at Home    Recommended diet: Cardiac Diet, Diabetic Diet and Low fat, Low cholesterol    Recommended activity: Activity as tolerated and PT/OT per Home Health    If you experience worse symptoms, please follow up with your PCP, surgeon or endocrinologist.    Edmond Valdivia with your PCP and endocrinology in a few weeks        Information obtained by :  I understand that if any problems occur once I am at home I am to contact my physician. I understand and acknowledge receipt of the instructions indicated above.                                                                                                                                            Physician's or R.N.'s Signature                                                                  Date/Time                                                                                                                                              Patient or Representative Signature                                                          Date/Time

## 2018-07-18 NOTE — PROGRESS NOTES
Ramses Patel [address] [address] [facility] YOB: 1960 Assessment & Plan: ARF · Presumably due to Pre-renal azotemia · No indication for RRT. Does not need renal bx 
· If otherwise ready for d/c would not object on basis of his ASPEN. Should follow up with us in the office to ensure resolution. (2 wk) Cutaneous vasculitis · Presumably drug reaction · Abx held Diabetes · Insulin · Has proteinuria that pre-dates current episode. Recent baseline Cr unknown but was normal in 2017. Subjective:  
CC: f/u ARF 
HPI: Creat <2 today ROS: Rash resolving, no sob or vomiting Current Facility-Administered Medications Medication Dose Route Frequency  bisacodyl (DULCOLAX) suppository 10 mg  10 mg Rectal DAILY PRN  polyethylene glycol (MIRALAX) packet 17 g  17 g Oral BID  simethicone (MYLICON) tablet 80 mg  80 mg Oral QID PRN  
 famotidine (PEPCID) tablet 20 mg  20 mg Oral BID  insulin glargine (LANTUS) injection 10 Units  10 Units SubCUTAneous DAILY  insulin lispro (HUMALOG) injection   SubCUTAneous AC&HS  
 dextrose (D50W) injection syrg 12.5-25 g  12.5-25 g IntraVENous PRN  
 HYDROmorphone (DILAUDID) tablet 2 mg  2 mg Oral Q6H PRN  phenyleph-shark jud oil-mo-pet (PREPARATION H) ointment   PeriANAL QID PRN  
 0.9% sodium chloride infusion  75 mL/hr IntraVENous CONTINUOUS  
 sodium chloride (NS) flush 5-10 mL  5-10 mL IntraVENous Q8H  
 sodium chloride (NS) flush 5-10 mL  5-10 mL IntraVENous PRN  
 acetaminophen (TYLENOL) tablet 650 mg  650 mg Oral Q4H PRN  
 naloxone (NARCAN) injection 0.4 mg  0.4 mg IntraVENous PRN  
 diphenhydrAMINE (BENADRYL) injection 12.5 mg  12.5 mg IntraVENous Q4H PRN  
 ondansetron (ZOFRAN) injection 4 mg  4 mg IntraVENous Q6H PRN  
 docusate sodium (COLACE) capsule 100 mg  100 mg Oral BID  heparin (porcine) injection 5,000 Units  5,000 Units SubCUTAneous Q8H  
 albuterol-ipratropium (DUO-NEB) 2.5 MG-0.5 MG/3 ML  3 mL Nebulization Q4H PRN  
 glucose chewable tablet 16 g  4 Tab Oral PRN  
 dextrose (D50W) injection syrg 12.5-25 g  12.5-25 g IntraVENous PRN  
 glucagon (GLUCAGEN) injection 1 mg  1 mg IntraMUSCular PRN Objective:  
 
Vitals: 
Blood pressure 141/45, pulse 89, temperature 97.8 °F (36.6 °C), resp. rate 24, height 6' (1.829 m), weight 139 kg (306 lb 8 oz), SpO2 98 %. Temp (24hrs), Av.7 °F (36.5 °C), Min:97.3 °F (36.3 °C), Max:98 °F (36.7 °C) Intake and Output: 
701 - 1900 In: 170 [I.V.:170] Out: -  
1901 -  0700 In: 2368.8 [P.O.:120; I.V.:2248.8] Out: [address] [BEWKM:] Physical Exam:              
GENERAL ASSESSMENT: NAD 
CHEST: CTA HEART: S1S2 ABDOMEN: Soft,NT 
EXTREMITY: no EDEMA SKIN: fading purpuric rash 
 
   
ECG/rhythm: 
 
Data Review No results for input(s): TNIPOC in the last 72 hours. No lab exists for component: ITNL Recent Labs  
   18 
 0158  07/15/18 
 2036 CPK  31*   --   
CKMB  <1.0   --   
TROIQ  <0.05  <0.05 Recent Labs  
   18 
 9830  18 
 0157  07/16/18 
 0158  07/15/18 
 2036 NA  131*  132*  130*  130*  
K  4.8  4.8  4.9  5.7* CL  99  96*  93*  93* CO2  23  26  30  30 BUN  54*  73*  80*  77* CREA  1.92*  2.62*  3.14*  3.00* GLU  298*  171*  178*  164* PHOS   --   4.9*  5.9*   --   
MG   --   2.0  2.0  2.1 CA  9.1  8.9  9.4  10.5* ALB   --   2.6*  2.7*   --   
WBC   --   7.3  9.5  10.5 HGB   --   9.5*  10.2*  11.0*  
HCT   --   28.5*  30.8*  32.4* PLT   --   167  185  216 No results for input(s): INR, PTP, APTT in the last 72 hours. No lab exists for component: INREXT, INREXT Needs: urine analysis, urine sodium, protein and creatinine Lab Results Component Value Date/Time Sodium urine, random 88 07/15/2018 10:06 PM  
 Creatinine, urine 45.48 2018 02:27 PM  
 
 
 
: Annie Santos MD 
2018 [facility]: 
[email or web address] [place]estine [place] office: 
[address] [place] [address], [address] [place], [address] [place] [address] Phone: [phone] Fax :     [phone] [place] office: 
[address] [address] Michelle Arias Phone - [phone] Fax - [phone]

## 2019-01-01 ENCOUNTER — HOME CARE VISIT (OUTPATIENT)
Dept: HOSPICE | Facility: HOSPICE | Age: 59
End: 2019-01-01
Payer: MEDICARE

## 2019-01-01 ENCOUNTER — APPOINTMENT (OUTPATIENT)
Dept: GENERAL RADIOLOGY | Age: 59
DRG: 291 | End: 2019-01-01
Attending: EMERGENCY MEDICINE
Payer: MEDICARE

## 2019-01-01 ENCOUNTER — APPOINTMENT (OUTPATIENT)
Dept: NON INVASIVE DIAGNOSTICS | Age: 59
DRG: 291 | End: 2019-01-01
Attending: INTERNAL MEDICINE
Payer: MEDICARE

## 2019-01-01 ENCOUNTER — HOSPITAL ENCOUNTER (INPATIENT)
Age: 59
LOS: 1 days | DRG: 951 | End: 2019-07-08
Attending: FAMILY MEDICINE | Admitting: FAMILY MEDICINE
Payer: OTHER MISCELLANEOUS

## 2019-01-01 ENCOUNTER — HOME CARE VISIT (OUTPATIENT)
Dept: SCHEDULING | Facility: HOME HEALTH | Age: 59
End: 2019-01-01
Payer: MEDICARE

## 2019-01-01 ENCOUNTER — APPOINTMENT (OUTPATIENT)
Dept: GENERAL RADIOLOGY | Age: 59
DRG: 291 | End: 2019-01-01
Attending: INTERNAL MEDICINE
Payer: MEDICARE

## 2019-01-01 ENCOUNTER — HOSPITAL ENCOUNTER (INPATIENT)
Age: 59
LOS: 9 days | Discharge: HOSPICE | DRG: 291 | End: 2019-07-07
Attending: EMERGENCY MEDICINE | Admitting: INTERNAL MEDICINE
Payer: MEDICARE

## 2019-01-01 ENCOUNTER — HOSPICE ADMISSION (OUTPATIENT)
Dept: HOSPICE | Facility: HOSPICE | Age: 59
End: 2019-01-01
Payer: MEDICARE

## 2019-01-01 VITALS
SYSTOLIC BLOOD PRESSURE: 105 MMHG | OXYGEN SATURATION: 95 % | HEART RATE: 61 BPM | RESPIRATION RATE: 18 BRPM | WEIGHT: 315 LBS | BODY MASS INDEX: 42.66 KG/M2 | HEIGHT: 72 IN | DIASTOLIC BLOOD PRESSURE: 69 MMHG | TEMPERATURE: 97.4 F

## 2019-01-01 VITALS
SYSTOLIC BLOOD PRESSURE: 61 MMHG | OXYGEN SATURATION: 92 % | DIASTOLIC BLOOD PRESSURE: 26 MMHG | HEART RATE: 35 BPM | RESPIRATION RATE: 16 BRPM | TEMPERATURE: 94.5 F

## 2019-01-01 DIAGNOSIS — N18.30 CKD (CHRONIC KIDNEY DISEASE) STAGE 3, GFR 30-59 ML/MIN (HCC): Chronic | ICD-10-CM

## 2019-01-01 DIAGNOSIS — J96.01 ACUTE RESPIRATORY FAILURE WITH HYPOXIA AND HYPERCAPNIA (HCC): Primary | ICD-10-CM

## 2019-01-01 DIAGNOSIS — J81.0 ACUTE PULMONARY EDEMA (HCC): ICD-10-CM

## 2019-01-01 DIAGNOSIS — Z71.89 DNR (DO NOT RESUSCITATE) DISCUSSION: ICD-10-CM

## 2019-01-01 DIAGNOSIS — R52 DIFFUSE PAIN: ICD-10-CM

## 2019-01-01 DIAGNOSIS — F41.9 ANXIETY: ICD-10-CM

## 2019-01-01 DIAGNOSIS — D64.9 ANEMIA, UNSPECIFIED TYPE: ICD-10-CM

## 2019-01-01 DIAGNOSIS — R45.1 RESTLESSNESS: ICD-10-CM

## 2019-01-01 DIAGNOSIS — R06.4 LABORED BREATHING: ICD-10-CM

## 2019-01-01 DIAGNOSIS — R60.1 ANASARCA: ICD-10-CM

## 2019-01-01 DIAGNOSIS — J96.02 ACUTE RESPIRATORY FAILURE WITH HYPOXIA AND HYPERCAPNIA (HCC): Primary | ICD-10-CM

## 2019-01-01 DIAGNOSIS — R53.81 PHYSICAL DEBILITY: ICD-10-CM

## 2019-01-01 DIAGNOSIS — Z71.89 GOALS OF CARE, COUNSELING/DISCUSSION: ICD-10-CM

## 2019-01-01 DIAGNOSIS — K11.7 INCREASED OROPHARYNGEAL SECRETIONS: ICD-10-CM

## 2019-01-01 DIAGNOSIS — J96.21 ACUTE ON CHRONIC RESPIRATORY FAILURE WITH HYPOXIA AND HYPERCAPNIA (HCC): ICD-10-CM

## 2019-01-01 DIAGNOSIS — J96.22 ACUTE ON CHRONIC RESPIRATORY FAILURE WITH HYPOXIA AND HYPERCAPNIA (HCC): ICD-10-CM

## 2019-01-01 LAB
ABO + RH BLD: NORMAL
ABO + RH BLD: NORMAL
ALBUMIN SERPL-MCNC: 2.2 G/DL (ref 3.5–5)
ALBUMIN SERPL-MCNC: 2.2 G/DL (ref 3.5–5)
ALBUMIN SERPL-MCNC: 2.3 G/DL (ref 3.5–5)
ALBUMIN SERPL-MCNC: 2.3 G/DL (ref 3.5–5)
ALBUMIN SERPL-MCNC: 2.4 G/DL (ref 3.5–5)
ALBUMIN SERPL-MCNC: 2.5 G/DL (ref 3.5–5)
ALBUMIN SERPL-MCNC: 2.6 G/DL (ref 3.5–5)
ALBUMIN SERPL-MCNC: 2.6 G/DL (ref 3.5–5)
ALBUMIN/GLOB SERPL: 0.6 {RATIO} (ref 1.1–2.2)
ALBUMIN/GLOB SERPL: 0.7 {RATIO} (ref 1.1–2.2)
ALP SERPL-CCNC: 144 U/L (ref 45–117)
ALP SERPL-CCNC: 171 U/L (ref 45–117)
ALT SERPL-CCNC: 11 U/L (ref 12–78)
ALT SERPL-CCNC: 12 U/L (ref 12–78)
ANION GAP BLD CALC-SCNC: 14 MMOL/L (ref 10–20)
ANION GAP SERPL CALC-SCNC: 2 MMOL/L (ref 5–15)
ANION GAP SERPL CALC-SCNC: 2 MMOL/L (ref 5–15)
ANION GAP SERPL CALC-SCNC: 3 MMOL/L (ref 5–15)
ANION GAP SERPL CALC-SCNC: 3 MMOL/L (ref 5–15)
ANION GAP SERPL CALC-SCNC: 4 MMOL/L (ref 5–15)
ANION GAP SERPL CALC-SCNC: 6 MMOL/L (ref 5–15)
APPEARANCE UR: ABNORMAL
APPEARANCE UR: CLEAR
APTT PPP: 28.8 SEC (ref 22.1–32)
ARTERIAL PATENCY WRIST A: YES
AST SERPL-CCNC: 10 U/L (ref 15–37)
AST SERPL-CCNC: 14 U/L (ref 15–37)
ATRIAL RATE: 76 BPM
B PERT DNA SPEC QL NAA+PROBE: NOT DETECTED
BACTERIA SPEC CULT: ABNORMAL
BACTERIA SPEC CULT: NORMAL
BACTERIA URNS QL MICRO: ABNORMAL /HPF
BACTERIA URNS QL MICRO: NEGATIVE /HPF
BASE EXCESS BLDA CALC-SCNC: 4.1 MMOL/L
BASE EXCESS BLDA CALC-SCNC: 4.1 MMOL/L
BASE EXCESS BLDA CALC-SCNC: 4.8 MMOL/L
BASE EXCESS BLDA CALC-SCNC: 6.7 MMOL/L
BASE EXCESS BLDA CALC-SCNC: 6.9 MMOL/L
BASE EXCESS BLDA CALC-SCNC: 7.2 MMOL/L
BASE EXCESS BLDA CALC-SCNC: 8.9 MMOL/L
BASOPHILS # BLD: 0 K/UL (ref 0–0.1)
BASOPHILS # BLD: 0.1 K/UL (ref 0–0.1)
BASOPHILS NFR BLD: 1 % (ref 0–1)
BDY SITE: ABNORMAL
BILIRUB SERPL-MCNC: 0.3 MG/DL (ref 0.2–1)
BILIRUB SERPL-MCNC: 0.3 MG/DL (ref 0.2–1)
BILIRUB UR QL: NEGATIVE
BILIRUB UR QL: NEGATIVE
BLD PROD TYP BPU: NORMAL
BLOOD GROUP ANTIBODIES SERPL: NORMAL
BLOOD GROUP ANTIBODIES SERPL: NORMAL
BNP SERPL-MCNC: 2378 PG/ML
BPU ID: NORMAL
BREATHS.SPONTANEOUS ON VENT: 12
BREATHS.SPONTANEOUS ON VENT: 22
BUN BLD-MCNC: 63 MG/DL (ref 9–20)
BUN SERPL-MCNC: 48 MG/DL (ref 6–20)
BUN SERPL-MCNC: 48 MG/DL (ref 6–20)
BUN SERPL-MCNC: 51 MG/DL (ref 6–20)
BUN SERPL-MCNC: 52 MG/DL (ref 6–20)
BUN SERPL-MCNC: 60 MG/DL (ref 6–20)
BUN SERPL-MCNC: 61 MG/DL (ref 6–20)
BUN SERPL-MCNC: 65 MG/DL (ref 6–20)
BUN SERPL-MCNC: 66 MG/DL (ref 6–20)
BUN SERPL-MCNC: 80 MG/DL (ref 6–20)
BUN SERPL-MCNC: 87 MG/DL (ref 6–20)
BUN/CREAT SERPL: 24 (ref 12–20)
BUN/CREAT SERPL: 24 (ref 12–20)
BUN/CREAT SERPL: 25 (ref 12–20)
BUN/CREAT SERPL: 26 (ref 12–20)
BUN/CREAT SERPL: 26 (ref 12–20)
BUN/CREAT SERPL: 29 (ref 12–20)
BUN/CREAT SERPL: 31 (ref 12–20)
BUN/CREAT SERPL: 35 (ref 12–20)
C PNEUM DNA SPEC QL NAA+PROBE: NOT DETECTED
CA-I BLD-MCNC: 1.21 MMOL/L (ref 1.12–1.32)
CALCIUM SERPL-MCNC: 8.2 MG/DL (ref 8.5–10.1)
CALCIUM SERPL-MCNC: 8.3 MG/DL (ref 8.5–10.1)
CALCIUM SERPL-MCNC: 8.4 MG/DL (ref 8.5–10.1)
CALCIUM SERPL-MCNC: 8.6 MG/DL (ref 8.5–10.1)
CALCIUM SERPL-MCNC: 8.9 MG/DL (ref 8.5–10.1)
CALCIUM SERPL-MCNC: 9.1 MG/DL (ref 8.5–10.1)
CALCULATED R AXIS, ECG10: 37 DEGREES
CALCULATED T AXIS, ECG11: 26 DEGREES
CC UR VC: ABNORMAL
CHLORIDE BLD-SCNC: 97 MMOL/L (ref 98–107)
CHLORIDE SERPL-SCNC: 100 MMOL/L (ref 97–108)
CHLORIDE SERPL-SCNC: 100 MMOL/L (ref 97–108)
CHLORIDE SERPL-SCNC: 102 MMOL/L (ref 97–108)
CHLORIDE SERPL-SCNC: 93 MMOL/L (ref 97–108)
CHLORIDE SERPL-SCNC: 93 MMOL/L (ref 97–108)
CHLORIDE SERPL-SCNC: 94 MMOL/L (ref 97–108)
CHLORIDE SERPL-SCNC: 97 MMOL/L (ref 97–108)
CHLORIDE SERPL-SCNC: 98 MMOL/L (ref 97–108)
CHLORIDE SERPL-SCNC: 99 MMOL/L (ref 97–108)
CHLORIDE SERPL-SCNC: 99 MMOL/L (ref 97–108)
CO2 BLD-SCNC: 33 MMOL/L (ref 21–32)
CO2 SERPL-SCNC: 31 MMOL/L (ref 21–32)
CO2 SERPL-SCNC: 33 MMOL/L (ref 21–32)
CO2 SERPL-SCNC: 34 MMOL/L (ref 21–32)
CO2 SERPL-SCNC: 34 MMOL/L (ref 21–32)
CO2 SERPL-SCNC: 35 MMOL/L (ref 21–32)
CO2 SERPL-SCNC: 35 MMOL/L (ref 21–32)
CO2 SERPL-SCNC: 36 MMOL/L (ref 21–32)
CO2 SERPL-SCNC: 36 MMOL/L (ref 21–32)
CO2 SERPL-SCNC: 37 MMOL/L (ref 21–32)
CO2 SERPL-SCNC: 37 MMOL/L (ref 21–32)
COLOR UR: ABNORMAL
COLOR UR: ABNORMAL
COMMENT, HOLDF: NORMAL
CREAT BLD-MCNC: 2 MG/DL (ref 0.6–1.3)
CREAT SERPL-MCNC: 1.78 MG/DL (ref 0.7–1.3)
CREAT SERPL-MCNC: 1.82 MG/DL (ref 0.7–1.3)
CREAT SERPL-MCNC: 1.88 MG/DL (ref 0.7–1.3)
CREAT SERPL-MCNC: 1.9 MG/DL (ref 0.7–1.3)
CREAT SERPL-MCNC: 1.99 MG/DL (ref 0.7–1.3)
CREAT SERPL-MCNC: 2.1 MG/DL (ref 0.7–1.3)
CREAT SERPL-MCNC: 2.45 MG/DL (ref 0.7–1.3)
CREAT SERPL-MCNC: 2.62 MG/DL (ref 0.7–1.3)
CREAT SERPL-MCNC: 3.11 MG/DL (ref 0.7–1.3)
CREAT SERPL-MCNC: 3.51 MG/DL (ref 0.7–1.3)
CROSSMATCH RESULT,%XM: NORMAL
DIAGNOSIS, 93000: NORMAL
DIFFERENTIAL METHOD BLD: ABNORMAL
ECHO AO ASC DIAM: 3.38 CM
ECHO AO ROOT DIAM: 3.23 CM
ECHO EST RA PRESSURE: 8 MMHG
ECHO IVC SNIFF: 2.85 CM
ECHO LA AREA 4C: 20.2 CM2
ECHO LA MAJOR AXIS: 4.62 CM
ECHO LA TO AORTIC ROOT RATIO: 1.43
ECHO LA VOL 2C: 85.95 ML (ref 18–58)
ECHO LA VOL 4C: 58.83 ML (ref 18–58)
ECHO LA VOL BP: 72.3 ML (ref 18–58)
ECHO LA VOL/BSA BIPLANE: 25.33 ML/M2 (ref 16–28)
ECHO LA VOLUME INDEX A2C: 30.11 ML/M2 (ref 16–28)
ECHO LA VOLUME INDEX A4C: 20.61 ML/M2 (ref 16–28)
ECHO LV E' LATERAL VELOCITY: 7.6 CENTIMETER/SECOND
ECHO LV E' SEPTAL VELOCITY: 6.53 CENTIMETER/SECOND
ECHO LV INTERNAL DIMENSION DIASTOLIC: 5.13 CM (ref 4.2–5.9)
ECHO LV INTERNAL DIMENSION SYSTOLIC: 3.94 CM
ECHO LV IVSD: 1.12 CM (ref 0.6–1)
ECHO LV MASS 2D: 260.6 G (ref 88–224)
ECHO LV MASS INDEX 2D: 91.3 G/M2 (ref 49–115)
ECHO LV POSTERIOR WALL DIASTOLIC: 1.11 CM (ref 0.6–1)
ECHO LVOT DIAM: 2.24 CM
ECHO LVOT PEAK GRADIENT: 2.5 MMHG
ECHO LVOT PEAK VELOCITY: 78.56 CM/S
ECHO LVOT SV: 75.5 ML
ECHO LVOT VTI: 19.18 CM
ECHO MV A VELOCITY: 127.07 CM/S
ECHO MV AREA PHT: 3 CM2
ECHO MV AREA VTI: 1.8 CM2
ECHO MV E DECELERATION TIME (DT): 285.2 MS
ECHO MV E VELOCITY: 144.74 CM/S
ECHO MV E/A RATIO: 1.14
ECHO MV MAX VELOCITY: 168.65 CM/S
ECHO MV MEAN GRADIENT: 4.7 MMHG
ECHO MV PEAK GRADIENT: 11.4 MMHG
ECHO MV PRESSURE HALF TIME (PHT): 73.4 MS
ECHO MV VTI: 43 CM
ECHO PULMONARY ARTERY SYSTOLIC PRESSURE (PASP): 75 MMHG
ECHO PV MAX VELOCITY: 102.34 CM/S
ECHO PV PEAK GRADIENT: 4.2 MMHG
ECHO RIGHT VENTRICULAR SYSTOLIC PRESSURE (RVSP): 75 MMHG
ECHO RV INTERNAL DIMENSION: 3.92 CM
ECHO RV TAPSE: 2.55 CM (ref 1.5–2)
ECHO TV REGURGITANT MAX VELOCITY: 409.38 CM/S
ECHO TV REGURGITANT PEAK GRADIENT: 67 MMHG
EOSINOPHIL # BLD: 0.2 K/UL (ref 0–0.4)
EOSINOPHIL # BLD: 0.3 K/UL (ref 0–0.4)
EOSINOPHIL # BLD: 0.3 K/UL (ref 0–0.4)
EOSINOPHIL # BLD: 0.4 K/UL (ref 0–0.4)
EOSINOPHIL NFR BLD: 4 % (ref 0–7)
EOSINOPHIL NFR BLD: 4 % (ref 0–7)
EOSINOPHIL NFR BLD: 5 % (ref 0–7)
EPAP/CPAP/PEEP, PAPEEP: 5
EPAP/CPAP/PEEP, PAPEEP: 5
EPAP/CPAP/PEEP, PAPEEP: 6
EPITH CASTS URNS QL MICRO: ABNORMAL /LPF
EPITH CASTS URNS QL MICRO: ABNORMAL /LPF
ERYTHROCYTE [DISTWIDTH] IN BLOOD BY AUTOMATED COUNT: 15.5 % (ref 11.5–14.5)
ERYTHROCYTE [DISTWIDTH] IN BLOOD BY AUTOMATED COUNT: 15.6 % (ref 11.5–14.5)
ERYTHROCYTE [DISTWIDTH] IN BLOOD BY AUTOMATED COUNT: 15.6 % (ref 11.5–14.5)
ERYTHROCYTE [DISTWIDTH] IN BLOOD BY AUTOMATED COUNT: 15.8 % (ref 11.5–14.5)
ERYTHROCYTE [DISTWIDTH] IN BLOOD BY AUTOMATED COUNT: 15.8 % (ref 11.5–14.5)
ERYTHROCYTE [DISTWIDTH] IN BLOOD BY AUTOMATED COUNT: 15.9 % (ref 11.5–14.5)
ERYTHROCYTE [DISTWIDTH] IN BLOOD BY AUTOMATED COUNT: 16 % (ref 11.5–14.5)
EST. AVERAGE GLUCOSE BLD GHB EST-MCNC: 140 MG/DL
FERRITIN SERPL-MCNC: 20 NG/ML (ref 26–388)
FIO2 ON VENT: 40 %
FIO2 ON VENT: 40 %
FIO2 ON VENT: 50 %
FLUAV H1 2009 PAND RNA SPEC QL NAA+PROBE: NOT DETECTED
FLUAV H1 RNA SPEC QL NAA+PROBE: NOT DETECTED
FLUAV H3 RNA SPEC QL NAA+PROBE: NOT DETECTED
FLUAV SUBTYP SPEC NAA+PROBE: NOT DETECTED
FLUBV RNA SPEC QL NAA+PROBE: NOT DETECTED
GAS FLOW.O2 O2 DELIVERY SYS: 2 L/MIN
GAS FLOW.O2 O2 DELIVERY SYS: 4 L/MIN
GAS FLOW.O2 SETTING OXYMISER: 24 L/MIN
GLOBULIN SER CALC-MCNC: 3.9 G/DL (ref 2–4)
GLOBULIN SER CALC-MCNC: 4.1 G/DL (ref 2–4)
GLUCOSE BLD STRIP.AUTO-MCNC: 102 MG/DL (ref 65–100)
GLUCOSE BLD STRIP.AUTO-MCNC: 104 MG/DL (ref 65–100)
GLUCOSE BLD STRIP.AUTO-MCNC: 107 MG/DL (ref 65–100)
GLUCOSE BLD STRIP.AUTO-MCNC: 122 MG/DL (ref 65–100)
GLUCOSE BLD STRIP.AUTO-MCNC: 138 MG/DL (ref 65–100)
GLUCOSE BLD STRIP.AUTO-MCNC: 149 MG/DL (ref 65–100)
GLUCOSE BLD STRIP.AUTO-MCNC: 161 MG/DL (ref 65–100)
GLUCOSE BLD STRIP.AUTO-MCNC: 168 MG/DL (ref 65–100)
GLUCOSE BLD STRIP.AUTO-MCNC: 170 MG/DL (ref 65–100)
GLUCOSE BLD STRIP.AUTO-MCNC: 172 MG/DL (ref 65–100)
GLUCOSE BLD STRIP.AUTO-MCNC: 181 MG/DL (ref 65–100)
GLUCOSE BLD STRIP.AUTO-MCNC: 181 MG/DL (ref 65–100)
GLUCOSE BLD STRIP.AUTO-MCNC: 183 MG/DL (ref 65–100)
GLUCOSE BLD STRIP.AUTO-MCNC: 184 MG/DL (ref 65–100)
GLUCOSE BLD STRIP.AUTO-MCNC: 189 MG/DL (ref 65–100)
GLUCOSE BLD STRIP.AUTO-MCNC: 204 MG/DL (ref 65–100)
GLUCOSE BLD STRIP.AUTO-MCNC: 208 MG/DL (ref 65–100)
GLUCOSE BLD STRIP.AUTO-MCNC: 209 MG/DL (ref 65–100)
GLUCOSE BLD STRIP.AUTO-MCNC: 214 MG/DL (ref 65–100)
GLUCOSE BLD STRIP.AUTO-MCNC: 215 MG/DL (ref 65–100)
GLUCOSE BLD STRIP.AUTO-MCNC: 224 MG/DL (ref 65–100)
GLUCOSE BLD STRIP.AUTO-MCNC: 239 MG/DL (ref 65–100)
GLUCOSE BLD STRIP.AUTO-MCNC: 241 MG/DL (ref 65–100)
GLUCOSE BLD STRIP.AUTO-MCNC: 245 MG/DL (ref 65–100)
GLUCOSE BLD STRIP.AUTO-MCNC: 253 MG/DL (ref 65–100)
GLUCOSE BLD STRIP.AUTO-MCNC: 254 MG/DL (ref 65–100)
GLUCOSE BLD STRIP.AUTO-MCNC: 269 MG/DL (ref 65–100)
GLUCOSE BLD STRIP.AUTO-MCNC: 289 MG/DL (ref 65–100)
GLUCOSE BLD STRIP.AUTO-MCNC: 292 MG/DL (ref 65–100)
GLUCOSE BLD STRIP.AUTO-MCNC: 300 MG/DL (ref 65–100)
GLUCOSE BLD STRIP.AUTO-MCNC: 301 MG/DL (ref 65–100)
GLUCOSE BLD STRIP.AUTO-MCNC: 318 MG/DL (ref 65–100)
GLUCOSE BLD STRIP.AUTO-MCNC: 48 MG/DL (ref 65–100)
GLUCOSE BLD STRIP.AUTO-MCNC: 51 MG/DL (ref 65–100)
GLUCOSE BLD STRIP.AUTO-MCNC: 56 MG/DL (ref 65–100)
GLUCOSE BLD STRIP.AUTO-MCNC: 62 MG/DL (ref 65–100)
GLUCOSE BLD STRIP.AUTO-MCNC: 66 MG/DL (ref 65–100)
GLUCOSE BLD STRIP.AUTO-MCNC: 69 MG/DL (ref 65–100)
GLUCOSE BLD STRIP.AUTO-MCNC: 72 MG/DL (ref 65–100)
GLUCOSE BLD STRIP.AUTO-MCNC: 75 MG/DL (ref 65–100)
GLUCOSE BLD STRIP.AUTO-MCNC: 77 MG/DL (ref 65–100)
GLUCOSE BLD STRIP.AUTO-MCNC: 78 MG/DL (ref 65–100)
GLUCOSE BLD STRIP.AUTO-MCNC: 79 MG/DL (ref 65–100)
GLUCOSE BLD STRIP.AUTO-MCNC: 81 MG/DL (ref 65–100)
GLUCOSE BLD STRIP.AUTO-MCNC: 81 MG/DL (ref 65–100)
GLUCOSE BLD STRIP.AUTO-MCNC: 86 MG/DL (ref 65–100)
GLUCOSE BLD STRIP.AUTO-MCNC: 89 MG/DL (ref 65–100)
GLUCOSE BLD STRIP.AUTO-MCNC: 90 MG/DL (ref 65–100)
GLUCOSE BLD STRIP.AUTO-MCNC: 91 MG/DL (ref 65–100)
GLUCOSE BLD STRIP.AUTO-MCNC: 95 MG/DL (ref 65–100)
GLUCOSE BLD-MCNC: 60 MG/DL (ref 65–100)
GLUCOSE SERPL-MCNC: 151 MG/DL (ref 65–100)
GLUCOSE SERPL-MCNC: 165 MG/DL (ref 65–100)
GLUCOSE SERPL-MCNC: 175 MG/DL (ref 65–100)
GLUCOSE SERPL-MCNC: 195 MG/DL (ref 65–100)
GLUCOSE SERPL-MCNC: 204 MG/DL (ref 65–100)
GLUCOSE SERPL-MCNC: 266 MG/DL (ref 65–100)
GLUCOSE SERPL-MCNC: 289 MG/DL (ref 65–100)
GLUCOSE SERPL-MCNC: 59 MG/DL (ref 65–100)
GLUCOSE SERPL-MCNC: 61 MG/DL (ref 65–100)
GLUCOSE SERPL-MCNC: 75 MG/DL (ref 65–100)
GLUCOSE UR STRIP.AUTO-MCNC: 100 MG/DL
GLUCOSE UR STRIP.AUTO-MCNC: NEGATIVE MG/DL
HADV DNA SPEC QL NAA+PROBE: NOT DETECTED
HBA1C MFR BLD: 6.5 % (ref 4.2–6.3)
HCO3 BLDA-SCNC: 34 MMOL/L (ref 22–26)
HCO3 BLDA-SCNC: 34 MMOL/L (ref 22–26)
HCO3 BLDA-SCNC: 35 MMOL/L (ref 22–26)
HCO3 BLDA-SCNC: 35 MMOL/L (ref 22–26)
HCO3 BLDA-SCNC: 36 MMOL/L (ref 22–26)
HCO3 BLDA-SCNC: 36 MMOL/L (ref 22–26)
HCO3 BLDA-SCNC: 37 MMOL/L (ref 22–26)
HCOV 229E RNA SPEC QL NAA+PROBE: NOT DETECTED
HCOV HKU1 RNA SPEC QL NAA+PROBE: NOT DETECTED
HCOV NL63 RNA SPEC QL NAA+PROBE: NOT DETECTED
HCOV OC43 RNA SPEC QL NAA+PROBE: NOT DETECTED
HCT VFR BLD AUTO: 23.1 % (ref 36.6–50.3)
HCT VFR BLD AUTO: 24.8 % (ref 36.6–50.3)
HCT VFR BLD AUTO: 25.3 % (ref 36.6–50.3)
HCT VFR BLD AUTO: 25.5 % (ref 36.6–50.3)
HCT VFR BLD AUTO: 26 % (ref 36.6–50.3)
HCT VFR BLD AUTO: 26.1 % (ref 36.6–50.3)
HCT VFR BLD AUTO: 26.2 % (ref 36.6–50.3)
HCT VFR BLD AUTO: 26.8 % (ref 36.6–50.3)
HCT VFR BLD AUTO: 27.2 % (ref 36.6–50.3)
HCT VFR BLD AUTO: 27.3 % (ref 36.6–50.3)
HCT VFR BLD AUTO: 27.4 % (ref 36.6–50.3)
HCT VFR BLD CALC: 24 % (ref 36.6–50.3)
HGB BLD-MCNC: 6.8 G/DL (ref 12.1–17)
HGB BLD-MCNC: 7.3 G/DL (ref 12.1–17)
HGB BLD-MCNC: 7.5 G/DL (ref 12.1–17)
HGB BLD-MCNC: 7.6 G/DL (ref 12.1–17)
HGB BLD-MCNC: 7.6 G/DL (ref 12.1–17)
HGB BLD-MCNC: 7.7 G/DL (ref 12.1–17)
HGB BLD-MCNC: 7.9 G/DL (ref 12.1–17)
HGB UR QL STRIP: ABNORMAL
HGB UR QL STRIP: NEGATIVE
HMPV RNA SPEC QL NAA+PROBE: NOT DETECTED
HPIV1 RNA SPEC QL NAA+PROBE: NOT DETECTED
HPIV2 RNA SPEC QL NAA+PROBE: NOT DETECTED
HPIV3 RNA SPEC QL NAA+PROBE: NOT DETECTED
HPIV4 RNA SPEC QL NAA+PROBE: NOT DETECTED
IMM GRANULOCYTES # BLD AUTO: 0 K/UL (ref 0–0.04)
IMM GRANULOCYTES # BLD AUTO: 0 K/UL (ref 0–0.04)
IMM GRANULOCYTES # BLD AUTO: 0.1 K/UL (ref 0–0.04)
IMM GRANULOCYTES NFR BLD AUTO: 0 % (ref 0–0.5)
IMM GRANULOCYTES NFR BLD AUTO: 1 % (ref 0–0.5)
INR PPP: 1.1 (ref 0.9–1.1)
IPAP/PIP, IPAPIP: 12
IPAP/PIP, IPAPIP: 20
IPAP/PIP, IPAPIP: 24
IRON SATN MFR SERPL: 8 % (ref 20–50)
IRON SERPL-MCNC: 30 UG/DL (ref 35–150)
KETONES UR QL STRIP.AUTO: NEGATIVE MG/DL
KETONES UR QL STRIP.AUTO: NEGATIVE MG/DL
LACTATE BLD-SCNC: 0.51 MMOL/L (ref 0.4–2)
LEUKOCYTE ESTERASE UR QL STRIP.AUTO: ABNORMAL
LEUKOCYTE ESTERASE UR QL STRIP.AUTO: ABNORMAL
LIPASE SERPL-CCNC: 97 U/L (ref 73–393)
LYMPHOCYTES # BLD: 0.6 K/UL (ref 0.8–3.5)
LYMPHOCYTES # BLD: 0.7 K/UL (ref 0.8–3.5)
LYMPHOCYTES # BLD: 0.8 K/UL (ref 0.8–3.5)
LYMPHOCYTES NFR BLD: 10 % (ref 12–49)
LYMPHOCYTES NFR BLD: 10 % (ref 12–49)
LYMPHOCYTES NFR BLD: 11 % (ref 12–49)
LYMPHOCYTES NFR BLD: 12 % (ref 12–49)
LYMPHOCYTES NFR BLD: 14 % (ref 12–49)
LYMPHOCYTES NFR BLD: 9 % (ref 12–49)
M PNEUMO DNA SPEC QL NAA+PROBE: NOT DETECTED
MAGNESIUM SERPL-MCNC: 1.8 MG/DL (ref 1.6–2.4)
MAGNESIUM SERPL-MCNC: 1.9 MG/DL (ref 1.6–2.4)
MAGNESIUM SERPL-MCNC: 2 MG/DL (ref 1.6–2.4)
MAGNESIUM SERPL-MCNC: 2.1 MG/DL (ref 1.6–2.4)
MAGNESIUM SERPL-MCNC: 2.2 MG/DL (ref 1.6–2.4)
MCH RBC QN AUTO: 26.9 PG (ref 26–34)
MCH RBC QN AUTO: 27 PG (ref 26–34)
MCH RBC QN AUTO: 27 PG (ref 26–34)
MCH RBC QN AUTO: 27.2 PG (ref 26–34)
MCH RBC QN AUTO: 27.2 PG (ref 26–34)
MCH RBC QN AUTO: 27.3 PG (ref 26–34)
MCH RBC QN AUTO: 27.3 PG (ref 26–34)
MCH RBC QN AUTO: 27.4 PG (ref 26–34)
MCH RBC QN AUTO: 27.5 PG (ref 26–34)
MCH RBC QN AUTO: 27.6 PG (ref 26–34)
MCH RBC QN AUTO: 27.6 PG (ref 26–34)
MCHC RBC AUTO-ENTMCNC: 28.6 G/DL (ref 30–36.5)
MCHC RBC AUTO-ENTMCNC: 28.6 G/DL (ref 30–36.5)
MCHC RBC AUTO-ENTMCNC: 28.7 G/DL (ref 30–36.5)
MCHC RBC AUTO-ENTMCNC: 28.8 G/DL (ref 30–36.5)
MCHC RBC AUTO-ENTMCNC: 28.9 G/DL (ref 30–36.5)
MCHC RBC AUTO-ENTMCNC: 28.9 G/DL (ref 30–36.5)
MCHC RBC AUTO-ENTMCNC: 29 G/DL (ref 30–36.5)
MCHC RBC AUTO-ENTMCNC: 29.1 G/DL (ref 30–36.5)
MCHC RBC AUTO-ENTMCNC: 29.2 G/DL (ref 30–36.5)
MCHC RBC AUTO-ENTMCNC: 29.4 G/DL (ref 30–36.5)
MCHC RBC AUTO-ENTMCNC: 29.4 G/DL (ref 30–36.5)
MCV RBC AUTO: 92.5 FL (ref 80–99)
MCV RBC AUTO: 92.8 FL (ref 80–99)
MCV RBC AUTO: 93.2 FL (ref 80–99)
MCV RBC AUTO: 93.9 FL (ref 80–99)
MCV RBC AUTO: 94.1 FL (ref 80–99)
MCV RBC AUTO: 94.4 FL (ref 80–99)
MCV RBC AUTO: 94.4 FL (ref 80–99)
MCV RBC AUTO: 94.5 FL (ref 80–99)
MCV RBC AUTO: 94.9 FL (ref 80–99)
MCV RBC AUTO: 95.1 FL (ref 80–99)
MCV RBC AUTO: 95.7 FL (ref 80–99)
MONOCYTES # BLD: 0.5 K/UL (ref 0–1)
MONOCYTES # BLD: 0.6 K/UL (ref 0–1)
MONOCYTES # BLD: 0.6 K/UL (ref 0–1)
MONOCYTES # BLD: 0.7 K/UL (ref 0–1)
MONOCYTES # BLD: 0.7 K/UL (ref 0–1)
MONOCYTES # BLD: 0.8 K/UL (ref 0–1)
MONOCYTES NFR BLD: 10 % (ref 5–13)
MONOCYTES NFR BLD: 8 % (ref 5–13)
MONOCYTES NFR BLD: 9 % (ref 5–13)
MONOCYTES NFR BLD: 9 % (ref 5–13)
NEUTS SEG # BLD: 3.8 K/UL (ref 1.8–8)
NEUTS SEG # BLD: 4 K/UL (ref 1.8–8)
NEUTS SEG # BLD: 5.3 K/UL (ref 1.8–8)
NEUTS SEG # BLD: 5.5 K/UL (ref 1.8–8)
NEUTS SEG # BLD: 5.9 K/UL (ref 1.8–8)
NEUTS SEG # BLD: 6.2 K/UL (ref 1.8–8)
NEUTS SEG NFR BLD: 69 % (ref 32–75)
NEUTS SEG NFR BLD: 72 % (ref 32–75)
NEUTS SEG NFR BLD: 74 % (ref 32–75)
NEUTS SEG NFR BLD: 74 % (ref 32–75)
NEUTS SEG NFR BLD: 75 % (ref 32–75)
NEUTS SEG NFR BLD: 76 % (ref 32–75)
NITRITE UR QL STRIP.AUTO: NEGATIVE
NITRITE UR QL STRIP.AUTO: NEGATIVE
NRBC # BLD: 0 K/UL (ref 0–0.01)
NRBC BLD-RTO: 0 PER 100 WBC
PCO2 BLDA: 62 MMHG (ref 35–45)
PCO2 BLDA: 65 MMHG (ref 35–45)
PCO2 BLDA: 66 MMHG (ref 35–45)
PCO2 BLDA: 71 MMHG (ref 35–45)
PCO2 BLDA: 76 MMHG (ref 35–45)
PCO2 BLDA: 77 MMHG (ref 35–45)
PCO2 BLDA: 88 MMHG (ref 35–45)
PH BLDA: 7.23 [PH] (ref 7.35–7.45)
PH BLDA: 7.26 [PH] (ref 7.35–7.45)
PH BLDA: 7.27 [PH] (ref 7.35–7.45)
PH BLDA: 7.32 [PH] (ref 7.35–7.45)
PH BLDA: 7.35 [PH] (ref 7.35–7.45)
PH BLDA: 7.35 [PH] (ref 7.35–7.45)
PH BLDA: 7.39 [PH] (ref 7.35–7.45)
PH UR STRIP: 5 [PH] (ref 5–8)
PH UR STRIP: 5 [PH] (ref 5–8)
PHOSPHATE SERPL-MCNC: 3.2 MG/DL (ref 2.6–4.7)
PHOSPHATE SERPL-MCNC: 3.4 MG/DL (ref 2.6–4.7)
PHOSPHATE SERPL-MCNC: 3.6 MG/DL (ref 2.6–4.7)
PHOSPHATE SERPL-MCNC: 3.8 MG/DL (ref 2.6–4.7)
PHOSPHATE SERPL-MCNC: 4.1 MG/DL (ref 2.6–4.7)
PHOSPHATE SERPL-MCNC: 4.5 MG/DL (ref 2.6–4.7)
PHOSPHATE SERPL-MCNC: 4.5 MG/DL (ref 2.6–4.7)
PHOSPHATE SERPL-MCNC: 5.9 MG/DL (ref 2.6–4.7)
PHOSPHATE SERPL-MCNC: 6.7 MG/DL (ref 2.6–4.7)
PLATELET # BLD AUTO: 153 K/UL (ref 150–400)
PLATELET # BLD AUTO: 155 K/UL (ref 150–400)
PLATELET # BLD AUTO: 157 K/UL (ref 150–400)
PLATELET # BLD AUTO: 161 K/UL (ref 150–400)
PLATELET # BLD AUTO: 168 K/UL (ref 150–400)
PLATELET # BLD AUTO: 168 K/UL (ref 150–400)
PLATELET # BLD AUTO: 169 K/UL (ref 150–400)
PLATELET # BLD AUTO: 169 K/UL (ref 150–400)
PLATELET # BLD AUTO: 171 K/UL (ref 150–400)
PLATELET # BLD AUTO: 176 K/UL (ref 150–400)
PLATELET # BLD AUTO: 181 K/UL (ref 150–400)
PMV BLD AUTO: 10 FL (ref 8.9–12.9)
PMV BLD AUTO: 10.1 FL (ref 8.9–12.9)
PMV BLD AUTO: 8.8 FL (ref 8.9–12.9)
PMV BLD AUTO: 8.9 FL (ref 8.9–12.9)
PMV BLD AUTO: 9.2 FL (ref 8.9–12.9)
PMV BLD AUTO: 9.3 FL (ref 8.9–12.9)
PMV BLD AUTO: 9.4 FL (ref 8.9–12.9)
PMV BLD AUTO: 9.5 FL (ref 8.9–12.9)
PMV BLD AUTO: 9.6 FL (ref 8.9–12.9)
PMV BLD AUTO: 9.7 FL (ref 8.9–12.9)
PMV BLD AUTO: 9.9 FL (ref 8.9–12.9)
PO2 BLDA: 100 MMHG (ref 80–100)
PO2 BLDA: 105 MMHG (ref 80–100)
PO2 BLDA: 107 MMHG (ref 80–100)
PO2 BLDA: 139 MMHG (ref 80–100)
PO2 BLDA: 164 MMHG (ref 80–100)
PO2 BLDA: 60 MMHG (ref 80–100)
PO2 BLDA: 72 MMHG (ref 80–100)
POTASSIUM BLD-SCNC: 4.4 MMOL/L (ref 3.5–5.1)
POTASSIUM SERPL-SCNC: 4.1 MMOL/L (ref 3.5–5.1)
POTASSIUM SERPL-SCNC: 4.4 MMOL/L (ref 3.5–5.1)
POTASSIUM SERPL-SCNC: 4.4 MMOL/L (ref 3.5–5.1)
POTASSIUM SERPL-SCNC: 4.5 MMOL/L (ref 3.5–5.1)
POTASSIUM SERPL-SCNC: 4.5 MMOL/L (ref 3.5–5.1)
POTASSIUM SERPL-SCNC: 4.6 MMOL/L (ref 3.5–5.1)
POTASSIUM SERPL-SCNC: 4.6 MMOL/L (ref 3.5–5.1)
POTASSIUM SERPL-SCNC: 4.8 MMOL/L (ref 3.5–5.1)
POTASSIUM SERPL-SCNC: 5.2 MMOL/L (ref 3.5–5.1)
POTASSIUM SERPL-SCNC: 5.6 MMOL/L (ref 3.5–5.1)
PROT SERPL-MCNC: 6.5 G/DL (ref 6.4–8.2)
PROT SERPL-MCNC: 6.7 G/DL (ref 6.4–8.2)
PROT UR STRIP-MCNC: 100 MG/DL
PROT UR STRIP-MCNC: 100 MG/DL
PROTHROMBIN TIME: 10.7 SEC (ref 9–11.1)
Q-T INTERVAL, ECG07: 422 MS
QRS DURATION, ECG06: 146 MS
QTC CALCULATION (BEZET), ECG08: 486 MS
RBC # BLD AUTO: 2.49 M/UL (ref 4.1–5.7)
RBC # BLD AUTO: 2.68 M/UL (ref 4.1–5.7)
RBC # BLD AUTO: 2.68 M/UL (ref 4.1–5.7)
RBC # BLD AUTO: 2.7 M/UL (ref 4.1–5.7)
RBC # BLD AUTO: 2.75 M/UL (ref 4.1–5.7)
RBC # BLD AUTO: 2.75 M/UL (ref 4.1–5.7)
RBC # BLD AUTO: 2.79 M/UL (ref 4.1–5.7)
RBC # BLD AUTO: 2.8 M/UL (ref 4.1–5.7)
RBC # BLD AUTO: 2.88 M/UL (ref 4.1–5.7)
RBC # BLD AUTO: 2.89 M/UL (ref 4.1–5.7)
RBC # BLD AUTO: 2.93 M/UL (ref 4.1–5.7)
RBC #/AREA URNS HPF: ABNORMAL /HPF (ref 0–5)
RBC #/AREA URNS HPF: ABNORMAL /HPF (ref 0–5)
RBC MORPH BLD: ABNORMAL
RSV RNA SPEC QL NAA+PROBE: NOT DETECTED
RV+EV RNA SPEC QL NAA+PROBE: NOT DETECTED
SAMPLES BEING HELD,HOLD: NORMAL
SAO2 % BLD: 90 % (ref 92–97)
SAO2 % BLD: 92 % (ref 92–97)
SAO2 % BLD: 97 % (ref 92–97)
SAO2 % BLD: 99 % (ref 92–97)
SAO2 % BLD: 99 % (ref 92–97)
SAO2% DEVICE SAO2% SENSOR NAME: ABNORMAL
SERVICE CMNT-IMP: ABNORMAL
SERVICE CMNT-IMP: NORMAL
SODIUM BLD-SCNC: 138 MMOL/L (ref 136–145)
SODIUM SERPL-SCNC: 130 MMOL/L (ref 136–145)
SODIUM SERPL-SCNC: 130 MMOL/L (ref 136–145)
SODIUM SERPL-SCNC: 132 MMOL/L (ref 136–145)
SODIUM SERPL-SCNC: 135 MMOL/L (ref 136–145)
SODIUM SERPL-SCNC: 138 MMOL/L (ref 136–145)
SODIUM SERPL-SCNC: 140 MMOL/L (ref 136–145)
SODIUM SERPL-SCNC: 140 MMOL/L (ref 136–145)
SP GR UR REFRACTOMETRY: 1.01 (ref 1–1.03)
SP GR UR REFRACTOMETRY: 1.01 (ref 1–1.03)
SPECIMEN EXP DATE BLD: NORMAL
SPECIMEN EXP DATE BLD: NORMAL
SPECIMEN SITE: ABNORMAL
STATUS OF UNIT,%ST: NORMAL
T4 FREE SERPL-MCNC: 1.2 NG/DL (ref 0.8–1.5)
THERAPEUTIC RANGE,PTTT: NORMAL SECS (ref 58–77)
TIBC SERPL-MCNC: 371 UG/DL (ref 250–450)
TROPONIN I BLD-MCNC: <0.04 NG/ML (ref 0–0.08)
TROPONIN I SERPL-MCNC: <0.05 NG/ML
TROPONIN I SERPL-MCNC: <0.05 NG/ML
TSH SERPL DL<=0.05 MIU/L-ACNC: 0.68 UIU/ML (ref 0.36–3.74)
UA: UC IF INDICATED,UAUC: ABNORMAL
UNIT DIVISION, %UDIV: 0
UR CULT HOLD, URHOLD: NORMAL
UROBILINOGEN UR QL STRIP.AUTO: 0.2 EU/DL (ref 0.2–1)
UROBILINOGEN UR QL STRIP.AUTO: 0.2 EU/DL (ref 0.2–1)
VENTRICULAR RATE, ECG03: 80 BPM
VT SETTING VENT: 568 ML
WBC # BLD AUTO: 4.4 K/UL (ref 4.1–11.1)
WBC # BLD AUTO: 4.9 K/UL (ref 4.1–11.1)
WBC # BLD AUTO: 4.9 K/UL (ref 4.1–11.1)
WBC # BLD AUTO: 5.3 K/UL (ref 4.1–11.1)
WBC # BLD AUTO: 5.4 K/UL (ref 4.1–11.1)
WBC # BLD AUTO: 5.5 K/UL (ref 4.1–11.1)
WBC # BLD AUTO: 6.5 K/UL (ref 4.1–11.1)
WBC # BLD AUTO: 7.4 K/UL (ref 4.1–11.1)
WBC # BLD AUTO: 7.5 K/UL (ref 4.1–11.1)
WBC # BLD AUTO: 7.9 K/UL (ref 4.1–11.1)
WBC # BLD AUTO: 8.1 K/UL (ref 4.1–11.1)
WBC URNS QL MICRO: ABNORMAL /HPF (ref 0–4)
WBC URNS QL MICRO: ABNORMAL /HPF (ref 0–4)
YEAST BUDDING URNS QL: PRESENT

## 2019-01-01 PROCEDURE — 36415 COLL VENOUS BLD VENIPUNCTURE: CPT

## 2019-01-01 PROCEDURE — 74011250636 HC RX REV CODE- 250/636: Performed by: INTERNAL MEDICINE

## 2019-01-01 PROCEDURE — 94664 DEMO&/EVAL PT USE INHALER: CPT

## 2019-01-01 PROCEDURE — P9040 RBC LEUKOREDUCED IRRADIATED: HCPCS

## 2019-01-01 PROCEDURE — 74011000250 HC RX REV CODE- 250: Performed by: INTERNAL MEDICINE

## 2019-01-01 PROCEDURE — 77010033678 HC OXYGEN DAILY

## 2019-01-01 PROCEDURE — 94640 AIRWAY INHALATION TREATMENT: CPT

## 2019-01-01 PROCEDURE — 82962 GLUCOSE BLOOD TEST: CPT

## 2019-01-01 PROCEDURE — 87633 RESP VIRUS 12-25 TARGETS: CPT

## 2019-01-01 PROCEDURE — 65660000000 HC RM CCU STEPDOWN

## 2019-01-01 PROCEDURE — 77030012879 HC MSK CPAP FLL FAC PHIL -B

## 2019-01-01 PROCEDURE — 74011000258 HC RX REV CODE- 258: Performed by: INTERNAL MEDICINE

## 2019-01-01 PROCEDURE — 74011250637 HC RX REV CODE- 250/637: Performed by: FAMILY MEDICINE

## 2019-01-01 PROCEDURE — 74011250637 HC RX REV CODE- 250/637: Performed by: STUDENT IN AN ORGANIZED HEALTH CARE EDUCATION/TRAINING PROGRAM

## 2019-01-01 PROCEDURE — 84100 ASSAY OF PHOSPHORUS: CPT

## 2019-01-01 PROCEDURE — P9016 RBC LEUKOCYTES REDUCED: HCPCS

## 2019-01-01 PROCEDURE — 71045 X-RAY EXAM CHEST 1 VIEW: CPT

## 2019-01-01 PROCEDURE — 94660 CPAP INITIATION&MGMT: CPT

## 2019-01-01 PROCEDURE — 81001 URINALYSIS AUTO W/SCOPE: CPT

## 2019-01-01 PROCEDURE — 83690 ASSAY OF LIPASE: CPT

## 2019-01-01 PROCEDURE — 99285 EMERGENCY DEPT VISIT HI MDM: CPT

## 2019-01-01 PROCEDURE — 82803 BLOOD GASES ANY COMBINATION: CPT

## 2019-01-01 PROCEDURE — 74011636637 HC RX REV CODE- 636/637: Performed by: INTERNAL MEDICINE

## 2019-01-01 PROCEDURE — 83735 ASSAY OF MAGNESIUM: CPT

## 2019-01-01 PROCEDURE — 74011250637 HC RX REV CODE- 250/637: Performed by: INTERNAL MEDICINE

## 2019-01-01 PROCEDURE — 93005 ELECTROCARDIOGRAM TRACING: CPT

## 2019-01-01 PROCEDURE — 85027 COMPLETE CBC AUTOMATED: CPT

## 2019-01-01 PROCEDURE — 80069 RENAL FUNCTION PANEL: CPT

## 2019-01-01 PROCEDURE — C9113 INJ PANTOPRAZOLE SODIUM, VIA: HCPCS | Performed by: INTERNAL MEDICINE

## 2019-01-01 PROCEDURE — 30233N1 TRANSFUSION OF NONAUTOLOGOUS RED BLOOD CELLS INTO PERIPHERAL VEIN, PERCUTANEOUS APPROACH: ICD-10-PCS | Performed by: INTERNAL MEDICINE

## 2019-01-01 PROCEDURE — 83880 ASSAY OF NATRIURETIC PEPTIDE: CPT

## 2019-01-01 PROCEDURE — 84484 ASSAY OF TROPONIN QUANT: CPT

## 2019-01-01 PROCEDURE — 5A09357 ASSISTANCE WITH RESPIRATORY VENTILATION, LESS THAN 24 CONSECUTIVE HOURS, CONTINUOUS POSITIVE AIRWAY PRESSURE: ICD-10-PCS | Performed by: INTERNAL MEDICINE

## 2019-01-01 PROCEDURE — 84443 ASSAY THYROID STIM HORMONE: CPT

## 2019-01-01 PROCEDURE — 77030020186 HC BOOT HL PROTCT SAGE -B

## 2019-01-01 PROCEDURE — 74011000258 HC RX REV CODE- 258

## 2019-01-01 PROCEDURE — 77030011256 HC DRSG MEPILEX <16IN NO BORD MOLN -A

## 2019-01-01 PROCEDURE — 94760 N-INVAS EAR/PLS OXIMETRY 1: CPT

## 2019-01-01 PROCEDURE — 77030012930 HC DRSG ANTIMIC COLO -B

## 2019-01-01 PROCEDURE — 85025 COMPLETE CBC W/AUTO DIFF WBC: CPT

## 2019-01-01 PROCEDURE — 0656 HSPC GENERAL INPATIENT

## 2019-01-01 PROCEDURE — 3336500001 HSPC ELECTION

## 2019-01-01 PROCEDURE — 83036 HEMOGLOBIN GLYCOSYLATED A1C: CPT

## 2019-01-01 PROCEDURE — 99223 1ST HOSP IP/OBS HIGH 75: CPT | Performed by: INTERNAL MEDICINE

## 2019-01-01 PROCEDURE — 74011000250 HC RX REV CODE- 250

## 2019-01-01 PROCEDURE — 84439 ASSAY OF FREE THYROXINE: CPT

## 2019-01-01 PROCEDURE — 77030029684 HC NEB SM VOL KT MONA -A

## 2019-01-01 PROCEDURE — 92610 EVALUATE SWALLOWING FUNCTION: CPT

## 2019-01-01 PROCEDURE — 94762 N-INVAS EAR/PLS OXIMTRY CONT: CPT

## 2019-01-01 PROCEDURE — 74011250636 HC RX REV CODE- 250/636: Performed by: EMERGENCY MEDICINE

## 2019-01-01 PROCEDURE — 65270000029 HC RM PRIVATE

## 2019-01-01 PROCEDURE — 80053 COMPREHEN METABOLIC PANEL: CPT

## 2019-01-01 PROCEDURE — 96360 HYDRATION IV INFUSION INIT: CPT

## 2019-01-01 PROCEDURE — 77030013046 HC MSK CPAP NSL PHIL -B

## 2019-01-01 PROCEDURE — 86900 BLOOD TYPING SEROLOGIC ABO: CPT

## 2019-01-01 PROCEDURE — 85610 PROTHROMBIN TIME: CPT

## 2019-01-01 PROCEDURE — 80047 BASIC METABLC PNL IONIZED CA: CPT

## 2019-01-01 PROCEDURE — 36600 WITHDRAWAL OF ARTERIAL BLOOD: CPT

## 2019-01-01 PROCEDURE — 87077 CULTURE AEROBIC IDENTIFY: CPT

## 2019-01-01 PROCEDURE — 76450000000

## 2019-01-01 PROCEDURE — C8929 TTE W OR WO FOL WCON,DOPPLER: HCPCS

## 2019-01-01 PROCEDURE — 85730 THROMBOPLASTIN TIME PARTIAL: CPT

## 2019-01-01 PROCEDURE — 74011250636 HC RX REV CODE- 250/636: Performed by: FAMILY MEDICINE

## 2019-01-01 PROCEDURE — 77030013032 HC MSK BPAP/CPAP FISP -B

## 2019-01-01 PROCEDURE — 87086 URINE CULTURE/COLONY COUNT: CPT

## 2019-01-01 PROCEDURE — 82728 ASSAY OF FERRITIN: CPT

## 2019-01-01 PROCEDURE — 83540 ASSAY OF IRON: CPT

## 2019-01-01 PROCEDURE — G0299 HHS/HOSPICE OF RN EA 15 MIN: HCPCS

## 2019-01-01 PROCEDURE — 87186 SC STD MICRODIL/AGAR DIL: CPT

## 2019-01-01 PROCEDURE — 77030038269 HC DRN EXT URIN PURWCK BARD -A

## 2019-01-01 PROCEDURE — 80048 BASIC METABOLIC PNL TOTAL CA: CPT

## 2019-01-01 PROCEDURE — 87040 BLOOD CULTURE FOR BACTERIA: CPT

## 2019-01-01 PROCEDURE — 36430 TRANSFUSION BLD/BLD COMPNT: CPT

## 2019-01-01 PROCEDURE — 74011250636 HC RX REV CODE- 250/636: Performed by: STUDENT IN AN ORGANIZED HEALTH CARE EDUCATION/TRAINING PROGRAM

## 2019-01-01 PROCEDURE — 86923 COMPATIBILITY TEST ELECTRIC: CPT

## 2019-01-01 PROCEDURE — 83605 ASSAY OF LACTIC ACID: CPT

## 2019-01-01 PROCEDURE — 77030012793 HC CIRC VNTLTR FISP -B

## 2019-01-01 PROCEDURE — 94761 N-INVAS EAR/PLS OXIMETRY MLT: CPT

## 2019-01-01 RX ORDER — FERROUS GLUCONATE 324(38)MG
1 TABLET ORAL
Status: DISCONTINUED | OUTPATIENT
Start: 2019-01-01 | End: 2019-01-01

## 2019-01-01 RX ORDER — DEXTROSE MONOHYDRATE 25 G/50ML
25-50 INJECTION, SOLUTION INTRAVENOUS AS NEEDED
Status: DISCONTINUED | OUTPATIENT
Start: 2019-01-01 | End: 2019-01-01 | Stop reason: CLARIF

## 2019-01-01 RX ORDER — SODIUM CHLORIDE 0.9 % (FLUSH) 0.9 %
5-40 SYRINGE (ML) INJECTION AS NEEDED
Status: DISCONTINUED | OUTPATIENT
Start: 2019-01-01 | End: 2019-01-01 | Stop reason: HOSPADM

## 2019-01-01 RX ORDER — DEXTROSE MONOHYDRATE 25 G/50ML
INJECTION, SOLUTION INTRAVENOUS
Status: COMPLETED
Start: 2019-01-01 | End: 2019-01-01

## 2019-01-01 RX ORDER — HALOPERIDOL 2 MG/ML
0.5 SOLUTION ORAL
Status: DISCONTINUED | OUTPATIENT
Start: 2019-01-01 | End: 2019-01-01 | Stop reason: HOSPADM

## 2019-01-01 RX ORDER — BUMETANIDE 2 MG/1
4 TABLET ORAL 2 TIMES DAILY
COMMUNITY
End: 2019-01-01

## 2019-01-01 RX ORDER — SPIRONOLACTONE 25 MG/1
25 TABLET ORAL 2 TIMES DAILY
Status: DISCONTINUED | OUTPATIENT
Start: 2019-01-01 | End: 2019-01-01

## 2019-01-01 RX ORDER — INSULIN LISPRO 100 [IU]/ML
INJECTION, SOLUTION INTRAVENOUS; SUBCUTANEOUS
Status: DISCONTINUED | OUTPATIENT
Start: 2019-01-01 | End: 2019-01-01 | Stop reason: HOSPADM

## 2019-01-01 RX ORDER — HALOPERIDOL 5 MG/ML
2 INJECTION INTRAMUSCULAR
Status: DISCONTINUED | OUTPATIENT
Start: 2019-01-01 | End: 2019-01-01 | Stop reason: HOSPADM

## 2019-01-01 RX ORDER — BUDESONIDE 0.5 MG/2ML
500 INHALANT ORAL
Status: DISCONTINUED | OUTPATIENT
Start: 2019-01-01 | End: 2019-01-01 | Stop reason: HOSPADM

## 2019-01-01 RX ORDER — CALCITRIOL 0.25 UG/1
0.25 CAPSULE ORAL
Status: DISCONTINUED | OUTPATIENT
Start: 2019-01-01 | End: 2019-01-01

## 2019-01-01 RX ORDER — ALBUTEROL SULFATE 0.83 MG/ML
2.5 SOLUTION RESPIRATORY (INHALATION)
Status: DISCONTINUED | OUTPATIENT
Start: 2019-01-01 | End: 2019-01-01 | Stop reason: HOSPADM

## 2019-01-01 RX ORDER — NYSTATIN 100000 [USP'U]/G
POWDER TOPICAL 2 TIMES DAILY
Status: DISCONTINUED | OUTPATIENT
Start: 2019-01-01 | End: 2019-01-01 | Stop reason: HOSPADM

## 2019-01-01 RX ORDER — LORAZEPAM 2 MG/ML
0.5 INJECTION INTRAMUSCULAR
Status: DISCONTINUED | OUTPATIENT
Start: 2019-01-01 | End: 2019-01-01 | Stop reason: HOSPADM

## 2019-01-01 RX ORDER — ONDANSETRON 2 MG/ML
4 INJECTION INTRAMUSCULAR; INTRAVENOUS
Status: DISCONTINUED | OUTPATIENT
Start: 2019-01-01 | End: 2019-01-01 | Stop reason: HOSPADM

## 2019-01-01 RX ORDER — SEVELAMER CARBONATE 800 MG/1
1600 TABLET, FILM COATED ORAL
Status: DISCONTINUED | OUTPATIENT
Start: 2019-01-01 | End: 2019-01-01

## 2019-01-01 RX ORDER — IPRATROPIUM BROMIDE AND ALBUTEROL SULFATE 2.5; .5 MG/3ML; MG/3ML
3 SOLUTION RESPIRATORY (INHALATION)
Status: DISCONTINUED | OUTPATIENT
Start: 2019-01-01 | End: 2019-01-01

## 2019-01-01 RX ORDER — ASPIRIN 81 MG/1
81 TABLET ORAL DAILY
Status: DISCONTINUED | OUTPATIENT
Start: 2019-01-01 | End: 2019-01-01

## 2019-01-01 RX ORDER — DOCUSATE SODIUM 100 MG/1
100 CAPSULE, LIQUID FILLED ORAL 2 TIMES DAILY
Status: DISCONTINUED | OUTPATIENT
Start: 2019-01-01 | End: 2019-01-01

## 2019-01-01 RX ORDER — LEVOFLOXACIN 5 MG/ML
750 INJECTION, SOLUTION INTRAVENOUS
Status: COMPLETED | OUTPATIENT
Start: 2019-01-01 | End: 2019-01-01

## 2019-01-01 RX ORDER — SODIUM POLYSTYRENE SULFONATE 15 G/60ML
30 SUSPENSION ORAL; RECTAL
Status: COMPLETED | OUTPATIENT
Start: 2019-01-01 | End: 2019-01-01

## 2019-01-01 RX ORDER — SEVELAMER CARBONATE 800 MG/1
1600 TABLET, FILM COATED ORAL
COMMUNITY
End: 2019-01-01

## 2019-01-01 RX ORDER — HYDRALAZINE HYDROCHLORIDE 10 MG/1
10 TABLET, FILM COATED ORAL 3 TIMES DAILY
Status: DISCONTINUED | OUTPATIENT
Start: 2019-01-01 | End: 2019-01-01

## 2019-01-01 RX ORDER — HYDROMORPHONE HYDROCHLORIDE 2 MG/ML
1 INJECTION, SOLUTION INTRAMUSCULAR; INTRAVENOUS; SUBCUTANEOUS
Status: DISCONTINUED | OUTPATIENT
Start: 2019-01-01 | End: 2019-01-01 | Stop reason: HOSPADM

## 2019-01-01 RX ORDER — DEXTROSE MONOHYDRATE 25 G/50ML
25 INJECTION, SOLUTION INTRAVENOUS
Status: COMPLETED | OUTPATIENT
Start: 2019-01-01 | End: 2019-01-01

## 2019-01-01 RX ORDER — DEXTROSE 50 % IN WATER (D50W) INTRAVENOUS SYRINGE
Status: COMPLETED
Start: 2019-01-01 | End: 2019-01-01

## 2019-01-01 RX ORDER — POLYETHYLENE GLYCOL 3350 17 G/17G
17 POWDER, FOR SOLUTION ORAL
Status: DISPENSED | OUTPATIENT
Start: 2019-01-01 | End: 2019-01-01

## 2019-01-01 RX ORDER — INSULIN LISPRO 100 [IU]/ML
INJECTION, SOLUTION INTRAVENOUS; SUBCUTANEOUS
Status: DISCONTINUED | OUTPATIENT
Start: 2019-01-01 | End: 2019-01-01

## 2019-01-01 RX ORDER — KETOROLAC TROMETHAMINE 30 MG/ML
15 INJECTION, SOLUTION INTRAMUSCULAR; INTRAVENOUS
Status: DISCONTINUED | OUTPATIENT
Start: 2019-01-01 | End: 2019-01-01 | Stop reason: HOSPADM

## 2019-01-01 RX ORDER — PANTOPRAZOLE SODIUM 40 MG/1
40 TABLET, DELAYED RELEASE ORAL
Status: DISCONTINUED | OUTPATIENT
Start: 2019-01-01 | End: 2019-01-01

## 2019-01-01 RX ORDER — FERROUS GLUCONATE 324(38)MG
324 TABLET ORAL
COMMUNITY
End: 2019-01-01

## 2019-01-01 RX ORDER — ALPRAZOLAM 0.5 MG/1
0.5 TABLET ORAL
Status: DISCONTINUED | OUTPATIENT
Start: 2019-01-01 | End: 2019-01-01 | Stop reason: HOSPADM

## 2019-01-01 RX ORDER — GABAPENTIN 100 MG/1
100 CAPSULE ORAL 3 TIMES DAILY
Status: DISCONTINUED | OUTPATIENT
Start: 2019-01-01 | End: 2019-01-01

## 2019-01-01 RX ORDER — GLIMEPIRIDE 1 MG/1
2 TABLET ORAL
Status: DISCONTINUED | OUTPATIENT
Start: 2019-01-01 | End: 2019-01-01

## 2019-01-01 RX ORDER — LORAZEPAM 2 MG/ML
0.5 INJECTION INTRAMUSCULAR
Status: DISCONTINUED | OUTPATIENT
Start: 2019-01-01 | End: 2019-01-01

## 2019-01-01 RX ORDER — SODIUM CHLORIDE 0.9 % (FLUSH) 0.9 %
5-40 SYRINGE (ML) INJECTION EVERY 8 HOURS
Status: DISCONTINUED | OUTPATIENT
Start: 2019-01-01 | End: 2019-01-01 | Stop reason: HOSPADM

## 2019-01-01 RX ORDER — CALCITRIOL 0.25 UG/1
0.25 CAPSULE ORAL
COMMUNITY
End: 2019-01-01

## 2019-01-01 RX ORDER — MAGNESIUM SULFATE 100 %
4 CRYSTALS MISCELLANEOUS AS NEEDED
Status: DISCONTINUED | OUTPATIENT
Start: 2019-01-01 | End: 2019-01-01 | Stop reason: HOSPADM

## 2019-01-01 RX ORDER — FUROSEMIDE 10 MG/ML
40 INJECTION INTRAMUSCULAR; INTRAVENOUS
Status: DISCONTINUED | OUTPATIENT
Start: 2019-01-01 | End: 2019-01-01

## 2019-01-01 RX ORDER — SCOLOPAMINE TRANSDERMAL SYSTEM 1 MG/1
1 PATCH, EXTENDED RELEASE TRANSDERMAL
Status: DISCONTINUED | OUTPATIENT
Start: 2019-01-01 | End: 2019-01-01 | Stop reason: HOSPADM

## 2019-01-01 RX ORDER — GLYCOPYRROLATE 0.2 MG/ML
0.1 INJECTION INTRAMUSCULAR; INTRAVENOUS
Status: DISCONTINUED | OUTPATIENT
Start: 2019-01-01 | End: 2019-01-01

## 2019-01-01 RX ORDER — PANTOPRAZOLE SODIUM 40 MG/1
40 TABLET, DELAYED RELEASE ORAL 2 TIMES DAILY
Status: DISCONTINUED | OUTPATIENT
Start: 2019-01-01 | End: 2019-01-01 | Stop reason: SDUPTHER

## 2019-01-01 RX ORDER — LORAZEPAM 2 MG/ML
0.5 INJECTION INTRAMUSCULAR EVERY 4 HOURS
Status: DISCONTINUED | OUTPATIENT
Start: 2019-01-01 | End: 2019-01-01 | Stop reason: HOSPADM

## 2019-01-01 RX ORDER — INSULIN ASPART 100 [IU]/ML
INJECTION, SOLUTION INTRAVENOUS; SUBCUTANEOUS
COMMUNITY
End: 2019-01-01

## 2019-01-01 RX ORDER — HEPARIN SODIUM 5000 [USP'U]/ML
5000 INJECTION, SOLUTION INTRAVENOUS; SUBCUTANEOUS EVERY 8 HOURS
Status: DISCONTINUED | OUTPATIENT
Start: 2019-01-01 | End: 2019-01-01

## 2019-01-01 RX ORDER — ARFORMOTEROL TARTRATE 15 UG/2ML
15 SOLUTION RESPIRATORY (INHALATION)
Status: DISCONTINUED | OUTPATIENT
Start: 2019-01-01 | End: 2019-01-01 | Stop reason: HOSPADM

## 2019-01-01 RX ORDER — HYDROMORPHONE HYDROCHLORIDE 2 MG/ML
0.5 INJECTION, SOLUTION INTRAMUSCULAR; INTRAVENOUS; SUBCUTANEOUS
Status: DISCONTINUED | OUTPATIENT
Start: 2019-01-01 | End: 2019-01-01 | Stop reason: HOSPADM

## 2019-01-01 RX ORDER — DEXTROSE MONOHYDRATE 100 MG/ML
125-250 INJECTION, SOLUTION INTRAVENOUS AS NEEDED
Status: DISCONTINUED | OUTPATIENT
Start: 2019-01-01 | End: 2019-01-01 | Stop reason: HOSPADM

## 2019-01-01 RX ORDER — SODIUM CHLORIDE 9 MG/ML
250 INJECTION, SOLUTION INTRAVENOUS AS NEEDED
Status: DISCONTINUED | OUTPATIENT
Start: 2019-01-01 | End: 2019-01-01 | Stop reason: HOSPADM

## 2019-01-01 RX ORDER — IPRATROPIUM BROMIDE AND ALBUTEROL SULFATE 2.5; .5 MG/3ML; MG/3ML
3 SOLUTION RESPIRATORY (INHALATION)
Status: DISCONTINUED | OUTPATIENT
Start: 2019-01-01 | End: 2019-01-01 | Stop reason: HOSPADM

## 2019-01-01 RX ORDER — PANTOPRAZOLE SODIUM 40 MG/1
40 TABLET, DELAYED RELEASE ORAL 2 TIMES DAILY
COMMUNITY
End: 2019-01-01

## 2019-01-01 RX ORDER — GLIMEPIRIDE 2 MG/1
2 TABLET ORAL 2 TIMES DAILY
COMMUNITY
End: 2019-01-01

## 2019-01-01 RX ORDER — METOLAZONE 5 MG/1
5 TABLET ORAL 2 TIMES DAILY
Status: DISCONTINUED | OUTPATIENT
Start: 2019-01-01 | End: 2019-01-01

## 2019-01-01 RX ORDER — BUMETANIDE 0.25 MG/ML
2 INJECTION INTRAMUSCULAR; INTRAVENOUS EVERY 12 HOURS
Status: DISCONTINUED | OUTPATIENT
Start: 2019-01-01 | End: 2019-01-01

## 2019-01-01 RX ORDER — ISOSORBIDE DINITRATE 10 MG/1
20 TABLET ORAL 3 TIMES DAILY
Status: DISCONTINUED | OUTPATIENT
Start: 2019-01-01 | End: 2019-01-01

## 2019-01-01 RX ORDER — POLYETHYLENE GLYCOL 3350 17 G/17G
17 POWDER, FOR SOLUTION ORAL
Status: DISCONTINUED | OUTPATIENT
Start: 2019-01-01 | End: 2019-01-01 | Stop reason: HOSPADM

## 2019-01-01 RX ORDER — GLYCOPYRROLATE 0.2 MG/ML
0.2 INJECTION INTRAMUSCULAR; INTRAVENOUS
Status: DISCONTINUED | OUTPATIENT
Start: 2019-01-01 | End: 2019-01-01 | Stop reason: HOSPADM

## 2019-01-01 RX ORDER — GLIMEPIRIDE 1 MG/1
2 TABLET ORAL 2 TIMES DAILY
Status: DISCONTINUED | OUTPATIENT
Start: 2019-01-01 | End: 2019-01-01

## 2019-01-01 RX ORDER — ACETAMINOPHEN 325 MG/1
650 TABLET ORAL
Status: DISCONTINUED | OUTPATIENT
Start: 2019-01-01 | End: 2019-01-01 | Stop reason: HOSPADM

## 2019-01-01 RX ADMIN — BUDESONIDE 500 MCG: 0.5 INHALANT RESPIRATORY (INHALATION) at 08:44

## 2019-01-01 RX ADMIN — INSULIN LISPRO 3 UNITS: 100 INJECTION, SOLUTION INTRAVENOUS; SUBCUTANEOUS at 16:10

## 2019-01-01 RX ADMIN — FERROUS GLUCONATE 1 TABLET: 324 TABLET ORAL at 06:41

## 2019-01-01 RX ADMIN — DOCUSATE SODIUM 100 MG: 100 CAPSULE ORAL at 17:49

## 2019-01-01 RX ADMIN — SEVELAMER CARBONATE 1600 MG: 800 TABLET, FILM COATED ORAL at 17:58

## 2019-01-01 RX ADMIN — SEVELAMER CARBONATE 1600 MG: 800 TABLET, FILM COATED ORAL at 12:26

## 2019-01-01 RX ADMIN — METOLAZONE 5 MG: 5 TABLET ORAL at 18:09

## 2019-01-01 RX ADMIN — PANTOPRAZOLE SODIUM 40 MG: 40 TABLET, DELAYED RELEASE ORAL at 17:58

## 2019-01-01 RX ADMIN — PATIROMER 8.4 G: 8.4 POWDER, FOR SUSPENSION ORAL at 09:24

## 2019-01-01 RX ADMIN — NYSTATIN: 100000 POWDER TOPICAL at 10:26

## 2019-01-01 RX ADMIN — Medication 10 ML: at 22:00

## 2019-01-01 RX ADMIN — ISOSORBIDE DINITRATE 20 MG: 20 TABLET ORAL at 10:22

## 2019-01-01 RX ADMIN — ISOSORBIDE DINITRATE 20 MG: 20 TABLET ORAL at 08:14

## 2019-01-01 RX ADMIN — Medication 10 ML: at 21:45

## 2019-01-01 RX ADMIN — ISOSORBIDE DINITRATE 20 MG: 20 TABLET ORAL at 22:16

## 2019-01-01 RX ADMIN — NYSTATIN: 100000 POWDER TOPICAL at 18:00

## 2019-01-01 RX ADMIN — METOLAZONE 5 MG: 5 TABLET ORAL at 09:59

## 2019-01-01 RX ADMIN — DOCUSATE SODIUM 100 MG: 100 CAPSULE ORAL at 10:02

## 2019-01-01 RX ADMIN — DOCUSATE SODIUM 100 MG: 100 CAPSULE ORAL at 18:00

## 2019-01-01 RX ADMIN — HYDRALAZINE HYDROCHLORIDE 10 MG: 10 TABLET, FILM COATED ORAL at 21:45

## 2019-01-01 RX ADMIN — DOCUSATE SODIUM 100 MG: 100 CAPSULE ORAL at 08:15

## 2019-01-01 RX ADMIN — FERROUS GLUCONATE 1 TABLET: 324 TABLET ORAL at 06:03

## 2019-01-01 RX ADMIN — FERROUS GLUCONATE 1 TABLET: 324 TABLET ORAL at 08:48

## 2019-01-01 RX ADMIN — ASPIRIN 81 MG: 81 TABLET, COATED ORAL at 10:21

## 2019-01-01 RX ADMIN — SODIUM CHLORIDE 40 MG: 9 INJECTION INTRAMUSCULAR; INTRAVENOUS; SUBCUTANEOUS at 21:15

## 2019-01-01 RX ADMIN — GLIMEPIRIDE 2 MG: 1 TABLET ORAL at 09:05

## 2019-01-01 RX ADMIN — BUDESONIDE 500 MCG: 0.5 INHALANT RESPIRATORY (INHALATION) at 20:14

## 2019-01-01 RX ADMIN — ISOSORBIDE DINITRATE 20 MG: 20 TABLET ORAL at 21:45

## 2019-01-01 RX ADMIN — DOCUSATE SODIUM 100 MG: 100 CAPSULE ORAL at 08:48

## 2019-01-01 RX ADMIN — BUDESONIDE 500 MCG: 0.5 INHALANT RESPIRATORY (INHALATION) at 20:09

## 2019-01-01 RX ADMIN — NYSTATIN: 100000 POWDER TOPICAL at 08:47

## 2019-01-01 RX ADMIN — SEVELAMER CARBONATE 1600 MG: 800 TABLET, FILM COATED ORAL at 17:13

## 2019-01-01 RX ADMIN — GLIMEPIRIDE 2 MG: 1 TABLET ORAL at 09:16

## 2019-01-01 RX ADMIN — Medication 10 ML: at 21:57

## 2019-01-01 RX ADMIN — GLIMEPIRIDE 2 MG: 1 TABLET ORAL at 17:50

## 2019-01-01 RX ADMIN — HYDRALAZINE HYDROCHLORIDE 10 MG: 10 TABLET, FILM COATED ORAL at 09:59

## 2019-01-01 RX ADMIN — IPRATROPIUM BROMIDE AND ALBUTEROL SULFATE 3 ML: .5; 3 SOLUTION RESPIRATORY (INHALATION) at 20:09

## 2019-01-01 RX ADMIN — SEVELAMER CARBONATE 1600 MG: 800 TABLET, FILM COATED ORAL at 18:09

## 2019-01-01 RX ADMIN — GABAPENTIN 100 MG: 100 CAPSULE ORAL at 21:15

## 2019-01-01 RX ADMIN — GLIMEPIRIDE 2 MG: 1 TABLET ORAL at 18:43

## 2019-01-01 RX ADMIN — NYSTATIN: 100000 POWDER TOPICAL at 18:42

## 2019-01-01 RX ADMIN — ISOSORBIDE DINITRATE 20 MG: 20 TABLET ORAL at 18:09

## 2019-01-01 RX ADMIN — DEXTROSE MONOHYDRATE 25 G: 25 INJECTION, SOLUTION INTRAVENOUS at 04:09

## 2019-01-01 RX ADMIN — ACETAMINOPHEN 650 MG: 325 TABLET ORAL at 03:13

## 2019-01-01 RX ADMIN — HYDROMORPHONE HYDROCHLORIDE 0.5 MG: 2 INJECTION INTRAMUSCULAR; INTRAVENOUS; SUBCUTANEOUS at 00:46

## 2019-01-01 RX ADMIN — BUMETANIDE 2 MG: 0.25 INJECTION INTRAMUSCULAR; INTRAVENOUS at 21:13

## 2019-01-01 RX ADMIN — AZTREONAM 2 G: 2 INJECTION, POWDER, LYOPHILIZED, FOR SOLUTION INTRAMUSCULAR; INTRAVENOUS at 23:21

## 2019-01-01 RX ADMIN — Medication 10 ML: at 03:40

## 2019-01-01 RX ADMIN — Medication 10 ML: at 14:30

## 2019-01-01 RX ADMIN — ACETAMINOPHEN 650 MG: 325 TABLET ORAL at 21:13

## 2019-01-01 RX ADMIN — INSULIN LISPRO 2 UNITS: 100 INJECTION, SOLUTION INTRAVENOUS; SUBCUTANEOUS at 21:13

## 2019-01-01 RX ADMIN — GLIMEPIRIDE 2 MG: 1 TABLET ORAL at 18:09

## 2019-01-01 RX ADMIN — Medication 10 ML: at 22:21

## 2019-01-01 RX ADMIN — NYSTATIN: 100000 POWDER TOPICAL at 17:14

## 2019-01-01 RX ADMIN — BUDESONIDE 500 MCG: 0.5 INHALANT RESPIRATORY (INHALATION) at 08:07

## 2019-01-01 RX ADMIN — CALCITRIOL CAPSULES 0.25 MCG 0.25 MCG: 0.25 CAPSULE ORAL at 03:35

## 2019-01-01 RX ADMIN — IPRATROPIUM BROMIDE AND ALBUTEROL SULFATE 3 ML: .5; 3 SOLUTION RESPIRATORY (INHALATION) at 07:48

## 2019-01-01 RX ADMIN — PANTOPRAZOLE SODIUM 40 MG: 40 TABLET, DELAYED RELEASE ORAL at 06:03

## 2019-01-01 RX ADMIN — GABAPENTIN 100 MG: 100 CAPSULE ORAL at 16:43

## 2019-01-01 RX ADMIN — PANTOPRAZOLE SODIUM 40 MG: 40 TABLET, DELAYED RELEASE ORAL at 16:12

## 2019-01-01 RX ADMIN — IPRATROPIUM BROMIDE AND ALBUTEROL SULFATE 3 ML: .5; 3 SOLUTION RESPIRATORY (INHALATION) at 03:56

## 2019-01-01 RX ADMIN — ACETAMINOPHEN 650 MG: 325 TABLET ORAL at 03:05

## 2019-01-01 RX ADMIN — INSULIN LISPRO 4 UNITS: 100 INJECTION, SOLUTION INTRAVENOUS; SUBCUTANEOUS at 11:30

## 2019-01-01 RX ADMIN — BUMETANIDE 2 MG: 0.25 INJECTION INTRAMUSCULAR; INTRAVENOUS at 10:00

## 2019-01-01 RX ADMIN — SEVELAMER CARBONATE 1600 MG: 800 TABLET, FILM COATED ORAL at 09:05

## 2019-01-01 RX ADMIN — ALPRAZOLAM 0.5 MG: 0.5 TABLET ORAL at 20:11

## 2019-01-01 RX ADMIN — SEVELAMER CARBONATE 1600 MG: 800 TABLET, FILM COATED ORAL at 10:00

## 2019-01-01 RX ADMIN — PANTOPRAZOLE SODIUM 40 MG: 40 TABLET, DELAYED RELEASE ORAL at 18:09

## 2019-01-01 RX ADMIN — HYDRALAZINE HYDROCHLORIDE 10 MG: 10 TABLET, FILM COATED ORAL at 21:56

## 2019-01-01 RX ADMIN — INSULIN LISPRO 2 UNITS: 100 INJECTION, SOLUTION INTRAVENOUS; SUBCUTANEOUS at 11:19

## 2019-01-01 RX ADMIN — BUMETANIDE 2 MG: 0.25 INJECTION INTRAMUSCULAR; INTRAVENOUS at 21:15

## 2019-01-01 RX ADMIN — DOCUSATE SODIUM 100 MG: 100 CAPSULE ORAL at 09:16

## 2019-01-01 RX ADMIN — Medication 5 ML: at 15:24

## 2019-01-01 RX ADMIN — HYDRALAZINE HYDROCHLORIDE 10 MG: 10 TABLET, FILM COATED ORAL at 09:06

## 2019-01-01 RX ADMIN — NYSTATIN: 100000 POWDER TOPICAL at 21:29

## 2019-01-01 RX ADMIN — PERFLUTREN 2 ML: 6.52 INJECTION, SUSPENSION INTRAVENOUS at 08:29

## 2019-01-01 RX ADMIN — ASPIRIN 81 MG: 81 TABLET, COATED ORAL at 08:48

## 2019-01-01 RX ADMIN — SODIUM POLYSTYRENE SULFONATE 30 G: 15 SUSPENSION ORAL; RECTAL at 11:50

## 2019-01-01 RX ADMIN — ISOSORBIDE DINITRATE 20 MG: 20 TABLET ORAL at 23:35

## 2019-01-01 RX ADMIN — HYDRALAZINE HYDROCHLORIDE 10 MG: 10 TABLET, FILM COATED ORAL at 23:35

## 2019-01-01 RX ADMIN — AZTREONAM 2 G: 2 INJECTION, POWDER, LYOPHILIZED, FOR SOLUTION INTRAMUSCULAR; INTRAVENOUS at 15:07

## 2019-01-01 RX ADMIN — DOCUSATE SODIUM 100 MG: 100 CAPSULE ORAL at 10:17

## 2019-01-01 RX ADMIN — ALBUTEROL SULFATE 2.5 MG: 2.5 SOLUTION RESPIRATORY (INHALATION) at 02:39

## 2019-01-01 RX ADMIN — IPRATROPIUM BROMIDE AND ALBUTEROL SULFATE 3 ML: .5; 3 SOLUTION RESPIRATORY (INHALATION) at 19:39

## 2019-01-01 RX ADMIN — ASPIRIN 81 MG: 81 TABLET, COATED ORAL at 08:14

## 2019-01-01 RX ADMIN — DEXTROSE MONOHYDRATE 12.5 G: 25 INJECTION, SOLUTION INTRAVENOUS at 17:20

## 2019-01-01 RX ADMIN — Medication 10 ML: at 23:36

## 2019-01-01 RX ADMIN — INSULIN LISPRO 4 UNITS: 100 INJECTION, SOLUTION INTRAVENOUS; SUBCUTANEOUS at 22:16

## 2019-01-01 RX ADMIN — Medication 10 ML: at 06:05

## 2019-01-01 RX ADMIN — INSULIN LISPRO 4 UNITS: 100 INJECTION, SOLUTION INTRAVENOUS; SUBCUTANEOUS at 10:00

## 2019-01-01 RX ADMIN — ISOSORBIDE DINITRATE 20 MG: 20 TABLET ORAL at 18:13

## 2019-01-01 RX ADMIN — GLIMEPIRIDE 2 MG: 1 TABLET ORAL at 09:59

## 2019-01-01 RX ADMIN — Medication 10 ML: at 17:14

## 2019-01-01 RX ADMIN — NYSTATIN: 100000 POWDER TOPICAL at 10:18

## 2019-01-01 RX ADMIN — Medication 10 ML: at 21:55

## 2019-01-01 RX ADMIN — VANCOMYCIN HYDROCHLORIDE 2500 MG: 10 INJECTION, POWDER, LYOPHILIZED, FOR SOLUTION INTRAVENOUS at 18:00

## 2019-01-01 RX ADMIN — DEXTROSE MONOHYDRATE 25 G: 25 INJECTION, SOLUTION INTRAVENOUS at 21:35

## 2019-01-01 RX ADMIN — HALOPERIDOL LACTATE 2 MG: 5 INJECTION, SOLUTION INTRAMUSCULAR at 00:53

## 2019-01-01 RX ADMIN — SEVELAMER CARBONATE 1600 MG: 800 TABLET, FILM COATED ORAL at 11:20

## 2019-01-01 RX ADMIN — LORAZEPAM 0.5 MG: 2 INJECTION INTRAMUSCULAR; INTRAVENOUS at 00:46

## 2019-01-01 RX ADMIN — DEXTROSE MONOHYDRATE 125 ML: 10 INJECTION, SOLUTION INTRAVENOUS at 07:57

## 2019-01-01 RX ADMIN — AZTREONAM 2 G: 2 INJECTION, POWDER, LYOPHILIZED, FOR SOLUTION INTRAMUSCULAR; INTRAVENOUS at 07:13

## 2019-01-01 RX ADMIN — INSULIN LISPRO 5 UNITS: 100 INJECTION, SOLUTION INTRAVENOUS; SUBCUTANEOUS at 16:40

## 2019-01-01 RX ADMIN — GABAPENTIN 100 MG: 100 CAPSULE ORAL at 09:05

## 2019-01-01 RX ADMIN — GLIMEPIRIDE 2 MG: 1 TABLET ORAL at 17:33

## 2019-01-01 RX ADMIN — IPRATROPIUM BROMIDE AND ALBUTEROL SULFATE 3 ML: .5; 3 SOLUTION RESPIRATORY (INHALATION) at 15:37

## 2019-01-01 RX ADMIN — SODIUM CHLORIDE 40 MG: 9 INJECTION INTRAMUSCULAR; INTRAVENOUS; SUBCUTANEOUS at 21:54

## 2019-01-01 RX ADMIN — BUDESONIDE 500 MCG: 0.5 INHALANT RESPIRATORY (INHALATION) at 19:21

## 2019-01-01 RX ADMIN — GLIMEPIRIDE 2 MG: 1 TABLET ORAL at 10:18

## 2019-01-01 RX ADMIN — ISOSORBIDE DINITRATE 20 MG: 20 TABLET ORAL at 09:17

## 2019-01-01 RX ADMIN — ISOSORBIDE DINITRATE 20 MG: 20 TABLET ORAL at 10:18

## 2019-01-01 RX ADMIN — ACETAMINOPHEN 650 MG: 325 TABLET ORAL at 00:19

## 2019-01-01 RX ADMIN — PANTOPRAZOLE SODIUM 40 MG: 40 TABLET, DELAYED RELEASE ORAL at 16:43

## 2019-01-01 RX ADMIN — HYDROMORPHONE HYDROCHLORIDE 0.5 MG: 2 INJECTION INTRAMUSCULAR; INTRAVENOUS; SUBCUTANEOUS at 21:50

## 2019-01-01 RX ADMIN — IPRATROPIUM BROMIDE AND ALBUTEROL SULFATE 3 ML: .5; 3 SOLUTION RESPIRATORY (INHALATION) at 19:41

## 2019-01-01 RX ADMIN — Medication 10 ML: at 18:00

## 2019-01-01 RX ADMIN — INSULIN LISPRO 2 UNITS: 100 INJECTION, SOLUTION INTRAVENOUS; SUBCUTANEOUS at 17:13

## 2019-01-01 RX ADMIN — IRON SUCROSE 200 MG: 20 INJECTION, SOLUTION INTRAVENOUS at 09:06

## 2019-01-01 RX ADMIN — BUMETANIDE 2 MG: 0.25 INJECTION INTRAMUSCULAR; INTRAVENOUS at 10:17

## 2019-01-01 RX ADMIN — SEVELAMER CARBONATE 1600 MG: 800 TABLET, FILM COATED ORAL at 14:59

## 2019-01-01 RX ADMIN — ACETAMINOPHEN 650 MG: 325 TABLET ORAL at 20:11

## 2019-01-01 RX ADMIN — IPRATROPIUM BROMIDE AND ALBUTEROL SULFATE 3 ML: .5; 3 SOLUTION RESPIRATORY (INHALATION) at 11:53

## 2019-01-01 RX ADMIN — ASPIRIN 81 MG: 81 TABLET, COATED ORAL at 09:06

## 2019-01-01 RX ADMIN — ISOSORBIDE DINITRATE 20 MG: 20 TABLET ORAL at 16:12

## 2019-01-01 RX ADMIN — METOLAZONE 5 MG: 5 TABLET ORAL at 17:58

## 2019-01-01 RX ADMIN — HYDRALAZINE HYDROCHLORIDE 10 MG: 10 TABLET, FILM COATED ORAL at 18:09

## 2019-01-01 RX ADMIN — HYDRALAZINE HYDROCHLORIDE 10 MG: 10 TABLET, FILM COATED ORAL at 16:12

## 2019-01-01 RX ADMIN — ASPIRIN 81 MG: 81 TABLET, COATED ORAL at 09:59

## 2019-01-01 RX ADMIN — NYSTATIN: 100000 POWDER TOPICAL at 09:07

## 2019-01-01 RX ADMIN — Medication 10 ML: at 14:00

## 2019-01-01 RX ADMIN — SEVELAMER CARBONATE 1600 MG: 800 TABLET, FILM COATED ORAL at 16:40

## 2019-01-01 RX ADMIN — IPRATROPIUM BROMIDE AND ALBUTEROL SULFATE 3 ML: .5; 3 SOLUTION RESPIRATORY (INHALATION) at 23:39

## 2019-01-01 RX ADMIN — HYDRALAZINE HYDROCHLORIDE 10 MG: 10 TABLET, FILM COATED ORAL at 17:50

## 2019-01-01 RX ADMIN — HYDRALAZINE HYDROCHLORIDE 10 MG: 10 TABLET, FILM COATED ORAL at 10:21

## 2019-01-01 RX ADMIN — IPRATROPIUM BROMIDE AND ALBUTEROL SULFATE 3 ML: .5; 3 SOLUTION RESPIRATORY (INHALATION) at 08:07

## 2019-01-01 RX ADMIN — DOCUSATE SODIUM 100 MG: 100 CAPSULE ORAL at 17:33

## 2019-01-01 RX ADMIN — SPIRONOLACTONE 25 MG: 25 TABLET ORAL at 17:50

## 2019-01-01 RX ADMIN — SEVELAMER CARBONATE 1600 MG: 800 TABLET, FILM COATED ORAL at 17:49

## 2019-01-01 RX ADMIN — BUDESONIDE 500 MCG: 0.5 INHALANT RESPIRATORY (INHALATION) at 07:48

## 2019-01-01 RX ADMIN — SEVELAMER CARBONATE 1600 MG: 800 TABLET, FILM COATED ORAL at 10:18

## 2019-01-01 RX ADMIN — PANTOPRAZOLE SODIUM 40 MG: 40 TABLET, DELAYED RELEASE ORAL at 17:49

## 2019-01-01 RX ADMIN — DOCUSATE SODIUM 100 MG: 100 CAPSULE ORAL at 18:43

## 2019-01-01 RX ADMIN — FERROUS GLUCONATE 1 TABLET: 324 TABLET ORAL at 10:02

## 2019-01-01 RX ADMIN — Medication 10 ML: at 07:06

## 2019-01-01 RX ADMIN — SEVELAMER CARBONATE 1600 MG: 800 TABLET, FILM COATED ORAL at 08:04

## 2019-01-01 RX ADMIN — INSULIN LISPRO 7 UNITS: 100 INJECTION, SOLUTION INTRAVENOUS; SUBCUTANEOUS at 12:40

## 2019-01-01 RX ADMIN — LORAZEPAM 0.5 MG: 2 INJECTION INTRAMUSCULAR; INTRAVENOUS at 21:49

## 2019-01-01 RX ADMIN — INSULIN LISPRO 10 UNITS: 100 INJECTION, SOLUTION INTRAVENOUS; SUBCUTANEOUS at 12:28

## 2019-01-01 RX ADMIN — INSULIN LISPRO 2 UNITS: 100 INJECTION, SOLUTION INTRAVENOUS; SUBCUTANEOUS at 21:45

## 2019-01-01 RX ADMIN — HYDROMORPHONE HYDROCHLORIDE 1 MG: 2 INJECTION INTRAMUSCULAR; INTRAVENOUS; SUBCUTANEOUS at 13:53

## 2019-01-01 RX ADMIN — ISOSORBIDE DINITRATE 20 MG: 20 TABLET ORAL at 21:13

## 2019-01-01 RX ADMIN — METOLAZONE 5 MG: 5 TABLET ORAL at 10:22

## 2019-01-01 RX ADMIN — IPRATROPIUM BROMIDE AND ALBUTEROL SULFATE 3 ML: .5; 3 SOLUTION RESPIRATORY (INHALATION) at 11:15

## 2019-01-01 RX ADMIN — IPRATROPIUM BROMIDE AND ALBUTEROL SULFATE 3 ML: .5; 3 SOLUTION RESPIRATORY (INHALATION) at 20:26

## 2019-01-01 RX ADMIN — SEVELAMER CARBONATE 1600 MG: 800 TABLET, FILM COATED ORAL at 16:42

## 2019-01-01 RX ADMIN — BUMETANIDE 2 MG: 0.25 INJECTION INTRAMUSCULAR; INTRAVENOUS at 09:06

## 2019-01-01 RX ADMIN — INSULIN LISPRO 5 UNITS: 100 INJECTION, SOLUTION INTRAVENOUS; SUBCUTANEOUS at 12:26

## 2019-01-01 RX ADMIN — ONDANSETRON 4 MG: 2 INJECTION INTRAMUSCULAR; INTRAVENOUS at 20:22

## 2019-01-01 RX ADMIN — ASPIRIN 81 MG: 81 TABLET, COATED ORAL at 09:16

## 2019-01-01 RX ADMIN — FERROUS GLUCONATE 1 TABLET: 324 TABLET ORAL at 08:04

## 2019-01-01 RX ADMIN — METOLAZONE 5 MG: 5 TABLET ORAL at 17:49

## 2019-01-01 RX ADMIN — NYSTATIN: 100000 POWDER TOPICAL at 17:59

## 2019-01-01 RX ADMIN — BUMETANIDE 1 MG/HR: 0.25 INJECTION, SOLUTION INTRAMUSCULAR; INTRAVENOUS at 19:33

## 2019-01-01 RX ADMIN — INSULIN LISPRO 5 UNITS: 100 INJECTION, SOLUTION INTRAVENOUS; SUBCUTANEOUS at 09:05

## 2019-01-01 RX ADMIN — CALCITRIOL CAPSULES 0.25 MCG 0.25 MCG: 0.25 CAPSULE ORAL at 18:43

## 2019-01-01 RX ADMIN — DEXTROSE MONOHYDRATE 12.5 G: 25 INJECTION, SOLUTION INTRAVENOUS at 14:07

## 2019-01-01 RX ADMIN — BUMETANIDE 2 MG: 0.25 INJECTION INTRAMUSCULAR; INTRAVENOUS at 21:20

## 2019-01-01 RX ADMIN — HYDRALAZINE HYDROCHLORIDE 10 MG: 10 TABLET, FILM COATED ORAL at 15:28

## 2019-01-01 RX ADMIN — INSULIN LISPRO 3 UNITS: 100 INJECTION, SOLUTION INTRAVENOUS; SUBCUTANEOUS at 18:08

## 2019-01-01 RX ADMIN — INSULIN LISPRO 2 UNITS: 100 INJECTION, SOLUTION INTRAVENOUS; SUBCUTANEOUS at 22:42

## 2019-01-01 RX ADMIN — IPRATROPIUM BROMIDE AND ALBUTEROL SULFATE 3 ML: .5; 3 SOLUTION RESPIRATORY (INHALATION) at 15:09

## 2019-01-01 RX ADMIN — IPRATROPIUM BROMIDE AND ALBUTEROL SULFATE 3 ML: .5; 3 SOLUTION RESPIRATORY (INHALATION) at 00:35

## 2019-01-01 RX ADMIN — PANTOPRAZOLE SODIUM 40 MG: 40 TABLET, DELAYED RELEASE ORAL at 06:13

## 2019-01-01 RX ADMIN — BUMETANIDE 1 MG/HR: 0.25 INJECTION, SOLUTION INTRAMUSCULAR; INTRAVENOUS at 19:39

## 2019-01-01 RX ADMIN — GABAPENTIN 100 MG: 100 CAPSULE ORAL at 16:09

## 2019-01-01 RX ADMIN — Medication 10 ML: at 15:08

## 2019-01-01 RX ADMIN — ISOSORBIDE DINITRATE 20 MG: 20 TABLET ORAL at 22:09

## 2019-01-01 RX ADMIN — METOLAZONE 5 MG: 5 TABLET ORAL at 09:16

## 2019-01-01 RX ADMIN — SODIUM CHLORIDE 1000 ML: 900 INJECTION, SOLUTION INTRAVENOUS at 14:35

## 2019-01-01 RX ADMIN — DOCUSATE SODIUM 100 MG: 100 CAPSULE ORAL at 09:59

## 2019-01-01 RX ADMIN — DEXTROSE MONOHYDRATE 12.5 G: 25 INJECTION, SOLUTION INTRAVENOUS at 18:31

## 2019-01-01 RX ADMIN — Medication 10 ML: at 16:43

## 2019-01-01 RX ADMIN — NYSTATIN: 100000 POWDER TOPICAL at 10:01

## 2019-01-01 RX ADMIN — FERROUS GLUCONATE 1 TABLET: 324 TABLET ORAL at 17:49

## 2019-01-01 RX ADMIN — SODIUM CHLORIDE 40 MG: 9 INJECTION INTRAMUSCULAR; INTRAVENOUS; SUBCUTANEOUS at 08:14

## 2019-01-01 RX ADMIN — IPRATROPIUM BROMIDE AND ALBUTEROL SULFATE 3 ML: .5; 3 SOLUTION RESPIRATORY (INHALATION) at 11:51

## 2019-01-01 RX ADMIN — IPRATROPIUM BROMIDE AND ALBUTEROL SULFATE 3 ML: .5; 3 SOLUTION RESPIRATORY (INHALATION) at 16:50

## 2019-01-01 RX ADMIN — INSULIN LISPRO 4 UNITS: 100 INJECTION, SOLUTION INTRAVENOUS; SUBCUTANEOUS at 23:35

## 2019-01-01 RX ADMIN — NYSTATIN: 100000 POWDER TOPICAL at 08:15

## 2019-01-01 RX ADMIN — PANTOPRAZOLE SODIUM 40 MG: 40 TABLET, DELAYED RELEASE ORAL at 07:04

## 2019-01-01 RX ADMIN — IPRATROPIUM BROMIDE AND ALBUTEROL SULFATE 3 ML: .5; 3 SOLUTION RESPIRATORY (INHALATION) at 03:41

## 2019-01-01 RX ADMIN — SODIUM CHLORIDE 40 MG: 9 INJECTION INTRAMUSCULAR; INTRAVENOUS; SUBCUTANEOUS at 09:00

## 2019-01-01 RX ADMIN — SEVELAMER CARBONATE 1600 MG: 800 TABLET, FILM COATED ORAL at 13:09

## 2019-01-01 RX ADMIN — NYSTATIN: 100000 POWDER TOPICAL at 22:22

## 2019-01-01 RX ADMIN — SEVELAMER CARBONATE 1600 MG: 800 TABLET, FILM COATED ORAL at 17:33

## 2019-01-01 RX ADMIN — GLIMEPIRIDE 2 MG: 1 TABLET ORAL at 17:58

## 2019-01-01 RX ADMIN — FERROUS GLUCONATE 1 TABLET: 324 TABLET ORAL at 07:04

## 2019-01-01 RX ADMIN — SPIRONOLACTONE 25 MG: 25 TABLET ORAL at 09:17

## 2019-01-01 RX ADMIN — BUDESONIDE 500 MCG: 0.5 INHALANT RESPIRATORY (INHALATION) at 19:40

## 2019-01-01 RX ADMIN — ISOSORBIDE DINITRATE 20 MG: 20 TABLET ORAL at 16:42

## 2019-01-01 RX ADMIN — Medication 10 ML: at 18:15

## 2019-01-01 RX ADMIN — Medication 10 ML: at 21:16

## 2019-01-01 RX ADMIN — POLYETHYLENE GLYCOL 3350 17 G: 17 POWDER, FOR SOLUTION ORAL at 17:32

## 2019-01-01 RX ADMIN — SEVELAMER CARBONATE 1600 MG: 800 TABLET, FILM COATED ORAL at 09:17

## 2019-01-01 RX ADMIN — Medication 10 ML: at 17:15

## 2019-01-01 RX ADMIN — SEVELAMER CARBONATE 1600 MG: 800 TABLET, FILM COATED ORAL at 12:28

## 2019-01-01 RX ADMIN — ONDANSETRON 4 MG: 2 INJECTION INTRAMUSCULAR; INTRAVENOUS at 20:37

## 2019-01-01 RX ADMIN — AZTREONAM 2 G: 2 INJECTION, POWDER, LYOPHILIZED, FOR SOLUTION INTRAMUSCULAR; INTRAVENOUS at 06:39

## 2019-01-01 RX ADMIN — HYDRALAZINE HYDROCHLORIDE 10 MG: 10 TABLET, FILM COATED ORAL at 10:18

## 2019-01-01 RX ADMIN — IPRATROPIUM BROMIDE AND ALBUTEROL SULFATE 3 ML: .5; 3 SOLUTION RESPIRATORY (INHALATION) at 15:39

## 2019-01-01 RX ADMIN — BUMETANIDE 2 MG: 0.25 INJECTION INTRAMUSCULAR; INTRAVENOUS at 21:55

## 2019-01-01 RX ADMIN — IPRATROPIUM BROMIDE AND ALBUTEROL SULFATE 3 ML: .5; 3 SOLUTION RESPIRATORY (INHALATION) at 15:29

## 2019-01-01 RX ADMIN — HYDRALAZINE HYDROCHLORIDE 10 MG: 10 TABLET, FILM COATED ORAL at 17:58

## 2019-01-01 RX ADMIN — Medication 10 ML: at 07:05

## 2019-01-01 RX ADMIN — INSULIN LISPRO 3 UNITS: 100 INJECTION, SOLUTION INTRAVENOUS; SUBCUTANEOUS at 11:30

## 2019-01-01 RX ADMIN — HYDROMORPHONE HYDROCHLORIDE 0.5 MG: 2 INJECTION INTRAMUSCULAR; INTRAVENOUS; SUBCUTANEOUS at 19:13

## 2019-01-01 RX ADMIN — DEXTROSE MONOHYDRATE 125 ML: 10 INJECTION, SOLUTION INTRAVENOUS at 21:12

## 2019-01-01 RX ADMIN — AZTREONAM 2 G: 2 INJECTION, POWDER, LYOPHILIZED, FOR SOLUTION INTRAMUSCULAR; INTRAVENOUS at 16:44

## 2019-01-01 RX ADMIN — IPRATROPIUM BROMIDE AND ALBUTEROL SULFATE 3 ML: .5; 3 SOLUTION RESPIRATORY (INHALATION) at 11:13

## 2019-01-01 RX ADMIN — GABAPENTIN 100 MG: 100 CAPSULE ORAL at 23:38

## 2019-01-01 RX ADMIN — ASPIRIN 81 MG: 81 TABLET, COATED ORAL at 10:02

## 2019-01-01 RX ADMIN — HYDRALAZINE HYDROCHLORIDE 10 MG: 10 TABLET, FILM COATED ORAL at 08:15

## 2019-01-01 RX ADMIN — SEVELAMER CARBONATE 1600 MG: 800 TABLET, FILM COATED ORAL at 10:02

## 2019-01-01 RX ADMIN — BUMETANIDE 2 MG: 0.25 INJECTION INTRAMUSCULAR; INTRAVENOUS at 22:16

## 2019-01-01 RX ADMIN — INSULIN LISPRO 4 UNITS: 100 INJECTION, SOLUTION INTRAVENOUS; SUBCUTANEOUS at 16:41

## 2019-01-01 RX ADMIN — POLYETHYLENE GLYCOL 3350 17 G: 17 POWDER, FOR SOLUTION ORAL at 12:00

## 2019-01-01 RX ADMIN — DOCUSATE SODIUM 100 MG: 100 CAPSULE ORAL at 18:09

## 2019-01-01 RX ADMIN — BUMETANIDE 2 MG: 0.25 INJECTION INTRAMUSCULAR; INTRAVENOUS at 08:47

## 2019-01-01 RX ADMIN — Medication 10 ML: at 22:17

## 2019-01-01 RX ADMIN — IPRATROPIUM BROMIDE AND ALBUTEROL SULFATE 3 ML: .5; 3 SOLUTION RESPIRATORY (INHALATION) at 11:50

## 2019-01-01 RX ADMIN — BUMETANIDE 2 MG: 0.25 INJECTION INTRAMUSCULAR; INTRAVENOUS at 10:01

## 2019-01-01 RX ADMIN — ACETAMINOPHEN 650 MG: 325 TABLET ORAL at 11:50

## 2019-01-01 RX ADMIN — LEVOFLOXACIN 750 MG: 5 INJECTION, SOLUTION INTRAVENOUS at 18:10

## 2019-01-01 RX ADMIN — BUMETANIDE 2 MG: 0.25 INJECTION INTRAMUSCULAR; INTRAVENOUS at 08:14

## 2019-01-01 RX ADMIN — DOCUSATE SODIUM 100 MG: 100 CAPSULE ORAL at 09:06

## 2019-01-01 RX ADMIN — INSULIN LISPRO 2 UNITS: 100 INJECTION, SOLUTION INTRAVENOUS; SUBCUTANEOUS at 08:56

## 2019-01-01 RX ADMIN — ISOSORBIDE DINITRATE 20 MG: 20 TABLET ORAL at 09:05

## 2019-01-01 RX ADMIN — GABAPENTIN 100 MG: 100 CAPSULE ORAL at 08:48

## 2019-01-01 RX ADMIN — FERROUS GLUCONATE 1 TABLET: 324 TABLET ORAL at 18:16

## 2019-01-01 RX ADMIN — BUDESONIDE 500 MCG: 0.5 INHALANT RESPIRATORY (INHALATION) at 08:54

## 2019-01-01 RX ADMIN — PANTOPRAZOLE SODIUM 40 MG: 40 TABLET, DELAYED RELEASE ORAL at 06:41

## 2019-01-01 RX ADMIN — IPRATROPIUM BROMIDE AND ALBUTEROL SULFATE 3 ML: .5; 3 SOLUTION RESPIRATORY (INHALATION) at 00:23

## 2019-01-01 RX ADMIN — INSULIN LISPRO 3 UNITS: 100 INJECTION, SOLUTION INTRAVENOUS; SUBCUTANEOUS at 09:21

## 2019-01-01 RX ADMIN — BUMETANIDE 2 MG: 0.25 INJECTION INTRAMUSCULAR; INTRAVENOUS at 21:45

## 2019-01-01 RX ADMIN — ISOSORBIDE DINITRATE 20 MG: 20 TABLET ORAL at 22:35

## 2019-01-01 RX ADMIN — LEVOFLOXACIN 750 MG: 5 INJECTION, SOLUTION INTRAVENOUS at 17:58

## 2019-01-01 RX ADMIN — PANTOPRAZOLE SODIUM 40 MG: 40 TABLET, DELAYED RELEASE ORAL at 06:05

## 2019-01-01 RX ADMIN — INSULIN LISPRO 3 UNITS: 100 INJECTION, SOLUTION INTRAVENOUS; SUBCUTANEOUS at 08:15

## 2019-01-01 RX ADMIN — POLYETHYLENE GLYCOL 3350 17 G: 17 POWDER, FOR SOLUTION ORAL at 21:59

## 2019-01-01 RX ADMIN — Medication 10 ML: at 14:35

## 2019-01-01 RX ADMIN — GLIMEPIRIDE 2 MG: 1 TABLET ORAL at 10:22

## 2019-01-01 RX ADMIN — INSULIN LISPRO 10 UNITS: 100 INJECTION, SOLUTION INTRAVENOUS; SUBCUTANEOUS at 08:35

## 2019-01-01 RX ADMIN — GABAPENTIN 100 MG: 100 CAPSULE ORAL at 10:18

## 2019-01-01 RX ADMIN — HYDROMORPHONE HYDROCHLORIDE 0.5 MG: 2 INJECTION INTRAMUSCULAR; INTRAVENOUS; SUBCUTANEOUS at 13:24

## 2019-01-01 RX ADMIN — CALCITRIOL CAPSULES 0.25 MCG 0.25 MCG: 0.25 CAPSULE ORAL at 21:56

## 2019-01-01 RX ADMIN — AZTREONAM 2 G: 2 INJECTION, POWDER, LYOPHILIZED, FOR SOLUTION INTRAMUSCULAR; INTRAVENOUS at 23:20

## 2019-01-01 RX ADMIN — HALOPERIDOL LACTATE 2 MG: 5 INJECTION, SOLUTION INTRAMUSCULAR at 22:46

## 2019-01-01 RX ADMIN — CALCITRIOL CAPSULES 0.25 MCG 0.25 MCG: 0.25 CAPSULE ORAL at 18:29

## 2019-01-01 RX ADMIN — IPRATROPIUM BROMIDE AND ALBUTEROL SULFATE 3 ML: .5; 3 SOLUTION RESPIRATORY (INHALATION) at 19:21

## 2019-01-01 RX ADMIN — SEVELAMER CARBONATE 1600 MG: 800 TABLET, FILM COATED ORAL at 08:48

## 2019-01-01 RX ADMIN — PANTOPRAZOLE SODIUM 40 MG: 40 TABLET, DELAYED RELEASE ORAL at 06:56

## 2019-01-01 RX ADMIN — ISOSORBIDE DINITRATE 20 MG: 20 TABLET ORAL at 15:29

## 2019-01-01 RX ADMIN — FERROUS GLUCONATE 1 TABLET: 324 TABLET ORAL at 11:50

## 2019-01-01 RX ADMIN — IPRATROPIUM BROMIDE AND ALBUTEROL SULFATE 3 ML: .5; 3 SOLUTION RESPIRATORY (INHALATION) at 08:54

## 2019-01-01 RX ADMIN — HYDRALAZINE HYDROCHLORIDE 10 MG: 10 TABLET, FILM COATED ORAL at 22:16

## 2019-01-01 RX ADMIN — ASPIRIN 81 MG: 81 TABLET, COATED ORAL at 10:17

## 2019-01-01 RX ADMIN — SODIUM CHLORIDE 40 MG: 9 INJECTION INTRAMUSCULAR; INTRAVENOUS; SUBCUTANEOUS at 21:20

## 2019-01-01 RX ADMIN — Medication 10 ML: at 21:15

## 2019-01-01 RX ADMIN — SODIUM CHLORIDE 40 MG: 9 INJECTION INTRAMUSCULAR; INTRAVENOUS; SUBCUTANEOUS at 10:02

## 2019-01-01 RX ADMIN — IPRATROPIUM BROMIDE AND ALBUTEROL SULFATE 3 ML: .5; 3 SOLUTION RESPIRATORY (INHALATION) at 08:35

## 2019-01-01 RX ADMIN — DEXTROSE MONOHYDRATE 250 ML: 10 INJECTION, SOLUTION INTRAVENOUS at 06:45

## 2019-01-01 RX ADMIN — SEVELAMER CARBONATE 1600 MG: 800 TABLET, FILM COATED ORAL at 10:22

## 2019-01-01 RX ADMIN — SPIRONOLACTONE 25 MG: 25 TABLET ORAL at 10:22

## 2019-01-01 RX ADMIN — IPRATROPIUM BROMIDE AND ALBUTEROL SULFATE 3 ML: .5; 3 SOLUTION RESPIRATORY (INHALATION) at 03:33

## 2019-01-01 RX ADMIN — Medication 10 ML: at 06:42

## 2019-01-01 RX ADMIN — PANTOPRAZOLE SODIUM 40 MG: 40 TABLET, DELAYED RELEASE ORAL at 15:30

## 2019-01-01 RX ADMIN — ISOSORBIDE DINITRATE 20 MG: 20 TABLET ORAL at 17:58

## 2019-01-01 RX ADMIN — Medication 10 ML: at 06:03

## 2019-01-01 RX ADMIN — Medication 10 ML: at 03:11

## 2019-01-01 RX ADMIN — ONDANSETRON 4 MG: 2 INJECTION INTRAMUSCULAR; INTRAVENOUS at 05:58

## 2019-01-01 RX ADMIN — INSULIN LISPRO 4 UNITS: 100 INJECTION, SOLUTION INTRAVENOUS; SUBCUTANEOUS at 17:33

## 2019-01-01 RX ADMIN — DOCUSATE SODIUM 100 MG: 100 CAPSULE ORAL at 10:22

## 2019-01-01 RX ADMIN — ALPRAZOLAM 0.5 MG: 0.5 TABLET ORAL at 04:11

## 2019-01-01 RX ADMIN — DOCUSATE SODIUM 100 MG: 100 CAPSULE ORAL at 17:19

## 2019-01-01 RX ADMIN — POLYETHYLENE GLYCOL 3350 17 G: 17 POWDER, FOR SOLUTION ORAL at 12:24

## 2019-01-01 RX ADMIN — Medication 10 ML: at 07:04

## 2019-01-01 RX ADMIN — INSULIN LISPRO 3 UNITS: 100 INJECTION, SOLUTION INTRAVENOUS; SUBCUTANEOUS at 18:13

## 2019-01-01 RX ADMIN — IPRATROPIUM BROMIDE AND ALBUTEROL SULFATE 3 ML: .5; 3 SOLUTION RESPIRATORY (INHALATION) at 20:15

## 2019-01-01 RX ADMIN — FERROUS GLUCONATE 1 TABLET: 324 TABLET ORAL at 08:00

## 2019-01-01 RX ADMIN — BUDESONIDE 500 MCG: 0.5 INHALANT RESPIRATORY (INHALATION) at 20:26

## 2019-01-01 RX ADMIN — SEVELAMER CARBONATE 1600 MG: 800 TABLET, FILM COATED ORAL at 12:40

## 2019-01-01 RX ADMIN — FERROUS GLUCONATE 1 TABLET: 324 TABLET ORAL at 14:59

## 2019-01-01 RX ADMIN — BUDESONIDE 500 MCG: 0.5 INHALANT RESPIRATORY (INHALATION) at 19:39

## 2019-01-01 RX ADMIN — FERROUS GLUCONATE 1 TABLET: 324 TABLET ORAL at 06:13

## 2019-01-01 RX ADMIN — HYDRALAZINE HYDROCHLORIDE 10 MG: 10 TABLET, FILM COATED ORAL at 16:43

## 2019-01-01 RX ADMIN — BUMETANIDE 2 MG: 0.25 INJECTION INTRAMUSCULAR; INTRAVENOUS at 23:35

## 2019-01-01 RX ADMIN — BUDESONIDE 500 MCG: 0.5 INHALANT RESPIRATORY (INHALATION) at 08:35

## 2019-01-01 RX ADMIN — ISOSORBIDE DINITRATE 20 MG: 20 TABLET ORAL at 09:59

## 2019-01-01 RX ADMIN — IPRATROPIUM BROMIDE AND ALBUTEROL SULFATE 3 ML: .5; 3 SOLUTION RESPIRATORY (INHALATION) at 08:44

## 2019-01-01 RX ADMIN — HYDRALAZINE HYDROCHLORIDE 10 MG: 10 TABLET, FILM COATED ORAL at 21:13

## 2019-06-28 PROBLEM — I50.33 DIASTOLIC CHF, ACUTE ON CHRONIC (HCC): Status: ACTIVE | Noted: 2019-01-01

## 2019-06-28 PROBLEM — J96.22 ACUTE ON CHRONIC RESPIRATORY FAILURE WITH HYPOXIA AND HYPERCAPNIA (HCC): Status: ACTIVE | Noted: 2019-01-01

## 2019-06-28 PROBLEM — I48.19 PERSISTENT ATRIAL FIBRILLATION (HCC): Status: ACTIVE | Noted: 2019-01-01

## 2019-06-28 PROBLEM — J96.21 ACUTE ON CHRONIC RESPIRATORY FAILURE WITH HYPOXIA AND HYPERCAPNIA (HCC): Status: ACTIVE | Noted: 2019-01-01

## 2019-06-28 NOTE — PROGRESS NOTES
Admission Medication Reconciliation: 
Comments/Recommendations: 
-Medication history obtained in IVCU (room 3004) -Updated allergy history (added cephalexin: throat started closing up right after 2nd dose, added ferrous sulfate: caused significant constipation, requested to add per family). Confirmed preferred pharmacy location 
-Only inhaler he currently uses is Combivent as needed (used to take [employer] but ran out a while ago) -Uses Novolog per a sliding scale (unable to determine exact sliding scale, family was not sure exactly) -Stopped taking glipizide because it caused him to stop urinating, now takes glimepiride 
-Confirmed Bumex 4 mg PO BID - not listed this way in RxQuery, but patient and family adamant that 4 mg PO BID is how he has been taking it 
-Takes calcitriol Monday through Friday daily (no doses on Saturday and Sunday) Medications added: Renvela, pantoprazole BID, Bumex, glimepiride, ferrous gluconate, calcitriol, Novolog sliding scale Medications removed: Famotidine (stopped when started pantoprazole), glipizide Medications changed: None Information obtained from: Patient (limited), family members, RxQuery, chart review Significant PMH/Disease States:  
Past Medical History:  
Diagnosis Date CAD (coronary artery disease)   
 pvc  
 COPD Diabetes ([address] GI bleed 6/2019: [place]. Found small bowel ulcer. S/P 3 unit transfusion. Heart failure ([address] Hypertension Mitral regurgitation Morbidly obese ([address] Chief Complaint for this Admission: Chief Complaint Patient presents with Extremity Weakness Low Blood Sugar Allergies:  Cephalexin; Codeine; Insulin regular; Pcn [penicillins]; Victoza [liraglutide]; Ferrous sulfate; Morphine; and Statins-hmg-coa reductase inhibitors Prior to Admission Medications:  
Prior to Admission Medications Prescriptions Last Dose Informant Patient Reported? Taking? aspirin (ASPIRIN) 325 mg tablet 6/27/2019 at AM Significant Other Yes Yes Sig: Take 325 mg by mouth daily. bumetanide (BUMEX) 2 mg tablet 6/28/2019 at AM  Yes Yes Sig: Take 4 mg by mouth two (2) times a day. calcitRIOL (ROCALTROL) 0.25 mcg capsule 6/28/2019 at AM  Yes Yes Sig: Take 0.25 mcg by mouth five (5) days a week. docusate sodium (COLACE) 100 mg capsule 6/28/2019 at AM Significant Other Yes Yes Sig: Take 100 mg by mouth daily as needed for Constipation. ferrous gluconate 324 mg (38 mg iron) tablet 6/28/2019 at AM  Yes Yes Sig: Take 324 mg by mouth Daily (before breakfast). glimepiride (AMARYL) 2 mg tablet 6/28/2019 at AM  Yes Yes Sig: Take 2 mg by mouth two (2) times a day. insulin aspart U-100 (NOVOLOG U-100 INSULIN ASPART) 100 unit/mL injection   Yes Yes Sig: by SubCUTAneous route. ipratropium-albuterol (COMBIVENT RESPIMAT)  mcg/actuation inhaler 6/28/2019 at AM Significant Other Yes Yes Sig: Take 1 Puff by inhalation every six (6) hours as needed for Wheezing. pantoprazole (PROTONIX) 40 mg tablet 6/28/2019 at AM  Yes Yes Sig: Take 40 mg by mouth two (2) times a day. polyethylene glycol (MIRALAX) 17 gram packet 6/27/2019 at bedtime Significant Other Yes Yes Sig: Take 17 g by mouth nightly. sevelamer carbonate (RENVELA) 800 mg tab tab 6/28/2019 at AM  Yes Yes Sig: Take 1,600 mg by mouth three (3) times daily (with meals). Facility-Administered Medications: None Thank you, 
Deborah Mendieta, PHARMD

## 2019-06-28 NOTE — H&P
Admission History and Physical 
 
 
NAME:  Radha Harvey :   1960 MRN:  [id number] PCP:  Matthew Garcia MD  
 
Date/Time:  2019 Assessment/Plan:   
  
Diastolic CHF, acute on chronic ([facility] (2019):  Echo in 2018 with findings of ejection fraction was estimated in the range of 55 % to 60 % with increased wall thickness. Has had peripheral edema that has worsened despite bumex bid at home. -- daily weight 
-- strict I/O 
-- check echo 
-- diuresis with IV bumex BID 
-- cardiology consult Acute on chronic respiratory failure with hypoxia and hypercapnia (HCC) (2019) / COPD / OHS:  Suspect respiratory failure to be multifactorial from COPD and OHS. Not currently wheezing. Discussed with patient and wife that patient needs to keep BiPAP on or risk is worsening respiratory failure leading to intubation. Would avoid ativan while on BiPAP. ABG in ED 7./72/34. 
-- continue BiPAP 
-- repeat ABG this PM 
-- pulmonology consult -- LABA/ICS along with scheduled duonebs and prn albuterol DM type 2 causing renal disease, not at goal [facility]) (7/15/2018): Hypoglycemia by labs with glucose of 59. Patient has refused insulin as outpatient in past.  Confirmed with wife that patient had temporary blindness on long acting insulin \"generic for Lantus,\" but has been on short acting insulin w/o adverse effects. -- hold glimepiride 
-- add SSI 
-- check A1c Morbid obesity ([facility] (7/15/2018): Difficult to lose weight with b/l amputations. -- nutrition eval 
 
Persistent atrial fibrillation ([facility] (2019):  Rate is controlled intrinsically. -- echo as above -- cardiology eval 
-- no anticoagulation given recent major upper GI bleed Ulcer of small bowel / PUD:  Earlier this month requiring endoscopic evaluation and transfusions. -- continue PPI 
 
CKD stage 3:  Creatinine similar to DC creatinine from 2018. Previously had concern for vasculitis on that admission. -- follow creatinine Anemia:  Suspect this is due to recent GI bleed. Denies further visible bleeding since discharge from [place]. Type and screen sent from ED. 
-- monitor HGB 
 
**6:00 PM 
UA with pyuria, but with moderate epis too. Wife obtained urine from patient in urinal after holding urinal up against patients pannus. So, specimen is not a good sample. Asked RN to collect urine by straight cath to obtain better sample. Will await repeat prior to empiric treatment. **6:41 PM 
Unable to straight cath as penis is retracted in pannus. Urine was collected as it traveled along pannus into urinal.  Not a clean sample. Patient denies dysuria, hematuria, suprapubic pain, fever, chills. No leukocytosis here. Abnormal UA likely represents contaminated urine collection, so will hold on abx unless symptoms develop. Subjective: CHIEF COMPLAINT:  Chest pain HISTORY OF PRESENT ILLNESS:    
Mr. Barrett Zavala is a 60 y.o.  male who is admitted with Diastolic CHF, acute on chronic ([facility] Mr. Barrett Zavala presented to the Emergency Department today complaining of chest pain. Intermittent since last night. Described as pressure. No radiation. No alleviating or aggravating factors. Associated with SOB despite home O2 at 2L NC. Has noted increased edema of arms, legs, and trunk. Wife reports less urine output. No fever, chills. No n/v. Was at [facility] ([place]) earlier this month for edema and then again for GI bleed due to small bowel ulcer. Past Medical History:  
Diagnosis Date  CAD (coronary artery disease)   
 pvc  COPD  Diabetes ([facility]  Heart failure ([facility]  Hypertension  Mitral regurgitation  Morbidly obese ([facility] Past Surgical History:  
Procedure Laterality Date  HX ORTHOPAEDIC    
 right leg surgery Social History Tobacco Use  Smoking status: Former Smoker  Smokeless tobacco: Never Used Substance Use Topics  Alcohol use:  No  
 Family History Problem Relation Age of Onset  Hypertension Father Allergies Allergen Reactions  Codeine Anaphylaxis  Insulin Regular Other (comments) Temp blindness and facial droop  Pcn [Penicillins] Anaphylaxis  Victoza [Liraglutide] Other (comments) Fascial droop, temp blindness  Morphine Other (comments)  
  hallucinates  Statins-Hmg-Coa Reductase Inhibitors Hives, Itching and Nausea and Vomiting Prior to Admission medications Medication Sig Start Date End Date Taking? Authorizing Provider  
insulin syringe,safetyneedle 0.3 mL 29 x 1/2\" syrg Use as directed to inject Lantus insulin 7/18/18   Jane Capps MD  
aspirin (ASPIRIN) 325 mg tablet Take 325 mg by mouth daily. Provider, Historical  
famotidine (PEPCID) 20 mg tablet Take 20 mg by mouth two (2) times a day. Provider, Historical  
glipiZIDE (GLUCOTROL) 5 mg tablet Take 10 mg by mouth daily. In addition to 5 mg every night    Provider, Historical  
glipiZIDE (GLUCOTROL) 5 mg tablet Take 5 mg by mouth nightly. In addition to 10 mg every morning    Provider, Historical  
ipratropium-albuterol (COMBIVENT RESPIMAT)  mcg/actuation inhaler Take 1 Puff by inhalation every six (6) hours as needed for Wheezing. Provider, Historical  
polyethylene glycol (MIRALAX) 17 gram packet Take 17 g by mouth nightly. Provider, Historical  
docusate sodium (COLACE) 100 mg capsule Take 100 mg by mouth daily as needed for Constipation. Provider, Historical  
 
 
 
Review of Systems: 
(bold if positive, if negative) Gen:  Eyes:  ENT:  CVS:  chest painedemaPulm:  dyspneaGI:   
:   
MS:  Skin:  Endo:   
Hem:  Renal:  Edema, change in urine Neuro:    
 
 
  
Objective: VITALS:   
Vital signs reviewed; most recent are: 
 
Visit Vitals /58 Pulse 78 Temp 98.2 °F (36.8 °C) Resp (!) 32  
Wt (!) 171.5 kg (378 lb) SpO2 (!) 79% BMI 51.27 kg/m² SpO2 Readings from Last 6 Encounters:  
06/28/19 (!) 79% 07/18/18 97% 06/23/17 93% 06/18/17 94% 05/06/17 96% 11/17/16 98% O2 Flow Rate (L/min): 4 l/min No intake or output data in the 24 hours ending 06/28/19 1544 Exam:  
 
Physical Exam: 
 
Gen:  Morbidly obese, disheveled HEENT:  Pink conjunctivae, PERRL, hearing intact to voice, moist mucous membranes Neck:  Supple Resp:  No accessory muscle use, bibasilar rales, no wheezing Card:  No murmurs, normal S1, S2 without thrills, 2+ peripheral edema Abd:  Soft, non-tender, non-distended, normoactive bowel sounds are present Musc:  Left BKA, right AKA Skin:  Mild erythema of skin folds or groin Neuro:  Cranial nerves 3-12 are grossly intact,  strength is 5/5 bilaterally, follows commands appropriately Psych:  Lethargic. Labs: 
 
Recent Labs  
  06/28/19 
1419 WBC 5.4 HGB 7.6* HCT 26.0*  
 Recent Labs  
  06/28/19 
1419   
K 4.5  
 CO2 35* GLU 59* BUN 61* CREA 1.99* CA 8.9 MG 2.1 ALB 2.6* TBILI 0.3 SGOT 14* ALT 12 Lab Results Component Value Date/Time Glucose (POC) 81 06/28/2019 02:29 PM  
 Glucose (POC) 60 (L) 06/28/2019 02:15 PM  
 Glucose (POC) 72 06/28/2019 02:03 PM  
 
No results for input(s): PH, PCO2, PO2, HCO3, FIO2 in the last 72 hours. No results for input(s): INR in the last 72 hours. No lab exists for component: INREXT Chest Xray:  Suboptimal due to body habitus. Pulmonary edema pattern with probable bilateral pleural effusions. Cardiomegaly. (reviewed myself) EKG reviewed:   afib with PVC. RBBB. Medical records reviewed in preparation for this admission: Old medical records. Surrogate decision maker:  spouse Total time spent in care of this patient: 79 Minutes Care Plan discussed with: Patient and Family Discussed:  Care Plan Prophylaxis:  SCD's and H2B/PPI Probable Disposition:  HH PT, OT, RN 
        
 ___________________________________________________ Attending Physician: Suyapa Gallegos MD

## 2019-06-28 NOTE — CONSULTS
Ashley Cheung DO [facility] [address] [place] [address], [address] [place], [id number] [place] [address] Office [phone] Office 21  [phone] Date of  Admission: 6/28/2019  2:00 PM 
PCP- Jany Stack MD 
 
Jennifer Vaca is a 60 y.o. male admitted for Diastolic CHF, acute on chronic ([facility] [address] [I50.33]. Consult requested by Shwetha Wood MD 
 
Assessment/Plan HFpEF Vascular disease (history of cerebrovascular disease status post CVA with stenting of his bilateral carotid arteries, reported history of coronary artery disease without prior stenting or bypass surgery) Diabetes with complication of renal disease and peripheral neuropathy, bilateral BKA CKD stage III Persistent atrial fibrillation Recent upper GI bleeding Anemia 
 
-Agree with aggressive diuretic therapy. Continue to monitor renal function with diuretics 
-repeat echo pending 
-Consider ACE inhibitor for renal protection once creatinine is stable 
-Patient has high CHADS VASC score for atrial fibrillation, long-term should either have left atrial appendage device placed if he has evidence of recurrent major GI bleeding, given recent severe anemia we will hold off on anticoagulation at this time. Hemoglobin rebecca depressed at 7 g/dL 
-Patient should be on antiplatelet therapy with at least aspirin 81 mg daily due to history of vascular disease,   Can hold in the interim to hemoglobin stabilizes with recent GI bleeding. Cardiology will see patient prn over the weekend, do not hesitate to call with question I appreciate the opportunity to be involved in . See below note for details. Please do not hesitate to contact us with questions or concerns. Noa Young DO Subjective: 
Jennifer Vaca is a 60 y.o. male with chronic comorbid conditions including morbid obesity, diabetes mellitus with complications of lower extremity amputation and renal disease, cerebrovascular disease status post stroke and stenting of carotid arteries presents to the ED for \"not feeling well\". Patient was admitted multiple times to 5 over the last several weeks initially for volume overload and subsequently for significant upper gastrointestinal bleeding. Patient was activated as a STEMI in route. In the ED EKG showed evidence of atrial fibrillation with right bundle branch block without ST-T wave changes. He reported having some chest pain earlier today. His wife also reports him not feeling well yesterday, no specific complaints could be obtained. In the ED he was found to be severely hypoglycemic with blood sugars in the 30s. Chest radiograph in the ED did show evidence of pulmonary edema with pleural effusions. Past Medical History:  
Diagnosis Date  CAD (coronary artery disease)   
 pvc  COPD  Diabetes ([address]  GI bleed 6/2019: [place]. Found small bowel ulcer. S/P 3 unit transfusion.  Heart failure ([address]  Hypertension  Mitral regurgitation  Morbidly obese ([address] Past Surgical History:  
Procedure Laterality Date  HX ORTHOPAEDIC    
 right leg surgery Allergies Allergen Reactions  Codeine Anaphylaxis  Insulin Regular Other (comments) Temp blindness and facial droop only with long acting insulin. Has been on short acting insulin without adverse.  Pcn [Penicillins] Anaphylaxis  Victoza [Liraglutide] Other (comments) Fascial droop, temp blindness  Morphine Other (comments)  
  hallucinates  Statins-Hmg-Coa Reductase Inhibitors Hives, Itching and Nausea and Vomiting Family History Problem Relation Age of Onset  Hypertension Father Social History Tobacco Use  Smoking status: Former Smoker  Smokeless tobacco: Never Used Substance Use Topics  Alcohol use:  No  
  Drug use: No  
 
 
 
 Medications: 
Medications Prior to Admission Medication Sig  
 insulin syringe,safetyneedle 0.3 mL 29 x 1/2\" syrg Use as directed to inject Lantus insulin  aspirin (ASPIRIN) 325 mg tablet Take 325 mg by mouth daily.  famotidine (PEPCID) 20 mg tablet Take 20 mg by mouth two (2) times a day.  glipiZIDE (GLUCOTROL) 5 mg tablet Take 10 mg by mouth daily. In addition to 5 mg every night  glipiZIDE (GLUCOTROL) 5 mg tablet Take 5 mg by mouth nightly. In addition to 10 mg every morning  ipratropium-albuterol (COMBIVENT RESPIMAT)  mcg/actuation inhaler Take 1 Puff by inhalation every six (6) hours as needed for Wheezing.  polyethylene glycol (MIRALAX) 17 gram packet Take 17 g by mouth nightly.  docusate sodium (COLACE) 100 mg capsule Take 100 mg by mouth daily as needed for Constipation. Current Facility-Administered Medications Medication Dose Route Frequency  sodium chloride (NS) flush 5-40 mL  5-40 mL IntraVENous Q8H  
 sodium chloride (NS) flush 5-40 mL  5-40 mL IntraVENous PRN  
 sodium chloride (NS) flush 5-40 mL  5-40 mL IntraVENous Q8H  
 sodium chloride (NS) flush 5-40 mL  5-40 mL IntraVENous PRN  
 acetaminophen (TYLENOL) tablet 650 mg  650 mg Oral Q4H PRN  
 ondansetron (ZOFRAN) injection 4 mg  4 mg IntraVENous Q4H PRN  
 docusate sodium (COLACE) capsule 100 mg  100 mg Oral BID  bumetanide (BUMEX) injection 2 mg  2 mg IntraVENous Q12H  
 albuterol-ipratropium (DUO-NEB) 2.5 MG-0.5 MG/3 ML  3 mL Nebulization Q4HWA RT  
 albuterol (PROVENTIL VENTOLIN) nebulizer solution 2.5 mg  2.5 mg Nebulization Q2H PRN  
 arformoterol (BROVANA) neb solution 15 mcg  15 mcg Nebulization BID RT  
 budesonide (PULMICORT) 500 mcg/2 ml nebulizer suspension  500 mcg Nebulization BID RT  
 nystatin (MYCOSTATIN) 100,000 unit/gram powder   Topical BID  insulin lispro (HUMALOG) injection   SubCUTAneous AC&HS  
 glucose chewable tablet 16 g  4 Tab Oral PRN  
  dextrose (D50) infusion 12.5-25 g  25-50 mL IntraVENous PRN  
 glucagon (GLUCAGEN) injection 1 mg  1 mg IntraMUSCular PRN Review of Systems: 
Pertinent Positive: Resolved chest pain Pertinent Negative: No shortness of breath orthopnea PND All Other systems reviewed and are negative for a Comprehensive ROS (10+) Physical Exam: 
Visit Vitals /80 Pulse 79 Temp 97.6 °F (36.4 °C) Resp 20 Ht 6' (1.829 m) Wt (!) 396 lb 6.2 oz (179.8 kg) SpO2 98% BMI 53.76 kg/m² Gen: Chronic ill-appearing, morbidly obese HEENT:  Pink conjunctivae, hearing intact to voice, moist mucous membranes Neck: Supple,No JVD, No Carotid Bruit Resp: Clear serially, and difficult to appreciate breath sounds posteriorly Card: Normal Rate,Regular Rythm,Normal S1, S2, No murmurs, rubs or gallop. No thrills. Abd:  Soft, non-tender, morbidly obese MSK: No cyanosis or clubbing Skin: No rashes or ulcers Neuro:  Cranial nerves are grossly intact, moving all four extremities, no focal deficit, follows commands appropriately Psych:  Good insight, oriented to person, place and time, alert, Nml Affect LE: No edema Vascular: Status post bilateral BKA's 
 
 
 
EKG: Atrial fibrillation right bundle branch block Cxray: 
 
Chest radiograph 6/20/2019 with evidence of pulmonary edema pleural effusions LABS: 
 
Lab Results Component Value Date/Time WBC 5.4 06/28/2019 02:19 PM  
 HGB 7.6 (L) 06/28/2019 02:19 PM  
 HCT 26.0 (L) 06/28/2019 02:19 PM  
 PLATELET 706 50/62/3965 02:19 PM  
 MCV 94.5 06/28/2019 02:19 PM  
 
Lab Results Component Value Date/Time  Sodium 140 06/28/2019 02:19 PM  
 Potassium 4.5 06/28/2019 02:19 PM  
 Chloride 102 06/28/2019 02:19 PM  
 CO2 35 (H) 06/28/2019 02:19 PM  
 Anion gap 3 (L) 06/28/2019 02:19 PM  
 Glucose 59 (L) 06/28/2019 02:19 PM  
 BUN 61 (H) 06/28/2019 02:19 PM  
 Creatinine 1.99 (H) 06/28/2019 02:19 PM  
 BUN/Creatinine ratio 31 (H) 06/28/2019 02:19 PM  
 GFR est AA 42 (L) 06/28/2019 02:19 PM  
 GFR est non-AA 35 (L) 06/28/2019 02:19 PM  
 Calcium 8.9 06/28/2019 02:19 PM  
 
Lab Results Component Value Date/Time CK 31 (L) 07/16/2018 01:58 AM  
 CK-MB Index Cannot be calculated 07/16/2018 01:58 AM  
 Troponin-I, Qt. <0.05 07/16/2018 01:58 AM  
 
Lab Results Component Value Date/Time  
 aPTT 28.8 06/28/2019 02:19 PM  
 
Lab Results Component Value Date/Time  INR 1.1 06/28/2019 02:19 PM  
 Prothrombin time 10.7 06/28/2019 02:19 PM  
 
 
 
 
Pacheco Liang DO

## 2019-06-28 NOTE — ROUTINE PROCESS
TRANSFER - OUT REPORT: 
 
Verbal report given to Melody(alesia) on Laverne Holden  being transferred to ICU(unit) for routine progression of care Report consisted of patients Situation, Background, Assessment and  
Recommendations(SBAR). Information from the following report(s) SBAR, Kardex, ED Summary, [facility] and Recent Results was reviewed with the receiving nurse. Lines:  
Peripheral IV 06/28/19 Right Antecubital (Active) Site Assessment Clean, dry, & intact 6/28/2019  2:04 PM  
Phlebitis Assessment 0 6/28/2019  2:04 PM  
Infiltration Assessment 0 6/28/2019  2:04 PM  
Dressing Status Clean, dry, & intact 6/28/2019  2:04 PM  
Dressing Type Transparent 6/28/2019  2:04 PM  
Hub Color/Line Status Pink; Infusing 6/28/2019  2:04 PM  
   
Peripheral IV 06/28/19 Left Wrist (Active) Site Assessment Clean, dry, & intact 6/28/2019  2:44 PM  
Phlebitis Assessment 0 6/28/2019  2:44 PM  
Infiltration Assessment 0 6/28/2019  2:44 PM  
Dressing Status Clean, dry, & intact 6/28/2019  2:44 PM  
Hub Color/Line Status Pink 6/28/2019  2:44 PM  
Action Taken Catheter retaped 6/28/2019  2:44 PM  
   
Peripheral IV 06/28/19 Right Hand (Active) Site Assessment Clean, dry, & intact 6/28/2019  2:54 PM  
Phlebitis Assessment 0 6/28/2019  2:54 PM  
Infiltration Assessment 0 6/28/2019  2:54 PM  
Dressing Status Clean, dry, & intact 6/28/2019  2:54 PM  
Action Taken Catheter retaped 6/28/2019  2:54 PM  
  
 
Opportunity for questions and clarification was provided. Patient transported with: 
 Monitor O2 @ BiPAP liters Registered Nurse

## 2019-06-28 NOTE — CONSULTS
[facility], Critical Care, and Sleep Medicine Initial Patient Consult Name: Codi Braun MRN: [id number] : 1960 Hospital: [facility] Date: 2019 IMPRESSION:  
· Acute on chronic hypoxemic/hypercapneic respiratory failure · Decompensated heart failure · COPD, not in acute exacerbation · Suspected JEROD/OHS · Hypoglycemia · ASPEN vs CKD · H/o CVA · HTN 
· H/o afib · Recent GIB 
· H/o non-compliance RECOMMENDATIONS:  
· Transition off NIPPV as able. Repeat ABG · Wean FiO2/PEEP to keep sats > 88% · Diurese · Follow cultures. Send sputum culture if able. UA pending · Check RVP and MRSA screen · Check TSH · Echo · Trend cardiac enzymes · UDS · Continue duonebs, brovana, pulmicort · Speech eval 
· Needs PFTs, sleep study as an outpatient Pt is critically ill and at high risk for decompensation CCT: 37 minutes Thank you for the consult. Will follow. Subjective:  
 
Asked to see by Dr. Mar Dillon for evaluation of acute on chronic respiratory failure. Mr. Sona Rodas is a 62yo male w/ history of COPD, CKD, HTN, afib, PVD s/p bilateral BKA, HLD, CVA, DM and non-compliance who presented to the ER on  w/ complaints of feeling \"sick. \"  He has been lethargic and weak. Also complains of chest pain, shortness of breath, swelling \"everywhere\" and a cough productive of white sputum. Cough sounds very wet, congested. Thinks several family members have recently had the flu. Admitted to the ICU on bipap. ABG in ED reportedly: . Recent admission to [facility] for GIB. Also says that he went to [facility] twice in the past month or so for swelling but that nothing was done. Says that he was diagnosed w/ COPD 35 years ago. No recent PFTs. Followed by his PCP. Uses combivent at home. Also says that he was diagnosed w/ JEORD in  but never treated. No recent evaluation. Past Medical History:  
Diagnosis Date  CAD (coronary artery disease)   
 pvc  COPD  Diabetes ([facility] [address]  GI bleed 6/2019: [place]. Found small bowel ulcer. S/P 3 unit transfusion.  Heart failure ([facility] [address]  Hypertension  Mitral regurgitation  Morbidly obese ([facility] [address] Past Surgical History:  
Procedure Laterality Date  HX ORTHOPAEDIC    
 right leg surgery Prior to Admission medications Medication Sig Start Date End Date Taking? Authorizing Provider  
aspirin (ASPIRIN) 325 mg tablet Take 325 mg by mouth daily. Provider, Historical  
ipratropium-albuterol (COMBIVENT RESPIMAT)  mcg/actuation inhaler Take 1 Puff by inhalation every six (6) hours as needed for Wheezing. Provider, Historical  
polyethylene glycol (MIRALAX) 17 gram packet Take 17 g by mouth nightly. Provider, Historical  
docusate sodium (COLACE) 100 mg capsule Take 100 mg by mouth daily as needed for Constipation. Provider, Historical  
 
Allergies Allergen Reactions  Cephalexin Other (comments) Throat starting closing up after 2nd dose  Codeine Anaphylaxis  Insulin Regular Other (comments) Temp blindness and facial droop only with long acting insulin. Has been on short acting insulin without adverse.  Pcn [Penicillins] Anaphylaxis  Victoza [Liraglutide] Other (comments) Fascial droop, temp blindness  Morphine Other (comments)  
  hallucinates  Statins-Hmg-Coa Reductase Inhibitors Hives, Itching and Nausea and Vomiting Social History Tobacco Use  Smoking status: Former Smoker  Smokeless tobacco: Never Used Substance Use Topics  Alcohol use: No  
  
Family History Problem Relation Age of Onset  Hypertension Father Current Facility-Administered Medications Medication Dose Route Frequency  sodium chloride (NS) flush 5-40 mL  5-40 mL IntraVENous Q8H  
 sodium chloride (NS) flush 5-40 mL  5-40 mL IntraVENous Q8H  
  docusate sodium (COLACE) capsule 100 mg  100 mg Oral BID  bumetanide (BUMEX) injection 2 mg  2 mg IntraVENous Q12H  
 arformoterol (BROVANA) neb solution 15 mcg  15 mcg Nebulization BID RT  
 budesonide (PULMICORT) 500 mcg/2 ml nebulizer suspension  500 mcg Nebulization BID RT  
 nystatin (MYCOSTATIN) 100,000 unit/gram powder   Topical BID  insulin lispro (HUMALOG) injection   SubCUTAneous AC&HS  
 albuterol-ipratropium (DUO-NEB) 2.5 MG-0.5 MG/3 ML  3 mL Nebulization Q4H RT  
 [START ON 2019] pantoprazole (PROTONIX) 40 mg in sodium chloride 0.9% 10 mL injection  40 mg IntraVENous DAILY Objective: 
Vital Signs:   
Visit Vitals /80 Pulse 79 Temp 97.6 °F (36.4 °C) Resp 20 Ht 6' (1.829 m) Wt (!) 179.8 kg (396 lb 6.2 oz) SpO2 98% BMI 53.76 kg/m² O2 Device: Nasal cannula O2 Flow Rate (L/min): 4 l/min Temp (24hrs), Av.9 °F (36.6 °C), Min:97.6 °F (36.4 °C), Max:98.2 °F (36.8 °C) Intake/Output:  
Last shift:      No intake/output data recorded. Last 3 shifts: No intake/output data recorded. No intake or output data in the 24 hours ending 19 [id number] Physical Exam:  
General:  Morbidly obese, +accessory muscle use Head:    
Eyes:    
Nose: Throat:   
Neck:   
Back:     
Lungs:   Diminished breath sounds Chest wall:  No tenderness or deformity. Heart:  Regular rate and rhythm Abdomen:   Soft, non-tender. Bowel sounds normal  
Extremities: Anasarca, bilateral BKA Pulses:   
Skin:   
Lymph nodes:   
Neurologic: Oriented x 3 Data review:  
 
Recent Results (from the past 24 hour(s)) GLUCOSE, POC Collection Time: 19  2:03 PM  
Result Value Ref Range Glucose (POC) 72 65 - 100 mg/dL Performed by Billie Vazquez   
POC TROPONIN-I Collection Time: 19  2:13 PM  
Result Value Ref Range Troponin-I (POC) <0.04 0.00 - 0.08 ng/mL POC CHEM8 Collection Time: 19  2:15 PM  
Result Value Ref Range Calcium, ionized (POC) 1.21 1.12 - 1.32 mmol/L Sodium (POC) 138 136 - 145 mmol/L Potassium (POC) 4.4 3.5 - 5.1 mmol/L Chloride (POC) 97 (L) 98 - 107 mmol/L  
 CO2 (POC) 33 (H) 21 - 32 mmol/L Anion gap (POC) 14 10 - 20 mmol/L Glucose (POC) 60 (L) 65 - 100 mg/dL BUN (POC) 63 (H) 9 - 20 mg/dL Creatinine (POC) 2.0 (H) 0.6 - 1.3 mg/dL GFRAA, POC 42 (L) >60 ml/min/1.73m2 GFRNA, POC 34 (L) >60 ml/min/1.73m2 Hematocrit (POC) 24 (L) 36.6 - 50.3 % Comment Notified RN or MD immediately by  POC LACTIC ACID Collection Time: 06/28/19  2:17 PM  
Result Value Ref Range Lactic Acid (POC) 0.51 0.40 - 2.00 mmol/L  
SAMPLES BEING HELD Collection Time: 06/28/19  2:19 PM  
Result Value Ref Range SAMPLES BEING HELD 1RED,1SST,1BL   
 COMMENT Add-on orders for these samples will be processed based on acceptable specimen integrity and analyte stability, which may vary by analyte. TYPE & SCREEN Collection Time: 06/28/19  2:19 PM  
Result Value Ref Range Crossmatch Expiration 07/01/2019 ABO/Rh(D) O POSITIVE Antibody screen NEG   
CBC WITH AUTOMATED DIFF Collection Time: 06/28/19  2:19 PM  
Result Value Ref Range WBC 5.4 4.1 - 11.1 K/uL  
 RBC 2.75 (L) 4.10 - 5.70 M/uL HGB 7.6 (L) 12.1 - 17.0 g/dL HCT 26.0 (L) 36.6 - 50.3 % MCV 94.5 80.0 - 99.0 FL  
 MCH 27.6 26.0 - 34.0 PG  
 MCHC 29.2 (L) 30.0 - 36.5 g/dL  
 RDW 16.0 (H) 11.5 - 14.5 % PLATELET 257 822 - 864 K/uL MPV 9.2 8.9 - 12.9 FL  
 NRBC 0.0 0  WBC ABSOLUTE NRBC 0.00 0.00 - 0.01 K/uL NEUTROPHILS 74 32 - 75 % LYMPHOCYTES 12 12 - 49 % MONOCYTES 9 5 - 13 % EOSINOPHILS 4 0 - 7 % BASOPHILS 1 0 - 1 % IMMATURE GRANULOCYTES 0 0.0 - 0.5 % ABS. NEUTROPHILS 4.0 1.8 - 8.0 K/UL  
 ABS. LYMPHOCYTES 0.6 (L) 0.8 - 3.5 K/UL  
 ABS. MONOCYTES 0.5 0.0 - 1.0 K/UL  
 ABS. EOSINOPHILS 0.2 0.0 - 0.4 K/UL  
 ABS. BASOPHILS 0.1 0.0 - 0.1 K/UL ABS. IMM. GRANS. 0.0 0.00 - 0.04 K/UL  
 DF AUTOMATED    
 RBC COMMENTS ANISOCYTOSIS 1+ 
    
 RBC COMMENTS POLYCHROMASIA 1+ 
    
 RBC COMMENTS BASOPHILIC STIPPLING 1+ 
    
 RBC COMMENTS HYPOCHROMIA 1+ METABOLIC PANEL, COMPREHENSIVE Collection Time: 06/28/19  2:19 PM  
Result Value Ref Range Sodium 140 136 - 145 mmol/L Potassium 4.5 3.5 - 5.1 mmol/L Chloride 102 97 - 108 mmol/L  
 CO2 35 (H) 21 - 32 mmol/L Anion gap 3 (L) 5 - 15 mmol/L Glucose 59 (L) 65 - 100 mg/dL BUN 61 (H) 6 - 20 MG/DL Creatinine 1.99 (H) 0.70 - 1.30 MG/DL  
 BUN/Creatinine ratio 31 (H) 12 - 20 GFR est AA 42 (L) >60 ml/min/1.73m2 GFR est non-AA 35 (L) >60 ml/min/1.73m2 Calcium 8.9 8.5 - 10.1 MG/DL Bilirubin, total 0.3 0.2 - 1.0 MG/DL  
 ALT (SGPT) 12 12 - 78 U/L  
 AST (SGOT) 14 (L) 15 - 37 U/L Alk. phosphatase 171 (H) 45 - 117 U/L Protein, total 6.7 6.4 - 8.2 g/dL Albumin 2.6 (L) 3.5 - 5.0 g/dL Globulin 4.1 (H) 2.0 - 4.0 g/dL A-G Ratio 0.6 (L) 1.1 - 2.2 LIPASE Collection Time: 06/28/19  2:19 PM  
Result Value Ref Range Lipase 97 73 - 393 U/L MAGNESIUM Collection Time: 06/28/19  2:19 PM  
Result Value Ref Range Magnesium 2.1 1.6 - 2.4 mg/dL NT-PRO BNP Collection Time: 06/28/19  2:19 PM  
Result Value Ref Range NT pro-BNP 2,378 (H) <125 PG/ML  
PTT Collection Time: 06/28/19  2:19 PM  
Result Value Ref Range aPTT 28.8 22.1 - 32.0 sec  
 aPTT, therapeutic range     58.0 - 77.0 SECS  
PROTHROMBIN TIME + INR Collection Time: 06/28/19  2:19 PM  
Result Value Ref Range INR 1.1 0.9 - 1.1 Prothrombin time 10.7 9.0 - 11.1 sec GLUCOSE, POC Collection Time: 06/28/19  2:29 PM  
Result Value Ref Range Glucose (POC) 81 65 - 100 mg/dL Performed by Alan Barnhart (tech) URINALYSIS W/MICROSCOPIC Collection Time: 06/28/19  4:10 PM  
Result Value Ref Range Color YELLOW/STRAW  Appearance CLOUDY (A) CLEAR    
 Specific gravity 1.012 1.003 - 1.030    
 pH (UA) 5.0 5.0 - 8.0 Protein 100 (A) NEG mg/dL Glucose NEGATIVE  NEG mg/dL Ketone NEGATIVE  NEG mg/dL Bilirubin NEGATIVE  NEG Blood TRACE (A) NEG Urobilinogen 0.2 0.2 - 1.0 EU/dL Nitrites NEGATIVE  NEG Leukocyte Esterase LARGE (A) NEG    
 WBC 20-50 0 - 4 /hpf  
 RBC 0-5 0 - 5 /hpf Epithelial cells MODERATE (A) FEW /lpf Bacteria 1+ (A) NEG /hpf URINE CULTURE HOLD SAMPLE Collection Time: 06/28/19  4:10 PM  
Result Value Ref Range Urine culture hold URINE ON HOLD IN MICROBIOLOGY DEPT FOR 3 DAYS. IF UNPRESERVED URINE IS SUBMITTED, IT CANNOT BE USED FOR ADDITIONAL TESTING AFTER 24 HRS, RECOLLECTION WILL BE REQUIRED. BLOOD GAS, ARTERIAL Collection Time: 06/28/19  4:29 PM  
Result Value Ref Range pH 7.35 7.35 - 7.45    
 PCO2 66 (H) 35.0 - 45.0 mmHg PO2 164 (H) 80 - 100 mmHg O2 SAT 99 (H) 92 - 97 % BICARBONATE 35 (H) 22 - 26 mmol/L  
 BASE EXCESS 7.2 mmol/L  
 O2 METHOD BIPAP    
 FIO2 50 % Tidal volume 568 SPONTANEOUS RATE 22.0 IPAP/PIP 12.0 EPAP/CPAP/PEEP 6.0 Sample source ARTERIAL    
 SITE RIGHT RADIAL SYEDA'S TEST YES    
GLUCOSE, POC Collection Time: 06/28/19  5:15 PM  
Result Value Ref Range Glucose (POC) 77 65 - 100 mg/dL Performed by Saba Shepherd Imaging: 
I have personally reviewed the patients radiographs and have reviewed the reports: 
  
 
  
Salazar Harvey MD

## 2019-06-28 NOTE — PROGRESS NOTES
Code stemi en route by EMS. HX of vascular disease, prior cva and bilateral bka. Chest pain earlier today. Ems ecg with SR RBBB no st/t wave change. ED ecg with afib, RBBB, no st/t wave changes. No chest pain in ED. Code stemi cancelled.

## 2019-06-28 NOTE — PROGRESS NOTES
Spiritual Care Assessment/Progress Note Oneil Hagan 
 
 
NAME: Srikanth Johnson      MRN: [id number] AGE: 60 y.o. SEX: male Yarsani Affiliation: No Evangelical Language: English  
 
6/28/2019     Total Time (in minutes): 16 Spiritual Assessment begun in [facility] EMERGENCY DEPT through conversation with: 
  
    []Patient        [x] Family    [] Friend(s) Reason for Consult: Other (comment)(Code STEMI) Spiritual beliefs: (Please include comment if needed) 
   [] Identifies with a cyndy tradition:     
   [] Supported by a cyndy community:        
   [x] Claims no spiritual orientation:   No preference. [] Seeking spiritual identity:            
   [] Adheres to an individual form of spirituality:       
   [] Not able to assess:                   
 
    
Identified resources for coping:  
   [] Prayer                           
   [] Music                  [] Guided Imagery [x] Family/friends                 [] Pet visits [] Devotional reading                         [] Unknown 
   [] Other:                                          
 
 
Interventions offered during this visit: (See comments for more details) Family/Friend(s): Affirmation of emotions/emotional suffering, Affirmation of cyndy, Catharsis/review of pertinent events in supportive environment, Coping skills reviewed/reinforced, Iconic (affirming the presence of God/Higher Power)(Wife Jose Alfredo Cain)) Plan of Care: 
 
 [] Support spiritual and/or cultural needs  
 [] Support AMD and/or advance care planning process    
 [] Support grieving process 
 [] Coordinate Rites and/or Rituals  
 [] Coordination with community clergy [] No spiritual needs identified at this time 
 [] Detailed Plan of Care below (See Comments)  [] Make referral to Music Therapy 
[] Make referral to Pet Therapy    
[] Make referral to Addiction services 
[] Make referral to [facility] 
[] Make referral to Spiritual Care Partner [] No future visits requested       
[x] Follow up visits as needed Comments:  responded to Code STEMI Emergency Room (ER) room 13. Patient brought in by EMS, met wife and escorted her to room 13 where she was seated waiting while staff provided care. Wife, Maxine Sanchez, says he has had chest pain most of today, but the pain had gotten worse and he decided he better come to ER. She said he prefers to be called \"Jose Rafael. \"  She spoke of his health and health issues. Says his feet and lower legs were removed about 2.5 years ago. Says he has periods where he feels depressed since that time. He lives with his wife and she is his primary care giver. Next month is their 45th wedding anniversary. Provided words of comfort and hope. Provided her the opportunity to give voice to her thoughts, feelings, and emotions. She appeared comforted and encouraged as a result of this visit. Escorted her to the patient's bedside. She expressed gratitude for this visit. Visited by Rev. Radhika Rawls MDiv, [facility], [facility]  paging service: [phone])

## 2019-06-28 NOTE — ED PROVIDER NOTES
Jeri Grace is a 60 y.o. male who presents via  to the ED with a c/o generalized weakness, low blood sugar today. History is mainly provided by EMS and pt's wife due to pt's condition. EMS state that they were originally called for a c/o chest pain all morning. Per EMS,their initial EKG showed some scattered ST elevation and depression, and monitor read MI. They report that en route, EKG changed, and was no longer showing elevation/depression. EMS report that pt's blood sugar was 36, and last result was 38 PTA (after being placed On Glucose Drip). Pt's wife states that pt has been in CHF fluid overload for \"a while\" and was recently admitted to the hospital from 6/13/19/6/19/19 in [place] for Bleeding in his Small Intestine. Pt was given 3 unites of blood, his HGB level only increased by 0.01 and given Protonix. Pt endorses back pain, nausea, shortness of breath and decreased appetite. Pt's wife also reports that he has not had a bowel movement in a couple of days and states that his last bowel movements were dark. There are no other medical complaints at this time. PCP: Kenneth Salmon MD 
PMHx significant for: HTN, DM, Morbid obesity, CHF, COPD, CAD, Mitral Regurgitation PSHx significant for: Rt Leg Surgery Social Hx: Tobacco: Former Smoker EtOH: none Illicit drug use: none There are no further complaints or symptoms at this time. Signed by: dianne Siddiqiibchica for Hilario Harrison on June 28th, 2019 at 14:01 PM. The history is provided by the patient, the spouse, the EMS personnel and medical records. No  was used. Past Medical History:  
Diagnosis Date  CAD (coronary artery disease)   
 pvc  COPD  Diabetes ([address]  Heart failure ([address]  Hypertension  Mitral regurgitation  Morbidly obese ([address] Past Surgical History:  
Procedure Laterality Date  HX ORTHOPAEDIC    
 right leg surgery Family History: Problem Relation Age of Onset  Hypertension Father Social History Socioeconomic History  Marital status:  Spouse name: Not on file  Number of children: Not on file  Years of education: Not on file  Highest education level: Not on file Occupational History  Not on file Social Needs  Financial resource strain: Not on file  Food insecurity:  
  Worry: Not on file Inability: Not on file  Transportation needs:  
  Medical: Not on file Non-medical: Not on file Tobacco Use  Smoking status: Former Smoker  Smokeless tobacco: Never Used Substance and Sexual Activity  Alcohol use: No  
 Drug use: No  
 Sexual activity: Not on file Lifestyle  Physical activity:  
  Days per week: Not on file Minutes per session: Not on file  Stress: Not on file Relationships  Social connections:  
  Talks on phone: Not on file Gets together: Not on file Attends [employer] service: Not on file Active member of club or organization: Not on file Attends meetings of clubs or organizations: Not on file Relationship status: Not on file  Intimate partner violence:  
  Fear of current or ex partner: Not on file Emotionally abused: Not on file Physically abused: Not on file Forced sexual activity: Not on file Other Topics Concern  Not on file Social History Narrative  Not on file ALLERGIES: Codeine; Insulin regular; Pcn [penicillins]; Victoza [liraglutide]; Morphine; and Statins-hmg-coa reductase inhibitors Review of Systems Constitutional: Positive for appetite change. Negative for chills and fever. Respiratory: Positive for shortness of breath. Negative for cough. Cardiovascular: Positive for chest pain. Negative for leg swelling. Gastrointestinal: Positive for nausea. Negative for abdominal pain and vomiting. Musculoskeletal: Positive for back pain. All other systems reviewed and are negative. Vitals:  
 06/28/19 1407 BP: 136/54 Pulse: 78 Resp: (!) 32 Temp: 98.2 °F (36.8 °C) SpO2: 94% Weight: (!) 171.5 kg (378 lb) Physical Exam  
Constitutional: He appears well-developed and well-nourished. He appears ill. Morbidly obese; moderate resp distress; somnolent HENT:  
Head: Normocephalic and atraumatic. Mouth/Throat: Mucous membranes are dry. Eyes: Conjunctivae are normal. No scleral icterus. Neck: Neck supple. No tracheal deviation present. Cardiovascular: Normal rate, regular rhythm, normal heart sounds and intact distal pulses. Exam reveals no gallop and no friction rub. No murmur heard. Pulmonary/Chest:  
O2 Cannula in place; tachypnea; decreased breath sounds bilaterally Abdominal: Soft. He exhibits no distension. There is no tenderness. There is no rebound and no guarding. Musculoskeletal: He exhibits no edema. Bilateral BKA Neurological: He is alert. Skin: Skin is warm and dry. No rash noted. Psychiatric: He has a normal mood and affect. Nursing note and vitals reviewed. MDM Procedures ED EKG interpretation: 
Rhythm: atrial fib; and regular . Rate (approx.): 80; Axis: normal; ST/T wave: normal; RBBB; PVC's; Time: 14:04 PM; As dictated by Dalia Rawls MD. 
 
Consult note: Dr. Rox Doe - will admit. Brooks Monterroso MD 
3:27 PM 
 
Total critical care time spent exclusive of procedures:  35 min. Brooks Monterroso MD 
3:27 PM 
 
A/P: acute resp failure - likely from pulm edema; on BiPAP; ABG before BiPAP with pH - 7.2 and CO2 in 70's; Hgb 7.6 with hx recent GIB; admit for further management.   Brooks Monterroso MD 
3:28 PM

## 2019-06-28 NOTE — WOUND CARE
60 y.o.male with PMH including CAD, COPD, DM, HF, HTN, Bilateral BKA, and Morbid obesity. Presented to the ER for chest pain and SOB (home O2 2L NC) with increased edema of arms, legs, and trunk. Wound care consult for evaluation of skin folds and skin injuries present on admission. Assessment: 
Patient was transferred from ER to ICU and skin assessment performed with bedside RN. Head to toe assessment finds Bilateral arms with scattered ecchymosis and resolving abrasions/lacerations. Bilateral armpits have mild moisture erosion. The right pectoral area has a skin fold with moisture related erythema and moderate erosion. Patient has bilateral BKAs with a left lateral open area (likely stage II present on admit). The open wound bed is pink/pale measuring approximately 1x1.5cm in size with blanching and non-blanching areas of kayleigh-wound skin. The left lateral distal posterior BKA site is dry with minimal blanching erythema and palpable sharp bony prominence under the skin. The distal aspect of both legs are dry with hemosiderin like appearance and cool to touch. Patient was turned to the side and noted to be incontinent of stool. Cleansed the inner gluteal area to find a small 1cm area of erosion vs. Pressure (possible stage II) near the coccyx. Bilateral inner gluteal area and posterior gluteal folds have scattered areas of erosion with a hydradenitis like appearance. The patient has noted scrotal edema with an enlarged pannus obscuring the penis. Recommendation: 
Daily with skin care apply Eucerin lotion to extremities and bony prominences for protection from friction/shear. Recommend Desitin ointment to upper torso areas of erosion and to gluteal areas. Suggest interdry to the groin/pannus areas of erosion. Float legs off mattress. Turn Q2h while in bed (with turn team). Protect from lines and devices. When up in chair use a waffle cushion and reposition every 20 minutes. To the bilateral lateral lower leg amputation site wounds,consider mepilex border dressing and change every 3 days (check under dressing daily).   
 
Will Follow, Consult as needed,  
Tommy POOLEN RN Mohsen Hodge

## 2019-06-28 NOTE — ED NOTES
Per EMS pt with c/o of \"sick\", BG 36 en route with 10% Dextrose being infused, EKG en route showed ST depressions and elevations. BG=72 Pt also with hx of CHF and stroke. Pt is lethargic, eyes opening to voice, speech is slow and garbled

## 2019-06-28 NOTE — PROGRESS NOTES
1605 - TRANSFER - IN REPORT: 
 
Verbal report received from ED RN(name) on Eric Kramer  being received from ED(unit) for routine progression of care Report consisted of patients Situation, Background, Assessment and  
Recommendations(SBAR). Information from the following report(s) SBAR, Kardex, Intake/Output, MAR, Accordion and Recent Results was reviewed with the receiving nurse. Opportunity for questions and clarification was provided. Assessment completed upon patients arrival to unit and care assumed. Bayron Ramirez Notified Dr. Pedro Babinski that patient refusing to have BiPap put on him. MD to talk to pt. 
 
Syeda Wilson - Dr. Jess Royal notified of low blood sugar even after treating with D50W per protocol. Md to round on patient.

## 2019-06-28 NOTE — PROGRESS NOTES
6/28/2019 
4:01 PM 
Case management note Reason for Admission:   CHF Patient came to ED today for chest pain earlier in day, weakness, low BS. Patient was hospitalized at [facility] 6/13/19 - 6/19/19 for bleeding and received 3 units of blood. Patient has hx of CHF, COPD, CAD and bilateral amputations. [facility] and [facility] helps with some medications. Patient lives with wife who performs most ADL's for him. They have motorized wheelchair, radha lift and BSC. They have a ramp to home as well. RRAT Score:     24 Resources/supports as identified by patient/family:   Wife, John Paul Bryan  caregiver, [phone] Top Challenges facing patient (as identified by patient/family and CM): Finances/Medication cost?      Medicare, [employer], limited funding Transportation? Will need transportation home Support system or lack thereof? Wife is the only caregiver Living arrangements? Patient lives in home with ramp Self-care/ADLs/Cognition? Unable to do self care, poor health cognition Current Advanced Directive/Advance Care Plan: Wife states Advance Directive but not on file Plan for utilizing home health: To be determined by PT/OT Transition of Care Plan: 1. Home with continued care from wife 2. Home Health 3. Rehab placement 4. Palliative consult 5. CM to follow until discharge Care Management Interventions PCP Verified by CM: Yes(dr. isabel mac no nn) Mode of Transport at Discharge: BLS Transition of Care Consult (CM Consult): Discharge Planning Current Support Network: Lives with Spouse Confirm Follow Up Transport: Other (see comment) Discharge Location Discharge Placement: Home with family assistance  Ira

## 2019-06-29 PROBLEM — K27.9 PUD (PEPTIC ULCER DISEASE): Status: ACTIVE | Noted: 2019-01-01

## 2019-06-29 PROBLEM — N18.30 CKD (CHRONIC KIDNEY DISEASE) STAGE 3, GFR 30-59 ML/MIN (HCC): Chronic | Status: ACTIVE | Noted: 2019-01-01

## 2019-06-29 PROBLEM — I48.19 PERSISTENT ATRIAL FIBRILLATION (HCC): Chronic | Status: ACTIVE | Noted: 2019-01-01

## 2019-06-29 PROBLEM — E66.01 MORBID OBESITY (HCC): Chronic | Status: ACTIVE | Noted: 2018-01-01

## 2019-06-29 PROBLEM — G93.41 ACUTE METABOLIC ENCEPHALOPATHY: Status: ACTIVE | Noted: 2019-01-01

## 2019-06-29 NOTE — PROGRESS NOTES
Doretha Rodriguez Dr Dosing Services: Antimicrobial Stewardship Progress Note Consult for antibiotic dosing of Vancomycin by Dr. Sarahy Osullivan Pharmacist reviewed antibiotic appropriateness for 60year old , 180 kg, male bcx 1/2 +GPC. Day of Therapy 1 BMI ~54 CKD 3 - no prior vancomycin history Plan: 
Vancomycin therapy: 
Start Vancomycin therapy 2500 mg every 24 hours Dose calculated to approximate a therapeutic trough of 15-20 mcg/mL. Plan for level: after second dose Pharmacy to follow daily and will make changes to dose and/or frequency based on clinical status. Serum Creatinine Lab Results Component Value Date/Time Creatinine 1.88 (H) 06/29/2019 02:39 AM  
 Creatinine (POC) 2.0 (H) 06/28/2019 02:15 PM  
 
   
Temp 98.2 °F (36.8 °C) WBC Lab Results Component Value Date/Time WBC 4.4 06/29/2019 02:39 AM  
 
   
H/H Lab Results Component Value Date/Time HGB 7.5 (L) 06/29/2019 02:39 AM  
 
  
 
Platelets Lab Results Component Value Date/Time PLATELET 038 85/84/7290 02:39 AM  
 
  
 
 
Pharmacist: Laya Garcia Contact information: [id number]

## 2019-06-29 NOTE — PROGRESS NOTES
1900 - . Adama Fletcher Bedside and Verbal shift change report given to KIRK Sharpe (oncoming nurse) by Michel Vargas RN (offgoing nurse). Report included the following information SBAR, Kardex, Intake/Output, MAR, Recent Results and Med Rec Status. 1930 - Patient assessed and very lethargic and unable to answer questions and stay awake. RT called to bedside, repeat ABG ordered and drawn to reassess oxygenation as patient refusing to wear BiPap. RT at bedside turned oxygen down to 2L, O2 goals 88-92% 1945 - Explained to patient that it is important that he wear BiPap mask and the risk of not wearing it could lead to possible intubation. Patient states he is not wearing BiPap because he does not like the mask Adama Fletcher Primary Nurse Miriam Wood RN and Micehl Vargas RN performed a dual skin assessment on this patient Impairment noted- see wound doc flow sheet Jesus score is 10 Patient has multiple areas of skin breakdown and compromise to include: Moisture injury to skin folds in groin and chest, discoloration and sluggish redness to bilateral BKAs as well as redness to sacrum and gluteal folds that is blanchable but sluggish. Patient has multiple areas of ecchymosis to extremities.

## 2019-06-29 NOTE — PROGRESS NOTES
Chart reviewed. Per PT note: Pt is totally dependent in transfers with a Jena lift. Pt was having HH PT for ROM and strengthening B UE's but has had to cancel in recent weeks secondary to severe B hand neuropathic pain. Pt currently has severe hand pain. If hand pain subsides and patient would like to resume BUE strengthening he can do so with [facility] services. Any acute OT intervention would focus on use of BUE for basic ADL and strengthening. Will complete order at this time.

## 2019-06-29 NOTE — PROGRESS NOTES
Pt Refuses to use Bipap. Pt  And wife at bedside encouraged and instructed on importance of wearing Bipap. Dr Pilo Louise and Walter Mckay, RN @ bedside. Pt placed on High FLow per Dr Pérez Im @ 30 %.

## 2019-06-29 NOTE — PROGRESS NOTES
visit for routine follow up, palliative care consult. Patient sitting up in bed, wife and mother are visiting. Good eye contact, friendly, smiling often, good sense of humor. Says he is feeling better than yesterday, but had a rough night and is still feeling a little tired. Provided spiritual presence and listening as he and his family spoke of their current thougths, feelings, and concerns. Wife, Quintin Mcrae, said she did get a little sleep last night and is feeling better today about her 's health. Olga Martinez is the oldest of three children, has one brother and one sister. He described good family support. Did not identify any spiritual needs at this time. He appeared encouraged and comforted as a result of this visit and expressed gratitude for this visit. Visited by Rev. Rufus Callahan MDiv, [facility], [facility]  paging service: [phone])

## 2019-06-29 NOTE — PROGRESS NOTES
Pt is totally dependent in transfers with a Jena lift. Pt was having HH PT for ROM and strengthening B UE's but has had to cancel in recent weeks secondary to severe B hand neuropathic pain. Pt currently has sever hand pain and is not appropriate for PT. Thanks for this consult.

## 2019-06-29 NOTE — PROGRESS NOTES
Shift Summary Bedside and Verbal shift change report given to Kristina Franco RN (oncoming nurse) by Hiro Bloom RN (offgoing nurse). Report included the following information SBAR, Kardex, MAR, Recent Results and Cardiac Rhythm  . MD at bedside. Discussed options for respiratory issues. Patient will not wear bipap or high flow nasal cannula because it irritates him. Education provided to patient by RN and MD. Patient continues to refuse. Echo at bedside. Patient with complaints of constipation. New orders for miralax. Patient has had x2 BM. Insistent on miralax or prune juice. Patient given warm prune juice. Patient has had three more bowel movements. Patient sleep. Oxygen in the 70's. Patient more lethargic when awake. Attempted to encourage patient to put on BiPAP patient refused. Education provided. Patient agreed to try Hi flow. Respiratory notified. Bedside and Verbal shift change report given to 1898 An Jordan RN (oncoming nurse) by KAVEH Watkins RN (offgoing nurse). Report included the following information SBAR, Kardex, MAR, Recent Results and Cardiac Rhythm  .

## 2019-06-29 NOTE — CONSULTS
[facility] [place], Critical Care, and Sleep Medicine Initial Patient Consult Name: Ghulam Barrios MRN: [id number] : 1960 Hospital: [facility] Date: 2019 IMPRESSION:  
· Acute on chronic hypoxemic/hypercapneic respiratory failure · Decompensated heart failure · COPD, not in acute exacerbation · Suspected JEROD/OHS · Hypoglycemia · ASPEN vs CKD · H/o CVA · HTN 
· H/o afib · Recent GIB 
· H/o non-compliance RECOMMENDATIONS:  
Auto bipap at night if patient will let us Ok to start diet 
oob to chair Overall poor prognosis Acutely ill, at risk of hypercapnia Will follow. Subjective:  
 
Asked to see by Dr. Marcello Addison for evaluation of acute on chronic respiratory failure. Mr. Hector Roque is a 62yo male w/ history of COPD, CKD, HTN, afib, PVD s/p bilateral BKA, HLD, CVA, DM and non-compliance who presented to the ER on  w/ complaints of feeling \"sick. \"  He has been lethargic and weak. Also complains of chest pain, shortness of breath, swelling \"everywhere\" and a cough productive of white sputum. Cough sounds very wet, congested. Thinks several family members have recently had the flu. Admitted to the ICU on bipap. ABG in ED reportedly: 7.. Recent admission to [facility] for GIB. Also says that he went to [facility] twice in the past month or so for swelling but that nothing was done. Says that he was diagnosed w/ COPD 35 years ago. No recent PFTs. Followed by his PCP. Uses combivent at home. Also says that he was diagnosed w/ JEROD in  but never treated. No recent evaluation. : still not using bipap, alert and sob this am, wants to eat, wife at bedside Past Medical History:  
Diagnosis Date  CAD (coronary artery disease)   
 pvc  CKD (chronic kidney disease) stage 3, GFR 30-59 ml/min ([facility] 2019  COPD  Diabetes ([facility] [address]  GI bleed 2019: [place]. Found small bowel ulcer.  S/P 3 unit transfusion.  Heart failure ([facility] [address]  Hypertension  Mitral regurgitation  Morbidly obese ([facility] [address]  PUD (peptic ulcer disease) 6/29/2019 Past Surgical History:  
Procedure Laterality Date  HX ORTHOPAEDIC    
 right leg surgery Prior to Admission medications Medication Sig Start Date End Date Taking? Authorizing Provider  
ferrous gluconate 324 mg (38 mg iron) tablet Take 324 mg by mouth Daily (before breakfast). Yes Provider, Historical  
bumetanide (BUMEX) 2 mg tablet Take 4 mg by mouth two (2) times a day. Yes Provider, Historical  
sevelamer carbonate (RENVELA) 800 mg tab tab Take 1,600 mg by mouth three (3) times daily (with meals). Yes Provider, Historical  
pantoprazole (PROTONIX) 40 mg tablet Take 40 mg by mouth two (2) times a day. Yes Provider, Historical  
calcitRIOL (ROCALTROL) 0.25 mcg capsule Take 0.25 mcg by mouth five (5) days a week. Yes Provider, Historical  
insulin aspart U-100 (NOVOLOG U-100 INSULIN ASPART) 100 unit/mL injection by SubCUTAneous route. Yes Provider, Historical  
glimepiride (AMARYL) 2 mg tablet Take 2 mg by mouth two (2) times a day. Yes Provider, Historical  
aspirin (ASPIRIN) 325 mg tablet Take 325 mg by mouth daily. Yes Provider, Historical  
ipratropium-albuterol (COMBIVENT RESPIMAT)  mcg/actuation inhaler Take 1 Puff by inhalation every six (6) hours as needed for Wheezing. Yes Provider, Historical  
polyethylene glycol (MIRALAX) 17 gram packet Take 17 g by mouth nightly. Yes Provider, Historical  
docusate sodium (COLACE) 100 mg capsule Take 100 mg by mouth daily as needed for Constipation. Yes Provider, Historical  
 
Allergies Allergen Reactions  Cephalexin Other (comments) Throat starting closing up after 2nd dose  Codeine Anaphylaxis  Insulin Regular Other (comments) Temp blindness and facial droop only with long acting insulin. Has been on short acting insulin without adverse.  Pcn [Penicillins] Anaphylaxis  Victoza [Liraglutide] Other (comments) Fascial droop, temp blindness  Ferrous Sulfate Other (comments) Ferrous sulfate caused significant constipation, able to tolerate ferrous gluconate without problems  Morphine Other (comments)  
  hallucinates  Statins-Hmg-Coa Reductase Inhibitors Hives, Itching and Nausea and Vomiting Social History Tobacco Use  Smoking status: Former Smoker  Smokeless tobacco: Never Used Substance Use Topics  Alcohol use: No  
  
Family History Problem Relation Age of Onset  Hypertension Father Current Facility-Administered Medications Medication Dose Route Frequency  sodium chloride (NS) flush 5-40 mL  5-40 mL IntraVENous Q8H  
 sodium chloride (NS) flush 5-40 mL  5-40 mL IntraVENous Q8H  
 docusate sodium (COLACE) capsule 100 mg  100 mg Oral BID  bumetanide (BUMEX) injection 2 mg  2 mg IntraVENous Q12H  
 arformoterol (BROVANA) neb solution 15 mcg  15 mcg Nebulization BID RT  
 budesonide (PULMICORT) 500 mcg/2 ml nebulizer suspension  500 mcg Nebulization BID RT  
 nystatin (MYCOSTATIN) 100,000 unit/gram powder   Topical BID  insulin lispro (HUMALOG) injection   SubCUTAneous AC&HS  
 albuterol-ipratropium (DUO-NEB) 2.5 MG-0.5 MG/3 ML  3 mL Nebulization Q4H RT  
 pantoprazole (PROTONIX) 40 mg in sodium chloride 0.9% 10 mL injection  40 mg IntraVENous Q12H  
 [START ON 7/1/2019] calcitRIOL (ROCALTROL) capsule 0.25 mcg  0.25 mcg Oral Once per day on Mon Tue Wed Thu Fri  
 ferrous gluconate 324 mg (38 mg iron) tablet 1 Tab  1 Tab Oral ACB  polyethylene glycol (MIRALAX) packet 17 g  17 g Oral QHS  sevelamer carbonate (RENVELA) tab 1,600 mg  1,600 mg Oral TID WITH MEALS  
 aspirin delayed-release tablet 81 mg  81 mg Oral DAILY Objective: 
Vital Signs:   
Visit Vitals /52 Pulse 75 Temp 97.8 °F (36.6 °C) Resp 20 Ht 6' (1.829 m) Wt (!) 180.5 kg (398 lb) SpO2 93% BMI 53.98 kg/m² O2 Device: Nasal cannula O2 Flow Rate (L/min): 2 l/min Temp (24hrs), Av.6 °F (36.4 °C), Min:97 °F (36.1 °C), Max:98.2 °F (36.8 °C) Intake/Output:  
Last shift:      No intake/output data recorded. Last 3 shifts:  1901 -  0700 In: -  
Out: 1000 [Urine:1000] Intake/Output Summary (Last 24 hours) at 2019 7515 Last data filed at 2019 2330 Gross per 24 hour Intake  Output 1000 ml Net -1000 ml Physical Exam:  
General:  Morbidly obese, +accessory muscle use Head:   atnc, edentulous Eyes:   anne, eomi intact Nose: Throat: mp-3 Neck:   
Back:     
Lungs:   Diminished breath sounds Chest wall:  No tenderness or deformity. Heart:  Regular rate and rhythm Abdomen:   Soft, non-tender. Bowel sounds normal  
Extremities: Anasarca, bilateral BKA Pulses: equal  
Skin:   
Lymph nodes:   
Neurologic: Oriented x 3 Data review:  
 
Recent Results (from the past 24 hour(s)) GLUCOSE, POC Collection Time: 19  2:03 PM  
Result Value Ref Range Glucose (POC) 72 65 - 100 mg/dL Performed by Yvan Chester   
POC TROPONIN-I Collection Time: 19  2:13 PM  
Result Value Ref Range Troponin-I (POC) <0.04 0.00 - 0.08 ng/mL POC CHEM8 Collection Time: 19  2:15 PM  
Result Value Ref Range Calcium, ionized (POC) 1.21 1.12 - 1.32 mmol/L Sodium (POC) 138 136 - 145 mmol/L Potassium (POC) 4.4 3.5 - 5.1 mmol/L Chloride (POC) 97 (L) 98 - 107 mmol/L  
 CO2 (POC) 33 (H) 21 - 32 mmol/L Anion gap (POC) 14 10 - 20 mmol/L Glucose (POC) 60 (L) 65 - 100 mg/dL BUN (POC) 63 (H) 9 - 20 mg/dL Creatinine (POC) 2.0 (H) 0.6 - 1.3 mg/dL GFRAA, POC 42 (L) >60 ml/min/1.73m2 GFRNA, POC 34 (L) >60 ml/min/1.73m2 Hematocrit (POC) 24 (L) 36.6 - 50.3 % Comment Notified RN or MD immediately by  POC LACTIC ACID Collection Time: 19  2:17 PM  
Result Value Ref Range Lactic Acid (POC) 0.51 0.40 - 2.00 mmol/L  
SAMPLES BEING HELD Collection Time: 06/28/19  2:19 PM  
Result Value Ref Range SAMPLES BEING HELD 1RED,1SST,1BL   
 COMMENT Add-on orders for these samples will be processed based on acceptable specimen integrity and analyte stability, which may vary by analyte. TYPE & SCREEN Collection Time: 06/28/19  2:19 PM  
Result Value Ref Range Crossmatch Expiration 07/01/2019 ABO/Rh(D) O POSITIVE Antibody screen NEG   
CBC WITH AUTOMATED DIFF Collection Time: 06/28/19  2:19 PM  
Result Value Ref Range WBC 5.4 4.1 - 11.1 K/uL  
 RBC 2.75 (L) 4.10 - 5.70 M/uL HGB 7.6 (L) 12.1 - 17.0 g/dL HCT 26.0 (L) 36.6 - 50.3 % MCV 94.5 80.0 - 99.0 FL  
 MCH 27.6 26.0 - 34.0 PG  
 MCHC 29.2 (L) 30.0 - 36.5 g/dL  
 RDW 16.0 (H) 11.5 - 14.5 % PLATELET 892 811 - 189 K/uL MPV 9.2 8.9 - 12.9 FL  
 NRBC 0.0 0  WBC ABSOLUTE NRBC 0.00 0.00 - 0.01 K/uL NEUTROPHILS 74 32 - 75 % LYMPHOCYTES 12 12 - 49 % MONOCYTES 9 5 - 13 % EOSINOPHILS 4 0 - 7 % BASOPHILS 1 0 - 1 % IMMATURE GRANULOCYTES 0 0.0 - 0.5 % ABS. NEUTROPHILS 4.0 1.8 - 8.0 K/UL  
 ABS. LYMPHOCYTES 0.6 (L) 0.8 - 3.5 K/UL  
 ABS. MONOCYTES 0.5 0.0 - 1.0 K/UL  
 ABS. EOSINOPHILS 0.2 0.0 - 0.4 K/UL  
 ABS. BASOPHILS 0.1 0.0 - 0.1 K/UL  
 ABS. IMM. GRANS. 0.0 0.00 - 0.04 K/UL  
 DF AUTOMATED    
 RBC COMMENTS ANISOCYTOSIS 1+ 
    
 RBC COMMENTS POLYCHROMASIA 1+ 
    
 RBC COMMENTS BASOPHILIC STIPPLING 1+ 
    
 RBC COMMENTS HYPOCHROMIA 1+ METABOLIC PANEL, COMPREHENSIVE Collection Time: 06/28/19  2:19 PM  
Result Value Ref Range Sodium 140 136 - 145 mmol/L Potassium 4.5 3.5 - 5.1 mmol/L Chloride 102 97 - 108 mmol/L  
 CO2 35 (H) 21 - 32 mmol/L Anion gap 3 (L) 5 - 15 mmol/L Glucose 59 (L) 65 - 100 mg/dL BUN 61 (H) 6 - 20 MG/DL  Creatinine 1.99 (H) 0.70 - 1.30 MG/DL  
 BUN/Creatinine ratio 31 (H) 12 - 20    
 GFR est AA 42 (L) >60 ml/min/1.73m2 GFR est non-AA 35 (L) >60 ml/min/1.73m2 Calcium 8.9 8.5 - 10.1 MG/DL Bilirubin, total 0.3 0.2 - 1.0 MG/DL  
 ALT (SGPT) 12 12 - 78 U/L  
 AST (SGOT) 14 (L) 15 - 37 U/L Alk. phosphatase 171 (H) 45 - 117 U/L Protein, total 6.7 6.4 - 8.2 g/dL Albumin 2.6 (L) 3.5 - 5.0 g/dL Globulin 4.1 (H) 2.0 - 4.0 g/dL A-G Ratio 0.6 (L) 1.1 - 2.2 LIPASE Collection Time: 06/28/19  2:19 PM  
Result Value Ref Range Lipase 97 73 - 393 U/L MAGNESIUM Collection Time: 06/28/19  2:19 PM  
Result Value Ref Range Magnesium 2.1 1.6 - 2.4 mg/dL NT-PRO BNP Collection Time: 06/28/19  2:19 PM  
Result Value Ref Range NT pro-BNP 2,378 (H) <125 PG/ML  
PTT Collection Time: 06/28/19  2:19 PM  
Result Value Ref Range aPTT 28.8 22.1 - 32.0 sec  
 aPTT, therapeutic range     58.0 - 77.0 SECS  
PROTHROMBIN TIME + INR Collection Time: 06/28/19  2:19 PM  
Result Value Ref Range INR 1.1 0.9 - 1.1 Prothrombin time 10.7 9.0 - 11.1 sec GLUCOSE, POC Collection Time: 06/28/19  2:29 PM  
Result Value Ref Range Glucose (POC) 81 65 - 100 mg/dL Performed by Zara Klein (tech) CULTURE, BLOOD, PERIPHERAL Collection Time: 06/28/19  2:46 PM  
Result Value Ref Range Special Requests: NO SPECIAL REQUESTS Culture result: NO GROWTH AFTER 15 HOURS    
CULTURE, BLOOD, PERIPHERAL Collection Time: 06/28/19  2:46 PM  
Result Value Ref Range Special Requests: NO SPECIAL REQUESTS Culture result: NO GROWTH AFTER 15 HOURS    
URINALYSIS W/MICROSCOPIC Collection Time: 06/28/19  4:10 PM  
Result Value Ref Range Color YELLOW/STRAW Appearance CLOUDY (A) CLEAR Specific gravity 1.012 1.003 - 1.030    
 pH (UA) 5.0 5.0 - 8.0 Protein 100 (A) NEG mg/dL Glucose NEGATIVE  NEG mg/dL Ketone NEGATIVE  NEG mg/dL Bilirubin NEGATIVE  NEG Blood TRACE (A) NEG Urobilinogen 0.2 0.2 - 1.0 EU/dL  Nitrites NEGATIVE  NEG    
 Leukocyte Esterase LARGE (A) NEG    
 WBC 20-50 0 - 4 /hpf  
 RBC 0-5 0 - 5 /hpf Epithelial cells MODERATE (A) FEW /lpf Bacteria 1+ (A) NEG /hpf URINE CULTURE HOLD SAMPLE Collection Time: 06/28/19  4:10 PM  
Result Value Ref Range Urine culture hold URINE ON HOLD IN MICROBIOLOGY DEPT FOR 3 DAYS. IF UNPRESERVED URINE IS SUBMITTED, IT CANNOT BE USED FOR ADDITIONAL TESTING AFTER 24 HRS, RECOLLECTION WILL BE REQUIRED. BLOOD GAS, ARTERIAL Collection Time: 06/28/19  4:29 PM  
Result Value Ref Range pH 7.35 7.35 - 7.45    
 PCO2 66 (H) 35.0 - 45.0 mmHg PO2 164 (H) 80 - 100 mmHg O2 SAT 99 (H) 92 - 97 % BICARBONATE 35 (H) 22 - 26 mmol/L  
 BASE EXCESS 7.2 mmol/L  
 O2 METHOD BIPAP    
 FIO2 50 % Tidal volume 568 SPONTANEOUS RATE 22.0 IPAP/PIP 12.0 EPAP/CPAP/PEEP 6.0 Sample source ARTERIAL    
 SITE RIGHT RADIAL SYEDA'S TEST YES    
GLUCOSE, POC Collection Time: 06/28/19  5:15 PM  
Result Value Ref Range Glucose (POC) 77 65 - 100 mg/dL Performed by [facility] RESPIRATORY PANEL,PCR,NASOPHARYNGEAL Collection Time: 06/28/19  6:29 PM  
Result Value Ref Range Adenovirus NOT DETECTED NOTD Coronavirus 229E NOT DETECTED NOTD Coronavirus HKU1 NOT DETECTED NOTD Coronavirus CVNL63 NOT DETECTED NOTD Coronavirus OC43 NOT DETECTED NOTD Metapneumovirus NOT DETECTED NOTD Rhinovirus and Enterovirus NOT DETECTED NOTD Influenza A NOT DETECTED NOTD Influenza A, subtype H1 NOT DETECTED NOTD Influenza A, subtype H3 NOT DETECTED NOTD INFLUENZA A H1N1 PCR NOT DETECTED NOTD Influenza B NOT DETECTED NOTD Parainfluenza 1 NOT DETECTED NOTD Parainfluenza 2 NOT DETECTED NOTD Parainfluenza 3 NOT DETECTED NOTD Parainfluenza virus 4 NOT DETECTED NOTD    
 RSV by PCR NOT DETECTED NOTD Bordetella pertussis - PCR NOT DETECTED NOTD Chlamydophila pneumoniae DNA, QL, PCR NOT DETECTED NOTD Mycoplasma pneumoniae DNA, QL, PCR NOT DETECTED NOTD    
GLUCOSE, POC Collection Time: 06/28/19  6:31 PM  
Result Value Ref Range Glucose (POC) 78 65 - 100 mg/dL Performed by Theresa Sahu GLUCOSE, POC Collection Time: 06/28/19  6:58 PM  
Result Value Ref Range Glucose (POC) 81 65 - 100 mg/dL Performed by Theresa Sahu TROPONIN I Collection Time: 06/28/19  6:59 PM  
Result Value Ref Range Troponin-I, Qt. <0.05 <0.05 ng/mL CBC W/O DIFF Collection Time: 06/28/19  6:59 PM  
Result Value Ref Range WBC 4.9 4.1 - 11.1 K/uL  
 RBC 2.68 (L) 4.10 - 5.70 M/uL HGB 7.3 (L) 12.1 - 17.0 g/dL HCT 25.3 (L) 36.6 - 50.3 % MCV 94.4 80.0 - 99.0 FL  
 MCH 27.2 26.0 - 34.0 PG  
 MCHC 28.9 (L) 30.0 - 36.5 g/dL  
 RDW 15.9 (H) 11.5 - 14.5 % PLATELET 560 293 - 630 K/uL MPV 8.8 (L) 8.9 - 12.9 FL  
 NRBC 0.0 0  WBC ABSOLUTE NRBC 0.00 0.00 - 0.01 K/uL BLOOD GAS, ARTERIAL Collection Time: 06/28/19  7:35 PM  
Result Value Ref Range pH 7.32 (L) 7.35 - 7.45    
 PCO2 71 (H) 35.0 - 45.0 mmHg PO2 100 80 - 100 mmHg O2 SAT 97 92 - 97 % BICARBONATE 36 (H) 22 - 26 mmol/L  
 BASE EXCESS 6.7 mmol/L  
 O2 METHOD NASAL O2    
 O2 FLOW RATE 4.00 L/min Sample source ARTERIAL    
 SITE RIGHT RADIAL SYEDA'S TEST YES    
GLUCOSE, POC Collection Time: 06/28/19  9:31 PM  
Result Value Ref Range Glucose (POC) 75 65 - 100 mg/dL Performed by Travis Badillo, POC Collection Time: 06/28/19 10:33 PM  
Result Value Ref Range Glucose (POC) 107 (H) 65 - 100 mg/dL Performed by Kenia Sharpe   
TROPONIN I Collection Time: 06/29/19  2:39 AM  
Result Value Ref Range Troponin-I, Qt. <0.05 <0.05 ng/mL CBC W/O DIFF Collection Time: 06/29/19  2:39 AM  
Result Value Ref Range WBC 4.4 4.1 - 11.1 K/uL  
 RBC 2.79 (L) 4.10 - 5.70 M/uL HGB 7.5 (L) 12.1 - 17.0 g/dL HCT 26.2 (L) 36.6 - 50.3 % MCV 93.9 80.0 - 99.0 FL  
 MCH 26.9 26.0 - 34.0 PG  
 MCHC 28.6 (L) 30.0 - 36.5 g/dL  
 RDW 15.9 (H) 11.5 - 14.5 % PLATELET 742 152 - 726 K/uL MPV 9.6 8.9 - 12.9 FL  
 NRBC 0.0 0  WBC ABSOLUTE NRBC 0.00 0.00 - 0.01 K/uL MAGNESIUM Collection Time: 06/29/19  2:39 AM  
Result Value Ref Range Magnesium 2.0 1.6 - 2.4 mg/dL METABOLIC PANEL, COMPREHENSIVE Collection Time: 06/29/19  2:39 AM  
Result Value Ref Range Sodium 140 136 - 145 mmol/L Potassium 4.1 3.5 - 5.1 mmol/L Chloride 100 97 - 108 mmol/L  
 CO2 36 (H) 21 - 32 mmol/L Anion gap 4 (L) 5 - 15 mmol/L Glucose 75 65 - 100 mg/dL BUN 65 (H) 6 - 20 MG/DL Creatinine 1.88 (H) 0.70 - 1.30 MG/DL  
 BUN/Creatinine ratio 35 (H) 12 - 20 GFR est AA 45 (L) >60 ml/min/1.73m2 GFR est non-AA 37 (L) >60 ml/min/1.73m2 Calcium 9.1 8.5 - 10.1 MG/DL Bilirubin, total 0.3 0.2 - 1.0 MG/DL  
 ALT (SGPT) 11 (L) 12 - 78 U/L  
 AST (SGOT) 10 (L) 15 - 37 U/L Alk. phosphatase 144 (H) 45 - 117 U/L Protein, total 6.5 6.4 - 8.2 g/dL Albumin 2.6 (L) 3.5 - 5.0 g/dL Globulin 3.9 2.0 - 4.0 g/dL A-G Ratio 0.7 (L) 1.1 - 2.2 TSH 3RD GENERATION Collection Time: 06/29/19  2:39 AM  
Result Value Ref Range TSH 0.68 0.36 - 3.74 uIU/mL PHOSPHORUS Collection Time: 06/29/19  2:39 AM  
Result Value Ref Range Phosphorus 3.6 2.6 - 4.7 MG/DL  
GLUCOSE, POC Collection Time: 06/29/19  4:04 AM  
Result Value Ref Range Glucose (POC) 69 65 - 100 mg/dL Performed by Jennifer Mercado, POC Collection Time: 06/29/19  5:39 AM  
Result Value Ref Range Glucose (POC) 91 65 - 100 mg/dL Performed by Jennifer Mercado, POC Collection Time: 06/29/19  7:44 AM  
Result Value Ref Range Glucose (POC) 86 65 - 100 mg/dL Performed by Unkown  Imaging: 
I have personally reviewed the patients radiographs and have reviewed the reports: Uri Orlando MD

## 2019-06-29 NOTE — PROGRESS NOTES
1900 - . Isabelle Scott Bedside and Verbal shift change report given to KIRK Sharpe (oncoming nurse) by Diana Seth RN (offgoing nurse). Report included the following information SBAR, ED Summary, Intake/Output, MAR, Recent Results and Med Rec Status. 1930 - Patient very lethargic and drowsy, RT called to bedside, repeat ABG drawn and oxygen turned down. Explained to patient the importance of wearing his Bipap mask, patient states that he does not want to wear the mask because the BiPap mask does not fit. Reiterated the risk of not wearing the mask and that if his CO2 level increases too high he might require intubation. Isabelle Scott .Primary Nurse Kassie Mack RN and Diana Seth RN performed a dual skin assessment on this patient Impairment noted- see wound doc flow sheet Jesus score is 14 Patient has multiple areas of skin breakdown 2135 - Patient given 25 grams IV dextrose for blood glucose 75 
 
2230 - Patient heard to be crying out for help, on assessment patient had broken off telemetry wires and SpO2 monitoring and refusing to put back on, patient asking to get out of bed and refusing to wear BiPap when offered 65 - Dr Wynelle Spatz paged regarding patient's increased altered mental status and refusal to wear BiPap. Dr Beau Gonzalez also called come to bedside to evaluate patient [address] with Dr Wynelle Spatz, per MD give patient 2mg IVP Haldol and see if patient is amenable to wearing high flow nasal cannula 
 
2300 - Dr Beau Gonzalez at bedside to evaluate patient, patient still acutely altered asking to take off all lines and tubes. MD explained to patient and wife the danger of not being monitored properly. 2310 - Attempted to give patient Haldol and patient refused 1152 - Patient crying out and yelling that he \"needs help\" and he wants to get out of bed, explained to patient he could not get out of bed.  Also explained to patient that he needed to rest and I was going to give him something to help him sleep better, patient given 2mg IVP Haldol 80 - Lab drawn and sent, patient resting comfortably 0400 - Patient reassessed, patient crying out and wants his high flow oxygen taken off, re-educated patient on the importance of wearing his oxygen to prevent further respiratory compromise 
 
0700 - . Lakeisha Nava Bedside and Verbal shift change report given to KIRK Atkins (oncoming nurse) by Maria Fernanda Frederick RN (offgoing nurse). Report included the following information SBAR, Procedure Summary, Intake/Output, MAR and Recent Results.

## 2019-06-30 PROBLEM — I27.81 COR PULMONALE (CHRONIC) (HCC): Chronic | Status: ACTIVE | Noted: 2019-01-01

## 2019-06-30 PROBLEM — I27.81 COR PULMONALE (CHRONIC) (HCC): Status: ACTIVE | Noted: 2019-01-01

## 2019-06-30 NOTE — PROGRESS NOTES
Bedside and Verbal shift change report given to Amanda RN (oncoming nurse) by Joe Dumont  RN (offgoing nurse). Report included the following information SBAR, Kardex and Recent Results. .. Bedside and Verbal shift change report given to [facility] RN  (oncoming nurse) by Leticia Finnegan RN (offgoing nurse). Report included the following information SBAR, Kardex and Recent Results.

## 2019-06-30 NOTE — PROGRESS NOTES
Bedside and Verbal shift change report given to [facility] RN (oncoming nurse) by Mechelle Reagan (offgoing nurse). Report included the following information SBAR, Kardex, Recent Results, Med Rec Status, Cardiac Rhythm SR and Alarm Parameters . Primary Nurse Yessenia Natarajan, KIRK and Ivan Martinez RN performed a dual skin assessment on this patient Impairment noted- see wound doc flow sheet Jesus score is 12 
 
2000- Attempted to place patient on bipap he panicked and stated that he is \"couldn't take it and that it makes him feel like he is suffocating. \" Pt placed back on 3 liters. 2100- Patient pulled up in bed.   
 
2200- Wife complained that the oxygen tubing was too short. Oxygen tubing extension given. 46- Wife stated that  was having difficulty breathing. Went into room and assess patients breathing. Suggested that he place bipap on. Pt attempted but panicked once the straps were clicked on. Pt was informed that if it came to a point he may end up intubated if he felt like he couldn't breathe. Pt stated that he understood. He stated that he has worn a nasal cpap in the past. Wife informed to stop giving patient fluids. That it defeated the purpose of bumex. Fluid restriction needs to be considered. 0017- Pt pulled up in bed.

## 2019-06-30 NOTE — PROGRESS NOTES
Ramses Patel [facility] 79 
[address] [facility], [place], [address] [place] [address] 
[phone] Medical Progress Note NAME: Blayne Bello :  1960 MRM:  [id number] Date/Time: 2019  8:14 AM  
 
 
  
Subjective: Chief Complaint:  Chest pain: patient somnolent and will not arouse to answer questions, opens eyes and falls right back asleep ROS: 
(bold if positive, if negative) Unable to obtain Objective:  
 
 
Vitals:  
 
 
  
Last 24hrs VS reviewed since prior progress note. Most recent are: 
 
Visit Vitals /45 (BP 1 Location: Left arm, BP Patient Position: At rest) Pulse 76 Temp 98 °F (36.7 °C) Resp 19 Ht 6' (1.829 m) Wt (!) 178.6 kg (393 lb 11.2 oz) SpO2 97% BMI 53.40 kg/m² SpO2 Readings from Last 6 Encounters:  
19 97% 18 97% 17 93% 17 94% 17 96% 16 98% O2 Flow Rate (L/min): 3 l/min Intake/Output Summary (Last 24 hours) at 2019 6055 Last data filed at 2019 3563 Gross per 24 hour Intake 1820 ml Output 4325 ml Net -2505 ml Exam:  
 
Physical Exam: 
 
Gen:  Well-developed, super morbidly obese, disheveled, unkempt, with odor from folds, in no acute distress HEENT:  Pink conjunctivae, PERRL, hearing intact to voice, moist mucous membranes Neck:  Supple, without masses, thyroid non-tender Resp:  No accessory muscle use, clear breath sounds without wheezes rales or rhonchi 
Card:  No murmurs, normal S1, S2 without thrills, bruits; 1-2+ pitting, peripheral edema Abd:  Soft, non-tender, non-distended, normoactive bowel sounds are present, no palpable organomegaly and no detectable hernias Lymph:  No cervical or inguinal adenopathy Musc:  No cyanosis or clubbing Skin:  Skin ulcers unchanged from Wound Care note Neuro:  Cranial nerves are grossly intact, no focal motor weakness, does not commands appropriately Psych:  No insight, oriented to person but not place and time, asleep, difficult to arouse Telemetry reviewed:   AFIB Medications Reviewed: (see below) Lab Data Reviewed: (see below) 
 
______________________________________________________________________ Medications:  
 
Current Facility-Administered Medications Medication Dose Route Frequency  gabapentin (NEURONTIN) capsule 100 mg  100 mg Oral TID  sodium chloride (NS) flush 5-40 mL  5-40 mL IntraVENous Q8H  
 sodium chloride (NS) flush 5-40 mL  5-40 mL IntraVENous PRN  
 sodium chloride (NS) flush 5-40 mL  5-40 mL IntraVENous Q8H  
 sodium chloride (NS) flush 5-40 mL  5-40 mL IntraVENous PRN  
 acetaminophen (TYLENOL) tablet 650 mg  650 mg Oral Q4H PRN  
 ondansetron (ZOFRAN) injection 4 mg  4 mg IntraVENous Q4H PRN  
 docusate sodium (COLACE) capsule 100 mg  100 mg Oral BID  bumetanide (BUMEX) injection 2 mg  2 mg IntraVENous Q12H  
 albuterol (PROVENTIL VENTOLIN) nebulizer solution 2.5 mg  2.5 mg Nebulization Q2H PRN  
 arformoterol (BROVANA) neb solution 15 mcg  15 mcg Nebulization BID RT  
 budesonide (PULMICORT) 500 mcg/2 ml nebulizer suspension  500 mcg Nebulization BID RT  
 nystatin (MYCOSTATIN) 100,000 unit/gram powder   Topical BID  insulin lispro (HUMALOG) injection   SubCUTAneous AC&HS  
 glucose chewable tablet 16 g  4 Tab Oral PRN  
 dextrose (D50) infusion 12.5-25 g  25-50 mL IntraVENous PRN  
 glucagon (GLUCAGEN) injection 1 mg  1 mg IntraMUSCular PRN  
 albuterol-ipratropium (DUO-NEB) 2.5 MG-0.5 MG/3 ML  3 mL Nebulization Q4H RT  
 pantoprazole (PROTONIX) 40 mg in sodium chloride 0.9% 10 mL injection  40 mg IntraVENous Q12H  
 [START ON 7/1/2019] calcitRIOL (ROCALTROL) capsule 0.25 mcg  0.25 mcg Oral Once per day on Mon Tue Wed Thu Fri  
 ferrous gluconate 324 mg (38 mg iron) tablet 1 Tab  1 Tab Oral ACB  polyethylene glycol (MIRALAX) packet 17 g  17 g Oral QHS  sevelamer carbonate (RENVELA) tab 1,600 mg  1,600 mg Oral TID WITH MEALS  
 aspirin delayed-release tablet 81 mg  81 mg Oral DAILY  zinc oxide-cod liver oil (DESITIN) 40 % paste   Topical PRN  
 haloperidol lactate (HALDOL) injection 2 mg  2 mg IntraVENous Q4H PRN Lab Review:  
 
Recent Labs  
  06/30/19 
0451 06/29/19 
0239 06/28/19 
1859 WBC 4.9 4.4 4.9 HGB 7.3* 7.5* 7.3* HCT 24.8* 26.2* 25.3*  
 171 153 Recent Labs  
  06/30/19 
0451 06/29/19 
0239 06/28/19 
1419  140 140  
K 4.5 4.1 4.5  
 100 102 CO2 35* 36* 35* * 75 59* BUN 52* 65* 61* CREA 1.78* 1.88* 1.99* CA 8.2* 9.1 8.9 MG 1.8 2.0 2.1 PHOS 3.2 3.6  --   
ALB  --  2.6* 2.6* TBILI  --  0.3 0.3 SGOT  --  10* 14* ALT  --  11* 12 INR  --   --  1.1 Lab Results Component Value Date/Time Glucose (POC) 172 (H) 06/29/2019 07:28 PM  
 Glucose (POC) 168 (H) 06/29/2019 04:43 PM  
 Glucose (POC) 104 (H) 06/29/2019 11:20 AM  
 Glucose (POC) 86 06/29/2019 07:44 AM  
 Glucose (POC) 91 06/29/2019 05:39 AM  
 
Recent Labs  
  06/28/19 
1935 06/28/19 
1629 PH 7.32* 7.35  
PCO2 71* 66* PO2 100 164* HCO3 36* 35* FIO2  --  50 Recent Labs  
  06/28/19 
1419 INR 1.1 No results found for: SDES Lab Results Component Value Date/Time Culture result: MRSA NOT PRESENT 06/28/2019 06:29 PM  
 Culture result:  06/28/2019 06:29 PM  
      Screening of patient nares for MRSA is for surveillance purposes and, if positive, to facilitate isolation considerations in high risk settings. It is not intended for automatic decolonization interventions per se as regimens are not sufficiently effective to warrant routine use. Culture result:  06/28/2019 04:10 PM  
  >2 ORGANISMS - CONTAMINATED SPECIMEN. SUGGEST RECOLLECTION  CONSISTING OF AT LEAST 4 COLONY TYPES/STRAINS OF GRAM NEGATIVE RODS Assessment:  
 
Principal Problem: Acute on chronic respiratory failure with hypoxia and hypercapnia ([facility] [address] (6/28/2019) Active Problems: 
  DM type 2 causing renal disease, not at goal [facility]) (7/15/2018) COPD (chronic obstructive pulmonary disease) ([facility] [address] (7/15/2018) Anemia (7/16/2018) Persistent atrial fibrillation ([address] (6/28/2019) CKD (chronic kidney disease) stage 3, GFR 30-59 ml/min ([facility]) (6/29/2019) Acute metabolic encephalopathy (3/38/2985) PUD (peptic ulcer disease) (6/29/2019) Cor pulmonale (chronic) ([facility] [address] (6/30/2019) Plan:  
 
Principal Problem: 
  Acute on chronic respiratory failure with hypoxia and hypercapnia ([facility]) (6/28/2019)/chronic cor pulmonale - POA, due to multiple chronic issues which have worsened with time 
 - again, if he won't wear NIPPV there is likely very little we can do to treat all of his problems - current somnolence is likely due to hypercapnea from non-compliance with NIPPV overnight 
 - if he does not wake up this AM he will need to be intubated if wife desires aggressive treatment - appropriate alternative would be no intubation, DNR status and hospice care as he has terminal disease which is not correctable and he is not interested in treatment - Palliative Care consulted Active Problems: 
  DM type 2 causing renal disease, not at goal [facility]) (7/15/2018) - BS okay, follow on current meds COPD (chronic obstructive pulmonary disease) ([facility] [address] (7/15/2018) - no acute exacerbation 
 - continue respiratory meds Anemia (7/16/2018) - Hgb stable, follow Chronic diastolic CHF 
 - I actually don't think there is much of an acute component of CHF at this point and I'm not totally confident in this as a chronic diagnosis now as his echo yesterday was pretty unremarkable in terms of left sided dysfunction 
 - he does have severe pulmonary HTN which is likely due to his obesity, JEROD, non-compliance rather than left-sided heart failure - will continue diuretics IV for now, added fluid restriction, creatinine is actually better Persistent atrial fibrillation ([address] (6/28/2019) 
 - no anticoagulation due to recent GI bleed and persistent anemia CKD (chronic kidney disease) stage 3, GFR 30-59 ml/min ([facility]) (6/29/2019) 
 - due to DM 2 
 - follow with diuresis Acute metabolic encephalopathy (4/98/3641) 
 - waxing and waning mental status, POA, due to issues as above 
 
  PUD (peptic ulcer disease) (6/29/2019) - continue PPI, no anticoagulation Total time spent in patient care: 35 minutes Care Plan discussed with: Patient, Family, Care Manager, Nursing Staff and Dr. Brendan Santos Discussed:  Code Status, Care Plan and D/C Planning Prophylaxis:  SCD's Disposition:  [facility] PT, OT, RN 
        
___________________________________________________ Attending Physician: Mitchell Davis MD

## 2019-06-30 NOTE — PROGRESS NOTES
Problem: Falls - Risk of 
Goal: *Absence of Falls Description Document Lilia May Fall Risk and appropriate interventions in the flowsheet. Outcome: Progressing Towards Goal 
  
Problem: Patient Education: Go to Patient Education Activity Goal: Patient/Family Education Outcome: Progressing Towards Goal 
  
Problem: Pressure Injury - Risk of 
Goal: *Prevention of pressure injury Description Document Jesus Scale and appropriate interventions in the flowsheet. Outcome: Progressing Towards Goal 
  
Problem: Patient Education: Go to Patient Education Activity Goal: Patient/Family Education Outcome: Progressing Towards Goal 
  
Problem: Diabetes Self-Management Goal: *Disease process and treatment process Description Define diabetes and identify own type of diabetes; list 3 options for treating diabetes. Outcome: Progressing Towards Goal 
Goal: *Incorporating nutritional management into lifestyle Description Describe effect of type, amount and timing of food on blood glucose; list 3 methods for planning meals. Outcome: Progressing Towards Goal 
Goal: *Incorporating physical activity into lifestyle Description State effect of exercise on blood glucose levels. Outcome: Progressing Towards Goal 
Goal: *Developing strategies to promote health/change behavior Description Define the ABC's of diabetes; identify appropriate screenings, schedule and personal plan for screenings. Outcome: Progressing Towards Goal 
Goal: *Using medications safely Description State effect of diabetes medications on diabetes; name diabetes medication taking, action and side effects. Outcome: Progressing Towards Goal 
Goal: *Monitoring blood glucose, interpreting and using results Description Identify recommended blood glucose targets  and personal targets. Outcome: Progressing Towards Goal 
Goal: *Prevention, detection, treatment of acute complications Description List symptoms of hyper- and hypoglycemia; describe how to treat low blood sugar and actions for lowering  high blood glucose level. Outcome: Progressing Towards Goal 
Goal: *Prevention, detection and treatment of chronic complications Description Define the natural course of diabetes and describe the relationship of blood glucose levels to long term complications of diabetes. Outcome: Progressing Towards Goal 
Goal: *Developing strategies to address psychosocial issues Description Describe feelings about living with diabetes; identify support needed and support network Outcome: Progressing Towards Goal 
Goal: *Insulin pump training Outcome: Progressing Towards Goal 
Goal: *Sick day guidelines Outcome: Progressing Towards Goal 
Goal: *Patient Specific Goal (EDIT GOAL, INSERT TEXT) Outcome: Progressing Towards Goal 
  
Problem: Patient Education: Go to Patient Education Activity Goal: Patient/Family Education Outcome: Progressing Towards Goal

## 2019-07-01 NOTE — PROGRESS NOTES
1900 - . Lakeisha Nava Bedside and Verbal shift change report given to KIRK Sharpe (oncoming nurse) by Alvaro Rooney RN (offgoing nurse). Report included the following information SBAR, Procedure Summary, Intake/Output, MAR and Recent Results. Lakeisha Nava .Primary Nurse Artie Thurman RN and Alvaro Rooney RN performed a dual skin assessment on this patient Impairment noted- see wound doc flow sheet Jesus score is 13 Patient has multiple areas of skin breakdown and excoriation, of note Stage 2 present on left lateral AKA stump, as well as moisture injury in skin folds 2000 - Patient assessed, patient still refusing to wear BiPap and also now refusing turns and repositioning. 2200 - Patient asking to be turned on his side so wife can rub lotion on his back, patient turned and needed to be cleaned up, attempted to leave patient on his side, patient requesting to be placed back on his back 
 
0000 - Patient reassessed, patient resting quietly with no complaints at this time 0300 - RT called to bedside to give patient nebulizer treatment 0315 - Labs drawn and sent, reassessed patient, patient noted to have increased crackles on the right side 
 
0700 - . Lakeisha Nava Bedside and Verbal shift change report given to Catrachito Moss RN (oncoming nurse) by Maria Fernanda Frederick RN (offgoing nurse). Report included the following information SBAR, Intake/Output, MAR, Recent Results and Med Rec Status.

## 2019-07-01 NOTE — CONSULTS
[facility], Critical Care, and Sleep Medicine Initial Patient Consult Name: Chencho Rogers MRN: [id number] : 1960 Hospital: [facility] Date: 2019 IMPRESSION:  
· Acute on chronic hypoxemic/hypercapneic respiratory failure · Decompensated heart failure · COPD, not in acute exacerbation · Suspected JEROD/OHS · Pulmonary HTN - group 2,3 
· Hypoglycemia · ASPEN vs CKD · H/o CVA · HTN 
· H/o afib · Recent GIB 
· H/o non-compliance RECOMMENDATIONS:  
Supplemental O2 as needed to keep sats > 88% NIPPV whenever sleeping Check ABG Would continue IV diuresis given ongoing O2 requirements, hypercapnia and pitting edema Follow cultures. Send repeat blood cultures Speech eval pending Continue duonebs brovana, pulmicort Fluid restrict CXR in am 
Continue ASA Continue isordil, hydralazine SSI Needs PFTs, sleep study as an outpatient DVT ppx: SCDs GI ppx: BID protonix Subjective:  
 
Mr. Eileen Edgar is a 62yo male w/ history of COPD, CKD, HTN, afib, PVD s/p bilateral BKA, HLD, CVA, DM and non-compliance who presented to the ER on  w/ complaints of feeling \"sick. \"  He has been lethargic and weak. Also complains of chest pain, shortness of breath, swelling \"everywhere\" and a cough productive of white sputum. Cough sounds very wet, congested. Thinks several family members have recently had the flu. Admitted to the ICU on bipap. ABG in ED reportedly: 7.72. Recent admission to [place] for GIB. Also says that he went to [place] twice in the past month or so for swelling but that nothing was done. Says that he was diagnosed w/ COPD 35 years ago. No recent PFTs. Followed by his PCP. Uses combivent at home. Also says that he was diagnosed w/ JEROD in  but never treated. No recent evaluation.  
 
 - echo: EF 61-65%, RV not well seen, mild MS, PASP 75 
 
 *blood cultures (6/28) - 1/4 w/ CoNS --> suspect contaminant *all other cultures NGTD *MRSA screen and RVP negative 24hr events: 
Continues to refuse turns, non-compliant w/ fluid restriction Not wearing bipap due to claustrophobia 
sats in the low 90s on 3L Past Medical History:  
Diagnosis Date  CAD (coronary artery disease)   
 pvc  CKD (chronic kidney disease) stage 3, GFR 30-59 ml/min ([facility]) 6/29/2019  COPD  Diabetes ([facility] [address]  GI bleed 6/2019: [place]. Found small bowel ulcer. S/P 3 unit transfusion.  Heart failure ([facility] [address]  Hypertension  Mitral regurgitation  Morbidly obese ([facility] [address]  PUD (peptic ulcer disease) 6/29/2019 Past Surgical History:  
Procedure Laterality Date  HX ORTHOPAEDIC    
 right leg surgery Prior to Admission medications Medication Sig Start Date End Date Taking? Authorizing Provider  
ferrous gluconate 324 mg (38 mg iron) tablet Take 324 mg by mouth Daily (before breakfast). Yes Provider, Historical  
bumetanide (BUMEX) 2 mg tablet Take 4 mg by mouth two (2) times a day. Yes Provider, Historical  
sevelamer carbonate (RENVELA) 800 mg tab tab Take 1,600 mg by mouth three (3) times daily (with meals). Yes Provider, Historical  
pantoprazole (PROTONIX) 40 mg tablet Take 40 mg by mouth two (2) times a day. Yes Provider, Historical  
calcitRIOL (ROCALTROL) 0.25 mcg capsule Take 0.25 mcg by mouth five (5) days a week. Yes Provider, Historical  
insulin aspart U-100 (NOVOLOG U-100 INSULIN ASPART) 100 unit/mL injection by SubCUTAneous route. Yes Provider, Historical  
glimepiride (AMARYL) 2 mg tablet Take 2 mg by mouth two (2) times a day. Yes Provider, Historical  
aspirin (ASPIRIN) 325 mg tablet Take 325 mg by mouth daily. Yes Provider, Historical  
ipratropium-albuterol (COMBIVENT RESPIMAT)  mcg/actuation inhaler Take 1 Puff by inhalation every six (6) hours as needed for Wheezing.    Yes Provider, Historical  
 polyethylene glycol (MIRALAX) 17 gram packet Take 17 g by mouth nightly. Yes Provider, Historical  
docusate sodium (COLACE) 100 mg capsule Take 100 mg by mouth daily as needed for Constipation. Yes Provider, Historical  
 
Allergies Allergen Reactions  Cephalexin Other (comments) Throat starting closing up after 2nd dose  Codeine Anaphylaxis  Insulin Regular Other (comments) Temp blindness and facial droop only with long acting insulin. Has been on short acting insulin without adverse.  Pcn [Penicillins] Anaphylaxis  Victoza [Liraglutide] Other (comments) Fascial droop, temp blindness  Ferrous Sulfate Other (comments) Ferrous sulfate caused significant constipation, able to tolerate ferrous gluconate without problems  Morphine Other (comments)  
  hallucinates  Statins-Hmg-Coa Reductase Inhibitors Hives, Itching and Nausea and Vomiting Social History Tobacco Use  Smoking status: Former Smoker  Smokeless tobacco: Never Used Substance Use Topics  Alcohol use: No  
  
Family History Problem Relation Age of Onset  Hypertension Father Current Facility-Administered Medications Medication Dose Route Frequency  isosorbide dinitrate (ISORDIL) tablet 20 mg  20 mg Oral TID  hydrALAZINE (APRESOLINE) tablet 10 mg  10 mg Oral TID  albuterol-ipratropium (DUO-NEB) 2.5 MG-0.5 MG/3 ML  3 mL Nebulization Q4HWA RT  
 pantoprazole (PROTONIX) tablet 40 mg  40 mg Oral ACB&D  
 gabapentin (NEURONTIN) capsule 100 mg  100 mg Oral TID  sodium chloride (NS) flush 5-40 mL  5-40 mL IntraVENous Q8H  
 sodium chloride (NS) flush 5-40 mL  5-40 mL IntraVENous Q8H  
 docusate sodium (COLACE) capsule 100 mg  100 mg Oral BID  bumetanide (BUMEX) injection 2 mg  2 mg IntraVENous Q12H  
 arformoterol (BROVANA) neb solution 15 mcg  15 mcg Nebulization BID RT  
 budesonide (PULMICORT) 500 mcg/2 ml nebulizer suspension  500 mcg Nebulization BID RT  
  nystatin (MYCOSTATIN) 100,000 unit/gram powder   Topical BID  insulin lispro (HUMALOG) injection   SubCUTAneous AC&HS  calcitRIOL (ROCALTROL) capsule 0.25 mcg  0.25 mcg Oral Once per day on   
 ferrous gluconate 324 mg (38 mg iron) tablet 1 Tab  1 Tab Oral ACB  polyethylene glycol (MIRALAX) packet 17 g  17 g Oral QHS  sevelamer carbonate (RENVELA) tab 1,600 mg  1,600 mg Oral TID WITH MEALS  
 aspirin delayed-release tablet 81 mg  81 mg Oral DAILY Objective: 
Vital Signs:   
Visit Vitals /50 Pulse 79 Temp 97.9 °F (36.6 °C) Resp 23 Ht 6' (1.829 m) Wt (!) 178.1 kg (392 lb 9.6 oz) SpO2 93% BMI 53.25 kg/m² O2 Device: Nasal cannula O2 Flow Rate (L/min): 3 l/min Temp (24hrs), Av.9 °F (36.6 °C), Min:97.4 °F (36.3 °C), Max:98.5 °F (36.9 °C) Intake/Output:  
Last shift:      No intake/output data recorded. Last 3 shifts:  1901 -  0700 In: 850 [P.O.:350; I.V.:500] Out: 5300 [Urine:5300] Intake/Output Summary (Last 24 hours) at 2019 1124 Last data filed at 2019 0000 Gross per 24 hour Intake  Output 1700 ml Net -1700 ml Physical Exam:  
General:  Morbidly obese Head:   atnc, edentulous Eyes:   anne, eomi intact Nose: Throat: mp-3 Neck: Trachea midline Back:     
Lungs: Few wheezes Chest wall:  No tenderness or deformity. Heart:  Regular rate and rhythm Abdomen:   Soft, non-tender. Bowel sounds normal  
Extremities: Anasarca, bilateral BKA Pulses: equal  
Skin:   
Lymph nodes:   
Neurologic: Oriented x 3 Data review:  
 
Recent Results (from the past 24 hour(s)) GLUCOSE, POC Collection Time: 19  3:53 PM  
Result Value Ref Range Glucose (POC) 204 (H) 65 - 100 mg/dL Performed by [place] [facility], POC Collection Time: 19 10:36 PM  
Result Value Ref Range Glucose (POC) 215 (H) 65 - 100 mg/dL  Performed by Reginald Camp   
CBC W/O DIFF  
 Collection Time: 07/01/19  3:38 AM  
Result Value Ref Range WBC 5.3 4.1 - 11.1 K/uL  
 RBC 2.75 (L) 4.10 - 5.70 M/uL HGB 7.6 (L) 12.1 - 17.0 g/dL HCT 26.1 (L) 36.6 - 50.3 % MCV 94.9 80.0 - 99.0 FL  
 MCH 27.6 26.0 - 34.0 PG  
 MCHC 29.1 (L) 30.0 - 36.5 g/dL  
 RDW 15.8 (H) 11.5 - 14.5 % PLATELET 284 378 - 150 K/uL MPV 8.9 8.9 - 12.9 FL  
 NRBC 0.0 0  WBC ABSOLUTE NRBC 0.00 0.00 - 0.01 K/uL MAGNESIUM Collection Time: 07/01/19  3:38 AM  
Result Value Ref Range Magnesium 1.9 1.6 - 2.4 mg/dL METABOLIC PANEL, BASIC Collection Time: 07/01/19  3:38 AM  
Result Value Ref Range Sodium 138 136 - 145 mmol/L Potassium 4.4 3.5 - 5.1 mmol/L Chloride 99 97 - 108 mmol/L  
 CO2 37 (H) 21 - 32 mmol/L Anion gap 2 (L) 5 - 15 mmol/L Glucose 195 (H) 65 - 100 mg/dL BUN 48 (H) 6 - 20 MG/DL Creatinine 1.82 (H) 0.70 - 1.30 MG/DL  
 BUN/Creatinine ratio 26 (H) 12 - 20 GFR est AA 46 (L) >60 ml/min/1.73m2 GFR est non-AA 38 (L) >60 ml/min/1.73m2 Calcium 8.2 (L) 8.5 - 10.1 MG/DL  
PHOSPHORUS Collection Time: 07/01/19  3:38 AM  
Result Value Ref Range Phosphorus 3.4 2.6 - 4.7 MG/DL  
GLUCOSE, POC Collection Time: 07/01/19  7:57 AM  
Result Value Ref Range Glucose (POC) 214 (H) 65 - 100 mg/dL Performed by [facility]: 
I have personally reviewed the patients radiographs and have reviewed the reports: 
  
 
  
Curt Cain MD

## 2019-07-01 NOTE — PROGRESS NOTES
attempted a follow up visit with patient, Tayo Du" on the ICU unit. Privacy curtain was closed and Jose Rafael's family requested that the  come back and another time.  will follow up as able. Chuck Esquivel. Leslie Orozco.  Paging Service: [phone])

## 2019-07-01 NOTE — PROGRESS NOTES
[address], [id number] [place] [address] 
[phone] Medical Progress Note NAME: Laverne Holden :  1960 MRM:  [id number] Date/Time: 2019 Subjective: Chief Complaint:  Patient was seen and examined by me. Chart reviewed. Dyspnea is improving. Spoke to patient's wife at bedside Objective:  
 
 
Vitals:  
 
 
Last 24hrs VS reviewed since prior progress note. Most recent are: 
 
Visit Vitals /58 (BP 1 Location: Left arm, BP Patient Position: At rest) Pulse 74 Temp 97.7 °F (36.5 °C) Resp 21 Ht 6' (1.829 m) Wt (!) 178.1 kg (392 lb 9.6 oz) SpO2 96% BMI 53.25 kg/m² SpO2 Readings from Last 6 Encounters:  
19 96% 18 97% 17 93% 17 94% 17 96% 16 98% O2 Flow Rate (L/min): 3 l/min Intake/Output Summary (Last 24 hours) at 2019 9155 Last data filed at 2019 0000 Gross per 24 hour Intake 350 ml Output 3100 ml Net -2750 ml Exam:  
 
Physical Exam: 
 
Gen:  Disheveled, ill-appearing, super morbid obesity, NAD HEENT:  Pink conjunctivae, PERRL, hearing intact to voice, moist mucous membranes Neck:  Supple, without masses, thyroid non-tender Resp:  No accessory muscle use, decreased BS at bases Card:  No murmurs, normal S1, S2 without thrills, 2+ edema Abd:  Soft, non-tender, non-distended, normoactive bowel sounds are present Musc:  No cyanosis or clubbing, bilateral LE BKA Skin:  Left lateral stage 2 ulcer on BKA, stage 2 decub ulcer (POA) Neuro:  Cranial nerves 3-12 are grossly intact, follows commands appropriately Psych:  poor insight, oriented to person, place and time, alert Medications Reviewed: (see below) Lab Data Reviewed: (see below) 
 
______________________________________________________________________ Medications:  
 
Current Facility-Administered Medications Medication Dose Route Frequency  isosorbide dinitrate (ISORDIL) tablet 20 mg  20 mg Oral TID  hydrALAZINE (APRESOLINE) tablet 10 mg  10 mg Oral TID  gabapentin (NEURONTIN) capsule 100 mg  100 mg Oral TID  sodium chloride (NS) flush 5-40 mL  5-40 mL IntraVENous Q8H  
 sodium chloride (NS) flush 5-40 mL  5-40 mL IntraVENous PRN  
 sodium chloride (NS) flush 5-40 mL  5-40 mL IntraVENous Q8H  
 sodium chloride (NS) flush 5-40 mL  5-40 mL IntraVENous PRN  
 acetaminophen (TYLENOL) tablet 650 mg  650 mg Oral Q4H PRN  
 ondansetron (ZOFRAN) injection 4 mg  4 mg IntraVENous Q4H PRN  
 docusate sodium (COLACE) capsule 100 mg  100 mg Oral BID  bumetanide (BUMEX) injection 2 mg  2 mg IntraVENous Q12H  
 albuterol (PROVENTIL VENTOLIN) nebulizer solution 2.5 mg  2.5 mg Nebulization Q2H PRN  
 arformoterol (BROVANA) neb solution 15 mcg  15 mcg Nebulization BID RT  
 budesonide (PULMICORT) 500 mcg/2 ml nebulizer suspension  500 mcg Nebulization BID RT  
 nystatin (MYCOSTATIN) 100,000 unit/gram powder   Topical BID  insulin lispro (HUMALOG) injection   SubCUTAneous AC&HS  
 glucose chewable tablet 16 g  4 Tab Oral PRN  
 dextrose (D50) infusion 12.5-25 g  25-50 mL IntraVENous PRN  
 glucagon (GLUCAGEN) injection 1 mg  1 mg IntraMUSCular PRN  
 albuterol-ipratropium (DUO-NEB) 2.5 MG-0.5 MG/3 ML  3 mL Nebulization Q4H RT  
 pantoprazole (PROTONIX) 40 mg in sodium chloride 0.9% 10 mL injection  40 mg IntraVENous Q12H  calcitRIOL (ROCALTROL) capsule 0.25 mcg  0.25 mcg Oral Once per day on Mon Tue Wed Thu Fri  
 ferrous gluconate 324 mg (38 mg iron) tablet 1 Tab  1 Tab Oral ACB  polyethylene glycol (MIRALAX) packet 17 g  17 g Oral QHS  sevelamer carbonate (RENVELA) tab 1,600 mg  1,600 mg Oral TID WITH MEALS  
 aspirin delayed-release tablet 81 mg  81 mg Oral DAILY  zinc oxide-cod liver oil (DESITIN) 40 % paste   Topical PRN  
 haloperidol lactate (HALDOL) injection 2 mg  2 mg IntraVENous Q4H PRN Lab Review: Recent Labs  
  07/01/19 
0338 06/30/19 
0451 06/29/19 
0239 WBC 5.3 4.9 4.4 HGB 7.6* 7.3* 7.5* HCT 26.1* 24.8* 26.2*  
 168 171 Recent Labs  
  07/01/19 
0338 06/30/19 
0451 06/29/19 
0239 06/28/19 
1419  138 140 140  
K 4.4 4.5 4.1 4.5  
CL 99 100 100 102 CO2 37* 35* 36* 35* * 175* 75 59* BUN 48* 52* 65* 61* CREA 1.82* 1.78* 1.88* 1.99* CA 8.2* 8.2* 9.1 8.9 MG 1.9 1.8 2.0 2.1 PHOS 3.4 3.2 3.6  --   
ALB  --   --  2.6* 2.6* TBILI  --   --  0.3 0.3 SGOT  --   --  10* 14* ALT  --   --  11* 12 INR  --   --   --  1.1 Lab Results Component Value Date/Time Glucose (POC) 214 (H) 07/01/2019 07:57 AM  
 Glucose (POC) 215 (H) 06/30/2019 10:36 PM  
 Glucose (POC) 204 (H) 06/30/2019 03:53 PM  
 Glucose (POC) 184 (H) 06/30/2019 10:55 AM  
 Glucose (POC) 183 (H) 06/30/2019 08:51 AM  
 
 
  
Assessment / Plan:  
 
Principal Problem: 
 
59 yo hx of HTN, DM, CAD, COPD on 2L O2, dCHF, CKD 3, PVD s/p BLE BKA, PUD w/ recent GI bleed, presented w/ resp failure, hypoxia, hypercapnea, severe pulm HTN 
 
1) Acute on chronic respiratory failure with hypoxia and hypercapnia: due to underlying COPD, cor pulmonale, CHF, super morbid obesity. Poor prognosis. On chronic 2-3L O2 at home. Cont O2, bipap. Pulm following. Consult palliative care 2) Acute on chronic diastolic CHF/cor pulmonale/severe pulm HTN: PASP 75. Cont IV bumex. Cards following 3) COPD: stable. Cont LABA/ICS, nebs prn 
 
4) HTN: cont hydralazine, bumex, isordil 5) DM type 2: A1C 6.5%. Cont SSI 6) PUD/recent GI bleed: cont IV PPI bid 7) Anemia: due to recent GI bleed. Will check iron studies. Cont oral iron 8) Chronic afib: not on anticoagulation due to GI bleed. HR stable. Not on AV moreno blocker 9) CKD 3: Cr around baseline. Will monitor closely while on IV diuresis 10) Pressure ulcers: stage 2 decub, stage 2 left lateral BKA. POA. Cont wound care Total time spent with patient: 45 Minutes Care Plan discussed with: Patient, wife, nursing Discussed:  Care Plan Prophylaxis:  SCD's, frequent turns Disposition:   PT, OT, RN 
        
___________________________________________________ Attending Physician: Hawk Storey MD

## 2019-07-01 NOTE — PROGRESS NOTES
Follow-up: 
 
Night charge reported that pt continues to refuse to wear BiPaP @ night. I met with pt and his wife to discuss discharge plans. 1.I discussed with pt what he desires when discharged. We discussed home health versus home with hospice. 2.Pt desires to return home with home health through [facility]. Choice letter signed and placed in chart to be scanned into EMR. 3.Orders for home health for skilled nursing for CHF,COPD,DM,.and home PT for evaluation and treatment sent through [facility] to [facility]. This has been placed on pt.'s AVS. 
 
Pt states he cannot wear the BiPaP mask because he is intensely claustrophobic. He is asking if there are any other masks? 4. At home,pt is on 2 liters of continuous oxygen through [facility]. I notified wife that she or family need to bring a portable tank to the hospital for discharge. 5.I notified pt that the Palliative Medicine Team will be meeting with pt today to discuss goals of care and code status. 6.Per attending,anticipated discharge is Tuesday or Wednesday. 7.Case Management will continue to follow pt for discharge needs. Griffin Millard Addendum-I contacted [facility] @ [phone] to inform their aency regarding new orders and anticipated discharge date. I faxed the order with clinicals to [phone]. When discharged,AVS will need to be faxed to that number also. [facility] also notified that pt is a CHF bundle pt. Griffin Millard

## 2019-07-01 NOTE — CONSULTS
Palliative Medicine Consult [place]: [phone] ([phone]) Patient Name: Young Bell YOB: 1960 Date of Initial Consult: 07/01/2019 Reason for Consult: Care decisions Requesting Provider: Maryellen Leon MD  
Primary Care Physician: Evita Gatica MD 
 
 SUMMARY:  
Young Bell is a 60 y.o. with a past history of bilat BKA, CAD, CVA, COPD, DM, CHF, HTN, mitral regurgitation, CKD, afib, and morbid obesity, who was admitted on 6/28/2019 from home with a diagnosis of acute on chronic respiratory failure with hypoxia and hypercapnia. Patient presented tot he ED with complaints of generalized weakness, hypoglycemia, and chest pain. ED eval revealed hypoxia and hypercapnia (ABG 7.27/76/72/34), chest xray with pulmonary edema pattern with probable bilateral pleural effusion, and elevated proBNP. Patient admitted and started on bipap and IV diuresis. Cardiology consulted and agree with aggressive diuretic therapy and repeat echo, which showed EF 61-65% with no WMA. Also, with afib and hx of GI bleed, would hold on staring anticoagulation at this time. Current medical issues leading to Palliative Medicine involvement include: Care decisions due to progressive decline, severe debility, multiple co morbidities. SH:  x 39 years. Has daughter (who is a nurse) and a son who both live near by. 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 PALLIATIVE DIAGNOSES:  
1. Generalized weakness 2. Physical debility 3. Goals of care 4. DNR discussion 5. ACP 6. Acute on chronic respiratory failure with hypoxia and hypercapnia PLAN:  
1. Met with patient and wife Oscar Santacruz with [facility] and introduced the role of Palliative Medicine. 2. Patient resting in bed, alert and oriented x 4. Offered no complaints currently.  
3. Goals of care--> Discussed current hospitalization and prior level of functioning. Patient and wife endorse progressive decline in level of functioning over the past year and report over that time period, patient has \"spent more time in the hospital than at home\". Is s/p bilat BKA and at one point wore prosthetics but reports that due to generalized swelling in both stumps, they do not currently fit. Is bed bound/wheelchair bound and family uses a radha lift to transfer him from bed to motorized wheelchair. Used to enjoy going shopping but now family finds it very difficult to get patient out of the house or to MD appointments and Amaury Zarate reports they have had to cancel many of them due to this. Amaury Zarate reports that she is primary caregiver and assists patient with all ADLs, and son helps with radha lift for transfers. Reviewed plan of care and concern for CO2 retention and patient no cooperating with bipap. Patient is adamant that he will not wear it due to severe claustrophobia. Amaury Zarate stated that patient did wear it for several minutes overnight but that he doesn't tolerate it. Patient and family understand that he is being transferred to stepdown and that he may be discharged in the next day or two. Discussed ways to support patient when he returns home, given overall severe debility and co morbidities. Patient wishes to continue home health with home PT/OT services at discharge, as he has been having PT come to the house twice a week now. CM discussed hospice care as an option and currently this is not in alignment with patient's goals. Given that it is becoming increasingly difficult for patient to leave the house, patient could consider utilizing home-based primary care services/[facility] ([facility] does not service their area). Patient and wife report that patient's PCP is very accommodating, frequently managing patient over the phone and has offered to come to the home. They wish to continue following with him when he returns home. Will continue to follow. 4. DNR discussion--> Addressed what patient would want done in the event of cardiopulmonary arrest and what resuscitation would entail. Patient elects to remain a full code but would not want to be prolonged if there were no chance of meaningful recovery. 5. ACP--> Patient and wife report that patient has completed AMD that appoints wife, Stella Hagen as primary agent for healthcare decisions, and daughter Tico Robison as secondary agent for healthcare decisions. Requested copy for EMR. ACP updated to reflect this. 6. Initial consult note routed to primary continuity provider and/or primary health care team members 7. Communicated plan of care with: Palliative IDT, [facility] Team 
 
 GOALS OF CARE / TREATMENT PREFERENCES:  
 
GOALS OF CARE: 
Patient/Health Care Proxy Stated Goals: (Full restorative measures in an effort to recover) TREATMENT PREFERENCES:  
Code Status: Full Code Advance Care Planning: 
[x] [facility] Interdisciplinary Team has updated the ACP Navigator with Lucianat 8 and Patient Capacity Advance Care Planning 7/1/2019 Patient's Healthcare Decision Maker is: Verbal statement (Legal Next of Kin remains as decision maker) Primary Decision Maker Name -  
Primary Decision Maker Phone Number -  
Confirm Advance Directive Yes, not on file Patient Would Like to Complete Advance Directive - Medical Interventions: Full interventions Other Instructions: Other: As far as possible, the palliative care team has discussed with patient / health care proxy about goals of care / treatment preferences for patient. HISTORY:  
 
History obtained from: patient, chart, wife CHIEF COMPLAINT: generalized weakness, low BS, and chest pain HPI/SUBJECTIVE: The patient is:  
[x] Verbal and participatory [] Non-participatory due to:  
Patient presented tot he ED with complaints of generalized weakness, hypoglycemia, and chest pain. ED eval revealed hypoxia and hypercapnia (ABG 7.27/76/72/34), chest xray with pulmonary edema pattern with probable bilateral pleural effusion, and elevated proBNP. Patient admitted and started on bipap and IV diuresis. Cardiology consulted and agree with aggressive diuretic therapy and repeat echo, which showed EF 61-65% with no WMA. Also, with afib and hx of GI bleed, would hold on staring anticoagulation at this time. Patient currently without complaints. Clinical Pain Assessment (nonverbal scale for severity on nonverbal patients):  
Clinical Pain Assessment Severity: 0 Duration: for how long has pt been experiencing pain (e.g., 2 days, 1 month, years) Frequency: how often pain is an issue (e.g., several times per day, once every few days, constant) FUNCTIONAL ASSESSMENT:  
 
Palliative Performance Scale (PPS): PPS: 40 PSYCHOSOCIAL/SPIRITUAL SCREENING:  
 
Palliative IDT has assessed this patient for cultural preferences / practices and a referral made as appropriate to needs (Cultural Services, Patient Advocacy, Ethics, etc.) Any spiritual / Buddhist concerns: 
[] Yes /  [x] No 
 
Caregiver Burnout: 
[] Yes /  [x] No /  [] No Caregiver Present Anticipatory grief assessment:  
[x] Normal  / [] Maladaptive ESAS Anxiety: Anxiety: 0 
 
ESAS Depression:    
 
 
 REVIEW OF SYSTEMS:  
 
Positive and pertinent negative findings in ROS are noted above in HPI. The following systems were [x] reviewed / [] unable to be reviewed as noted in HPI Other findings are noted below. Systems: constitutional, ears/nose/mouth/throat, respiratory, gastrointestinal, genitourinary, musculoskeletal, integumentary, neurologic, psychiatric, endocrine. Positive findings noted below. Modified ESAS Completed by: provider Fatigue: 6 Drowsiness: 0 Pain: 0 Anxiety: 0 Nausea: 0 Anorexia: 0 Dyspnea: 0 Constipation: No  
  Stool Occurrence(s): 1 PHYSICAL EXAM:  
 
From RN flowsheet: 
Wt Readings from Last 3 Encounters:  
07/01/19 (!) 392 lb 9.6 oz (178.1 kg) 07/18/18 306 lb 8 oz (139 kg) 06/23/17 334 lb 7 oz (151.7 kg) Blood pressure 140/44, pulse 82, temperature 98.1 °F (36.7 °C), resp. rate 24, height 6' (1.829 m), weight (!) 392 lb 9.6 oz (178.1 kg), SpO2 91 %. Pain Scale 1: Numeric (0 - 10) Pain Intensity 1: 0 Pain Location 1: Back Last bowel movement, if known:  
 
Constitutional: obese, NAD Eyes: pupils equal, anicteric ENMT: no nasal discharge, moist mucous membranes Cardiovascular: regular rhythm, distal pulses intact, +PATRICIA Respiratory: breathing not labored, symmetric, diminished BS throughout Gastrointestinal: soft non-tender, +bowel sounds Musculoskeletal: bilateral BKA, no tenderness to palpation Skin: warm, dry Neurologic: following commands, moving all extremities Psychiatric: full affect, no hallucinations Other: 
 
 
 HISTORY:  
 
Principal Problem: 
  Acute on chronic respiratory failure with hypoxia and hypercapnia ([address] (6/28/2019) Active Problems: 
  DM type 2 causing renal disease, not at goal [facility]) (7/15/2018) COPD (chronic obstructive pulmonary disease) ([address] (7/15/2018) Anemia (7/16/2018) Persistent atrial fibrillation ([address] (6/28/2019) CKD (chronic kidney disease) stage 3, GFR 30-59 ml/min ([facility]) (6/29/2019) Acute metabolic encephalopathy (2/11/5363) PUD (peptic ulcer disease) (6/29/2019) Cor pulmonale (chronic) ([address] (6/30/2019) Weakness generalized () Physical debility () Goals of care, counseling/discussion () 
 
  DNR (do not resuscitate) discussion () Past Medical History:  
Diagnosis Date  CAD (coronary artery disease)   
 pvc  CKD (chronic kidney disease) stage 3, GFR 30-59 ml/min ([facility]) 6/29/2019  COPD  Diabetes ([address]  GI bleed 6/2019: [place]. Found small bowel ulcer. S/P 3 unit transfusion.  Heart failure ([facility]  Hypertension  Mitral regurgitation  Morbidly obese ([facility]  PUD (peptic ulcer disease) 6/29/2019 Past Surgical History:  
Procedure Laterality Date  HX ORTHOPAEDIC    
 right leg surgery Family History Problem Relation Age of Onset  Hypertension Father History reviewed, no pertinent family history. Social History Tobacco Use  Smoking status: Former Smoker  Smokeless tobacco: Never Used Substance Use Topics  Alcohol use: No  
 
Allergies Allergen Reactions  Cephalexin Other (comments) Throat starting closing up after 2nd dose  Codeine Anaphylaxis  Insulin Regular Other (comments) Temp blindness and facial droop only with long acting insulin. Has been on short acting insulin without adverse.  Pcn [Penicillins] Anaphylaxis  Victoza [Liraglutide] Other (comments) Fascial droop, temp blindness  Ferrous Sulfate Other (comments) Ferrous sulfate caused significant constipation, able to tolerate ferrous gluconate without problems  Morphine Other (comments)  
  hallucinates  Statins-Hmg-Coa Reductase Inhibitors Hives, Itching and Nausea and Vomiting Current Facility-Administered Medications Medication Dose Route Frequency  isosorbide dinitrate (ISORDIL) tablet 20 mg  20 mg Oral TID  hydrALAZINE (APRESOLINE) tablet 10 mg  10 mg Oral TID  albuterol-ipratropium (DUO-NEB) 2.5 MG-0.5 MG/3 ML  3 mL Nebulization Q4HWA RT  
 pantoprazole (PROTONIX) tablet 40 mg  40 mg Oral ACB&D  
 gabapentin (NEURONTIN) capsule 100 mg  100 mg Oral TID  sodium chloride (NS) flush 5-40 mL  5-40 mL IntraVENous Q8H  
 sodium chloride (NS) flush 5-40 mL  5-40 mL IntraVENous PRN  
 sodium chloride (NS) flush 5-40 mL  5-40 mL IntraVENous Q8H  
 sodium chloride (NS) flush 5-40 mL  5-40 mL IntraVENous PRN  
 acetaminophen (TYLENOL) tablet 650 mg  650 mg Oral Q4H PRN  
  ondansetron (ZOFRAN) injection 4 mg  4 mg IntraVENous Q4H PRN  
 docusate sodium (COLACE) capsule 100 mg  100 mg Oral BID  bumetanide (BUMEX) injection 2 mg  2 mg IntraVENous Q12H  
 albuterol (PROVENTIL VENTOLIN) nebulizer solution 2.5 mg  2.5 mg Nebulization Q2H PRN  
 arformoterol (BROVANA) neb solution 15 mcg  15 mcg Nebulization BID RT  
 budesonide (PULMICORT) 500 mcg/2 ml nebulizer suspension  500 mcg Nebulization BID RT  
 nystatin (MYCOSTATIN) 100,000 unit/gram powder   Topical BID  insulin lispro (HUMALOG) injection   SubCUTAneous AC&HS  
 glucose chewable tablet 16 g  4 Tab Oral PRN  
 dextrose (D50) infusion 12.5-25 g  25-50 mL IntraVENous PRN  
 glucagon (GLUCAGEN) injection 1 mg  1 mg IntraMUSCular PRN  
 calcitRIOL (ROCALTROL) capsule 0.25 mcg  0.25 mcg Oral Once per day on Mon Tue Wed Thu Fri  
 ferrous gluconate 324 mg (38 mg iron) tablet 1 Tab  1 Tab Oral ACB  polyethylene glycol (MIRALAX) packet 17 g  17 g Oral QHS  sevelamer carbonate (RENVELA) tab 1,600 mg  1,600 mg Oral TID WITH MEALS  
 aspirin delayed-release tablet 81 mg  81 mg Oral DAILY  zinc oxide-cod liver oil (DESITIN) 40 % paste   Topical PRN  
 haloperidol lactate (HALDOL) injection 2 mg  2 mg IntraVENous Q4H PRN  
 
 
 
 LAB AND IMAGING FINDINGS:  
 
Lab Results Component Value Date/Time WBC 5.3 07/01/2019 03:38 AM  
 HGB 7.6 (L) 07/01/2019 03:38 AM  
 PLATELET 224 10/75/4294 03:38 AM  
 
Lab Results Component Value Date/Time Sodium 138 07/01/2019 03:38 AM  
 Potassium 4.4 07/01/2019 03:38 AM  
 Chloride 99 07/01/2019 03:38 AM  
 CO2 37 (H) 07/01/2019 03:38 AM  
 BUN 48 (H) 07/01/2019 03:38 AM  
 Creatinine 1.82 (H) 07/01/2019 03:38 AM  
 Calcium 8.2 (L) 07/01/2019 03:38 AM  
 Magnesium 1.9 07/01/2019 03:38 AM  
 Phosphorus 3.4 07/01/2019 03:38 AM  
  
Lab Results Component Value Date/Time AST (SGOT) 10 (L) 06/29/2019 02:39 AM  
 Alk.  phosphatase 144 (H) 06/29/2019 02:39 AM  
 Protein, total 6.5 06/29/2019 02:39 AM  
 Albumin 2.6 (L) 06/29/2019 02:39 AM  
 Globulin 3.9 06/29/2019 02:39 AM  
 
Lab Results Component Value Date/Time INR 1.1 06/28/2019 02:19 PM  
 Prothrombin time 10.7 06/28/2019 02:19 PM  
 aPTT 28.8 06/28/2019 02:19 PM  
  
Lab Results Component Value Date/Time Iron 30 (L) 07/01/2019 03:38 AM  
 TIBC 371 07/01/2019 03:38 AM  
 Iron % saturation 8 (L) 07/01/2019 03:38 AM  
 Ferritin 20 (L) 07/01/2019 03:38 AM  
  
Lab Results Component Value Date/Time pH 7.39 07/01/2019 11:30 AM  
 PCO2 62 (H) 07/01/2019 11:30 AM  
 PO2 60 (L) 07/01/2019 11:30 AM  
 
No components found for: [place] Lab Results Component Value Date/Time CK 31 (L) 07/16/2018 01:58 AM  
 CK - MB <1.0 07/16/2018 01:58 AM  
  
 
 
   
 
Total time: 70 min Counseling / coordination time, spent as noted above: 50 min 
> 50% counseling / coordination?:  yes Prolonged service was provided for  []30 min   []75 min in face to face time in the presence of the patient, spent as noted above. Time Start:  
Time End:  
Note: this can only be billed with 17305 (initial) or 37462 (follow up). If multiple start / stop times, list each separately.

## 2019-07-01 NOTE — PROGRESS NOTES
Andrés Bonilla DO [facility] [address], [address] [place], [id number] [place] [address] Office [phone],HENRI[place] [phone] 
 
2019 Admit Date: 2019 Admit Diagnosis: Diastolic CHF, acute on chronic (HCC) [I50.33] NAME: Codi Braun :  1960     MRN: [id number] Assessment/Plan: 
 
Acute on chronic hypoxic respiratory failure Pulmonary hypertension HFpEF 
  
Vascular disease (history of cerebrovascular disease status post CVA with stenting of his bilateral carotid arteries, reported history of coronary artery disease without prior stenting or bypass surgery) 
  
Diabetes with complication of renal disease and peripheral neuropathy, bilateral BKA 
  
CKD stage III 
  
Persistent atrial fibrillation 
  
Recent upper GI bleeding 
  
Anemia 
 
 
- can change diuretic to PO 
- add isdn/hydralazine due to ongoing systemic hypertension with pulmonary hypertension 
- discussed weight loss and adherence to cpap for ongoing pulmonary hypertension 
-Patient has high CHADS VASC score for atrial fibrillation, long-term should either have left atrial appendage device placed if he has evidence of recurrent major GI bleeding, given recent severe anemia we will hold off on anticoagulation at this time. Hemoglobin rebecca depressed at 7 g/dL 
-Patient should be on antiplatelet therapy with at least aspirin 81 mg daily due to history of vascular disease 
- intolerence of statin, consider PSK9 inhibitor in out-patient setting Cardiology will sign-off at this time, do not hesitate to call with any further questions Please do not hesitate to contact us with questions or concerns. See note below for details. Micaela Gastelum DO History of Present Illness: 
Codi Braun is a 60 y.o. male with chronic comorbid conditions including morbid obesity, diabetes mellitus with complications of lower extremity amputation and renal disease, cerebrovascular disease status post stroke and stenting of carotid arteries presents to the ED for \"not feeling well\". Patient was admitted multiple times to 5 over the last several weeks initially for volume overload and subsequently for significant upper gastrointestinal bleeding. Patient was activated as a STEMI in route. In the ED EKG showed evidence of atrial fibrillation with right bundle branch block without ST-T wave changes. He reported having some chest pain earlier today. His wife also reports him not feeling well yesterday, no specific complaints could be obtained. In the ED he was found to be severely hypoglycemic with blood sugars in the 30s. Chest radiograph in the ED did show evidence of pulmonary edema with pleural effusions. Interval Hx: 
Feeling better, still havign difficulty with cpap. ROS: 
Constitutional: negative Cardiovascular: no chest pain Respiratory: improving dyspnea Medications: 
Current Facility-Administered Medications Medication Dose Route Frequency  isosorbide dinitrate (ISORDIL) tablet 20 mg  20 mg Oral TID  hydrALAZINE (APRESOLINE) tablet 10 mg  10 mg Oral TID  gabapentin (NEURONTIN) capsule 100 mg  100 mg Oral TID  sodium chloride (NS) flush 5-40 mL  5-40 mL IntraVENous Q8H  
 sodium chloride (NS) flush 5-40 mL  5-40 mL IntraVENous PRN  
 sodium chloride (NS) flush 5-40 mL  5-40 mL IntraVENous Q8H  
 sodium chloride (NS) flush 5-40 mL  5-40 mL IntraVENous PRN  
 acetaminophen (TYLENOL) tablet 650 mg  650 mg Oral Q4H PRN  
 ondansetron (ZOFRAN) injection 4 mg  4 mg IntraVENous Q4H PRN  
 docusate sodium (COLACE) capsule 100 mg  100 mg Oral BID  bumetanide (BUMEX) injection 2 mg  2 mg IntraVENous Q12H  
 albuterol (PROVENTIL VENTOLIN) nebulizer solution 2.5 mg  2.5 mg Nebulization Q2H PRN  
 arformoterol (BROVANA) neb solution 15 mcg  15 mcg Nebulization BID RT  
  budesonide (PULMICORT) 500 mcg/2 ml nebulizer suspension  500 mcg Nebulization BID RT  
 nystatin (MYCOSTATIN) 100,000 unit/gram powder   Topical BID  insulin lispro (HUMALOG) injection   SubCUTAneous AC&HS  
 glucose chewable tablet 16 g  4 Tab Oral PRN  
 dextrose (D50) infusion 12.5-25 g  25-50 mL IntraVENous PRN  
 glucagon (GLUCAGEN) injection 1 mg  1 mg IntraMUSCular PRN  
 albuterol-ipratropium (DUO-NEB) 2.5 MG-0.5 MG/3 ML  3 mL Nebulization Q4H RT  
 pantoprazole (PROTONIX) 40 mg in sodium chloride 0.9% 10 mL injection  40 mg IntraVENous Q12H  calcitRIOL (ROCALTROL) capsule 0.25 mcg  0.25 mcg Oral Once per day on Mon Tue Wed Thu Fri  
 ferrous gluconate 324 mg (38 mg iron) tablet 1 Tab  1 Tab Oral ACB  polyethylene glycol (MIRALAX) packet 17 g  17 g Oral QHS  sevelamer carbonate (RENVELA) tab 1,600 mg  1,600 mg Oral TID WITH MEALS  
 aspirin delayed-release tablet 81 mg  81 mg Oral DAILY  zinc oxide-cod liver oil (DESITIN) 40 % paste   Topical PRN  
 haloperidol lactate (HALDOL) injection 2 mg  2 mg IntraVENous Q4H PRN Physical Exam: 
Visit Vitals /58 (BP 1 Location: Left arm, BP Patient Position: At rest) Pulse 74 Temp 97.7 °F (36.5 °C) Resp 21 Ht 6' (1.829 m) Wt (!) 392 lb 9.6 oz (178.1 kg) SpO2 96% BMI 53.25 kg/m² O2 Flow Rate (L/min): 3 l/min O2 Device: Nasal cannula Gen: Morbidly obese, chronic ill appearing, no acute distress Resp: No accessory muscle use, Clear breath sounds anteriorly Card: Normal Rate,Regular Rythm, Normal S1, S2, No murmurs, rubs or gallop. Continued scrotal edema Psych:  Good insight, oriented to person, place Cardiac Testing/ Procedures: 
 
06/28/19 ECHO ADULT COMPLETE 06/29/2019 6/29/2019 Narrative · Left Ventricle: Normal cavity size, wall thickness and systolic function  
(ejection fraction normal). Estimated left ventricular ejection fraction is 61 - 65%. No regional wall motion abnormality noted. · Left Atrium: Mildly dilated left atrium. · Right Ventricle: Not well visualized. · Mitral Valve: Moderate mitral annular calcification. Mild mitral valve  
stenosis. · Tricuspid Valve: Mild tricuspid valve regurgitation is present. · Pulmonary Artery: Severe pulmonary hypertension. Signed by: Kelle Pinon MD  
 
 
 
 
Labs:   
Recent Labs  
  06/29/19 
3130 TROIQ <0.05 Recent Labs  
  07/01/19 
4594   
K 4.4 CL 99 BUN 48* CREA 1.82* * PHOS 3.4  
CA 8.2* Recent Labs  
  07/01/19 
1114 WBC 5.3 HGB 7.6* HCT 26.1*  
 No results for input(s): CHOL, LDLC in the last 72 hours.  
 
No lab exists for component: TGL, HDLC,  HBA1C

## 2019-07-01 NOTE — PROGRESS NOTES
Bedside shift change report given to Josefa Marie RN (oncoming nurse) by Grady Ramirez RN (offgoing nurse). Report included the following information SBAR, Kardex and MAR.

## 2019-07-01 NOTE — DISCHARGE INSTRUCTIONS
Patient Education     Avoiding Triggers With Heart Failure: Care Instructions  Your Care Instructions    Triggers are anything that make your heart failure flare up. A flare-up is also called \"sudden heart failure\" or \"acute heart failure. \" When you have a flare-up, fluid builds up in your lungs, and you have problems breathing. You might need to go to the hospital. By watching for changes in your condition and avoiding triggers, you can prevent heart failure flare-ups. Follow-up care is a key part of your treatment and safety. Be sure to make and go to all appointments, and call your doctor if you are having problems. It's also a good idea to know your test results and keep a list of the medicines you take. How can you care for yourself at home? Watch for changes in your weight and condition  · Weigh yourself without clothing at the same time each day. Record your weight. Call your doctor if you gain 3 pounds or more in 24 hrs or 5 pounds in one week. A sudden weight gain may mean that your heart failure is getting worse. · Keep a daily record of your symptoms. Write down any changes in how you feel, such as new shortness of breath, cough, or problems eating. Also record if your ankles are more swollen than usual and if you have to urinate in the night more often. Note anything that you ate or did that could have triggered these changes. Limit sodium  Sodium causes your body to hold on to water, making it harder for your heart to pump. People get most of their sodium from processed foods. Fast food and restaurant meals also tend to be very high in sodium. · Your doctor may suggest that you limit sodium to 1,500 milligrams (mg) a day. That is less than 1 teaspoon of salt a day, including all the salt you eat in cooking or in packaged foods. · Read food labels on cans and food packages. They tell you how much sodium you get in one serving. Check the serving size.  If you eat more than one serving, you are getting more sodium. · Be aware that sodium can come in forms other than salt, including monosodium glutamate (MSG), sodium citrate, and sodium bicarbonate (baking soda). MSG is often added to Asian food. You can sometimes ask for food without MSG or salt. · Slowly reducing salt will help you adjust to the taste. Take the salt shaker off the table. · Flavor your food with garlic, lemon juice, onion, vinegar, herbs, and spices instead of salt. Do not use soy sauce, steak sauce, onion salt, garlic salt, mustard, or ketchup on your food, unless it is labeled \"low-sodium\" or \"low-salt. \"  · Make your own salad dressings, sauces, and ketchup without adding salt. · Use fresh or frozen ingredients, instead of canned ones, whenever you can. Choose low-sodium canned goods. · Eat less processed food and food from restaurants, including fast food. Exercise as directed  Moderate, regular exercise is very good for your heart. It improves your blood flow and helps control your weight. But too much exercise can stress your heart and cause a heart failure flare-up. · Check with your doctor before you start an exercise program.  · Walking is an easy way to get exercise. Start out slowly. Gradually increase the length and pace of your walk. Swimming, riding a bike, and using a treadmill are also good forms of exercise. · When you exercise, watch for signs that your heart is working too hard. You are pushing yourself too hard if you cannot talk while you are exercising. If you become short of breath or dizzy or have chest pain, stop, sit down, and rest.  · Do not exercise when you do not feel well. Take medicines correctly  · Take your medicines exactly as prescribed. Call your doctor if you think you are having a problem with your medicine. · Make a list of all the medicines you take. Include those prescribed to you by other doctors and any over-the-counter medicines, vitamins, or supplements you take.  Take this list with you when you go to any doctor. · Take your medicines at the same time every day. It may help you to post a list of all the medicines you take every day and what time of day you take them. · Make taking your medicine as simple as you can. Plan times to take your medicines when you are doing other things, such as eating a meal or getting ready for bed. This will make it easier to remember to take your medicines. · Get organized. Use helpful tools, such as daily or weekly pill containers. When should you call for help? Call 911 if you have symptoms of sudden heart failure such as:  · You have severe trouble breathing. · You cough up pink, foamy mucus. · You have a new irregular or rapid heartbeat. Call your doctor now or seek immediate medical care if:  · You have new or increased shortness of breath. · You are dizzy or lightheaded, or you feel like you may faint. · You have sudden weight gain, such as 3 pounds in 24 hours, or 5 pounds in one week. · You have increased swelling in your legs, ankles, or feet. · You are suddenly so tired or weak that you cannot do your usual activities. Watch closely for changes in your health, and be sure to contact your doctor if you develop new symptoms. Where can you learn more? Go to http://kendy-kristina.info/  Enter V089 in the search box to learn more about \"Avoiding Triggers With Heart Failure: Care Instructions. \"  © 9681-9532 Healthwise, [employer]. Care instructions adapted under license by [employer] (which disclaims liability or warranty for this information). This care instruction is for use with your licensed healthcare professional. If you have questions about a medical condition or this instruction, always ask your healthcare professional. [place] [address] any warranty or liability for your use of this information. Content Version: 61.0.252298; Current as of: January 27, 2016 (modified 2/9/18).

## 2019-07-01 NOTE — PROGRESS NOTES
I met with the [phone]  from [facility] ([phone]). He is willing to bring in an extra portable tank when pt s discharged if we contact him @ the above number early in the morning of discharge.  
 
Alvaro Desai

## 2019-07-01 NOTE — PROGRESS NOTES
Palliative Medicine Code Status: Full Code Advance Care Planning 7/1/2019 Patient's Healthcare Decision Maker is: Verbal statement (Legal Next of Kin remains as decision maker) Primary Decision Maker Name Juancarlos Cowart Primary Decision Maker Phone Number [phone] Confirm Advance Directive Yes, not on file Patient Would Like to Complete Advance Directive Already in place, per pt/wife Patient / Family Encounter Documentation Participants (names):  Pt, wife Marifer Campos Medicine (NP Suri Millard) Narrative:  Pt was awake in bed, alert and talkative, receptive to visit. Pt and wife will celebrate their 39th anniversary this month, have two adult children who both live nearby. Son assists with [employer] lift and grocery shopping, dtr is an RN, helps out after work. Pt reports he and wife have worked in various capacities over the years, including an [employer], \"flipping\" houses, and a cleaning service. Pt also served as a preacher. Wife reports pt has had numerous health issues, stated pt has spent more time in the hospital than home over the past 3 years, shared that pt has essentially been homebound for the past year. Wife does not leave the hospital when pt is admitted, nor does she leave the home due to pt's care needs, stated she writes \"very detailed grocery lists\" for son. Wife reports difficulty getting pt to doctors' appts, shared that PCP is very attentive and will assist with care by phone as able, pt reports he is unable to get to the eye doctor or dentist.   
 
Pt is followed by [facility], places great trust in his Physical Therapist, Carlos Blandon. Wife shared that pt had not really felt up to participating with PT in the days prior to hospitalization but pt wishes to resume services when he returns home. Care Manager had discussed hospice care with pt/wife, reports pt is not interested in pursuing hospice at this time. Pt/wife report pt already has AMD in place which appoints wife James Schuler and dtr Micheal as primary and secondary Medical POAs, respectively. Copy was requested for [facility] record. Pt expressed wish to remain a full code at this time, would want initial attempts at resuscitation but stated he would not want efforts prolonged without hope for meaningful recovery. Psychosocial Issues Identified/ Resilience Factors:  Neither pt nor wife are currently working due to pt's medical issues/care needs. Wife has only limited support, reports she rarely leaves the home. 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 Goals of Care / Plan: Pt is looking forward to returning home soon (wife reports possible discharge 7/2 or 7/3), requests to resume home health services. Copy of pt's previously completed AMD was requested for chart. Pt remains a full code at this time. Palliative Medicine will follow for support and ongoing goals of care conversations as appropriate. Discussed with Care Manager. Thank you for including Palliative Medicine in the care of Mr. Eileen Edgar. [facility] [address], [facility]- 
[phone]

## 2019-07-01 NOTE — PROGRESS NOTES
Speech Pathology bedside swallow evaluation/discharge Patient: Neirssa Miller (57 y.o. male) Date: 7/1/2019 Primary Diagnosis: Diastolic CHF, acute on chronic ([facility]) [I50.33] Precautions: reflux ASSESSMENT : 
Based on the objective data described below, the patient presents with functional oral-pharyngeal swallow. no dysarthria. Admitted with dysarthria, AMS, STEMi, bipap. Was on high flow 02 and bipap, but refused. PMH: CHF, CVA (2-3 years ago per patient) , vascular dz, (B) BKA, HTN< DM, morbid obesity, COPD, +h/o tobacco, small bowel ulcer, GI bleed, anemia. . 
Skilled acute therapy provided by a speech-language pathologist is not indicated at this time. PLAN : 
Recommendations: 
Ok for diet as tolerated. Discharge Recommendations: None SUBJECTIVE:  
Patient stated Harini Johnson have me on limited fluids. OBJECTIVE:  
 
Past Medical History:  
Diagnosis Date  CAD (coronary artery disease)   
 pvc  CKD (chronic kidney disease) stage 3, GFR 30-59 ml/min ([facility]) 6/29/2019  COPD  Diabetes ([facility] [address]  GI bleed 6/2019: [facility]. Found small bowel ulcer. S/P 3 unit transfusion.  Heart failure ([address]  Hypertension  Mitral regurgitation  Morbidly obese ([facility] [address]  PUD (peptic ulcer disease) 6/29/2019 Past Surgical History:  
Procedure Laterality Date  HX ORTHOPAEDIC    
 right leg surgery Prior Level of Function/Home Situation: lives with wife Diet prior to admission: regualr, thins Current Diet:  Regular, thins Cognitive and Communication Status: 
Neurologic State: Alert Orientation Level: Oriented to person, Oriented to place Cognition: Follows commands Perception: Appears intact Safety/Judgement: Insight into deficits Oral Assessment: 
Oral Assessment Labial: No impairment Dentition: Natural;Limited P.O. Trials: 
Patient Position: UPRIGHT IN BED Vocal quality prior to P.O.: No impairment Consistency Presented: Thin liquid; Solid(seen at lunch) How Presented: Straw(wife fed) ORAL PHASE:  
Bolus Acceptance: No impairment Bolus Formation/Control: No impairment Propulsion: No impairment Oral Residue: None PHARYNGEAL PHASE:  
Initiation of Swallow: No impairment Laryngeal Elevation: Functional 
Aspiration Signs/Symptoms: None Pharyngeal Phase Characteristics: No impairment, issues, or problems Patient has GErD. On PPI now due to ulcer NOMS:  
The NOMS functional outcome measure was used to quantify this patient's level of swallowing impairment. Based on the NOMS, the patient was determined to be at level 7 for swallow function NOMS Swallowing Levels: 
Level 1 (CN): NPO Level 2 (CM): NPO but takes consistency in therapy Level 3 (CL): Takes less than 50% of nutrition p.o. and continues with nonoral feedings; and/or safe with mod cues; and/or max diet restriction Level 4 (CK): Safe swallow but needs mod cues; and/or mod diet restriction; and/or still requires some nonoral feeding/supplements Level 5 (CJ): Safe swallow with min diet restriction; and/or needs min cues Level 6 (CI): Independent with p.o.; rare cues; usually self cues; may need to avoid some foods or needs extra time Level 7 (CH): Independent for all p.o. CHRISTINA. (2003). National Outcomes Measurement System (NOMS): Adult Speech-Language Pathology User's Guide. Pain: 
Pain Scale 1: Numeric (0 - 10) Pain Intensity 1: 0 After treatment:  
[] Patient left in no apparent distress sitting up in chair 
[] Patient left in no apparent distress in bed 
[x] Call bell left within reach [x] Nursing notified  
[x] Caregiver present 
[] Bed alarm activated COMMUNICATION/EDUCATION:  
The patients plan of care including findings, recommendations, and recommended diet changes were discussed with: Registered Nurse. 
 
 
[] Posted safety precautions in patient's room. [x] Patient/family have participated as able and agree with findings and recommendations. [] Patient is unable to participate in plan of care at this time. Thank you for this referral. 
JOEY Saldivar Time Calculation: 15 mins

## 2019-07-01 NOTE — NURSE NAVIGATOR
Chart reviewed by Heart Failure Nurse Navigator. Heart Failure database completed. EF:  61 to 65% with no regional wall motion abnormalities. ACEi/ARB/ARNi: Not indicated. BB: Not indicated. Aldosterone Antagonist: Not indicated. Hydralazine 10 mg TID. Isosorbide dinitrate 20 mg TID. Obstructive Sleep Apnea Screening:  Needs Sleep Study as OP. STOP-BANG score: 
 Referred to Sleep Medicine: CRT Not indicated. NYHA Functional Class **. Heart Failure Teach Back in Patient Education. Heart Failure Avoiding Triggers on Discharge Instructions. Cardiologist: Dr. Rayshawn Diaz Post discharge follow up phone call to be made within 48-72 hours of discharge.

## 2019-07-01 NOTE — PROGRESS NOTES
Nutrition Assessment: 
 
RECOMMENDATIONS/INTERVENTION(S):  
1. Continue CHO consistent low Na+ diet. 2. Provided brief diet education to patient/ wife. 3. Monitor BG/FS and adjust insulin as needed. 4. Monitor weight trends, skin integrity, labs, and GI function. ASSESSMENT:  
7/1: 61 y/o M admitted with diastolic CHF. MD consult for general nutrition. PMH - CAD, COPD, DM, heart failure, HTN, morbid obesity. Noted pt refusing turns and non-compliant with fluid restriction. Spoke with pt and wife at bedside. Pt reports improving appetite with 100% breakfast intake. Pt reports poor appetite x3 days PTA r/t clinical status however, prior to that, appetite was good. Wife prepares meals at home and has been assisting with meals in-house. Wife admits she caters to him and generally he tells her what he wants to eat. Later in conversation wife says she cooks with salt restriction (although pt saying he eats salty foods) and limits sweets. Hgb A1c 6.5. BG/FS 75 - 215, on insulin regimen. Pt monitoring BG at home. .1 kb. BMI 60.3 adjusted for B/L BKA. Estimated nutritional needs -1000 kcal to promote healthy weight loss. Pt without teeth, some difficulty chewing. Speech following. Pt denies n/v/c/d. LBM 6/30, on bowel regimen. Stage II PI leg lower L + multiple wounds. Encouraged and discussed limiting salt, monitoring CHO intake, and limiting concentrated sweets at home. Anticipate limited compliance, pt does not seem receptive to information/ not ready to make lifestyle changes. Labs reviewed. Meds - bumetanide, docusate, ferrous gluconate, insulin lispro, isosorbide dinitrate. Diet Order: Cardiac, Consistent carb 
% Eaten:   
Patient Vitals for the past 72 hrs: 
 % Diet Eaten  
06/29/19 1745 100 % 06/29/19 1445 100 % 06/29/19 1045 100 % Pertinent Medications: [x] Reviewed Labs: [x] Reviewed Anthropometrics: Height: 6' (182.9 cm) Weight: (!) 178.1 kg (392 lb 9.6 oz) IBW (%IBW):   ( ) UBW (%UBW):   (  %) BMI: Body mass index is 53.25 kg/m². This BMI is indicative of: 
 [] Underweight    [] Normal    [] Overweight    []  Obesity    [x]  Extreme Obesity (BMI>40) Estimated Nutrition Needs (Based on): 1962 Kcals/day(REE 2634 x 1.2 - 1000kcal) , 110 g(105 - 122 (1.3 - 1.5 gm/kg IBW)) Protein Carbohydrate: At Least 130 g/day  Fluids: 1962 mL/day (1 ml/kcal) Last BM: 6/30   [x]Active     []Hyperactive  []Hypoactive       [] Absent   BS Skin:    [] Intact   [] Incision  [x] Breakdown - stage II leg lowe + multiple wounds   [] DTI   [] Tears/Excoriation/Abrasion  [x]Edema - LLE non-pitting, RLE 2+ [] Other: Wt Readings from Last 30 Encounters:  
07/01/19 (!) 178.1 kg (392 lb 9.6 oz)  
07/18/18 139 kg (306 lb 8 oz) 06/23/17 151.7 kg (334 lb 7 oz) 06/18/17 142.9 kg (315 lb) 05/06/17 153.8 kg (339 lb)  
11/17/16 140.6 kg (310 lb)  
02/25/15 (!) 167.8 kg (370 lb) 12/20/14 (!) 161 kg (355 lb) 05/11/13 (!) 168.7 kg (372 lb) NUTRITION DIAGNOSES:  
Problem:  Overweight/obesity Etiology: related to excessive energy intake Signs/Symptoms: as evidenced by BMI 60.3 (adjusted for b/l BKA) NUTRITION INTERVENTIONS: 
Meals/Snacks: General/healthful diet         Initial/Brief Nutrition Education: Survival information GOAL:  
Pt will consume >75% meals within 5-7 days with gradual weight loss 1-2 lb/week Cultural, Synagogue, or Ethnic Dietary Needs: None EDUCATION & DISCHARGE NEEDS:  
 [] None Identified 
 [] Identified and Education Provided/Documented 
 [x] Identified and Pt declined/was not appropriate [x] Interdisciplinary Care Plan Reviewed/Documented  
 [x] Discharge Needs:    Cardiac CHO consistent diet 
 [] No Nutrition Related Discharge Needs NUTRITION RISK:  
Pt Is At Nutrition Risk  [x] No Nutrition Risk Identified  [] PT SEEN FOR:  
 [x]  MD Consult: []Calorie Count []Diabetic Diet Education []Diet Education []Electrolyte Management 
   [x]General Nutrition Management and Supplements []Management of Tube Feeding []TPN Recommendations []  RN Referral:  []MST score >=2 
   []Enteral/Parenteral Nutrition PTA []Pregnant: Gestational DM or Multigestation  
              [] Pressure Ulcer 
 
[]  Low BMI      []  Length of Stay       [] Dysphagia Diet         [] Ventilator 
[]  Follow-up Previous Recommendations: 
 [] Implemented          [] Not Implemented          [x] Not Applicable Previous Goal: 
 [] Met              [] Progressing Towards Goal              [] Not Progressing Towards Goal   [x] Not Applicable Jed Flores RDN Pager [phone] Phone [phone]

## 2019-07-01 NOTE — CDMP QUERY
Patient admitted with acute respiratory failure, noted to also have a Stage 2 left lateral pressure ulcer to the Left BKA pe the wound care assessment. If possible, please document in progress notes and D/C Summary the following regarding the ulcer: 
 
? TYPE of Ulcer (Decubitus, Diabetic, Venous stasis, other) ? SITE of Ulcer (Including laterality, if applicable) ? STAGE of Ulcer (If applicable) ? POA Status (Present on Admission (POA) or Hospital Acquired) ? Other, please specify ? Unable to be determined The medical record reflects the following: 
  Risk Factors: 59 yo M admitted with AHRF Clinical Indicators: 6/28 Wound Care notes state \" left lateral open area (likely stage II present on admit). The open wound bed is pink/pale measuring approximately 1x1.5cm in size with blanching and non-blanching areas of kayleigh-wound skin. \" Treatment: wound care suggests To the bilateral lateral lower leg amputation site wounds,consider mepilex border dressing and change every 3 days Thank you for your time [facility] RN/BSN, CCDS Desk:   [phone] Other:  [phone]

## 2019-07-01 NOTE — ACP (ADVANCE CARE PLANNING)
Primary Decision Maker: Mariangel Salas - Spouse - [phone] Secondary Decision Maker: Roque Lopez - Daughter Advance Care Planning 7/1/2019 Patient's Healthcare Decision Maker is: Verbal statement (Legal Next of Kin remains as decision maker) Primary Decision Maker Name John Paul Bryan Primary Decision Maker Phone Number [phone] Confirm Advance Directive Yes, not on file Patient Would Like to Complete Advance Directive Already in place, per pt/wife Pt/wife report pt already has AMD in place which appoints wife Rocio Marie and dtr Davie Doyle as primary and secondary Medical POAs, respectively. Copy was requested for [place] [address] record. Pt expressed wish to remain a full code at this time, would want initial attempts at resuscitation but stated he would not want efforts prolonged without hope for meaningful recovery. Wife present for discussion, is aware of pt's wishes.

## 2019-07-02 NOTE — PROGRESS NOTES
[facility], Critical Care, and Sleep Medicine Initial Patient Consult Name: Nenita Reilly MRN: [id number] : 1960 Hospital: [address] [facility] Date: 2019 IMPRESSION:  
· Acute on chronic hypoxemic/hypercapneic respiratory failure · Decompensated heart failure · COPD, not in acute exacerbation · Suspected JEROD/OHS · Pulmonary HTN - group 2,3 
· Hypoglycemia · ASPEN vs CKD · H/o CVA · HTN 
· H/o afib · Recent GIB 
· H/o non-compliance RECOMMENDATIONS:  
Supplemental O2 as needed to keep sats > 88% NIPPV whenever sleeping Check ABG Would continue IV diuresis given ongoing O2 requirements, hypercapnia and pitting edema Follow cultures. Speech cleared for reg diet Continue duonebs, brovana, pulmicort Fluid restrict Continue ASA Continue isordil, hydralazine SSI Needs PFTs, sleep study as an outpatient DVT ppx: SCDs GI ppx: BID protonix Subjective:  
 
Mr. Shaylee Palmer is a 62yo male w/ history of COPD, CKD, HTN, afib, PVD s/p bilateral BKA, HLD, CVA, DM and non-compliance who presented to the ER on  w/ complaints of feeling \"sick. \"  He has been lethargic and weak. Also complains of chest pain, shortness of breath, swelling \"everywhere\" and a cough productive of white sputum. Cough sounds very wet, congested. Thinks several family members have recently had the flu. Admitted to the ICU on bipap. ABG in ED reportedly: 7./72. Recent admission to [facility] for GIB. Also says that he went to [facility] twice in the past month or so for swelling but that nothing was done. Says that he was diagnosed w/ COPD 35 years ago. No recent PFTs. Followed by his PCP. Uses combivent at home. Also says that he was diagnosed w/ JEROD in  but never treated. No recent evaluation.  - echo: EF 61-65%, RV not well seen, mild MS, PASP 75 *blood cultures () - 1/4 w/ CoNS --> suspect contaminant *all other cultures NGTD *MRSA screen and RVP negative 24hr events: 
Reports breathing feels much better Continues to refuse turns, non-compliant w/ fluid restriction Not wearing bipap due to claustrophobia and \"burning\" in nares 
sats 97% on 3L Past Medical History:  
Diagnosis Date  CAD (coronary artery disease)   
 pvc  CKD (chronic kidney disease) stage 3, GFR 30-59 ml/min ([facility]) 6/29/2019  COPD  Diabetes ([facility]  GI bleed 6/2019: [place]. Found small bowel ulcer. S/P 3 unit transfusion.  Heart failure ([facility]  Hypertension  Mitral regurgitation  [facility]  PUD (peptic ulcer disease) 6/29/2019 Past Surgical History:  
Procedure Laterality Date  HX ORTHOPAEDIC    
 right leg surgery Prior to Admission medications Medication Sig Start Date End Date Taking? Authorizing Provider  
ferrous gluconate 324 mg (38 mg iron) tablet Take 324 mg by mouth Daily (before breakfast). Yes Provider, Historical  
bumetanide (BUMEX) 2 mg tablet Take 4 mg by mouth two (2) times a day. Yes Provider, Historical  
sevelamer carbonate (RENVELA) 800 mg tab tab Take 1,600 mg by mouth three (3) times daily (with meals). Yes Provider, Historical  
pantoprazole (PROTONIX) 40 mg tablet Take 40 mg by mouth two (2) times a day. Yes Provider, Historical  
calcitRIOL (ROCALTROL) 0.25 mcg capsule Take 0.25 mcg by mouth five (5) days a week. Yes Provider, Historical  
insulin aspart U-100 (NOVOLOG U-100 INSULIN ASPART) 100 unit/mL injection by SubCUTAneous route. Yes Provider, Historical  
glimepiride (AMARYL) 2 mg tablet Take 2 mg by mouth two (2) times a day. Yes Provider, Historical  
aspirin (ASPIRIN) 325 mg tablet Take 325 mg by mouth daily. Yes Provider, Historical  
ipratropium-albuterol (COMBIVENT RESPIMAT)  mcg/actuation inhaler Take 1 Puff by inhalation every six (6) hours as needed for Wheezing.    Yes Provider, Historical  
 polyethylene glycol (MIRALAX) 17 gram packet Take 17 g by mouth nightly. Yes Provider, Historical  
docusate sodium (COLACE) 100 mg capsule Take 100 mg by mouth daily as needed for Constipation. Yes Provider, Historical  
 
Allergies Allergen Reactions  Cephalexin Other (comments) Throat starting closing up after 2nd dose  Codeine Anaphylaxis  Insulin Regular Other (comments) Temp blindness and facial droop only with long acting insulin. Has been on short acting insulin without adverse.  Pcn [Penicillins] Anaphylaxis  Victoza [Liraglutide] Other (comments) Fascial droop, temp blindness  Ferrous Sulfate Other (comments) Ferrous sulfate caused significant constipation, able to tolerate ferrous gluconate without problems  Morphine Other (comments)  
  hallucinates  Statins-Hmg-Coa Reductase Inhibitors Hives, Itching and Nausea and Vomiting Social History Tobacco Use  Smoking status: Former Smoker  Smokeless tobacco: Never Used Substance Use Topics  Alcohol use: No  
  
Family History Problem Relation Age of Onset  Hypertension Father Current Facility-Administered Medications Medication Dose Route Frequency  glimepiride (AMARYL) tablet 2 mg  2 mg Oral BID  isosorbide dinitrate (ISORDIL) tablet 20 mg  20 mg Oral TID  hydrALAZINE (APRESOLINE) tablet 10 mg  10 mg Oral TID  albuterol-ipratropium (DUO-NEB) 2.5 MG-0.5 MG/3 ML  3 mL Nebulization Q4HWA RT  
 pantoprazole (PROTONIX) tablet 40 mg  40 mg Oral ACB&D  
 gabapentin (NEURONTIN) capsule 100 mg  100 mg Oral TID  sodium chloride (NS) flush 5-40 mL  5-40 mL IntraVENous Q8H  
 sodium chloride (NS) flush 5-40 mL  5-40 mL IntraVENous Q8H  
 docusate sodium (COLACE) capsule 100 mg  100 mg Oral BID  bumetanide (BUMEX) injection 2 mg  2 mg IntraVENous Q12H  
 arformoterol (BROVANA) neb solution 15 mcg  15 mcg Nebulization BID RT  
  budesonide (PULMICORT) 500 mcg/2 ml nebulizer suspension  500 mcg Nebulization BID RT  
 nystatin (MYCOSTATIN) 100,000 unit/gram powder   Topical BID  insulin lispro (HUMALOG) injection   SubCUTAneous AC&HS  calcitRIOL (ROCALTROL) capsule 0.25 mcg  0.25 mcg Oral Once per day on   
 ferrous gluconate 324 mg (38 mg iron) tablet 1 Tab  1 Tab Oral ACB  polyethylene glycol (MIRALAX) packet 17 g  17 g Oral QHS  sevelamer carbonate (RENVELA) tab 1,600 mg  1,600 mg Oral TID WITH MEALS  
 aspirin delayed-release tablet 81 mg  81 mg Oral DAILY Objective: 
Vital Signs:   
Visit Vitals /71 (BP 1 Location: Left arm, BP Patient Position: At rest) Pulse 72 Temp 97.6 °F (36.4 °C) Resp 24 Ht 6' (1.829 m) Wt (!) 181.5 kg (400 lb 1.6 oz) SpO2 97% BMI 54.26 kg/m² O2 Device: Nasal cannula O2 Flow Rate (L/min): 3 l/min Temp (24hrs), Av.9 °F (36.6 °C), Min:97.4 °F (36.3 °C), Max:98.4 °F (36.9 °C) Intake/Output:  
Last shift:      No intake/output data recorded. Last 3 shifts:  1901 -  0700 In: 1130 [P.O.:1130] Out: 2200 [Urine:2200] Intake/Output Summary (Last 24 hours) at 2019 1022 Last data filed at 2019 7414 Gross per 24 hour Intake 600 ml Output 1050 ml Net -450 ml Physical Exam:  
General:  Morbidly obese Head:   atnc, edentulous Eyes:   perrla, eomi intact Nose: Throat:   
Neck: Trachea midline Back:     
Lungs:   Diminished throughout anteriorly, unable to assess posteriorly due to body habitus. No wheezing noted Chest wall:  No tenderness or deformity. Heart:  Regular rate and rhythm Abdomen:   Soft, non-tender. Bowel sounds normal  
Extremities: Anasarca, bilateral BKA Pulses:   
Skin:   
Lymph nodes:   
Neurologic: Oriented x 3 Data review:  
 
Recent Results (from the past 24 hour(s)) BLOOD GAS, ARTERIAL Collection Time: 19 11:30 AM  
Result Value Ref Range pH 7.39 7.35 - 7.45    
 PCO2 62 (H) 35.0 - 45.0 mmHg PO2 60 (L) 80 - 100 mmHg O2 SAT 90 (L) 92 - 97 % BICARBONATE 37 (H) 22 - 26 mmol/L  
 BASE EXCESS 8.9 mmol/L  
 O2 METHOD NASAL O2    
 O2 FLOW RATE 2.00 L/min Sample source ARTERIAL    
 SITE LEFT RADIAL SYEDA'S TEST YES    
GLUCOSE, POC Collection Time: 07/01/19 12:08 PM  
Result Value Ref Range Glucose (POC) 269 (H) 65 - 100 mg/dL Performed by Jayson Donnelly   
CULTURE, BLOOD Collection Time: 07/01/19 12:29 PM  
Result Value Ref Range Special Requests: NO SPECIAL REQUESTS Culture result: NO GROWTH AFTER 18 HOURS    
CULTURE, BLOOD Collection Time: 07/01/19 12:29 PM  
Result Value Ref Range Special Requests: NO SPECIAL REQUESTS Culture result: NO GROWTH AFTER 18 HOURS    
URINALYSIS W/ REFLEX CULTURE Collection Time: 07/01/19  2:06 PM  
Result Value Ref Range Color YELLOW/STRAW Appearance CLEAR CLEAR Specific gravity 1.009 1.003 - 1.030    
 pH (UA) 5.0 5.0 - 8.0 Protein 100 (A) NEG mg/dL Glucose 100 (A) NEG mg/dL Ketone NEGATIVE  NEG mg/dL Bilirubin NEGATIVE  NEG Blood NEGATIVE  NEG Urobilinogen 0.2 0.2 - 1.0 EU/dL Nitrites NEGATIVE  NEG Leukocyte Esterase TRACE (A) NEG    
 WBC 5-10 0 - 4 /hpf  
 RBC 0-5 0 - 5 /hpf Epithelial cells FEW FEW /lpf Bacteria NEGATIVE  NEG /hpf  
 UA:UC IF INDICATED URINE CULTURE ORDERED (A) CNI Budding yeast PRESENT (A) NEG    
GLUCOSE, POC Collection Time: 07/01/19  4:12 PM  
Result Value Ref Range Glucose (POC) 292 (H) 65 - 100 mg/dL Performed by Eric Bowles (PCT) GLUCOSE, POC Collection Time: 07/01/19  9:01 PM  
Result Value Ref Range Glucose (POC) 318 (H) 65 - 100 mg/dL Performed by [employer] CBC WITH AUTOMATED DIFF Collection Time: 07/02/19  1:22 AM  
Result Value Ref Range WBC 5.5 4.1 - 11.1 K/uL  
 RBC 2.49 (L) 4.10 - 5.70 M/uL HGB 6.8 (L) 12.1 - 17.0 g/dL HCT 23.1 (L) 36.6 - 50.3 % MCV 92.8 80.0 - 99.0 FL  
 MCH 27.3 26.0 - 34.0 PG  
 MCHC 29.4 (L) 30.0 - 36.5 g/dL  
 RDW 15.5 (H) 11.5 - 14.5 % PLATELET 306 479 - 382 K/uL MPV 9.3 8.9 - 12.9 FL  
 NRBC 0.0 0  WBC ABSOLUTE NRBC 0.00 0.00 - 0.01 K/uL NEUTROPHILS 69 32 - 75 % LYMPHOCYTES 14 12 - 49 % MONOCYTES 10 5 - 13 % EOSINOPHILS 5 0 - 7 % BASOPHILS 1 0 - 1 % IMMATURE GRANULOCYTES 1 (H) 0.0 - 0.5 % ABS. NEUTROPHILS 3.8 1.8 - 8.0 K/UL  
 ABS. LYMPHOCYTES 0.8 0.8 - 3.5 K/UL  
 ABS. MONOCYTES 0.6 0.0 - 1.0 K/UL  
 ABS. EOSINOPHILS 0.3 0.0 - 0.4 K/UL  
 ABS. BASOPHILS 0.0 0.0 - 0.1 K/UL  
 ABS. IMM. GRANS. 0.0 0.00 - 0.04 K/UL  
 DF AUTOMATED RENAL FUNCTION PANEL Collection Time: 07/02/19  5:00 AM  
Result Value Ref Range Sodium 138 136 - 145 mmol/L Potassium 4.4 3.5 - 5.1 mmol/L Chloride 99 97 - 108 mmol/L  
 CO2 37 (H) 21 - 32 mmol/L Anion gap 2 (L) 5 - 15 mmol/L Glucose 266 (H) 65 - 100 mg/dL BUN 48 (H) 6 - 20 MG/DL Creatinine 1.90 (H) 0.70 - 1.30 MG/DL  
 BUN/Creatinine ratio 25 (H) 12 - 20 GFR est AA 44 (L) >60 ml/min/1.73m2 GFR est non-AA 36 (L) >60 ml/min/1.73m2 Calcium 8.2 (L) 8.5 - 10.1 MG/DL Phosphorus 4.1 2.6 - 4.7 MG/DL Albumin 2.4 (L) 3.5 - 5.0 g/dL MAGNESIUM Collection Time: 07/02/19  5:00 AM  
Result Value Ref Range Magnesium 2.0 1.6 - 2.4 mg/dL GLUCOSE, POC Collection Time: 07/02/19  6:19 AM  
Result Value Ref Range Glucose (POC) 254 (H) 65 - 100 mg/dL Performed by Raman [facility]: 
I have personally reviewed the patients radiographs and have reviewed the reports: 
  
 
  
LEE Zavala Student [facility]

## 2019-07-02 NOTE — DIABETES MGMT
[facility] Progress Note Recommendations/ Comments: Chart reviewed for hyperglycemia, POC glucose consistently > 200 mg/dL in past 24 hours. Patient has received 19 units of correction. Noted home Amaryl restarted today. Noted patient with listed allergy for long-acting insulin due to temporary blindness. If appropriate, please consider: 
If POC glucose remain > 180 mg/dL with addition of Amaryl, increase lispro correction scale to resistant sensitivity for BMI measurement. Current hospital DM medication:  
Lispro normal sensitivity correction scale Amaryl 2 mg BID Chart reviewed on Nenita Reilly. Patient is a 60 y.o. male with known Type 2 Diabetes on oral agent (monotherapy): glimepiride (Amaryl) 2 mg BID 
insulin injections: Novolog at home. A1c:  
Lab Results Component Value Date/Time Hemoglobin A1c 6.5 (H) 06/29/2019 02:39 AM  
 Hemoglobin A1c 7.4 (H) 07/16/2018 01:58 AM  
 
 
Recent Glucose Results:  
Lab Results Component Value Date/Time  (H) 07/02/2019 05:00 AM  
 GLUCPOC 254 (H) 07/02/2019 06:19 AM  
 GLUCPOC 318 (H) 07/01/2019 09:01 PM  
 GLUCPOC 292 (H) 07/01/2019 04:12 PM  
  
 
Lab Results Component Value Date/Time Creatinine 1.90 (H) 07/02/2019 05:00 AM  
 
Estimated Creatinine Clearance: 70.6 mL/min (A) (based on SCr of 1.9 mg/dL (H)). Active Orders Diet DIET DIABETIC CONSISTENT CARB Regular; FR 1200ML  
  
 
PO intake:  
Patient Vitals for the past 72 hrs: 
 % Diet Eaten 07/01/19 0800 100 % 06/29/19 1745 100 % 06/29/19 1445 100 % Will continue to follow as needed. Thank you Ricardo Peng, RN Office [phone] Pager [phone] Time spent: 5 min

## 2019-07-02 NOTE — PROGRESS NOTES
Music Therapy Assessment [address] [place] [id number]    
1960  60 y.o.  male Patient Telephone Number: [phone] (home) [employer] Affiliation: No Denominational Language: [place] Patient Active Problem List  
 Diagnosis Date Noted  Weakness generalized  Physical debility  Goals of care, counseling/discussion  DNR (do not resuscitate) discussion  Cor pulmonale (chronic) ([facility] 06/30/2019  CKD (chronic kidney disease) stage 3, GFR 30-59 ml/min ([facility]) 06/29/2019  Acute metabolic encephalopathy 62/09/2230  PUD (peptic ulcer disease) 06/29/2019  Acute on chronic respiratory failure with hypoxia and hypercapnia ([facility] 06/28/2019  Persistent atrial fibrillation ([facility] 06/28/2019  Mitral regurgitation  Anemia 07/16/2018  Hypertriglyceridemia 07/16/2018  Acute renal failure (ARF) ([facility] 07/15/2018  DM type 2 causing renal disease, not at goal [facility]) 07/15/2018  Bradycardia 07/15/2018  Hyponatremia 07/15/2018  Morbid obesity ([facility] 07/15/2018  COPD (chronic obstructive pulmonary disease) ([facility] 07/15/2018  Acute hyperkalemia 06/18/2017 Date: 7/2/2019            Total Time (in minutes): 29          [facility] TELE 2 Mental Status:   [x] Alert [  ] Kevon Fulling [  ]  Confused  [  ] Minimally responsive  [  ] Sleeping Communication Status: [  ] Impaired Speech [  ] Nonverbal -N/A Physical Status:   [x] Oxygen in use  [  ] Hard of Hearing [  ] Vision Impaired [  ] Ambulatory  [  ] Ambulatory with assistance [  ] Non-ambulatory Music Preferences, Background: [place], including [address] [facility] [address] [facility], some [place], and [place]. Pt used to play guitar in a Gospel band. Pt shared that he always enjoy listening to music. Clinical Problem addressed: Support healthy coping for patient and family, Improve mood.  
 
Goal(s) met in session: 
Physical/Pain management (Scale of 1-10):   
 Pre-session rating: Pt did not report on pain. Post-session rating: N/A: Please see Session Observations below. [  ] Increased relaxation   [  ] Affected breathing patterns [  ] Decreased muscle tension   [  ] Decreased agitation [  ] Affected heart rate    [  ] Increased alertness Emotional/Psychological: 
[x] Increased self-expression   [  ] Decreased aggressive behavior [  ] Decreased feelings of stress  [  ] Discussed healthy coping skills [x] Improved mood    [  ] Decreased withdrawn behavior Social: 
[  ] Decreased feelings of isolation/loneliness [x] Positive social interaction  
[x] Provided support and/or comfort for family/friends Spiritual: 
[x] Spiritual support    [  ] Expressed peace [  ] Expressed cyndy    [  ] Discussed beliefs Techniques Utilized (Check all that apply):  
[  ] Procedural support MT [  ] Music for relaxation [x] Patient preferred music 
[  ] Diana analysis  [x] Song choice  [  ] Music for validation [  ] Entrainment  [  ] Movement to music [  ] Guided visualization [  ] Bertha Hatchet  [  ] Patient instrument playing [  ] OneJoppel writing 
[x] Sing along   [  ] Improvisation  [x] Sensory stimulation 
[x] Active Listening  [x] Music for spiritual support [  ] Making of CDs as gifts Session Observations: Referral from Summer Jefferson, Palliative NP. Patient (pt) was alert lying in bed. Pt's family, including pt's spouse, two adult females and two boys, was at bedside. After this music therapy intern ([address] [place] [address] intern) asked pt about how he was feeling, MT intern sat at bedside. MT intern sang and played the [address] with [employer]. Pt's affect brightened and he expressed enjoyment in the music. Pt chose to hear the Tiny Babita song Blue Eyes Crying In the [place], which MT intern sang and played this with guitar. Pt increased self-expression in response to the music AEB singing along.  Pt chose to hear a [email or web address]. MT intern snag and played the song You Got A Friend with guitar. Pt's family increased emotional expression in response to the music AEB becoming tearful. Pt increased self-expression AEB sharing that he used to play guitar in a Gospel band though he was unable to continue playing due to his health conditions. MT intern provided active listening and words of validation. Pt chose to hear the hymn [employer], which MT intern sang and played this with guitar. Pt's facial expression appeared to be pensive as he listened to this hymn and he increased self-expression in response to the music AEB sharing that this is his spouse favorite hymn. Pt expressed enjoyment in the music and gratitude for the visit saying the music has brightened up his day. Will follow as able. Brigette \"Nicole\" Kim Rebolledo Music Therapy Intern Spiritual Care Department Referral-based service

## 2019-07-02 NOTE — PROGRESS NOTES
Pt encouraged and instructed on importance of Bipap @ bedtime. Pt refuses bipap, pt placed on bipap says he cant do it. Pt wife @ bedside. Sharan Dobbs RN notified.

## 2019-07-02 NOTE — PROGRESS NOTES
[address], [id number] [place] [address] 
[phone] Medical Progress Note NAME: Eric Kramer :  1960 MRM:  [id number] Date/Time: 2019 Subjective: Chief Complaint:  Patient was seen and examined by me. Chart reviewed. Dyspnea is the same. No chest pain, fevers Objective:  
 
 
Vitals:  
 
 
Last 24hrs VS reviewed since prior progress note. Most recent are: 
 
Visit Vitals /71 (BP 1 Location: Left arm, BP Patient Position: At rest) Pulse 72 Temp 97.6 °F (36.4 °C) Resp 24 Ht 6' (1.829 m) Wt (!) 181.5 kg (400 lb 1.6 oz) SpO2 97% BMI 54.26 kg/m² SpO2 Readings from Last 6 Encounters:  
19 97% 18 97% 17 93% 17 94% 17 96% 16 98% O2 Flow Rate (L/min): 3 l/min Intake/Output Summary (Last 24 hours) at 2019 1052 Last data filed at 2019 3244 Gross per 24 hour Intake 600 ml Output 1050 ml Net -450 ml Exam:  
 
Physical Exam: 
 
Gen:  Disheveled, ill-appearing, super morbid obesity, NAD HEENT:  Pink conjunctivae, PERRL, hearing intact to voice, moist mucous membranes Neck:  Supple, without masses, thyroid non-tender Resp:  No accessory muscle use, decreased BS at bases, scattered crackles Card:  No murmurs, normal S1, S2 without thrills, 2+ edema Abd:  Soft, non-tender, non-distended, normoactive bowel sounds are present Musc:  No cyanosis or clubbing, bilateral LE BKA Skin:  Left lateral stage 2 ulcer on BKA, stage 2 decub ulcer (POA) Neuro:  Cranial nerves 3-12 are grossly intact, follows commands appropriately Psych:  poor insight, oriented to person, place and time, alert Medications Reviewed: (see below) Lab Data Reviewed: (see below) 
 
______________________________________________________________________ Medications:  
 
Current Facility-Administered Medications Medication Dose Route Frequency  0.9% sodium chloride infusion 250 mL  250 mL IntraVENous PRN  
 glimepiride (AMARYL) tablet 2 mg  2 mg Oral BID  isosorbide dinitrate (ISORDIL) tablet 20 mg  20 mg Oral TID  hydrALAZINE (APRESOLINE) tablet 10 mg  10 mg Oral TID  albuterol-ipratropium (DUO-NEB) 2.5 MG-0.5 MG/3 ML  3 mL Nebulization Q4HWA RT  
 pantoprazole (PROTONIX) tablet 40 mg  40 mg Oral ACB&D  
 gabapentin (NEURONTIN) capsule 100 mg  100 mg Oral TID  sodium chloride (NS) flush 5-40 mL  5-40 mL IntraVENous Q8H  
 sodium chloride (NS) flush 5-40 mL  5-40 mL IntraVENous PRN  
 sodium chloride (NS) flush 5-40 mL  5-40 mL IntraVENous Q8H  
 sodium chloride (NS) flush 5-40 mL  5-40 mL IntraVENous PRN  
 acetaminophen (TYLENOL) tablet 650 mg  650 mg Oral Q4H PRN  
 ondansetron (ZOFRAN) injection 4 mg  4 mg IntraVENous Q4H PRN  
 docusate sodium (COLACE) capsule 100 mg  100 mg Oral BID  bumetanide (BUMEX) injection 2 mg  2 mg IntraVENous Q12H  
 albuterol (PROVENTIL VENTOLIN) nebulizer solution 2.5 mg  2.5 mg Nebulization Q2H PRN  
 arformoterol (BROVANA) neb solution 15 mcg  15 mcg Nebulization BID RT  
 budesonide (PULMICORT) 500 mcg/2 ml nebulizer suspension  500 mcg Nebulization BID RT  
 nystatin (MYCOSTATIN) 100,000 unit/gram powder   Topical BID  insulin lispro (HUMALOG) injection   SubCUTAneous AC&HS  
 glucose chewable tablet 16 g  4 Tab Oral PRN  
 dextrose (D50) infusion 12.5-25 g  25-50 mL IntraVENous PRN  
 glucagon (GLUCAGEN) injection 1 mg  1 mg IntraMUSCular PRN  
 calcitRIOL (ROCALTROL) capsule 0.25 mcg  0.25 mcg Oral Once per day on Mon Tue Wed Thu Fri  
 ferrous gluconate 324 mg (38 mg iron) tablet 1 Tab  1 Tab Oral ACB  polyethylene glycol (MIRALAX) packet 17 g  17 g Oral QHS  sevelamer carbonate (RENVELA) tab 1,600 mg  1,600 mg Oral TID WITH MEALS  
 aspirin delayed-release tablet 81 mg  81 mg Oral DAILY  zinc oxide-cod liver oil (DESITIN) 40 % paste   Topical PRN  
  haloperidol lactate (HALDOL) injection 2 mg  2 mg IntraVENous Q4H PRN Lab Review:  
 
Recent Labs  
  07/02/19 
0122 07/01/19 
0454 06/30/19 
0451 WBC 5.5 5.3 4.9 HGB 6.8* 7.6* 7.3* HCT 23.1* 26.1* 24.8*  
 161 168 Recent Labs  
  07/02/19 
0500 07/01/19 
0338 06/30/19 
0451  138 138  
K 4.4 4.4 4.5  
CL 99 99 100 CO2 37* 37* 35* * 195* 175* BUN 48* 48* 52* CREA 1.90* 1.82* 1.78* CA 8.2* 8.2* 8.2* MG 2.0 1.9 1.8 PHOS 4.1 3.4 3.2 ALB 2.4*  --   --   
 
Lab Results Component Value Date/Time Glucose (POC) 254 (H) 07/02/2019 06:19 AM  
 Glucose (POC) 318 (H) 07/01/2019 09:01 PM  
 Glucose (POC) 292 (H) 07/01/2019 04:12 PM  
 Glucose (POC) 269 (H) 07/01/2019 12:08 PM  
 Glucose (POC) 214 (H) 07/01/2019 07:57 AM  
 
 
  
Assessment / Plan:  
 
Principal Problem: 
 
61 yo hx of HTN, DM, CAD, COPD on 2L O2, dCHF, CKD 3, PVD s/p BLE BKA, PUD w/ recent GI bleed, presented w/ resp failure, hypoxia, hypercapnea, severe pulm HTN 
 
1) Acute on chronic respiratory failure with hypoxia and hypercapnia: slow to improved. Due to underlying COPD, cor pulmonale, CHF, super morbid obesity. Poor prognosis. On chronic 2-3L O2 at home. Cont O2, bipap. Pulm and palliative care following. Patient and wife not ready for hospice yet, but thinking about it 2) Acute on chronic diastolic CHF/cor pulmonale/severe pulm HTN: PASP 75. Cont IV bumex, hydralazine, isordil. Cards following 3) COPD: stable. Cont LABA/ICS, nebs prn 
 
4) HTN: cont hydralazine, bumex, isordil 5) DM type 2: A1C 6.5%. Restart glimepiride. Increase SSI 6) PUD/recent GI bleed: cont IV PPI bid 7) Anemia: due to recent GI bleed and Iron def. Given heart dz and dyspnea, will transfuse 1u RBCs today. Start IV iron 8) Chronic afib: not on anticoagulation due to GI bleed. HR stable. Not on AV moreno blocker 9) CKD 3: Cr around baseline. Will monitor closely while on IV diuresis 10) Pressure ulcers: stage 2 decub, stage 2 left lateral BKA. POA. Cont wound care 11) UTI: prior urine Cx with citrobacter. Will start IV aztreonam (due to multiple allergies) 12) Debility: cont PT/OT. Goal is for home with [facility] Total time spent with patient: 40 Minutes Care Plan discussed with: Patient, wife, nursing, cards Discussed:  Care Plan Prophylaxis:  SCD's, frequent turns Disposition:   PT, OT, RN 
        
___________________________________________________ Attending Physician: Mariola Ríos MD

## 2019-07-02 NOTE — PROGRESS NOTES
Problem: Falls - Risk of 
Goal: *Absence of Falls Description Document Aaron Red Fall Risk and appropriate interventions in the flowsheet. Outcome: Progressing Towards Goal 
  
Problem: Patient Education: Go to Patient Education Activity Goal: Patient/Family Education Outcome: Progressing Towards Goal 
  
Problem: Pressure Injury - Risk of 
Goal: *Prevention of pressure injury Description Document Jesus Scale and appropriate interventions in the flowsheet. Outcome: Progressing Towards Goal 
  
Problem: Patient Education: Go to Patient Education Activity Goal: Patient/Family Education Outcome: Progressing Towards Goal

## 2019-07-02 NOTE — PROGRESS NOTES
Bedside and Verbal shift change report given to Melony BRADLEY (oncoming nurse) by Merline Royals  (offgoing nurse). Report included the following information SBAR, Kardex, Intake/Output, MAR, Accordion, Recent Results and Med Rec Status. 0900: Refused turn by the turn-team.  
 
1030: Dr. Shayla Jorge notified of telemetry call of NSVT x 2, no order received. 1946:Bedside and Verbal shift change report given to WENDY Gupta  (oncoming nurse) by Melony BRADLEY (offgoing nurse). Report included the following information SBAR, Kardex, Intake/Output, MAR, Accordion, Recent Results and Med Rec Status.

## 2019-07-02 NOTE — PROGRESS NOTES
Shift Change: 
Bedside and Verbal shift change report given to Anderson Orantes RN (oncoming nurse) by RN (offgoing nurse). Report included the following information SBAR and Kardex. Shift Summary: 
2330: Pt complains of burning pain during med administration in L wrist IV. Will remove. 0100: Removed L wrist IV due to pain/burning. Pt still has two other IV sites that need to be changed out as well due to date. Attempted x3 to insert new IV and was unsuccessful will try again in the am. 
0200: Called Dr. Rosario Casanova due to pt hgb result of 6.8. Advised no orders at this time, may transfuse later. 0500: Outdated IVs removed, new IV placed by [employer]. End of Shift Report[de-identified] 
Bedside and Verbal shift change report given to Melony BRADLEY (oncoming nurse) by Anderson Orantes RN (offgoing nurse). Report included the following information SBAR and Kardex.

## 2019-07-02 NOTE — PROGRESS NOTES
Chart Reviewed Plan - [place] [facility] If [facility] has enough notice prior to discharge, a tank can be delivered here. I will continue to follow and assist with discharge planning.  ADRIEN Campoverde

## 2019-07-03 NOTE — PROGRESS NOTES
Chart Reviewed Discharge Plan 1. Home with wife and HH through [facility]. I faxed face to face form to [facility] [phone]. If [facility] has enough notice prior to discharge, a tank can be delivered here. Shahbaz from [facility] is the contact ([phone]). 2.Patient has transportation home. I will continue to follow and assist with discharge planning.  ADRIEN Garg

## 2019-07-03 NOTE — PALLIATIVE CARE DISCHARGE
Goals of Care/Treatment Preferences The Palliative Medicine team was consulted as part of your/your loved one's care in the hospital. Our team is a supportive service; we strive to relieve suffering and improve quality of life. We reviewed advance care planning information, which includes the following: 
Patient's [place] is[de-identified] Verbal statement (Legal Next of Kin remains as decision maker) Confirm Advance Directive: Yes, not on file Patient/Health Care Proxy Stated Goals: (Full restorative measures in an effort to recover) We reviewed / discussed your code status as: Full Code Full Code means perform CPR in the event of cardiac arrest. 
    DNR means do NOT perform CPR in the event of cardiac arrest. 
    Partial Code means you have specific preferences, please discuss with your healthcare team. 
    Christopher Cancino means this issue was not addressed / resolved during your stay Medical Interventions: Full interventions Other Instructions: You shared that you already have an Advance Medical Directive in place. It is always recommended that you share a copy with your health care providers to be placed in your medical record. Because of the importance of this information, we are providing you with a printed copy to share with other healthcare providers after this hospitalization is complete.

## 2019-07-03 NOTE — DIABETES MGMT
[facility] Progress Note Recommendations/ Comments: Chart reviewed for extreme hyperglycemia, noting POC glucose remaining at 300 mg/dL today. Patient has received 20 units of lispro on resistant scale today. Noted A1c at goal on admission. If appropriate, please consider: 1. Start basal insulin at 36 units (0.2 units/kg/day). Current hospital DM medication:  
Lispro resistant nsensitivity correction scale Amaryl 2 mg BID Chart reviewed on Yessenia Betts. Patient is a 60 y.o. male with known Type 2 Diabetes on oral agent (monotherapy): glimepiride (Amaryl) 2 mg BID 
insulin injections: Novolog at home. A1c:  
Lab Results Component Value Date/Time Hemoglobin A1c 6.5 (H) 06/29/2019 02:39 AM  
 Hemoglobin A1c 7.4 (H) 07/16/2018 01:58 AM  
 
 
Recent Glucose Results:  
Lab Results Component Value Date/Time  (H) 07/03/2019 03:51 AM  
 GLUCPOC 301 (H) 07/03/2019 11:27 AM  
 GLUCPOC 300 (H) 07/03/2019 06:06 AM  
 GLUCPOC 253 (H) 07/02/2019 09:03 PM  
  
 
Lab Results Component Value Date/Time Creatinine 2.10 (H) 07/03/2019 03:51 AM  
 
Estimated Creatinine Clearance: 63.9 mL/min (A) (based on SCr of 2.1 mg/dL (H)). Active Orders Diet DIET DIABETIC CONSISTENT CARB Regular; 2 GM NA (House Low NA); FR 1200ML  
  
 
PO intake:  
Patient Vitals for the past 72 hrs: 
 % Diet Eaten 07/03/19 0838 100 % 07/01/19 0800 100 % Will continue to follow as needed. Thank you Denis Brown, RN Office [phone] Pager [phone] Time spent: 5 min

## 2019-07-03 NOTE — PROGRESS NOTES
[address], [address] [place] [address] 
[phone] Medical Progress Note NAME: Marga Ponce :  1960 MRM:  [id number] Date/Time: 7/3/2019 Subjective: Chief Complaint:  Patient was seen and examined by me. Chart reviewed. Still very edematous, weak Objective:  
 
 
Vitals:  
 
 
Last 24hrs VS reviewed since prior progress note. Most recent are: 
 
Visit Vitals /65 (BP 1 Location: Left arm, BP Patient Position: At rest) Pulse 79 Temp 98.4 °F (36.9 °C) Resp 22 Ht 6' (1.829 m) Wt (!) 181.5 kg (400 lb 1.6 oz) SpO2 93% BMI 54.26 kg/m² SpO2 Readings from Last 6 Encounters:  
19 93% 18 97% 17 93% 17 94% 17 96% 16 98% O2 Flow Rate (L/min): 3 l/min Intake/Output Summary (Last 24 hours) at 7/3/2019 1229 Last data filed at 7/3/2019 4688 Gross per 24 hour Intake 597.9 ml Output 1750 ml Net -1152.1 ml Exam:  
 
Physical Exam: 
 
Gen:  Disheveled, ill-appearing, super morbid obesity, mild distress HEENT:  Pink conjunctivae, PERRL, hearing intact to voice, moist mucous membranes Neck:  Supple, without masses, thyroid non-tender Resp:  No accessory muscle use, decreased BS at bases, scattered crackles Card:  No murmurs, normal S1, S2 without thrills, 3+ edema, anasarca, scrotal edema Abd:  Soft, non-tender, non-distended, normoactive bowel sounds are present Musc:  No cyanosis or clubbing, bilateral LE BKA Skin:  Left lateral stage 2 ulcer on BKA, stage 2 decub ulcer (POA) Neuro:  Cranial nerves 3-12 are grossly intact, follows commands appropriately Psych:  poor insight, oriented to person, place and time, alert Medications Reviewed: (see below) Lab Data Reviewed: (see below) 
 
______________________________________________________________________ Medications:  
 
Current Facility-Administered Medications Medication Dose Route Frequency  0.9% sodium chloride infusion 250 mL  250 mL IntraVENous PRN  
 glimepiride (AMARYL) tablet 2 mg  2 mg Oral BID  dextrose 10% infusion 125-250 mL  125-250 mL IntraVENous PRN  
 aztreonam (AZACTAM) 2 g in 0.9% sodium chloride (MBP/ADV) 100 mL  2 g IntraVENous Q8H  
 insulin lispro (HUMALOG) injection   SubCUTAneous AC&HS  isosorbide dinitrate (ISORDIL) tablet 20 mg  20 mg Oral TID  hydrALAZINE (APRESOLINE) tablet 10 mg  10 mg Oral TID  albuterol-ipratropium (DUO-NEB) 2.5 MG-0.5 MG/3 ML  3 mL Nebulization Q4HWA RT  
 pantoprazole (PROTONIX) tablet 40 mg  40 mg Oral ACB&D  
 gabapentin (NEURONTIN) capsule 100 mg  100 mg Oral TID  sodium chloride (NS) flush 5-40 mL  5-40 mL IntraVENous Q8H  
 sodium chloride (NS) flush 5-40 mL  5-40 mL IntraVENous PRN  
 sodium chloride (NS) flush 5-40 mL  5-40 mL IntraVENous Q8H  
 sodium chloride (NS) flush 5-40 mL  5-40 mL IntraVENous PRN  
 acetaminophen (TYLENOL) tablet 650 mg  650 mg Oral Q4H PRN  
 ondansetron (ZOFRAN) injection 4 mg  4 mg IntraVENous Q4H PRN  
 docusate sodium (COLACE) capsule 100 mg  100 mg Oral BID  bumetanide (BUMEX) injection 2 mg  2 mg IntraVENous Q12H  
 albuterol (PROVENTIL VENTOLIN) nebulizer solution 2.5 mg  2.5 mg Nebulization Q2H PRN  
 arformoterol (BROVANA) neb solution 15 mcg  15 mcg Nebulization BID RT  
 budesonide (PULMICORT) 500 mcg/2 ml nebulizer suspension  500 mcg Nebulization BID RT  
 nystatin (MYCOSTATIN) 100,000 unit/gram powder   Topical BID  
 glucose chewable tablet 16 g  4 Tab Oral PRN  
 glucagon (GLUCAGEN) injection 1 mg  1 mg IntraMUSCular PRN  
 calcitRIOL (ROCALTROL) capsule 0.25 mcg  0.25 mcg Oral Once per day on Mon Tue Wed Thu Fri  
 ferrous gluconate 324 mg (38 mg iron) tablet 1 Tab  1 Tab Oral ACB  polyethylene glycol (MIRALAX) packet 17 g  17 g Oral QHS  sevelamer carbonate (RENVELA) tab 1,600 mg  1,600 mg Oral TID WITH MEALS  
  aspirin delayed-release tablet 81 mg  81 mg Oral DAILY  zinc oxide-cod liver oil (DESITIN) 40 % paste   Topical PRN  
 haloperidol lactate (HALDOL) injection 2 mg  2 mg IntraVENous Q4H PRN Lab Review:  
 
Recent Labs  
  07/03/19 
0351 07/02/19 
0122 07/01/19 
0450 WBC 8.1 5.5 5.3 HGB 7.9* 6.8* 7.6* HCT 27.3* 23.1* 26.1*  
 169 161 Recent Labs  
  07/03/19 
0351 07/02/19 
0500 07/01/19 
7766  138 138  
K 4.6 4.4 4.4 CL 98 99 99 CO2 36* 37* 37* * 266* 195* BUN 51* 48* 48* CREA 2.10* 1.90* 1.82* CA 8.4* 8.2* 8.2* MG 2.0 2.0 1.9 PHOS 3.8 4.1 3.4 ALB 2.5* 2.4*  --   
 
Lab Results Component Value Date/Time Glucose (POC) 301 (H) 07/03/2019 11:27 AM  
 Glucose (POC) 300 (H) 07/03/2019 06:06 AM  
 Glucose (POC) 253 (H) 07/02/2019 09:03 PM  
 Glucose (POC) 224 (H) 07/02/2019 05:19 PM  
 Glucose (POC) 289 (H) 07/02/2019 11:33 AM  
 
 
  
Assessment / Plan:  
 
Principal Problem: 
 
61 yo hx of HTN, DM, CAD, COPD on 2L O2, dCHF, CKD 3, PVD s/p BLE BKA, PUD w/ recent GI bleed, presented w/ resp failure, hypoxia, hypercapnea, severe pulm HTN 
 
1) Acute on chronic respiratory failure with hypoxia and hypercapnia: slow to improve. Due to underlying COPD, cor pulmonale, CHF, super morbid obesity. Poor prognosis. On chronic 2-3L O2 at home. Cont O2, bipap. Pulm and palliative care following 2) Acute on chronic diastolic CHF/cor pulmonale/severe pulm HTN/anasarca: No improvement. Poor prognosis. Would benefit from hospice, but patient/family not ready for this yet. PASP on echo is 75. Cont IV bumex, hydralazine, isordil. Monitor I/O, weight. Cards following 3) COPD: stable. Cont LABA/ICS, nebs prn 
 
4) HTN: cont hydralazine, bumex, isordil 5) DM type 2: A1C 6.5%. Cont glimepiride. Cont SSI 6) PUD/recent GI bleed: cont IV PPI bid 7) Anemia: due to recent GI bleed and Iron def. S/p 1u RBCs, s/p IV iron. Cont to monitor Hgb closely 8) Chronic afib: not on anticoagulation due to GI bleed. HR stable. Not on AV moreno blocker 9) CKD 3: Cr around baseline. Will monitor closely while on IV diuresis 10) Pressure ulcers: stage 2 decub, stage 2 left lateral BKA. POA. Cont wound care 11) UTI: prior urine Cx with citrobacter. Will cont IV aztreonam (due to multiple allergies) 12) Debility: cont PT/OT. Goal is for home with [facility] Total time spent with patient: 35 Minutes Care Plan discussed with: Patient, wife, nursing Discussed:  Care Plan Prophylaxis:  SCD's, frequent turns Disposition:   PT, OT, RN vs long term care 
        
___________________________________________________ Attending Physician: Micki House MD

## 2019-07-03 NOTE — PROGRESS NOTES
0311 [facility] contacted me requesting physician to fill out face to face sheet. Their number is [phone] . They will fax their face to face sheet to the [facility]  for attending to sign.  
 
Evelina Ramon

## 2019-07-03 NOTE — PROGRESS NOTES
Bedside shift change report given to [facility] (oncoming nurse) by Darlin Erickson (offgoing nurse). Report included the following information SBAR, Kardex, Intake/Output, MAR, Recent Results and Cardiac Rhythm NSR, BBB.  
 
1814 Patients wife express concern of patient being sleepy and lethargic and that this might be caused by Gabapentin which does not take at home because it makes him tired. Dr Wilma Javed contacted. New order to discontinue Gabapentin. Bedside shift change report given to Gisella Fournier (oncoming nurse) by [facility] (offgoing nurse). Report included the following information SBAR, Kardex, Intake/Output, MAR, Recent Results and Cardiac Rhythm NSR.

## 2019-07-03 NOTE — PROGRESS NOTES
[facility] [place], Critical Care, and Sleep Medicine Initial Patient Consult Name: Iveth Hill MRN: [id number] : 1960 Hospital: [facility] Date: 7/3/2019 IMPRESSION:  
· Acute on chronic hypoxemic/hypercapneic respiratory failure · Decompensated heart failure · COPD, not in acute exacerbation · Suspected JEROD/OHS · Pulmonary HTN - group 2,3 
· Hypoglycemia · ASPEN vs CKD · H/o CVA · HTN 
· H/o afib · Recent GIB 
· H/o non-compliance RECOMMENDATIONS:  
Supplemental O2 as needed to keep sats > 88% Ideally, should be using NIPPV whenever sleeping; however, he has been unable to tolerate any of the masks Would continue IV diuresis given ongoing O2 requirements, hypercapnia and pitting edema Follow cultures. Continue duonebs, brovana, pulmicort Needs PFTs, sleep study as an outpatient Subjective:  
 
Mr. Ethel Lorenzo is a 64yo male w/ history of COPD, CKD, HTN, afib, PVD s/p bilateral BKA, HLD, CVA, DM and non-compliance who presented to the ER on  w/ complaints of feeling \"sick. \"  He has been lethargic and weak. Also complains of chest pain, shortness of breath, swelling \"everywhere\" and a cough productive of white sputum. Cough sounds very wet, congested. Thinks several family members have recently had the flu. Admitted to the ICU on bipap. ABG in ED reportedly: 7./72. Recent admission to [facility] for GIB. Also says that he went to [facility] twice in the past month or so for swelling but that nothing was done. Says that he was diagnosed w/ COPD 35 years ago. No recent PFTs. Followed by his PCP. Uses combivent at home. Also says that he was diagnosed w/ JEROD in  but never treated. No recent evaluation.  - echo: EF 61-65%, RV not well seen, mild MS, PASP 75 *blood cultures () - 1/4 w/ CoNS --> suspect contaminant *all other cultures NGTD *MRSA screen and RVP negative 24hr events: Non-compliance w/ treatments Intolerant of bipap Still edematous Past Medical History:  
Diagnosis Date  CAD (coronary artery disease)   
 pvc  CKD (chronic kidney disease) stage 3, GFR 30-59 ml/min ([facility]) 6/29/2019  COPD  Diabetes ([address]  GI bleed 6/2019: [place]. Found small bowel ulcer. S/P 3 unit transfusion.  Heart failure ([address]  Hypertension  Mitral regurgitation  Morbidly obese ([address]  PUD (peptic ulcer disease) 6/29/2019 Past Surgical History:  
Procedure Laterality Date  HX ORTHOPAEDIC    
 right leg surgery Prior to Admission medications Medication Sig Start Date End Date Taking? Authorizing Provider  
ferrous gluconate 324 mg (38 mg iron) tablet Take 324 mg by mouth Daily (before breakfast). Yes Provider, Historical  
bumetanide (BUMEX) 2 mg tablet Take 4 mg by mouth two (2) times a day. Yes Provider, Historical  
sevelamer carbonate (RENVELA) 800 mg tab tab Take 1,600 mg by mouth three (3) times daily (with meals). Yes Provider, Historical  
pantoprazole (PROTONIX) 40 mg tablet Take 40 mg by mouth two (2) times a day. Yes Provider, Historical  
calcitRIOL (ROCALTROL) 0.25 mcg capsule Take 0.25 mcg by mouth five (5) days a week. Yes Provider, Historical  
insulin aspart U-100 (NOVOLOG U-100 INSULIN ASPART) 100 unit/mL injection by SubCUTAneous route. Yes Provider, Historical  
glimepiride (AMARYL) 2 mg tablet Take 2 mg by mouth two (2) times a day. Yes Provider, Historical  
aspirin (ASPIRIN) 325 mg tablet Take 325 mg by mouth daily. Yes Provider, Historical  
ipratropium-albuterol (COMBIVENT RESPIMAT)  mcg/actuation inhaler Take 1 Puff by inhalation every six (6) hours as needed for Wheezing. Yes Provider, Historical  
polyethylene glycol (MIRALAX) 17 gram packet Take 17 g by mouth nightly.    Yes Provider, Historical  
docusate sodium (COLACE) 100 mg capsule Take 100 mg by mouth daily as needed for Constipation. Yes Provider, Historical  
 
Allergies Allergen Reactions  Cephalexin Other (comments) Throat starting closing up after 2nd dose  Codeine Anaphylaxis  Insulin Regular Other (comments) Temp blindness and facial droop only with long acting insulin. Has been on short acting insulin without adverse.  Pcn [Penicillins] Anaphylaxis  Victoza [Liraglutide] Other (comments) Fascial droop, temp blindness  Ferrous Sulfate Other (comments) Ferrous sulfate caused significant constipation, able to tolerate ferrous gluconate without problems  Morphine Other (comments)  
  hallucinates  Statins-Hmg-Coa Reductase Inhibitors Hives, Itching and Nausea and Vomiting Social History Tobacco Use  Smoking status: Former Smoker  Smokeless tobacco: Never Used Substance Use Topics  Alcohol use: No  
  
Family History Problem Relation Age of Onset  Hypertension Father Current Facility-Administered Medications Medication Dose Route Frequency  glimepiride (AMARYL) tablet 2 mg  2 mg Oral BID  
 aztreonam (AZACTAM) 2 g in 0.9% sodium chloride (MBP/ADV) 100 mL  2 g IntraVENous Q8H  
 insulin lispro (HUMALOG) injection   SubCUTAneous AC&HS  isosorbide dinitrate (ISORDIL) tablet 20 mg  20 mg Oral TID  hydrALAZINE (APRESOLINE) tablet 10 mg  10 mg Oral TID  albuterol-ipratropium (DUO-NEB) 2.5 MG-0.5 MG/3 ML  3 mL Nebulization Q4HWA RT  
 pantoprazole (PROTONIX) tablet 40 mg  40 mg Oral ACB&D  
 gabapentin (NEURONTIN) capsule 100 mg  100 mg Oral TID  sodium chloride (NS) flush 5-40 mL  5-40 mL IntraVENous Q8H  
 sodium chloride (NS) flush 5-40 mL  5-40 mL IntraVENous Q8H  
 docusate sodium (COLACE) capsule 100 mg  100 mg Oral BID  bumetanide (BUMEX) injection 2 mg  2 mg IntraVENous Q12H  
 arformoterol (BROVANA) neb solution 15 mcg  15 mcg Nebulization BID RT  
  budesonide (PULMICORT) 500 mcg/2 ml nebulizer suspension  500 mcg Nebulization BID RT  
 nystatin (MYCOSTATIN) 100,000 unit/gram powder   Topical BID  calcitRIOL (ROCALTROL) capsule 0.25 mcg  0.25 mcg Oral Once per day on   
 ferrous gluconate 324 mg (38 mg iron) tablet 1 Tab  1 Tab Oral ACB  polyethylene glycol (MIRALAX) packet 17 g  17 g Oral QHS  sevelamer carbonate (RENVELA) tab 1,600 mg  1,600 mg Oral TID WITH MEALS  
 aspirin delayed-release tablet 81 mg  81 mg Oral DAILY Objective: 
Vital Signs:   
Visit Vitals /65 (BP 1 Location: Left arm, BP Patient Position: At rest) Pulse 79 Temp 98.4 °F (36.9 °C) Resp 22 Ht 6' (1.829 m) Wt (!) 181.5 kg (400 lb 1.6 oz) SpO2 93% BMI 54.26 kg/m² O2 Device: Nasal cannula O2 Flow Rate (L/min): 3 l/min Temp (24hrs), Av °F (36.7 °C), Min:97.7 °F (36.5 °C), Max:98.4 °F (36.9 °C) Intake/Output:  
Last shift:       07 -  1900 In: 200 [P.O.:200] Out: - Last 3 shifts:  1901 -  0700 In: 997.9 [P.O.:720] Out: 2250 [Urine:2250] Intake/Output Summary (Last 24 hours) at 7/3/2019 1416 Last data filed at 7/3/2019 2640 Gross per 24 hour Intake 320 ml Output 650 ml Net -330 ml Physical Exam:  
General:  Morbidly obese Head:    
Eyes:    
Nose: Throat:   
Neck:   
Back:     
Lungs:   Diminished breath sounds Chest wall:    
Heart:  Regular rate and rhythm Abdomen:   Soft, non-tender. Bowel sounds normal  
Extremities: Anasarca, bilateral BKA Pulses:   
Skin:   
Lymph nodes:   
Neurologic: Oriented x 3 Data review:  
 
Recent Results (from the past 24 hour(s)) GLUCOSE, POC Collection Time: 19  5:19 PM  
Result Value Ref Range Glucose (POC) 224 (H) 65 - 100 mg/dL Performed by [employer] GLUCOSE, POC Collection Time: 19  9:03 PM  
Result Value Ref Range Glucose (POC) 253 (H) 65 - 100 mg/dL Performed by Guillermo Murphy (PCT) CBC WITH AUTOMATED DIFF Collection Time: 07/03/19  3:51 AM  
Result Value Ref Range WBC 8.1 4.1 - 11.1 K/uL  
 RBC 2.93 (L) 4.10 - 5.70 M/uL HGB 7.9 (L) 12.1 - 17.0 g/dL HCT 27.3 (L) 36.6 - 50.3 % MCV 93.2 80.0 - 99.0 FL  
 MCH 27.0 26.0 - 34.0 PG  
 MCHC 28.9 (L) 30.0 - 36.5 g/dL  
 RDW 15.6 (H) 11.5 - 14.5 % PLATELET 131 724 - 781 K/uL MPV 9.5 8.9 - 12.9 FL  
 NRBC 0.0 0  WBC ABSOLUTE NRBC 0.00 0.00 - 0.01 K/uL NEUTROPHILS 76 (H) 32 - 75 % LYMPHOCYTES 9 (L) 12 - 49 % MONOCYTES 9 5 - 13 % EOSINOPHILS 4 0 - 7 % BASOPHILS 1 0 - 1 % IMMATURE GRANULOCYTES 1 (H) 0.0 - 0.5 % ABS. NEUTROPHILS 6.2 1.8 - 8.0 K/UL  
 ABS. LYMPHOCYTES 0.7 (L) 0.8 - 3.5 K/UL  
 ABS. MONOCYTES 0.7 0.0 - 1.0 K/UL  
 ABS. EOSINOPHILS 0.3 0.0 - 0.4 K/UL  
 ABS. BASOPHILS 0.1 0.0 - 0.1 K/UL  
 ABS. IMM. GRANS. 0.1 (H) 0.00 - 0.04 K/UL  
 DF SMEAR SCANNED    
 RBC COMMENTS ANISOCYTOSIS 1+ 
    
 RBC COMMENTS POLYCHROMASIA PRESENT 
    
 RBC COMMENTS HYPOCHROMIA PRESENT 
    
 RBC COMMENTS BASOPHILIC STIPPLING 
PRESENT 
    
RENAL FUNCTION PANEL Collection Time: 07/03/19  3:51 AM  
Result Value Ref Range Sodium 138 136 - 145 mmol/L Potassium 4.6 3.5 - 5.1 mmol/L Chloride 98 97 - 108 mmol/L  
 CO2 36 (H) 21 - 32 mmol/L Anion gap 4 (L) 5 - 15 mmol/L Glucose 289 (H) 65 - 100 mg/dL BUN 51 (H) 6 - 20 MG/DL Creatinine 2.10 (H) 0.70 - 1.30 MG/DL  
 BUN/Creatinine ratio 24 (H) 12 - 20 GFR est AA 39 (L) >60 ml/min/1.73m2 GFR est non-AA 32 (L) >60 ml/min/1.73m2 Calcium 8.4 (L) 8.5 - 10.1 MG/DL Phosphorus 3.8 2.6 - 4.7 MG/DL Albumin 2.5 (L) 3.5 - 5.0 g/dL MAGNESIUM Collection Time: 07/03/19  3:51 AM  
Result Value Ref Range Magnesium 2.0 1.6 - 2.4 mg/dL GLUCOSE, POC Collection Time: 07/03/19  6:06 AM  
Result Value Ref Range Glucose (POC) 300 (H) 65 - 100 mg/dL Performed by Guillermo Murphy (PCT) GLUCOSE, POC Collection Time: 07/03/19 11:27 AM  
Result Value Ref Range Glucose (POC) 301 (H) 65 - 100 mg/dL Performed by [employer] (PCT) Imaging: 
I have personally reviewed the patients radiographs and have reviewed the reports: 
  
 
  
Jann Monge MD 
 
[facility] [address] [place] [address]

## 2019-07-03 NOTE — NURSE NAVIGATOR
Met with patient and wife at bedside. Introduced self and role of HF NN. Assessed current understanding of HF disease process. Patient's speech difficult to understand at times due to poor dentition. Wife notes he was having fluid retention leading up to admission and is still retaining fluid. Reviewed diastolic heart failure and pulmonary HTN disease process as well as symptoms of HF. Patient is unable to weigh self due to bilateral BKA's. He notes increased shortness of breath and swelling of abdomen and upper legs as his symptoms of HF. Patient's wife manages his care and was more engaged in discussion of HF self care measures. Discussed role of treating sleep apnea for improvement in heart function and fluid retention. Patient states he has tried every mask and he thinks someone is \"making a lot of money\" from the various masks. Discussed the relationship of high sodium food to fluid retention and symptoms. Patient had a ham and cheese sandwich on lunch tray. Discussed high sodium content of that food choice and the need for a low sodium diet. Later, discussed with nutrition who added sodium restriction to diet. Reviewed hidden sources of sodium with wife as well as how to read a label for sodium. She notes she is mindful of sodium at home. Wife was given Living with HF Education Book, HF magnet, and HF calendar.

## 2019-07-03 NOTE — PROGRESS NOTES
Bedside, Verbal and  shift change report given to [facility] (oncoming nurse) by Haydee Coleman (offgoing nurse). Report included the following information SBAR, Kardex, Intake/Output, MAR, Recent Results, Med Rec Status and Cardiac Rhythm Sinus Rhythm.

## 2019-07-04 NOTE — PROGRESS NOTES
[facility] [place] Pulmonary, Critical Care, and Sleep Medicine Name: Marifer Hunter MRN: [id number] : 1960 Hospital: [address] [facility] Date: 2019 IMPRESSION:  
· Acute on chronic hypoxemic/hypercapneic respiratory failure · Decompensated heart failure · COPD, not in acute exacerbation · Suspected JEROD/OHS · Pulmonary HTN - group 2,3 
· Hypoglycemia · ASPEN vs CKD · H/o CVA · HTN 
· H/o afib · Recent GIB 
· H/o non-compliance RECOMMENDATIONS:  
Place back on NIPPV. Repeat ABG in 2hrs 
titrate FiO2 as needed to keep sats > 88% Would continue IV diuresis given ongoing O2 requirements, hypercapnia and pitting edema. Zaroxolyn added by renal 
Follow cultures. Continue duonebs, brovana, pulmicort Needs PFTs, sleep study as an outpatient Palliative following Not sure what is left to offer if he continues to refuse bipap. Would otherwise need to consider intubation/trach or hospice (which seems most appropriate). Pt is critically ill and at high risk for decompensation CCT: 39 minutes Subjective:  
 
Mr. Cindy Chong is a 62yo male w/ history of COPD, CKD, HTN, afib, PVD s/p bilateral BKA, HLD, CVA, DM and non-compliance who presented to the ER on  w/ complaints of feeling \"sick. \"  He has been lethargic and weak. Also complains of chest pain, shortness of breath, swelling \"everywhere\" and a cough productive of white sputum. Cough sounds very wet, congested. Thinks several family members have recently had the flu. Admitted to the ICU on bipap. ABG in ED reportedly: . Recent admission to [facility] for GIB. Also says that he went to [facility] twice in the past month or so for swelling but that nothing was done. Says that he was diagnosed w/ COPD 35 years ago. No recent PFTs. Followed by his PCP. Uses combivent at home. Also says that he was diagnosed w/ JEROD in  but never treated. No recent evaluation. 6/29 - echo: EF 61-65%, RV not well seen, mild MS, PASP 75 *blood cultures (6/28) - 1/4 w/ CoNS --> suspect contaminant *all other cultures NGTD *MRSA screen and RVP negative 24hr events: More lethargic and has been continuing to refuse bipap ABG checked: 7.23/88/105 Past Medical History:  
Diagnosis Date  CAD (coronary artery disease)   
 pvc  CKD (chronic kidney disease) stage 3, GFR 30-59 ml/min ([facility]) 6/29/2019  COPD  Diabetes ([address]  GI bleed 6/2019: [place]. Found small bowel ulcer. S/P 3 unit transfusion.  Heart failure ([address]  Hypertension  Mitral regurgitation  Morbidly obese ([address]  PUD (peptic ulcer disease) 6/29/2019 Past Surgical History:  
Procedure Laterality Date  HX ORTHOPAEDIC    
 right leg surgery Prior to Admission medications Medication Sig Start Date End Date Taking? Authorizing Provider  
ferrous gluconate 324 mg (38 mg iron) tablet Take 324 mg by mouth Daily (before breakfast). Yes Provider, Historical  
bumetanide (BUMEX) 2 mg tablet Take 4 mg by mouth two (2) times a day. Yes Provider, Historical  
sevelamer carbonate (RENVELA) 800 mg tab tab Take 1,600 mg by mouth three (3) times daily (with meals). Yes Provider, Historical  
pantoprazole (PROTONIX) 40 mg tablet Take 40 mg by mouth two (2) times a day. Yes Provider, Historical  
calcitRIOL (ROCALTROL) 0.25 mcg capsule Take 0.25 mcg by mouth five (5) days a week. Yes Provider, Historical  
insulin aspart U-100 (NOVOLOG U-100 INSULIN ASPART) 100 unit/mL injection by SubCUTAneous route. Yes Provider, Historical  
glimepiride (AMARYL) 2 mg tablet Take 2 mg by mouth two (2) times a day. Yes Provider, Historical  
aspirin (ASPIRIN) 325 mg tablet Take 325 mg by mouth daily. Yes Provider, Historical  
ipratropium-albuterol (COMBIVENT RESPIMAT)  mcg/actuation inhaler Take 1 Puff by inhalation every six (6) hours as needed for Wheezing. Yes Provider, Historical  
polyethylene glycol (MIRALAX) 17 gram packet Take 17 g by mouth nightly. Yes Provider, Historical  
docusate sodium (COLACE) 100 mg capsule Take 100 mg by mouth daily as needed for Constipation. Yes Provider, Historical  
 
Allergies Allergen Reactions  Cephalexin Other (comments) Throat starting closing up after 2nd dose  Codeine Anaphylaxis  Insulin Regular Other (comments) Temp blindness and facial droop only with long acting insulin. Has been on short acting insulin without adverse.  Pcn [Penicillins] Anaphylaxis  Victoza [Liraglutide] Other (comments) Fascial droop, temp blindness  Ferrous Sulfate Other (comments) Ferrous sulfate caused significant constipation, able to tolerate ferrous gluconate without problems  Morphine Other (comments)  
  hallucinates  Statins-Hmg-Coa Reductase Inhibitors Hives, Itching and Nausea and Vomiting Social History Tobacco Use  Smoking status: Former Smoker  Smokeless tobacco: Never Used Substance Use Topics  Alcohol use: No  
  
Family History Problem Relation Age of Onset  Hypertension Father Current Facility-Administered Medications Medication Dose Route Frequency  metOLazone (ZAROXOLYN) tablet 5 mg  5 mg Oral BID  calcitRIOL (ROCALTROL) capsule 0.25 mcg  0.25 mcg Oral Once per day on Mon Tue Wed Thu Fri  
 glimepiride (AMARYL) tablet 2 mg  2 mg Oral BID  
 aztreonam (AZACTAM) 2 g in 0.9% sodium chloride (MBP/ADV) 100 mL  2 g IntraVENous Q8H  
 insulin lispro (HUMALOG) injection   SubCUTAneous AC&HS  isosorbide dinitrate (ISORDIL) tablet 20 mg  20 mg Oral TID  hydrALAZINE (APRESOLINE) tablet 10 mg  10 mg Oral TID  albuterol-ipratropium (DUO-NEB) 2.5 MG-0.5 MG/3 ML  3 mL Nebulization Q4HWA RT  
 pantoprazole (PROTONIX) tablet 40 mg  40 mg Oral ACB&D  
 sodium chloride (NS) flush 5-40 mL  5-40 mL IntraVENous Q8H  
  sodium chloride (NS) flush 5-40 mL  5-40 mL IntraVENous Q8H  
 docusate sodium (COLACE) capsule 100 mg  100 mg Oral BID  bumetanide (BUMEX) injection 2 mg  2 mg IntraVENous Q12H  
 arformoterol (BROVANA) neb solution 15 mcg  15 mcg Nebulization BID RT  
 budesonide (PULMICORT) 500 mcg/2 ml nebulizer suspension  500 mcg Nebulization BID RT  
 nystatin (MYCOSTATIN) 100,000 unit/gram powder   Topical BID  ferrous gluconate 324 mg (38 mg iron) tablet 1 Tab  1 Tab Oral ACB  polyethylene glycol (MIRALAX) packet 17 g  17 g Oral QHS  sevelamer carbonate (RENVELA) tab 1,600 mg  1,600 mg Oral TID WITH MEALS  
 aspirin delayed-release tablet 81 mg  81 mg Oral DAILY Objective:  
Vital Signs:   
Visit Vitals /61 (BP 1 Location: Left arm, BP Patient Position: At rest) Pulse 83 Temp 98.1 °F (36.7 °C) Resp 20 Ht 6' (1.829 m) Wt (!) 186.6 kg (411 lb 6.4 oz) SpO2 92% BMI 55.80 kg/m² O2 Device: BIPAP  
O2 Flow Rate (L/min): 4 l/min Temp (24hrs), Av.1 °F (36.7 °C), Min:97.4 °F (36.3 °C), Max:98.6 °F (37 °C) Intake/Output:  
Last shift:      No intake/output data recorded. Last 3 shifts:  1901 -  0700 In: 56 [P.O.:520; I.V.:500] Out: 1400 [POSOF:5208] Intake/Output Summary (Last 24 hours) at 2019 1243 Last data filed at 7/3/2019 2320 Gross per 24 hour Intake 700 ml Output 200 ml Net 500 ml Physical Exam:  
General:  Morbidly obese, lethargic Head:    
Eyes:    
Nose: Throat:   
Neck:   
Back:     
Lungs:   Diminished breath sounds Chest wall:    
Heart:  Regular rate and rhythm Abdomen:   Soft, non-tender. Bowel sounds normal  
Extremities: Anasarca, bilateral BKA Pulses:   
Skin:   
Lymph nodes:   
Neurologic: RASS -2 Data review:  
 
Recent Results (from the past 24 hour(s)) GLUCOSE, POC Collection Time: 19  4:08 PM  
Result Value Ref Range Glucose (POC) 245 (H) 65 - 100 mg/dL Performed by Edita Chaudhari (PCT) GLUCOSE, POC Collection Time: 07/03/19  8:50 PM  
Result Value Ref Range Glucose (POC) 241 (H) 65 - 100 mg/dL Performed by [employer] CBC WITH AUTOMATED DIFF Collection Time: 07/04/19  4:52 AM  
Result Value Ref Range WBC 7.5 4.1 - 11.1 K/uL  
 RBC 2.80 (L) 4.10 - 5.70 M/uL HGB 7.7 (L) 12.1 - 17.0 g/dL HCT 26.8 (L) 36.6 - 50.3 % MCV 95.7 80.0 - 99.0 FL  
 MCH 27.5 26.0 - 34.0 PG  
 MCHC 28.7 (L) 30.0 - 36.5 g/dL  
 RDW 15.5 (H) 11.5 - 14.5 % PLATELET 110 956 - 515 K/uL MPV 10.1 8.9 - 12.9 FL  
 NRBC 0.0 0  WBC ABSOLUTE NRBC 0.00 0.00 - 0.01 K/uL NEUTROPHILS 75 32 - 75 % LYMPHOCYTES 10 (L) 12 - 49 % MONOCYTES 8 5 - 13 % EOSINOPHILS 5 0 - 7 % BASOPHILS 1 0 - 1 % IMMATURE GRANULOCYTES 1 (H) 0.0 - 0.5 % ABS. NEUTROPHILS 5.5 1.8 - 8.0 K/UL  
 ABS. LYMPHOCYTES 0.8 0.8 - 3.5 K/UL  
 ABS. MONOCYTES 0.6 0.0 - 1.0 K/UL  
 ABS. EOSINOPHILS 0.4 0.0 - 0.4 K/UL  
 ABS. BASOPHILS 0.1 0.0 - 0.1 K/UL  
 ABS. IMM. GRANS. 0.1 (H) 0.00 - 0.04 K/UL  
 DF SMEAR SCANNED    
 RBC COMMENTS BASOPHILIC STIPPLING 
ANISOCYTOSIS 1+ RENAL FUNCTION PANEL Collection Time: 07/04/19  4:52 AM  
Result Value Ref Range Sodium 135 (L) 136 - 145 mmol/L Potassium 4.8 3.5 - 5.1 mmol/L Chloride 97 97 - 108 mmol/L  
 CO2 34 (H) 21 - 32 mmol/L Anion gap 4 (L) 5 - 15 mmol/L Glucose 204 (H) 65 - 100 mg/dL BUN 60 (H) 6 - 20 MG/DL Creatinine 2.45 (H) 0.70 - 1.30 MG/DL  
 BUN/Creatinine ratio 24 (H) 12 - 20 GFR est AA 33 (L) >60 ml/min/1.73m2 GFR est non-AA 27 (L) >60 ml/min/1.73m2 Calcium 8.3 (L) 8.5 - 10.1 MG/DL Phosphorus 4.5 2.6 - 4.7 MG/DL Albumin 2.3 (L) 3.5 - 5.0 g/dL MAGNESIUM Collection Time: 07/04/19  4:52 AM  
Result Value Ref Range Magnesium 2.1 1.6 - 2.4 mg/dL GLUCOSE, POC Collection Time: 07/04/19  6:02 AM  
Result Value Ref Range Glucose (POC) 209 (H) 65 - 100 mg/dL Performed by [employer] BLOOD GAS, ARTERIAL Collection Time: 07/04/19 12:00 PM  
Result Value Ref Range pH 7.23 (LL) 7.35 - 7.45    
 PCO2 88 (H) 35.0 - 45.0 mmHg PO2 105 (H) 80 - 100 mmHg O2 SAT 97 92 - 97 % BICARBONATE 36 (H) 22 - 26 mmol/L  
 BASE EXCESS 4.8 mmol/L  
 O2 METHOD NASAL O2    
 O2 FLOW RATE 4.00 L/min Sample source ARTERIAL    
 SITE LEFT RADIAL SYEDA'S TEST YES Critical value read back LUCIANO Davies MD   
GLUCOSE, POC Collection Time: 07/04/19 12:08 PM  
Result Value Ref Range Glucose (POC) 208 (H) 65 - 100 mg/dL Performed by Demetrius Pham   
 
 
Imaging: 
I have personally reviewed the patients radiographs and have reviewed the reports: 
  
 
  
Syed Bonds MD 
 
[facility] [place]

## 2019-07-04 NOTE — PROGRESS NOTES
Bedside shift change report given to [facility] (oncoming nurse) by Shabana Sigala (offgoing nurse). Report included the following information SBAR, Kardex, Intake/Output, MAR, Recent Results and Cardiac Rhythm Sinus rhytm with BBB and PVC's. Bedside shift change report given to [facility] (oncoming nurse) by [facility] (offgoing nurse). Report included the following information SBAR, Kardex, Intake/Output, MAR, Recent Results, Med Rec Status and Cardiac Rhythm Irregular.

## 2019-07-04 NOTE — PROGRESS NOTES
Bedside and Verbal shift change report given to Rudolph Johnson RN (oncoming nurse) by Roseanne Antonio RN (offgoing nurse). Report included the following information SBAR, Kardex, Procedure Summary, Intake/Output, MAR, Recent Results, Med Rec Status and Cardiac Rhythm NSR. Bedside and Verbal shift change report given to Roseanne Antonio RN (oncoming nurse) by Rudolph Johnson RN (offgoing nurse). Report included the following information SBAR, Kardex, Intake/Output, MAR, Recent Results, Med Rec Status and Cardiac Rhythm NSR.

## 2019-07-04 NOTE — CONSULTS
[address] [place]  Name:  Nestor Pizano 
MR#:  [id number] :  1960 ACCOUNT #:  [de-identified] DATE OF SERVICE:  2019 REQUESTING PHYSICIAN:  Dr. Amador Rater:  Elevated creatinine. HISTORY OF PRESENT ILLNESS:  The patient is a 14-year-old white male with morbid obesity, COPD, and likely obstructive sleep apnea. I saw him last year for acute renal failure which was felt to be due to volume depletion and improved with some intravenous fluids. His serum creatinine had improved to less than 2 mg/dL at the time of discharge. He did not follow up with us. He was recently hospitalized in [place] and seen by a nephrologist there. He was admitted to this hospital on  with anasarca and his creatinine at that time was 1.99. He has been treated with Bumex. It is not clear what his fluid balance is as he is probably partially incontinent due to severe scrotal edema, but suffice it to say, he likely has not had a brisk diuresis and remains severely edematous. Serum creatinine has trended up, however, to 2.45 today. The patient has been unable to tolerate noninvasive positive pressure ventilation. He has met with Palliative Care and remains full code and is not interested in hospice. An echo shows severe pulmonary artery hypertension with normal left ventricular ejection fraction. REVIEW OF SYSTEMS: 
CONSTITUTIONAL:  No fevers or chills. CARDIOVASCULAR:  Positive for shortness of breath although stable and at baseline according to the patient. He feels his legs are less swollen. He has severe scrotal edema. GASTROINTESTINAL:  No vomiting or diarrhea. All other systems are reviewed and are negative except as per history of present illness.  
 
PAST MEDICAL HISTORY:  Chronic kidney disease stage III presumably, morbid obesity, COPD, likely obstructive sleep apnea/obesity hypoventilation syndrome, type 2 diabetes mellitus, atrial fibrillation, peptic ulcer disease, anemia, coronary artery disease, hypertension. FAMILY HISTORY:  No significant family history of renal disease. SOCIAL HISTORY:  He is a former smoker. PHYSICAL EXAMINATION: 
VITAL SIGNS:  Temperature 97.4, pulse 85, blood pressure 132/58. GENERAL:  A morbidly obese white male. HEENT:  Eyes are anicteric. Extraocular movements intact. ENMT:  Mucous membranes are dry. There is no epistaxis. NECK:  Nontender. There is no visible JVD. HEART:  Irregular. There is pitting edema of the lower extremities. RESPIRATORY:  He is on oxygen. There are diminished breath sounds. He is in no respiratory distress. GASTROINTESTINAL:  Abdomen is obese and nontender. Bowel sounds are active. There is no guarding or rebound. GENITOURINARY:  There is severe scrotal edema. There is no CVA tenderness. MUSCULOSKELETAL:  There is no cyanosis or clubbing. There is no synovitis in fingers or wrists bilaterally. There are bilateral BKAs. LABORATORY DATA:  Sodium 135, potassium 4.8, chloride 97, bicarb 34, BUN 60, creatinine 2.45. RADIOGRAPHIC STUDIES:  Chest x-ray from 07/02 shows bilateral pleural effusions and interstitial markings. ASSESSMENT:  This is a difficult situation. This gentleman has renal dysfunction which appears to have worsened somewhat while on diuretics. He has massive peripheral particularly scrotal edema, probably due to his severe pulmonary hypertension. He is intolerant of positive pressure ventilation. There do not appear to be any simple and good options here. I favor trying to intensify his diuretic regimen but the creatinine is already rising and this is a strategy that may have to be quickly abandoned if this trend continues.   Renal replacement therapy is likely to be associated with very poor prognosis in this gentleman and I would not recommend initiating renal replacement therapy for management of this problem. I have suggested to him that if he could revisit the positive pressure ventilation and if he was able to tolerate the BiPAP that perhaps pulmonary hypertension would improve somewhat and allow more diuresis. I am not sure how likely any of that is to happen, but it seems to be one of our few options so perhaps he will consider it. I also agree with ongoing discussion about goals of care. If we are unable to manage his severe volume overload with diuretics, we are left with some very difficult choices and, as I have said, I am not convinced that renal replacement therapy would be superior to anything we are currently doing and might be associated with new complications and difficulties. We will follow closely with you to assist in his management. I have also ordered a bladder scan just to ensure that he is not having any urinary retention related to his scrotal edema that might account for the rising creatinine. MD EMMA [email or web address] 
D:  07/04/2019 9:00 
T:  07/04/2019 9:09 
JOB #:  [id number]

## 2019-07-04 NOTE — PROGRESS NOTES
[facility] 
[address] 
[phone] Medical Progress Note NAME: Neil Ta :  1960 MRM:  [id number] Date/Time: 2019 Subjective: Chief Complaint:  Patient was seen and examined by me. Chart reviewed. Dyspneic, drowsy. Agreeable to bipap. Spoke to wife at bedside Objective:  
 
 
Vitals:  
 
 
Last 24hrs VS reviewed since prior progress note. Most recent are: 
 
Visit Vitals /58 (BP 1 Location: Right arm, BP Patient Position: At rest) Pulse 85 Temp 97.4 °F (36.3 °C) Resp 20 Ht 6' (1.829 m) Wt (!) 186.6 kg (411 lb 6.4 oz) SpO2 99% BMI 55.80 kg/m² SpO2 Readings from Last 6 Encounters:  
19 99% 18 97% 17 93% 17 94% 17 96% 16 98% O2 Flow Rate (L/min): 4 l/min Intake/Output Summary (Last 24 hours) at 2019 4626 Last data filed at 7/3/2019 2320 Gross per 24 hour Intake 700 ml Output 750 ml Net -50 ml Exam:  
 
Physical Exam: 
 
Gen:  Disheveled, ill-appearing, super morbid obesity, drowsy HEENT:  Pink conjunctivae, PERRL, hearing intact to voice, moist mucous membranes Neck:  Supple, without masses, thyroid non-tender Resp:  No accessory muscle use, decreased BS at bases, scattered crackles Card:  No murmurs, normal S1, S2 without thrills, 3+ edema, anasarca, scrotal edema Abd:  Soft, non-tender, non-distended, normoactive bowel sounds are present Musc:  No cyanosis or clubbing, bilateral LE BKA Skin:  Left lateral stage 2 ulcer on BKA, stage 2 decub ulcer (POA) Neuro:  Cranial nerves 3-12 are grossly intact, follows commands appropriately Psych:  poor insight, oriented to person, place Medications Reviewed: (see below) Lab Data Reviewed: (see below) 
 
______________________________________________________________________ Medications:  
 
Current Facility-Administered Medications Medication Dose Route Frequency  metOLazone (ZAROXOLYN) tablet 5 mg  5 mg Oral BID  calcitRIOL (ROCALTROL) capsule 0.25 mcg  0.25 mcg Oral Once per day on Mon Tue Wed Thu Fri  
 0.9% sodium chloride infusion 250 mL  250 mL IntraVENous PRN  
 glimepiride (AMARYL) tablet 2 mg  2 mg Oral BID  dextrose 10% infusion 125-250 mL  125-250 mL IntraVENous PRN  
 aztreonam (AZACTAM) 2 g in 0.9% sodium chloride (MBP/ADV) 100 mL  2 g IntraVENous Q8H  
 insulin lispro (HUMALOG) injection   SubCUTAneous AC&HS  isosorbide dinitrate (ISORDIL) tablet 20 mg  20 mg Oral TID  hydrALAZINE (APRESOLINE) tablet 10 mg  10 mg Oral TID  albuterol-ipratropium (DUO-NEB) 2.5 MG-0.5 MG/3 ML  3 mL Nebulization Q4HWA RT  
 pantoprazole (PROTONIX) tablet 40 mg  40 mg Oral ACB&D  
 sodium chloride (NS) flush 5-40 mL  5-40 mL IntraVENous Q8H  
 sodium chloride (NS) flush 5-40 mL  5-40 mL IntraVENous PRN  
 sodium chloride (NS) flush 5-40 mL  5-40 mL IntraVENous Q8H  
 sodium chloride (NS) flush 5-40 mL  5-40 mL IntraVENous PRN  
 acetaminophen (TYLENOL) tablet 650 mg  650 mg Oral Q4H PRN  
 ondansetron (ZOFRAN) injection 4 mg  4 mg IntraVENous Q4H PRN  
 docusate sodium (COLACE) capsule 100 mg  100 mg Oral BID  bumetanide (BUMEX) injection 2 mg  2 mg IntraVENous Q12H  
 albuterol (PROVENTIL VENTOLIN) nebulizer solution 2.5 mg  2.5 mg Nebulization Q2H PRN  
 arformoterol (BROVANA) neb solution 15 mcg  15 mcg Nebulization BID RT  
 budesonide (PULMICORT) 500 mcg/2 ml nebulizer suspension  500 mcg Nebulization BID RT  
 nystatin (MYCOSTATIN) 100,000 unit/gram powder   Topical BID  
 glucose chewable tablet 16 g  4 Tab Oral PRN  
 glucagon (GLUCAGEN) injection 1 mg  1 mg IntraMUSCular PRN  
 ferrous gluconate 324 mg (38 mg iron) tablet 1 Tab  1 Tab Oral ACB  polyethylene glycol (MIRALAX) packet 17 g  17 g Oral QHS  sevelamer carbonate (RENVELA) tab 1,600 mg  1,600 mg Oral TID WITH MEALS  
  aspirin delayed-release tablet 81 mg  81 mg Oral DAILY  zinc oxide-cod liver oil (DESITIN) 40 % paste   Topical PRN  
 haloperidol lactate (HALDOL) injection 2 mg  2 mg IntraVENous Q4H PRN Lab Review:  
 
Recent Labs  
  07/04/19 
0452 07/03/19 
0351 07/02/19 
0122 WBC 7.5 8.1 5.5 HGB 7.7* 7.9* 6.8* HCT 26.8* 27.3* 23.1*  
 155 169 Recent Labs  
  07/04/19 
0452 07/03/19 
0351 07/02/19 
0500 * 138 138  
K 4.8 4.6 4.4  
CL 97 98 99 CO2 34* 36* 37* * 289* 266* BUN 60* 51* 48* CREA 2.45* 2.10* 1.90* CA 8.3* 8.4* 8.2* MG 2.1 2.0 2.0 PHOS 4.5 3.8 4.1 ALB 2.3* 2.5* 2.4* Lab Results Component Value Date/Time Glucose (POC) 209 (H) 07/04/2019 06:02 AM  
 Glucose (POC) 241 (H) 07/03/2019 08:50 PM  
 Glucose (POC) 245 (H) 07/03/2019 04:08 PM  
 Glucose (POC) 301 (H) 07/03/2019 11:27 AM  
 Glucose (POC) 300 (H) 07/03/2019 06:06 AM  
 
 
  
Assessment / Plan:  
 
Principal Problem: 
 
61 yo hx of HTN, DM, CAD, COPD on 2L O2, dCHF, CKD 3, PVD s/p BLE BKA, PUD w/ recent GI bleed, presented w/ resp failure, hypoxia, hypercapnea, severe pulm HTN 
 
1) Acute on chronic respiratory failure with hypoxia and hypercapnia: minimal improvement. Due to underlying COPD, cor pulmonale, CHF, super morbid obesity. Poor prognosis. On chronic 2-3L O2 at home. Non-compliance on bipap. Cont O2, encourage bipap use. Pulm and palliative care following 2) Acute on chronic diastolic CHF/cor pulmonale/severe pulm HTN/anasarca: No improvement. Poor prognosis. Would benefit from hospice, but patient/family not ready for this yet. PASP on echo is 75. Cont IV bumex, hydralazine, isordil. Consider adding metolazone. Monitor I/O, weight. Cards following 3) ASPEN/CKD 3: Cr worsening. Poor candidate for dialysis. Will consult Renal.  Will monitor closely while on IV diuresis 4) COPD: stable. Cont LABA/ICS, nebs prn 
 
5) HTN: cont hydralazine, bumex, isordil 6) DM type 2: A1C 6.5%. Cont glimepiride. Cont SSI 7) PUD/recent GI bleed: cont IV PPI bid 8) Anemia: due to recent GI bleed and Iron def. S/p 1u RBCs, s/p IV iron. Cont to monitor Hgb closely 9) Chronic afib: not on anticoagulation due to GI bleed. HR stable. Not on AV moreno blocker 10) Pressure ulcers: stage 2 decub, stage 2 left lateral BKA. POA. Cont wound care 11) UTI: prior urine Cx w/ proteus, klebsiella. Will cont IV aztreonam (due to multiple allergies) 12) Debility: cont PT/OT. Needs hospice. Will continue family discussion Total time spent with patient: 40 Minutes Care Plan discussed with: Patient, wife, nursing Discussed:  Care Plan Prophylaxis:  SCD's, frequent turns Disposition:   PT, OT, RN vs long term care vs hospice 
        
___________________________________________________ Attending Physician: Pallavi Bustillos MD

## 2019-07-04 NOTE — CONSULTS
Consult dictated CKD 3 Severe PHTN Anasarca Scrotal edema COPD with likely OHS/JEROD Intolerance of NIPPV Worsening renal function with diuresis Options very limited Peripheral and especially scrotal edema is severe but he is likely to have persistent azotemia with diuresis and he is a very poor candidate for dialysis The patient and family are not interested in hospice at this point, but that may be the best of poor options In the meantime, I suggested he try the Bipap again, as some reduction in PHTN, if it could be achieved, seems the most likely path to improved volume status and diuresis. We can try intensifying diuretics but if Cr continues to worsen, would back off. I would advise against pursuing dialysis as a treatment of this disorder.

## 2019-07-04 NOTE — PROGRESS NOTES
Palliative Medicine Consult [place]: [phone] ([phone]) Patient Name: Chencho Rogers YOB: 1960 Date of Initial Consult: 07/01/2019 Reason for Consult: Care decisions Requesting Provider: Kwadwo Rosario MD  
Primary Care Physician: Amisha Castanon MD 
 
 SUMMARY:  
Chencho Rogers is a 60 y.o. with a past history of bilat BKA, CAD, CVA, COPD, DM, CHF, HTN, mitral regurgitation, CKD, afib, and morbid obesity, who was admitted on 6/28/2019 from home with a diagnosis of acute on chronic respiratory failure with hypoxia and hypercapnia. Patient presented tot he ED with complaints of generalized weakness, hypoglycemia, and chest pain. ED eval revealed hypoxia and hypercapnia (ABG 7.27/76/72/34), chest xray with pulmonary edema pattern with probable bilateral pleural effusion, and elevated proBNP. Patient admitted and started on bipap and IV diuresis. Cardiology consulted and agree with aggressive diuretic therapy and repeat echo, which showed EF 61-65% with no WMA. Also, with afib and hx of GI bleed, would hold on staring anticoagulation at this time. Current medical issues leading to Palliative Medicine involvement include: Care decisions due to progressive decline, severe debility, multiple co morbidities. SH:  x 39 years. Has daughter (who is a nurse) and a son who both live near by. 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 PALLIATIVE DIAGNOSES:  
1. Generalized weakness 2. Physical debility 3. Goals of care 4. DNR discussion 5. ACP 6. Acute on chronic respiratory failure with hypoxia and hypercapnia PLAN:  
1. Met with patient and wife James Schuler. Chart reviewed and unfortunately, patient with further decline->worsening renal function, ongoing anasarca, and intolerant to BIPAP. He is agreeable to attempt BIPAP today but really not tolerated well.  Reviewed the discussions they had with the prior providers and they clearly understand that his renal function is worsening and not a dialysis candidate. Patient at one point ask \"am I dying\". I explained that he is very sick and we have very limited other options for treatment. His wife told me quietly, \"I knew it was coming to this\". For now, he wants to attempt the BIPAP which is being placed while I am in the room. 2. Goals of care--> his wife seems to understand that really limited other treatments and we discussed that it may be time to consider focusing on his comfort rather than more testing, etc. She seems open to this consideration but patient continues to say, \"let's see how this works(BIPAP)\". They are going to discuss further and explained if they have any change in goals, can let the bedside nurse know and they can call us. 3. DNR discussion--> addressed code status again and really explained in detail the limitations in resuscitation with his advanced medical issues. Also discussed if he changed to DNAR, that does not mean any of current treatments would stop. His wife clearly seems ready to change to DNR but patient wants to talk further. 4. ACP--> Patient and wife report that patient has completed AMD that appoints wife, Daniele Goss as primary agent for healthcare decisions, and daughter Atiya Walters as secondary agent for healthcare decisions. Requested copy for EMR. ACP updated to reflect this. 5. Discussed with bedside nurse 6. Initial consult note routed to primary continuity provider and/or primary health care team members 7. Communicated plan of care with: Palliative Amisha [id number] Team 
 
 GOALS OF CARE / TREATMENT PREFERENCES:  
 
GOALS OF CARE: 
Patient/Health Care Proxy Stated Goals: (Full restorative measures in an effort to recover) TREATMENT PREFERENCES:  
Code Status: Full Code Advance Care Planning: 
[x] The [facility] Interdisciplinary Team has updated the ACP Navigator with Health Care Decision Maker and Patient Capacity Advance Care Planning 7/1/2019 Patient's Healthcare Decision Maker is: Verbal statement (Legal Next of Kin remains as decision maker) Primary Decision Maker Name -  
Primary Decision Maker Phone Number -  
Confirm Advance Directive Yes, not on file Patient Would Like to Complete Advance Directive - Medical Interventions: Full interventions Other Instructions: Other: As far as possible, the palliative care team has discussed with patient / health care proxy about goals of care / treatment preferences for patient. HISTORY:  
 
History obtained from: patient, chart, wife CHIEF COMPLAINT: generalized weakness, low BS, and chest pain HPI/SUBJECTIVE: The patient is:  
[x] Verbal and participatory [] Non-participatory due to:  
Patient presented tot he ED with complaints of generalized weakness, hypoglycemia, and chest pain. ED eval revealed hypoxia and hypercapnia (ABG 7.27/76/72/34), chest xray with pulmonary edema pattern with probable bilateral pleural effusion, and elevated proBNP. Patient admitted and started on bipap and IV diuresis. Cardiology consulted and agree with aggressive diuretic therapy and repeat echo, which showed EF 61-65% with no WMA. Also, with afib and hx of GI bleed, would hold on staring anticoagulation at this time. Patient currently without complaints. 7/4-tired appearing, no pain. Clinical Pain Assessment (nonverbal scale for severity on nonverbal patients):  
Clinical Pain Assessment Severity: 0 Duration: for how long has pt been experiencing pain (e.g., 2 days, 1 month, years) Frequency: how often pain is an issue (e.g., several times per day, once every few days, constant) FUNCTIONAL ASSESSMENT:  
 
Palliative Performance Scale (PPS): PPS: 40  PSYCHOSOCIAL/SPIRITUAL SCREENING:  
 
Palliative IDT has assessed this patient for cultural preferences / practices and a referral made as appropriate to needs (Cultural Services, Patient Advocacy, Ethics, etc.) Any spiritual / Denominational concerns: 
[] Yes /  [x] No 
 
Caregiver Burnout: 
[] Yes /  [x] No /  [] No Caregiver Present Anticipatory grief assessment:  
[x] Normal  / [] Maladaptive ESAS Anxiety: Anxiety: 0 
 
ESAS Depression:    
 
 
 REVIEW OF SYSTEMS:  
 
Positive and pertinent negative findings in ROS are noted above in HPI. The following systems were [x] reviewed / [] unable to be reviewed as noted in HPI Other findings are noted below. Systems: constitutional, ears/nose/mouth/throat, respiratory, gastrointestinal, genitourinary, musculoskeletal, integumentary, neurologic, psychiatric, endocrine. Positive findings noted below. Modified ESAS Completed by: provider Fatigue: 6 Drowsiness: 0 Pain: 0 Anxiety: 0 Nausea: 0 Anorexia: 0 Dyspnea: 0 Constipation: No  
  Stool Occurrence(s): 1 PHYSICAL EXAM:  
 
From RN flowsheet: 
Wt Readings from Last 3 Encounters:  
07/04/19 (!) 411 lb 6.4 oz (186.6 kg) 07/18/18 306 lb 8 oz (139 kg) 06/23/17 334 lb 7 oz (151.7 kg) Blood pressure 132/58, pulse 85, temperature 97.4 °F (36.3 °C), resp. rate 20, height 6' (1.829 m), weight (!) 411 lb 6.4 oz (186.6 kg), SpO2 99 %. Pain Scale 1: Numeric (0 - 10) Pain Intensity 1: 0 Pain Location 1: Back Last bowel movement, if known:  
 
Constitutional: obese, NAD, appears ill Eyes: pupils equal, anicteric ENMT: no nasal discharge, moist mucous membranes Cardiovascular: regular rhythm, distal pulses intact, +PATRICIA Respiratory: breathing slightly labored, symmetric, diminished BS throughout Gastrointestinal: soft non-tender, +bowel sounds Musculoskeletal: bilateral BKA, no tenderness to palpation Skin: warm, dry Neurologic: following commands, moving all extremities Psychiatric: full affect, no hallucinations Other: 
 
 
 HISTORY:  
 
Principal Problem: Acute on chronic respiratory failure with hypoxia and hypercapnia ([address] (6/28/2019) Active Problems: 
  DM type 2 causing renal disease, not at goal [facility]) (7/15/2018) COPD (chronic obstructive pulmonary disease) ([address] (7/15/2018) Anemia (7/16/2018) Persistent atrial fibrillation ([address] (6/28/2019) CKD (chronic kidney disease) stage 3, GFR 30-59 ml/min ([facility]) (6/29/2019) Acute metabolic encephalopathy (0/71/1129) PUD (peptic ulcer disease) (6/29/2019) Cor pulmonale (chronic) ([address] (6/30/2019) Weakness generalized () Physical debility () Goals of care, counseling/discussion () 
 
  DNR (do not resuscitate) discussion () Past Medical History:  
Diagnosis Date  CAD (coronary artery disease)   
 pvc  CKD (chronic kidney disease) stage 3, GFR 30-59 ml/min ([facility]) 6/29/2019  COPD  Diabetes ([address]  GI bleed 6/2019: [facility]. Found small bowel ulcer. S/P 3 unit transfusion.  Heart failure ([address]  Hypertension  Mitral regurgitation  Morbidly obese ([address]  PUD (peptic ulcer disease) 6/29/2019 Past Surgical History:  
Procedure Laterality Date  HX ORTHOPAEDIC    
 right leg surgery Family History Problem Relation Age of Onset  Hypertension Father History reviewed, no pertinent family history. Social History Tobacco Use  Smoking status: Former Smoker  Smokeless tobacco: Never Used Substance Use Topics  Alcohol use: No  
 
Allergies Allergen Reactions  Cephalexin Other (comments) Throat starting closing up after 2nd dose  Codeine Anaphylaxis  Insulin Regular Other (comments) Temp blindness and facial droop only with long acting insulin. Has been on short acting insulin without adverse.  Pcn [Penicillins] Anaphylaxis  Victoza [Liraglutide] Other (comments) Fascial droop, temp blindness  Ferrous Sulfate Other (comments) Ferrous sulfate caused significant constipation, able to tolerate ferrous gluconate without problems  Morphine Other (comments)  
  hallucinates  Statins-Hmg-Coa Reductase Inhibitors Hives, Itching and Nausea and Vomiting Current Facility-Administered Medications Medication Dose Route Frequency  metOLazone (ZAROXOLYN) tablet 5 mg  5 mg Oral BID  calcitRIOL (ROCALTROL) capsule 0.25 mcg  0.25 mcg Oral Once per day on Mon Tue Wed Thu Fri  
 0.9% sodium chloride infusion 250 mL  250 mL IntraVENous PRN  
 glimepiride (AMARYL) tablet 2 mg  2 mg Oral BID  dextrose 10% infusion 125-250 mL  125-250 mL IntraVENous PRN  
 aztreonam (AZACTAM) 2 g in 0.9% sodium chloride (MBP/ADV) 100 mL  2 g IntraVENous Q8H  
 insulin lispro (HUMALOG) injection   SubCUTAneous AC&HS  isosorbide dinitrate (ISORDIL) tablet 20 mg  20 mg Oral TID  hydrALAZINE (APRESOLINE) tablet 10 mg  10 mg Oral TID  albuterol-ipratropium (DUO-NEB) 2.5 MG-0.5 MG/3 ML  3 mL Nebulization Q4HWA RT  
 pantoprazole (PROTONIX) tablet 40 mg  40 mg Oral ACB&D  
 sodium chloride (NS) flush 5-40 mL  5-40 mL IntraVENous Q8H  
 sodium chloride (NS) flush 5-40 mL  5-40 mL IntraVENous PRN  
 sodium chloride (NS) flush 5-40 mL  5-40 mL IntraVENous Q8H  
 sodium chloride (NS) flush 5-40 mL  5-40 mL IntraVENous PRN  
 acetaminophen (TYLENOL) tablet 650 mg  650 mg Oral Q4H PRN  
 ondansetron (ZOFRAN) injection 4 mg  4 mg IntraVENous Q4H PRN  
 docusate sodium (COLACE) capsule 100 mg  100 mg Oral BID  bumetanide (BUMEX) injection 2 mg  2 mg IntraVENous Q12H  
 albuterol (PROVENTIL VENTOLIN) nebulizer solution 2.5 mg  2.5 mg Nebulization Q2H PRN  
 arformoterol (BROVANA) neb solution 15 mcg  15 mcg Nebulization BID RT  
 budesonide (PULMICORT) 500 mcg/2 ml nebulizer suspension  500 mcg Nebulization BID RT  
 nystatin (MYCOSTATIN) 100,000 unit/gram powder   Topical BID  
 glucose chewable tablet 16 g  4 Tab Oral PRN  
  glucagon (GLUCAGEN) injection 1 mg  1 mg IntraMUSCular PRN  
 ferrous gluconate 324 mg (38 mg iron) tablet 1 Tab  1 Tab Oral ACB  polyethylene glycol (MIRALAX) packet 17 g  17 g Oral QHS  sevelamer carbonate (RENVELA) tab 1,600 mg  1,600 mg Oral TID WITH MEALS  
 aspirin delayed-release tablet 81 mg  81 mg Oral DAILY  zinc oxide-cod liver oil (DESITIN) 40 % paste   Topical PRN  
 haloperidol lactate (HALDOL) injection 2 mg  2 mg IntraVENous Q4H PRN  
 
 
 
 LAB AND IMAGING FINDINGS:  
 
Lab Results Component Value Date/Time WBC 7.5 07/04/2019 04:52 AM  
 HGB 7.7 (L) 07/04/2019 04:52 AM  
 PLATELET 184 04/70/1383 04:52 AM  
 
Lab Results Component Value Date/Time Sodium 135 (L) 07/04/2019 04:52 AM  
 Potassium 4.8 07/04/2019 04:52 AM  
 Chloride 97 07/04/2019 04:52 AM  
 CO2 34 (H) 07/04/2019 04:52 AM  
 BUN 60 (H) 07/04/2019 04:52 AM  
 Creatinine 2.45 (H) 07/04/2019 04:52 AM  
 Calcium 8.3 (L) 07/04/2019 04:52 AM  
 Magnesium 2.1 07/04/2019 04:52 AM  
 Phosphorus 4.5 07/04/2019 04:52 AM  
  
Lab Results Component Value Date/Time AST (SGOT) 10 (L) 06/29/2019 02:39 AM  
 Alk. phosphatase 144 (H) 06/29/2019 02:39 AM  
 Protein, total 6.5 06/29/2019 02:39 AM  
 Albumin 2.3 (L) 07/04/2019 04:52 AM  
 Globulin 3.9 06/29/2019 02:39 AM  
 
Lab Results Component Value Date/Time INR 1.1 06/28/2019 02:19 PM  
 Prothrombin time 10.7 06/28/2019 02:19 PM  
 aPTT 28.8 06/28/2019 02:19 PM  
  
Lab Results Component Value Date/Time Iron 30 (L) 07/01/2019 03:38 AM  
 TIBC 371 07/01/2019 03:38 AM  
 Iron % saturation 8 (L) 07/01/2019 03:38 AM  
 Ferritin 20 (L) 07/01/2019 03:38 AM  
  
Lab Results Component Value Date/Time pH 7.39 07/01/2019 11:30 AM  
 PCO2 62 (H) 07/01/2019 11:30 AM  
 PO2 60 (L) 07/01/2019 11:30 AM  
 
No components found for: [place] Lab Results Component Value Date/Time  CK 31 (L) 07/16/2018 01:58 AM  
 CK - MB <1.0 07/16/2018 01:58 AM  
  
 
 
   
 
 Total time: 35min Counseling / coordination time, spent as noted above: 30 min 
> 50% counseling / coordination?:  yes Prolonged service was provided for  []30 min   []75 min in face to face time in the presence of the patient, spent as noted above. Time Start:  
Time End:  
Note: this can only be billed with 39898 (initial) or 67893 (follow up). If multiple start / stop times, list each separately.

## 2019-07-04 NOTE — DISCHARGE SUMMARY
This is an interim summary from 07/01 to 07/04 Admission date: 06/28 Admission diagnosis: Acute on chronic resp failure, hypoxia, hypercapnea, severe pulm HTN Consults: Renal, Cards, Pulm 59 yo hx of HTN, DM, CAD, COPD on 2L O2, dCHF, CKD 3, PVD s/p BLE BKA, PUD w/ recent GI bleed, presented w/ resp failure, hypoxia, hypercapnea, severe pulm HTN. Patient remains very dyspneic, edematous despite prolonged IV diuretics. He is also non-compliance on bipap. Renal function now worsening. The patient would benefit from hospice but he and his wife have refused. Current meds: 
Current Facility-Administered Medications Medication Dose Route Frequency  metOLazone (ZAROXOLYN) tablet 5 mg  5 mg Oral BID  calcitRIOL (ROCALTROL) capsule 0.25 mcg  0.25 mcg Oral Once per day on Mon Tue Wed Thu Fri  
 0.9% sodium chloride infusion 250 mL  250 mL IntraVENous PRN  
 glimepiride (AMARYL) tablet 2 mg  2 mg Oral BID  dextrose 10% infusion 125-250 mL  125-250 mL IntraVENous PRN  
 aztreonam (AZACTAM) 2 g in 0.9% sodium chloride (MBP/ADV) 100 mL  2 g IntraVENous Q8H  
 insulin lispro (HUMALOG) injection   SubCUTAneous AC&HS  isosorbide dinitrate (ISORDIL) tablet 20 mg  20 mg Oral TID  hydrALAZINE (APRESOLINE) tablet 10 mg  10 mg Oral TID  albuterol-ipratropium (DUO-NEB) 2.5 MG-0.5 MG/3 ML  3 mL Nebulization Q4HWA RT  
 pantoprazole (PROTONIX) tablet 40 mg  40 mg Oral ACB&D  
 sodium chloride (NS) flush 5-40 mL  5-40 mL IntraVENous Q8H  
 sodium chloride (NS) flush 5-40 mL  5-40 mL IntraVENous PRN  
 sodium chloride (NS) flush 5-40 mL  5-40 mL IntraVENous Q8H  
 sodium chloride (NS) flush 5-40 mL  5-40 mL IntraVENous PRN  
 acetaminophen (TYLENOL) tablet 650 mg  650 mg Oral Q4H PRN  
 ondansetron (ZOFRAN) injection 4 mg  4 mg IntraVENous Q4H PRN  
 docusate sodium (COLACE) capsule 100 mg  100 mg Oral BID  bumetanide (BUMEX) injection 2 mg  2 mg IntraVENous Q12H  albuterol (PROVENTIL VENTOLIN) nebulizer solution 2.5 mg  2.5 mg Nebulization Q2H PRN  
 arformoterol (BROVANA) neb solution 15 mcg  15 mcg Nebulization BID RT  
 budesonide (PULMICORT) 500 mcg/2 ml nebulizer suspension  500 mcg Nebulization BID RT  
 nystatin (MYCOSTATIN) 100,000 unit/gram powder   Topical BID  
 glucose chewable tablet 16 g  4 Tab Oral PRN  
 glucagon (GLUCAGEN) injection 1 mg  1 mg IntraMUSCular PRN  
 ferrous gluconate 324 mg (38 mg iron) tablet 1 Tab  1 Tab Oral ACB  polyethylene glycol (MIRALAX) packet 17 g  17 g Oral QHS  sevelamer carbonate (RENVELA) tab 1,600 mg  1,600 mg Oral TID WITH MEALS  
 aspirin delayed-release tablet 81 mg  81 mg Oral DAILY  zinc oxide-cod liver oil (DESITIN) 40 % paste   Topical PRN  
 haloperidol lactate (HALDOL) injection 2 mg  2 mg IntraVENous Q4H PRN

## 2019-07-05 NOTE — PROGRESS NOTES
NUTRITION 
 
  
 
MST received for pressure injury. Stage 2 p/i left BKA. See full note dated 7/1. Added Gelatein BID for 40 gm pro total and it does not count towards fluid restriction of 1200 mL. Low carb supplement. , 204, 289, mg/dL. Edema 1+. PO has been good, >75% on average. Check acceptance of geltaine. Estimated Nutrition Needs:  
Kcals/day: 2634 Kcals/day(BMR(2634 no AF)) Protein: 110 g(105 - 122 (1.3 - 1.5 gm/kg IBW)) Fluid:  1200 mL FR Based On: [place] 1898 Weight Used: Actual wt187.8 kg Patient Vitals for the past 168 hrs: 
 % Diet Eaten 07/04/19 1750 80 % 07/04/19 0901 30 % 07/03/19 1644 100 % 07/03/19 0838 100 % 07/01/19 0800 100 % 06/29/19 1745 100 % 06/29/19 1445 100 % 06/29/19 1045 100 % Omero Cisneros, RD

## 2019-07-05 NOTE — PROGRESS NOTES
Shift Summary 1930: Bedside and Verbal shift change report given to Ayesha Drake RN (oncoming nurse) by Sharon Gerardo RN (offgoing nurse). Report included the following information SBAR, Kardex, Procedure Summary, Intake/Output, MAR, Accordion, Recent Results and Cardiac Rhythm NSR, PVCs. Family at bedside. Talked with wife briefly about Palliative visit, Code status decisions. She says that she and her daughter are in favor of a change to DNR. Encouraged her to talk to her  about his wishes while is alert and able to have conversation. Patient currently alert, eating some dinner, interacting with family 2130: Patient refusing BIPAP at this time- asking for RN to wait until later Wife calling out, asking for stronger pain medication for patient who complains of severe arm pain. 2230: spoke to Dr Sudha Snow regarding pain- patient has multiple allergies, was hallcinating on morphine the other night, creatinine too high for torodol. Per MD can put in order for percocet if wife wishes, but discuss risk of respiratory depression, hallucinations. Attempted to put in percocet order, allergy contraindication with codeine. Spoke to patient and family, explained contraindications for medication and urged attempt at repositioning, relaxation. Patient is very drowsy, sleeping on and off. Both agreeable to deferring further pain medication at this time 2330: Patient placed on BIPAP after several attempts Concerned that patient has not voided since AM. Bladder scan showed only 75, not sure if accurate d/t patient girth. Spoke to Dr. Sudha Snow asking if we should consider a straight cath if has not voided by 5am, per MD, unless complaining of pelivic pain ok until morning. 0230: Patient removed from BIPAP by RT per patient request.  
 
Patient had 7 beat run of Vtach- asymptomatic 
 
0300 Patient voided in urinal- 300cc out. Patient still complaining of severe arm pain 0400: Performed wound care- drainage coming from stage II pressure sore on L stump. Applied lotion/ aloe ointment to BL stumps, elbows, arms. 56: Paitent complaining of stomach pain/nausea, administered sofran 
 
0630: patient calling out with CP. When asked about pain, he just said he was having palpitations. Denied pressure, sharpness, heaviness. Told Dr. Cheryl Preciado about palpitations- no new orders, per MD normal for patient. 0730: Bedside shift change report given to Gm Camarillo RN (oncoming nurse) by Anila Hernandez RN (offgoing nurse). Report included the following information SBAR, Kardex, Intake/Output, MAR, Accordion, Recent Results and Cardiac Rhythm NSR, PVCs.

## 2019-07-05 NOTE — PROGRESS NOTES
Ramses Patel [address] [place] [address] Nerissa Miller YOB: 1960 Assessment & Plan:  
ASPEN/Cardiorenal syndrome CKD 3 Severe pulm HTN with Cor Pulmonale Anasarca OHS/JEROD likely, poor tolerance of NIPPV B AKA Debility Anemia with Fe def Rec: 
Not really responding to Bumex + metolazone Na a bit lower, will have to watch closely Can try Bumex gtt today and spironolacone. I am skeptical we will get a good result with diuretics. Continue to try NIPPV to address the underlying problem If we are making no progress, will need to consider whether to pursue more aggressive options like trach and dialysis vs. Focusing on comfort. Palliative care is involved. Increase iron. Replete iron prior to considering ESTELA Subjective:  
CC: f/u ASPEN, edema HPI: Creat higher. Wife says he only made 600 cc urine yesterday. Sodium is a bit lower today. He did wear the BiPAP for up to 4 hour stretches yesterday ROS: feels bad, stable SOB +edema, no improvement in UOP Current Facility-Administered Medications Medication Dose Route Frequency  metOLazone (ZAROXOLYN) tablet 5 mg  5 mg Oral BID  levoFLOXacin (LEVAQUIN) 750 mg in D5W IVPB  750 mg IntraVENous Q48H  calcitRIOL (ROCALTROL) capsule 0.25 mcg  0.25 mcg Oral Once per day on Mon Tue Wed Thu Fri  
 0.9% sodium chloride infusion 250 mL  250 mL IntraVENous PRN  
 glimepiride (AMARYL) tablet 2 mg  2 mg Oral BID  dextrose 10% infusion 125-250 mL  125-250 mL IntraVENous PRN  
 insulin lispro (HUMALOG) injection   SubCUTAneous AC&HS  isosorbide dinitrate (ISORDIL) tablet 20 mg  20 mg Oral TID  hydrALAZINE (APRESOLINE) tablet 10 mg  10 mg Oral TID  albuterol-ipratropium (DUO-NEB) 2.5 MG-0.5 MG/3 ML  3 mL Nebulization Q4HWA RT  
 pantoprazole (PROTONIX) tablet 40 mg  40 mg Oral ACB&D  
 sodium chloride (NS) flush 5-40 mL  5-40 mL IntraVENous Q8H  
  sodium chloride (NS) flush 5-40 mL  5-40 mL IntraVENous PRN  
 sodium chloride (NS) flush 5-40 mL  5-40 mL IntraVENous Q8H  
 sodium chloride (NS) flush 5-40 mL  5-40 mL IntraVENous PRN  
 acetaminophen (TYLENOL) tablet 650 mg  650 mg Oral Q4H PRN  
 ondansetron (ZOFRAN) injection 4 mg  4 mg IntraVENous Q4H PRN  
 docusate sodium (COLACE) capsule 100 mg  100 mg Oral BID  bumetanide (BUMEX) injection 2 mg  2 mg IntraVENous Q12H  
 albuterol (PROVENTIL VENTOLIN) nebulizer solution 2.5 mg  2.5 mg Nebulization Q2H PRN  
 arformoterol (BROVANA) neb solution 15 mcg  15 mcg Nebulization BID RT  
 budesonide (PULMICORT) 500 mcg/2 ml nebulizer suspension  500 mcg Nebulization BID RT  
 nystatin (MYCOSTATIN) 100,000 unit/gram powder   Topical BID  
 glucose chewable tablet 16 g  4 Tab Oral PRN  
 glucagon (GLUCAGEN) injection 1 mg  1 mg IntraMUSCular PRN  
 ferrous gluconate 324 mg (38 mg iron) tablet 1 Tab  1 Tab Oral ACB  polyethylene glycol (MIRALAX) packet 17 g  17 g Oral QHS  sevelamer carbonate (RENVELA) tab 1,600 mg  1,600 mg Oral TID WITH MEALS  
 aspirin delayed-release tablet 81 mg  81 mg Oral DAILY  zinc oxide-cod liver oil (DESITIN) 40 % paste   Topical PRN  
 haloperidol lactate (HALDOL) injection 2 mg  2 mg IntraVENous Q4H PRN Objective:  
 
Vitals: 
Blood pressure 122/73, pulse 78, temperature 98 °F (36.7 °C), resp. rate 18, height 6' (1.829 m), weight (!) 187.8 kg (414 lb 1.6 oz), SpO2 97 %. Temp (24hrs), Av.1 °F (36.7 °C), Min:98 °F (36.7 °C), Max:98.3 °F (36.8 °C) Intake and Output: 
No intake/output data recorded.  1901 -  0700 In: 400 [P.O.:300; I.V.:100] Out: - Physical Exam:              
GENERAL ASSESSMENT: Obese NAD 
CHEST: decreased BS, on NC O2 
HEART: S1S2 ABDOMEN: Soft,NT 
: +scrotal edema EXTREMITY: ++edema, B BKA 
 
   
ECG/rhythm: 
 
Data Review No results for input(s): TNIPOC in the last 72 hours. No lab exists for component: ITNL No results for input(s): CPK, CKMB, TROIQ in the last 72 hours. Recent Labs  
  07/05/19 
0320 07/04/19 
2283 07/03/19 
1767 * 135* 138  
K 4.6 4.8 4.6 CL 94* 97 98 CO2 34* 34* 36*  
BUN 66* 60* 51* CREA 2.62* 2.45* 2.10* * 204* 289* PHOS 4.5 4.5 3.8 MG 2.1 2.1 2.0  
CA 8.3* 8.3* 8.4* ALB 2.2* 2.3* 2.5* WBC 6.5 7.5 8.1 HGB 7.3* 7.7* 7.9*  
HCT 25.5* 26.8* 27.3*  
 168 155 No results for input(s): INR, PTP, APTT in the last 72 hours. No lab exists for component: INREXT Needs: urine analysis, urine sodium, protein and creatinine Lab Results Component Value Date/Time Sodium,urine random 88 07/15/2018 10:06 PM  
 Creatinine, urine 45.48 07/16/2018 02:27 PM  
 
 
 
 
: Anastasiia Mathews MD 
7/5/2019 [facility]: 
[email or web address] [place] office: 
[address] [place] [address], [address] Phone: [phone] Fax :     [phone] [place] office: 
[address] [facility] [address] Phone - [phone] Fax - [phone]

## 2019-07-05 NOTE — PROGRESS NOTES
[facility] Name: Laverne Holden MRN: [id number] : 1960 Hospital: [address] [facility] Date: 2019 IMPRESSION:  
· Acute on chronic hypoxemic/hypercapneic respiratory failure · Decompensated heart failure · COPD, not in acute exacerbation · Suspected JEROD/OHS · Pulmonary HTN - group 2,3 
· Hypoglycemia · ASPEN vs CKD · H/o CVA · HTN 
· H/o afib · Recent GIB 
· H/o non-compliance RECOMMENDATIONS:  
Supplemental O2 as needed to keep sats > 88% NIPPV whenever sleeping. Recommend trilogy on discharge Would continue IV diuresis given ongoing O2 requirements, hypercapnia and pitting edema. Bumex/zaroxolyn/spironolactone as per renal 
Follow cultures. Continue duonebs, brovana, pulmicort Needs PFTs, sleep study as an outpatient Palliative following Not sure what is left to offer if he continues to refuse bipap. Would otherwise need to consider intubation/trach or hospice (which seems most appropriate). Subjective:  
 
Mr. Artem Johnson is a 64yo male w/ history of COPD, CKD, HTN, afib, PVD s/p bilateral BKA, HLD, CVA, DM and non-compliance who presented to the ER on  w/ complaints of feeling \"sick. \"  He has been lethargic and weak. Also complains of chest pain, shortness of breath, swelling \"everywhere\" and a cough productive of white sputum. Cough sounds very wet, congested. Thinks several family members have recently had the flu. Admitted to the ICU on bipap. ABG in ED reportedly: 7.72. Recent admission to [facility] for GIB. Also says that he went to [facility] twice in the past month or so for swelling but that nothing was done. Says that he was diagnosed w/ COPD 35 years ago. No recent PFTs. Followed by his PCP. Uses combivent at home. Also says that he was diagnosed w/ JEROD in  but never treated. No recent evaluation.  
 
 - echo: EF 61-65%, RV not well seen, mild MS, PASP 75 
 *blood cultures (6/28) - 1/4 w/ CoNS --> suspect contaminant *all other cultures NGTD *MRSA screen and RVP negative 24hr events: On NIPPV most of yesterday afternoon Only wore it for about 3hrs last night Still short of breath, weak Past Medical History:  
Diagnosis Date  CAD (coronary artery disease)   
 pvc  CKD (chronic kidney disease) stage 3, GFR 30-59 ml/min ([facility]) 6/29/2019  COPD  Diabetes ([facility]  GI bleed 6/2019: [place]. Found small bowel ulcer. S/P 3 unit transfusion.  Heart failure ([facility]  Hypertension  Mitral regurgitation  [facility]  PUD (peptic ulcer disease) 6/29/2019 Past Surgical History:  
Procedure Laterality Date  HX ORTHOPAEDIC    
 right leg surgery Prior to Admission medications Medication Sig Start Date End Date Taking? Authorizing Provider  
ferrous gluconate 324 mg (38 mg iron) tablet Take 324 mg by mouth Daily (before breakfast). Yes Provider, Historical  
bumetanide (BUMEX) 2 mg tablet Take 4 mg by mouth two (2) times a day. Yes Provider, Historical  
sevelamer carbonate (RENVELA) 800 mg tab tab Take 1,600 mg by mouth three (3) times daily (with meals). Yes Provider, Historical  
pantoprazole (PROTONIX) 40 mg tablet Take 40 mg by mouth two (2) times a day. Yes Provider, Historical  
calcitRIOL (ROCALTROL) 0.25 mcg capsule Take 0.25 mcg by mouth five (5) days a week. Yes Provider, Historical  
insulin aspart U-100 (NOVOLOG U-100 INSULIN ASPART) 100 unit/mL injection by SubCUTAneous route. Yes Provider, Historical  
glimepiride (AMARYL) 2 mg tablet Take 2 mg by mouth two (2) times a day. Yes Provider, Historical  
aspirin (ASPIRIN) 325 mg tablet Take 325 mg by mouth daily. Yes Provider, Historical  
ipratropium-albuterol (COMBIVENT RESPIMAT)  mcg/actuation inhaler Take 1 Puff by inhalation every six (6) hours as needed for Wheezing.    Yes Provider, Historical  
 polyethylene glycol (MIRALAX) 17 gram packet Take 17 g by mouth nightly. Yes Provider, Historical  
docusate sodium (COLACE) 100 mg capsule Take 100 mg by mouth daily as needed for Constipation. Yes Provider, Historical  
 
Allergies Allergen Reactions  Cephalexin Other (comments) Throat starting closing up after 2nd dose  Codeine Anaphylaxis  Insulin Regular Other (comments) Temp blindness and facial droop only with long acting insulin. Has been on short acting insulin without adverse.  Pcn [Penicillins] Anaphylaxis  Victoza [Liraglutide] Other (comments) Fascial droop, temp blindness  Ferrous Sulfate Other (comments) Ferrous sulfate caused significant constipation, able to tolerate ferrous gluconate without problems  Morphine Other (comments)  
  hallucinates  Statins-Hmg-Coa Reductase Inhibitors Hives, Itching and Nausea and Vomiting Social History Tobacco Use  Smoking status: Former Smoker  Smokeless tobacco: Never Used Substance Use Topics  Alcohol use: No  
  
Family History Problem Relation Age of Onset  Hypertension Father Current Facility-Administered Medications Medication Dose Route Frequency  spironolactone (ALDACTONE) tablet 25 mg  25 mg Oral BID  ferrous gluconate 324 mg (38 mg iron) tablet 1 Tab  1 Tab Oral TID WITH MEALS  
 bumetanide (BUMEX) 0.25 mg/mL infusion  1 mg/hr IntraVENous CONTINUOUS  
 metOLazone (ZAROXOLYN) tablet 5 mg  5 mg Oral BID  levoFLOXacin (LEVAQUIN) 750 mg in D5W IVPB  750 mg IntraVENous Q48H  calcitRIOL (ROCALTROL) capsule 0.25 mcg  0.25 mcg Oral Once per day on Mon Tue Wed Thu Fri  
 glimepiride (AMARYL) tablet 2 mg  2 mg Oral BID  insulin lispro (HUMALOG) injection   SubCUTAneous AC&HS  isosorbide dinitrate (ISORDIL) tablet 20 mg  20 mg Oral TID  hydrALAZINE (APRESOLINE) tablet 10 mg  10 mg Oral TID  albuterol-ipratropium (DUO-NEB) 2.5 MG-0.5 MG/3 ML  3 mL Nebulization Q4HWA RT  
 pantoprazole (PROTONIX) tablet 40 mg  40 mg Oral ACB&D  
 sodium chloride (NS) flush 5-40 mL  5-40 mL IntraVENous Q8H  
 sodium chloride (NS) flush 5-40 mL  5-40 mL IntraVENous Q8H  
 docusate sodium (COLACE) capsule 100 mg  100 mg Oral BID  arformoterol (BROVANA) neb solution 15 mcg  15 mcg Nebulization BID RT  
 budesonide (PULMICORT) 500 mcg/2 ml nebulizer suspension  500 mcg Nebulization BID RT  
 nystatin (MYCOSTATIN) 100,000 unit/gram powder   Topical BID  polyethylene glycol (MIRALAX) packet 17 g  17 g Oral QHS  sevelamer carbonate (RENVELA) tab 1,600 mg  1,600 mg Oral TID WITH MEALS  
 aspirin delayed-release tablet 81 mg  81 mg Oral DAILY Objective:  
Vital Signs:   
Visit Vitals /62 (BP 1 Location: Left arm, BP Patient Position: At rest) Pulse 80 Temp 97.3 °F (36.3 °C) Resp 20 Ht 6' (1.829 m) Wt (!) 187.8 kg (414 lb 1.6 oz) SpO2 92% BMI 56.16 kg/m² O2 Device: Room air O2 Flow Rate (L/min): 3 l/min Temp (24hrs), Av.8 °F (36.6 °C), Min:97.3 °F (36.3 °C), Max:98.3 °F (36.8 °C) Intake/Output:  
Last shift:      No intake/output data recorded. Last 3 shifts:  1901 -  0700 In: 400 [P.O.:300; I.V.:100] Out: - Intake/Output Summary (Last 24 hours) at 2019 1646 Last data filed at 2019 1750 Gross per 24 hour Intake 200 ml Output  Net 200 ml Physical Exam:  
General:  Morbidly obese Head:  NCAT Eyes:  PERRL Nose: Throat: Crowded oropharynx Neck: thick Back:     
Lungs:   Diminished breath sounds Chest wall:    
Heart:  Regular rate and rhythm Abdomen:   Soft, non-tender. Bowel sounds normal  
Extremities: Anasarca, bilateral BKA Pulses:   
Skin: No rashes Lymph nodes: No cervical nodes Neurologic: Oriented x 3 Data review:  
 
Recent Results (from the past 24 hour(s)) BLOOD GAS, ARTERIAL Collection Time: 19  4:54 PM  
Result Value Ref Range pH 7.35 7.35 - 7.45    
 PCO2 65 (H) 35.0 - 45.0 mmHg PO2 139 (H) 80 - 100 mmHg O2 SAT 99 (H) 92 - 97 % BICARBONATE 35 (H) 22 - 26 mmol/L  
 BASE EXCESS 6.9 mmol/L  
 O2 METHOD BIPAP    
 FIO2 40 % SET RATE 24    
 IPAP/PIP 24.0    
 EPAP/CPAP/PEEP 5.0 Sample source ARTERIAL    
 SITE LEFT RADIAL SYEDA'S TEST YES    
GLUCOSE, POC Collection Time: 07/04/19  8:38 PM  
Result Value Ref Range Glucose (POC) 239 (H) 65 - 100 mg/dL Performed by Hay Cat (PCT) RENAL FUNCTION PANEL Collection Time: 07/05/19  3:20 AM  
Result Value Ref Range Sodium 132 (L) 136 - 145 mmol/L Potassium 4.6 3.5 - 5.1 mmol/L Chloride 94 (L) 97 - 108 mmol/L  
 CO2 34 (H) 21 - 32 mmol/L Anion gap 4 (L) 5 - 15 mmol/L Glucose 151 (H) 65 - 100 mg/dL BUN 66 (H) 6 - 20 MG/DL Creatinine 2.62 (H) 0.70 - 1.30 MG/DL  
 BUN/Creatinine ratio 25 (H) 12 - 20 GFR est AA 31 (L) >60 ml/min/1.73m2 GFR est non-AA 25 (L) >60 ml/min/1.73m2 Calcium 8.3 (L) 8.5 - 10.1 MG/DL Phosphorus 4.5 2.6 - 4.7 MG/DL Albumin 2.2 (L) 3.5 - 5.0 g/dL MAGNESIUM Collection Time: 07/05/19  3:20 AM  
Result Value Ref Range Magnesium 2.1 1.6 - 2.4 mg/dL CBC W/O DIFF Collection Time: 07/05/19  3:20 AM  
Result Value Ref Range WBC 6.5 4.1 - 11.1 K/uL  
 RBC 2.70 (L) 4.10 - 5.70 M/uL HGB 7.3 (L) 12.1 - 17.0 g/dL HCT 25.5 (L) 36.6 - 50.3 % MCV 94.4 80.0 - 99.0 FL  
 MCH 27.0 26.0 - 34.0 PG  
 MCHC 28.6 (L) 30.0 - 36.5 g/dL  
 RDW 15.6 (H) 11.5 - 14.5 % PLATELET 599 064 - 117 K/uL MPV 9.7 8.9 - 12.9 FL  
 NRBC 0.0 0  WBC ABSOLUTE NRBC 0.00 0.00 - 0.01 K/uL GLUCOSE, POC Collection Time: 07/05/19  6:06 AM  
Result Value Ref Range Glucose (POC) 138 (H) 65 - 100 mg/dL Performed by Hay Cat (PCT) GLUCOSE, POC Collection Time: 07/05/19 11:55 AM  
Result Value Ref Range Glucose (POC) 181 (H) 65 - 100 mg/dL Performed by Jaylyn Govea (PCT) Imaging: 
I have personally reviewed the patients radiographs and have reviewed the reports: 
  
 
  
Anu Barnhart MD 
 
[facility]

## 2019-07-05 NOTE — PROGRESS NOTES
Palliative Medicine Code Status: Full Code Advance Care Planning 7/1/2019 Patient's Healthcare Decision Maker is: Verbal statement (Legal Next of Kin remains as decision maker) Primary Decision Maker Name Danni Correa Primary Decision Maker Phone Number [phone] Confirm Advance Directive Yes, not on file Patient Would Like to Complete Advance Directive Already in place, per pt/wife  
  
Patient / Family Encounter Documentation Participants (names): Pt, wife Ronald Arroyo, other family, Palliative Medicine (NP Citlalli Parker) Narrative:  Spoke first with pt's mother and cousin outside of room. Mother stated she lost her  5 yrs ago to cancer, shared that pt had expressed three weeks ago that he was ready for God to take him, expressed that his quality of life was not how he wanted to live. Mother reports she has Bell's Palsy, expressed belief that the stress of pt's medical condition was a contributing factor. Visited later with pt and dtr in room. Pt was alert, engaged in conversation, stated he has been told he is not a candidate for dialysis, inquired about his heart (\"how bad is it? \"). NP spoke frankly with pt re: poor treatment options; pt repeatedly expressed wish for improvement, would not engage in discussion as to plans if the \"fix\" he desires is not found. Pt reports his spirits have been \"fine. \" 
 
Spoke with wife outside of room after meeting with pt. Wife appears to have better understanding of the severity of pt's condition, expressed belief that pt is depressed though stated he would not acknowledge this, will only say that he is feeling \"crappy. \"  Wife is aware that life expectancy is limited, has been bringing pt food that he likes (double whopper, fried catfish) to allow him some pleasure.  
 
Psychosocial Issues Identified/ Resilience Factors: Anticipatory grief:  Wife shared that pt's birthday is [birth year], their 36th anniversary is 7/30, stated they have rarely spent time apart in all of their years together. Pt and wife have good family support, cyndy is a source of comfort and strength. Goals of Care / Plan: Pt appears reluctant to consider the possibility that his condition might not improve, continues to express hope that he will get better. Palliative team will continue to engage pt/family in goals of care discussions, will continue to provide support. Thank you for including Palliative Medicine in the care of Mr. Stephen Mcmillan. Deirdre Castro LCSW, [facility]- 
[phone])

## 2019-07-05 NOTE — PROGRESS NOTES
Palliative Medicine Consult [place]: [phone] ([phone]) Patient Name: Marifer Hunter YOB: 1960 Date of Initial Consult: 07/01/2019 Reason for Consult: Care decisions Requesting Provider: Silverio Stauffer MD  
Primary Care Physician: Gm Doan MD 
 
 SUMMARY:  
Marifer Hunter is a 60 y.o. with a past history of bilat BKA, CAD, CVA, COPD, DM, CHF, HTN, mitral regurgitation, CKD, afib, and morbid obesity, who was admitted on 6/28/2019 from home with a diagnosis of acute on chronic respiratory failure with hypoxia and hypercapnia. Patient presented tot he ED with complaints of generalized weakness, hypoglycemia, and chest pain. ED eval revealed hypoxia and hypercapnia (ABG 7.27/76/72/34), chest xray with pulmonary edema pattern with probable bilateral pleural effusion, and elevated proBNP. Patient admitted and started on bipap and IV diuresis. Cardiology consulted and agree with aggressive diuretic therapy and repeat echo, which showed EF 61-65% with no WMA. Also, with afib and hx of GI bleed, would hold on staring anticoagulation at this time. Current medical issues leading to Palliative Medicine involvement include: Care decisions due to progressive decline, severe debility, multiple co morbidities. SH:  x 39 years. Has daughter (who is a nurse) and a son who both live near by. 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 Interim History: 
07/05--> Patient on bipap for approx 4 hours last night. Continues with anasarca not adequately responding to current regimen of oral bumex and metolazone. Creat up to 2.62. PALLIATIVE DIAGNOSES:  
1. Generalized weakness 2. Physical debility 3. Goals of care 4. DNR discussion 5. ACP 6. Acute on chronic respiratory failure with hypoxia and hypercapnia PLAN:  
1. Met with patient and wife Des Dc, and daughter, Magalys Wolfe with Homer Osorio LCSW in follow-up. 2. Patient resting in bed on NC, depressed mood. Offered no complaints. 3. Goals of care--> Discussed overnight events, including limited use of bipap, worsening kidney function, and lack of adequate response to bumex and metolazone. Explained addition of aldactone and bumex drip to attempt further diuresis but with concern that this could worsen renal function even further. Patient understands that he is not a dialysis candidate, and is hopeful that these interventions will work, expressing frustration in that he wants team to \"find something to fix all of this\". Explained to him that all previous interventions have failed to improve patient's condition and that we currently have very little more to offer if bumex drip/aldactone don't work. Did explain to patient that if at any point these aggressive interventions become too burdensome or are not effective, could consider stopping medical management (especially if there is no response to addition of bumex drip and aldactone) and transition to a focus on comfort so patient can enjoy what time he has. Talked with wife in [facility] and she seems to have good insight into patient's condition, understands we are getting to the point where we have exhausted medical management and only option would be comfort care/hospice. Patient and family wish to see whether the additional interventions implemented today make any difference. 4. DNR discussion--> addressed code status again and reiterated concern that patient will continue to be hypercapnic and, if unable to tolerate Bipap, intubation is the only option to clear the CO2. Patient and family are still considering changing code status to DNR, and currently remains full code. Spoke with wife in the [facility] and she stated that she and their daughter (who is a RN)  have had ongoing discussions with him today and feel that this would be in his best interest now, but patient remains undecided. 5. ACP--> Patient and wife report that patient has completed AMD that appoints wife, Mavis Mesa as primary agent for healthcare decisions, and daughter Tonya Hawkins as secondary agent for healthcare decisions. Requested copy for EMR. ACP updated to reflect this. 6. Discussed with bedside nurse 7. Initial consult note routed to primary continuity provider and/or primary health care team members 8. Communicated plan of care with: Palliative IDT, [facility] Team 
 
 GOALS OF CARE / TREATMENT PREFERENCES:  
 
GOALS OF CARE: 
Patient/Health Care Proxy Stated Goals: (Full restorative measures in an effort to recover) TREATMENT PREFERENCES:  
Code Status: Full Code Advance Care Planning: 
[x] [facility] Interdisciplinary Team has updated the ACP Navigator with [place] and Patient Capacity Advance Care Planning 7/1/2019 Patient's Healthcare Decision Maker is: Verbal statement (Legal Next of Kin remains as decision maker) Primary Decision Maker Name -  
Primary Decision Maker Phone Number -  
Confirm Advance Directive Yes, not on file Patient Would Like to Complete Advance Directive - Medical Interventions: Full interventions Other Instructions: Other: As far as possible, the palliative care team has discussed with patient / health care proxy about goals of care / treatment preferences for patient. HISTORY:  
 
History obtained from: patient, chart, wife CHIEF COMPLAINT: generalized weakness, low BS, and chest pain HPI/SUBJECTIVE: The patient is:  
[x] Verbal and participatory [] Non-participatory due to:  
Patient presented tot he ED with complaints of generalized weakness, hypoglycemia, and chest pain. ED eval revealed hypoxia and hypercapnia (ABG 7.27/76/72/34), chest xray with pulmonary edema pattern with probable bilateral pleural effusion, and elevated proBNP. Patient admitted and started on bipap and IV diuresis. Cardiology consulted and agree with aggressive diuretic therapy and repeat echo, which showed EF 61-65% with no WMA. Also, with afib and hx of GI bleed, would hold on staring anticoagulation at this time. Patient currently without complaints. 7/4-tired appearing, no pain. 7/5-Patient resting in bed on NC, depressed mood. Offered no complaints Clinical Pain Assessment (nonverbal scale for severity on nonverbal patients):  
Clinical Pain Assessment Severity: 0 Duration: for how long has pt been experiencing pain (e.g., 2 days, 1 month, years) Frequency: how often pain is an issue (e.g., several times per day, once every few days, constant) FUNCTIONAL ASSESSMENT:  
 
Palliative Performance Scale (PPS): PPS: 40 PSYCHOSOCIAL/SPIRITUAL SCREENING:  
 
Palliative IDT has assessed this patient for cultural preferences / practices and a referral made as appropriate to needs (Cultural Services, Patient Advocacy, Ethics, etc.) Any spiritual / Zoroastrian concerns: 
[] Yes /  [x] No 
 
Caregiver Burnout: 
[] Yes /  [x] No /  [] No Caregiver Present Anticipatory grief assessment:  
[x] Normal  / [] Maladaptive ESAS Anxiety: Anxiety: 0 
 
ESAS Depression:    
 
 
 REVIEW OF SYSTEMS:  
 
Positive and pertinent negative findings in ROS are noted above in HPI. The following systems were [x] reviewed / [] unable to be reviewed as noted in HPI Other findings are noted below. Systems: constitutional, ears/nose/mouth/throat, respiratory, gastrointestinal, genitourinary, musculoskeletal, integumentary, neurologic, psychiatric, endocrine. Positive findings noted below. Modified ESAS Completed by: provider Fatigue: 6 Drowsiness: 0 Pain: 0 Anxiety: 0 Nausea: 0 Anorexia: 0 Dyspnea: 0 Constipation: No  
  Stool Occurrence(s): 1 PHYSICAL EXAM:  
 
From RN flowsheet: 
Wt Readings from Last 3 Encounters:  
07/05/19 (!) 414 lb 1.6 oz (187.8 kg) 07/18/18 306 lb 8 oz (139 kg) 06/23/17 334 lb 7 oz (151.7 kg) Blood pressure 131/62, pulse 80, temperature 97.3 °F (36.3 °C), resp. rate 20, height 6' (1.829 m), weight (!) 414 lb 1.6 oz (187.8 kg), SpO2 92 %. Pain Scale 1: Numeric (0 - 10) Pain Intensity 1: 0 Pain Location 1: Arm Pain Orientation 1: Right, Left Pain Description 1: Aching Pain Intervention(s) 1: Medication (see MAR) Last bowel movement, if known:  
 
Constitutional: obese, NAD, appears ill Eyes: pupils equal, anicteric ENMT: no nasal discharge, moist mucous membranes Cardiovascular: regular rhythm, distal pulses intact, +PATRICIA Respiratory: breathing not labored, symmetric, diminished BS throughout Gastrointestinal: soft non-tender, +bowel sounds Musculoskeletal: bilateral BKA, no tenderness to palpation Skin: warm, dry Neurologic: following commands, moving all extremities Psychiatric: flat affect, no hallucinations Other: 
 
 
 HISTORY:  
 
Principal Problem: 
  Acute on chronic respiratory failure with hypoxia and hypercapnia ([address] (6/28/2019) Active Problems: 
  DM type 2 causing renal disease, not at goal [facility]) (7/15/2018) COPD (chronic obstructive pulmonary disease) ([address] (7/15/2018) Anemia (7/16/2018) Persistent atrial fibrillation ([address] (6/28/2019) CKD (chronic kidney disease) stage 3, GFR 30-59 ml/min ([facility]) (6/29/2019) Acute metabolic encephalopathy (0/49/9108) PUD (peptic ulcer disease) (6/29/2019) Cor pulmonale (chronic) ([address] (6/30/2019) Weakness generalized () Physical debility () Goals of care, counseling/discussion () 
 
  DNR (do not resuscitate) discussion () Past Medical History:  
Diagnosis Date  CAD (coronary artery disease)   
 pvc  CKD (chronic kidney disease) stage 3, GFR 30-59 ml/min ([facility]) 6/29/2019  COPD  Diabetes ([address]  GI bleed 6/2019: [place]. Found small bowel ulcer. S/P 3 unit transfusion.  Heart failure ([address]  Hypertension  Mitral regurgitation  Morbidly obese ([address]  PUD (peptic ulcer disease) 6/29/2019 Past Surgical History:  
Procedure Laterality Date  HX ORTHOPAEDIC    
 right leg surgery Family History Problem Relation Age of Onset  Hypertension Father History reviewed, no pertinent family history. Social History Tobacco Use  Smoking status: Former Smoker  Smokeless tobacco: Never Used Substance Use Topics  Alcohol use: No  
 
Allergies Allergen Reactions  Cephalexin Other (comments) Throat starting closing up after 2nd dose  Codeine Anaphylaxis  Insulin Regular Other (comments) Temp blindness and facial droop only with long acting insulin. Has been on short acting insulin without adverse.  Pcn [Penicillins] Anaphylaxis  Victoza [Liraglutide] Other (comments) Fascial droop, temp blindness  Ferrous Sulfate Other (comments) Ferrous sulfate caused significant constipation, able to tolerate ferrous gluconate without problems  Morphine Other (comments)  
  hallucinates  Statins-Hmg-Coa Reductase Inhibitors Hives, Itching and Nausea and Vomiting Current Facility-Administered Medications Medication Dose Route Frequency  spironolactone (ALDACTONE) tablet 25 mg  25 mg Oral BID  ferrous gluconate 324 mg (38 mg iron) tablet 1 Tab  1 Tab Oral TID WITH MEALS  
 bumetanide (BUMEX) 0.25 mg/mL infusion  1 mg/hr IntraVENous CONTINUOUS  
 metOLazone (ZAROXOLYN) tablet 5 mg  5 mg Oral BID  levoFLOXacin (LEVAQUIN) 750 mg in D5W IVPB  750 mg IntraVENous Q48H  calcitRIOL (ROCALTROL) capsule 0.25 mcg  0.25 mcg Oral Once per day on Mon Tue Wed Thu Fri  
 0.9% sodium chloride infusion 250 mL  250 mL IntraVENous PRN  
 glimepiride (AMARYL) tablet 2 mg  2 mg Oral BID  dextrose 10% infusion 125-250 mL  125-250 mL IntraVENous PRN  
 insulin lispro (HUMALOG) injection   SubCUTAneous AC&HS  
  isosorbide dinitrate (ISORDIL) tablet 20 mg  20 mg Oral TID  hydrALAZINE (APRESOLINE) tablet 10 mg  10 mg Oral TID  albuterol-ipratropium (DUO-NEB) 2.5 MG-0.5 MG/3 ML  3 mL Nebulization Q4HWA RT  
 pantoprazole (PROTONIX) tablet 40 mg  40 mg Oral ACB&D  
 sodium chloride (NS) flush 5-40 mL  5-40 mL IntraVENous Q8H  
 sodium chloride (NS) flush 5-40 mL  5-40 mL IntraVENous PRN  
 sodium chloride (NS) flush 5-40 mL  5-40 mL IntraVENous Q8H  
 sodium chloride (NS) flush 5-40 mL  5-40 mL IntraVENous PRN  
 acetaminophen (TYLENOL) tablet 650 mg  650 mg Oral Q4H PRN  
 ondansetron (ZOFRAN) injection 4 mg  4 mg IntraVENous Q4H PRN  
 docusate sodium (COLACE) capsule 100 mg  100 mg Oral BID  albuterol (PROVENTIL VENTOLIN) nebulizer solution 2.5 mg  2.5 mg Nebulization Q2H PRN  
 arformoterol (BROVANA) neb solution 15 mcg  15 mcg Nebulization BID RT  
 budesonide (PULMICORT) 500 mcg/2 ml nebulizer suspension  500 mcg Nebulization BID RT  
 nystatin (MYCOSTATIN) 100,000 unit/gram powder   Topical BID  
 glucose chewable tablet 16 g  4 Tab Oral PRN  
 glucagon (GLUCAGEN) injection 1 mg  1 mg IntraMUSCular PRN  polyethylene glycol (MIRALAX) packet 17 g  17 g Oral QHS  sevelamer carbonate (RENVELA) tab 1,600 mg  1,600 mg Oral TID WITH MEALS  
 aspirin delayed-release tablet 81 mg  81 mg Oral DAILY  zinc oxide-cod liver oil (DESITIN) 40 % paste   Topical PRN  
 haloperidol lactate (HALDOL) injection 2 mg  2 mg IntraVENous Q4H PRN  
 
 
 
 LAB AND IMAGING FINDINGS:  
 
Lab Results Component Value Date/Time WBC 6.5 07/05/2019 03:20 AM  
 HGB 7.3 (L) 07/05/2019 03:20 AM  
 PLATELET 508 13/39/6687 03:20 AM  
 
Lab Results Component Value Date/Time  Sodium 132 (L) 07/05/2019 03:20 AM  
 Potassium 4.6 07/05/2019 03:20 AM  
 Chloride 94 (L) 07/05/2019 03:20 AM  
 CO2 34 (H) 07/05/2019 03:20 AM  
 BUN 66 (H) 07/05/2019 03:20 AM  
 Creatinine 2.62 (H) 07/05/2019 03:20 AM  
 Calcium 8.3 (L) 07/05/2019 03:20 AM  
 Magnesium 2.1 07/05/2019 03:20 AM  
 Phosphorus 4.5 07/05/2019 03:20 AM  
  
Lab Results Component Value Date/Time AST (SGOT) 10 (L) 06/29/2019 02:39 AM  
 Alk. phosphatase 144 (H) 06/29/2019 02:39 AM  
 Protein, total 6.5 06/29/2019 02:39 AM  
 Albumin 2.2 (L) 07/05/2019 03:20 AM  
 Globulin 3.9 06/29/2019 02:39 AM  
 
Lab Results Component Value Date/Time INR 1.1 06/28/2019 02:19 PM  
 Prothrombin time 10.7 06/28/2019 02:19 PM  
 aPTT 28.8 06/28/2019 02:19 PM  
  
Lab Results Component Value Date/Time Iron 30 (L) 07/01/2019 03:38 AM  
 TIBC 371 07/01/2019 03:38 AM  
 Iron % saturation 8 (L) 07/01/2019 03:38 AM  
 Ferritin 20 (L) 07/01/2019 03:38 AM  
  
Lab Results Component Value Date/Time pH 7.35 07/04/2019 04:54 PM  
 PCO2 65 (H) 07/04/2019 04:54 PM  
 PO2 139 (H) 07/04/2019 04:54 PM  
 
No components found for: [place] Lab Results Component Value Date/Time CK 31 (L) 07/16/2018 01:58 AM  
 CK - MB <1.0 07/16/2018 01:58 AM  
  
 
 
   
 
Total time: 35min Counseling / coordination time, spent as noted above: 30 min 
> 50% counseling / coordination?:  yes Prolonged service was provided for  []30 min   []75 min in face to face time in the presence of the patient, spent as noted above. Time Start:  
Time End:  
Note: this can only be billed with 02624 (initial) or 77245 (follow up). If multiple start / stop times, list each separately.

## 2019-07-05 NOTE — PROGRESS NOTES
Ramses Patel [facility] 
[address] [facility], [place], [id number] [place] [address] 
[phone] Medical Progress Note NAME: Cr Andrews :  1960 MRM:  [id number] Date/Time: 2019  7:28 AM 
 
  
Assessment and Plan: 1. Acute on chronic respiratory failure with hypoxia and hypercapnia: secondary to COPD, cor pulmonale, CHF, super morbid obesity. Not much improvement. Poor prognosis. On chronic 2-3L O2 at home. Non-compliance on bipap. Cont O2, encourage bipap use. Pulm and palliative care following. On going discussion about goal of care  
  
2. Acute on chronic diastolic CHF/cor pulmonale/severe pulm HTN/anasarca: No improvement. Poor prognosis. Would benefit from hospice, but patient not ready for this yet. Appreciated palliative care FU. On IV bumex, hydralazine, isordil. Monitor I/O, weight. Cardiology following.   
  
3. ASPEN/CKD 3: Cr worsening. Not a candidate for dialysis. Evaluated by Renal. on IV diuretic.  
  
4. COPD: stable. Cont LABA/ICS, nebs prn 
  
5. HTN: cont hydralazine, bumex, isordil 
  
6. DM type 2: A1C 6.5%. Cont glimepiride. Cont SSI 
  
7. PUD/recent GI bleed: cont IV PPI bid 
  
8. Anemia: due to recent GI bleed and Iron def. S/p 1u RBCs, s/p IV iron. Cont to monitor Hgb closely 
  
9. Chronic afib: not on anticoagulation due to GI bleed. HR stable. Not on AV moreno blocker 
  
10. Pressure ulcers: stage 2 decub, stage 2 left lateral BKA. POA. Cont wound care 
  
11. UTI: prior urine Cx w/ proteus, klebsiella. Was on IV aztreonam and now on levaquibn  
  
12. Debility: cont PT/OT. Needs hospice. Will continue family discussion 13. Hyponatremia. Likely due to diuretic. Worsening. continue to monitor. Renal following Subjective: Chief Complaint:  Follow up of pt who was admitted with acute on chronic respiratory failure. C/o nausea. Pt used BiPAP until 2:30 am 
 
ROS: 
(bold if positive, if negative) Nausea/ 
 Tolerating PT  Tolerating Diet Objective:  
 
Last 24hrs VS reviewed since prior progress note. Most recent are: 
 
Visit Vitals /73 (BP 1 Location: Left arm, BP Patient Position: At rest) Pulse 80 Temp 98 °F (36.7 °C) Resp 18 Ht 6' (1.829 m) Wt (!) 187.8 kg (414 lb 1.6 oz) SpO2 97% BMI 56.16 kg/m² SpO2 Readings from Last 6 Encounters:  
07/05/19 97% 07/18/18 97% 06/23/17 93% 06/18/17 94% 05/06/17 96% 11/17/16 98% O2 Flow Rate (L/min): 3 l/min Intake/Output Summary (Last 24 hours) at 7/5/2019 9200 Last data filed at 7/4/2019 1750 Gross per 24 hour Intake 300 ml Output  Net 300 ml Physical Exam: 
 
Gen:  obese, in mild acute distress HEENT:  Pink conjunctivae, PERRL, hearing intact to voice, moist mucous membranes Neck:  Supple, without masses, thyroid non-tender Resp:  No accessory muscle use, decreased breath sounds Card:  No murmurs, normal S1, S2 without thrills, bruits. ++ peripheral edema Abd:  Soft, non-tender, non-distended, normoactive bowel sounds are present, no palpable organomegaly and no detectable hernias Lymph:  No cervical or inguinal adenopathy Musc:  No cyanosis or clubbing. BL BKA Skin:  No rashes or ulcers, skin turgor is good Neuro:  Cranial nerves are grossly intact, no focal motor weakness, follows commands appropriately Psych:  Good insight, oriented to person, place and time, alert 
__________________________________________________________________ Medications Reviewed: (see below) Medications:  
 
Current Facility-Administered Medications Medication Dose Route Frequency  metOLazone (ZAROXOLYN) tablet 5 mg  5 mg Oral BID  levoFLOXacin (LEVAQUIN) 750 mg in D5W IVPB  750 mg IntraVENous Q48H  calcitRIOL (ROCALTROL) capsule 0.25 mcg  0.25 mcg Oral Once per day on Mon Tue Wed Thu Fri  
 0.9% sodium chloride infusion 250 mL  250 mL IntraVENous PRN  
 glimepiride (AMARYL) tablet 2 mg  2 mg Oral BID  
  dextrose 10% infusion 125-250 mL  125-250 mL IntraVENous PRN  
 insulin lispro (HUMALOG) injection   SubCUTAneous AC&HS  isosorbide dinitrate (ISORDIL) tablet 20 mg  20 mg Oral TID  hydrALAZINE (APRESOLINE) tablet 10 mg  10 mg Oral TID  albuterol-ipratropium (DUO-NEB) 2.5 MG-0.5 MG/3 ML  3 mL Nebulization Q4HWA RT  
 pantoprazole (PROTONIX) tablet 40 mg  40 mg Oral ACB&D  
 sodium chloride (NS) flush 5-40 mL  5-40 mL IntraVENous Q8H  
 sodium chloride (NS) flush 5-40 mL  5-40 mL IntraVENous PRN  
 sodium chloride (NS) flush 5-40 mL  5-40 mL IntraVENous Q8H  
 sodium chloride (NS) flush 5-40 mL  5-40 mL IntraVENous PRN  
 acetaminophen (TYLENOL) tablet 650 mg  650 mg Oral Q4H PRN  
 ondansetron (ZOFRAN) injection 4 mg  4 mg IntraVENous Q4H PRN  
 docusate sodium (COLACE) capsule 100 mg  100 mg Oral BID  bumetanide (BUMEX) injection 2 mg  2 mg IntraVENous Q12H  
 albuterol (PROVENTIL VENTOLIN) nebulizer solution 2.5 mg  2.5 mg Nebulization Q2H PRN  
 arformoterol (BROVANA) neb solution 15 mcg  15 mcg Nebulization BID RT  
 budesonide (PULMICORT) 500 mcg/2 ml nebulizer suspension  500 mcg Nebulization BID RT  
 nystatin (MYCOSTATIN) 100,000 unit/gram powder   Topical BID  
 glucose chewable tablet 16 g  4 Tab Oral PRN  
 glucagon (GLUCAGEN) injection 1 mg  1 mg IntraMUSCular PRN  
 ferrous gluconate 324 mg (38 mg iron) tablet 1 Tab  1 Tab Oral ACB  polyethylene glycol (MIRALAX) packet 17 g  17 g Oral QHS  sevelamer carbonate (RENVELA) tab 1,600 mg  1,600 mg Oral TID WITH MEALS  
 aspirin delayed-release tablet 81 mg  81 mg Oral DAILY  zinc oxide-cod liver oil (DESITIN) 40 % paste   Topical PRN  
 haloperidol lactate (HALDOL) injection 2 mg  2 mg IntraVENous Q4H PRN Lab Data Reviewed: (see below) Lab Review:  
 
Recent Labs  
  07/05/19 
0320 07/04/19 
0452 07/03/19 
0351 WBC 6.5 7.5 8.1 HGB 7.3* 7.7* 7.9*  
HCT 25.5* 26.8* 27.3*  
 168 155 Recent Labs 07/05/19 
0320 07/04/19 
9586 07/03/19 
5914 * 135* 138  
K 4.6 4.8 4.6 CL 94* 97 98 CO2 34* 34* 36* * 204* 289* BUN 66* 60* 51* CREA 2.62* 2.45* 2.10* CA 8.3* 8.3* 8.4* MG 2.1 2.1 2.0 PHOS 4.5 4.5 3.8 ALB 2.2* 2.3* 2.5* Lab Results Component Value Date/Time Glucose (POC) 138 (H) 07/05/2019 06:06 AM  
 Glucose (POC) 239 (H) 07/04/2019 08:38 PM  
 Glucose (POC) 161 (H) 07/04/2019 04:31 PM  
 Glucose (POC) 208 (H) 07/04/2019 12:08 PM  
 Glucose (POC) 209 (H) 07/04/2019 06:02 AM  
 
Recent Labs  
  07/04/19 
1654 07/04/19 
1420 07/04/19 
1200 PH 7.35 7.26* 7.23* PCO2 65* 77* 88* PO2 139* 107* 105* HCO3 35* 34* 36* FIO2 40 40  -- No results for input(s): INR in the last 72 hours. No lab exists for component: INREXT All Micro Results Procedure Component Value Units Date/Time CULTURE, BLOOD [[id number]] Collected:  07/01/19 1229 Order Status:  Completed Specimen:  Blood Updated:  07/05/19 0703 Special Requests: NO SPECIAL REQUESTS Culture result: NO GROWTH 4 DAYS     
 CULTURE, BLOOD [[id number]] Collected:  07/01/19 1229 Order Status:  Completed Specimen:  Blood Updated:  07/05/19 0703 Special Requests: NO SPECIAL REQUESTS Culture result: NO GROWTH 4 DAYS     
 CULTURE, BLOOD, PERIPHERAL [[id number]] Collected:  06/28/19 1446 Order Status:  Completed Specimen:  Blood Updated:  07/04/19 8998 Special Requests: NO SPECIAL REQUESTS Culture result: NO GROWTH 6 DAYS     
 CULTURE, URINE [[id number]]  (Abnormal)  (Susceptibility) Collected:  07/01/19 1406 Order Status:  Completed Specimen:  Urine Updated:  07/03/19 1049 Special Requests: --     
  NO SPECIAL REQUESTS Reflexed from [id number] [place] Count --     
  [id number] COLONIES/mL Culture result: KLEBSIELLA OXYTOCA PROTEUS MIRABILIS     
 CULTURE, URINE [[id number]] Collected:  06/28/19 8551 Order Status:  Completed Specimen:  Urine from Clean catch Updated:  06/30/19 7595 Special Requests: NO SPECIAL REQUESTS Culture result:    
  >2 ORGANISMS - CONTAMINATED SPECIMEN. SUGGEST RECOLLECTION  CONSISTING OF AT LEAST 4 COLONY TYPES/STRAINS OF GRAM NEGATIVE RODS  
     
 CULTURE, BLOOD, PERIPHERAL [[id number]]  (Abnormal) Collected:  06/28/19 1446 Order Status:  Completed Specimen:  Blood Updated:  06/30/19 7613 Special Requests: NO SPECIAL REQUESTS Culture result: MIXED STAPHYLOCOCCUS SPECIES, COAGULASE NEGATIVE GROWING IN 1 OF 2 BOTTLES DRAWN SITE = NO SITE INDICATED PLATES WILL BE HELD 3 DAYS. CALL [phone] IF SENSITIVITIES ARE NEEDED. CALLED TO AND READ BACK BY 
JEANETTE CROSS RN, AT 3223 ON 6.29.19 RB 
  
      
  REMAINING BOTTLE(S) HAS/HAVE NO GROWTH SO FAR  
     
 CULTURE, MRSA [[id number]] Collected:  06/28/19 1829 Order Status:  Completed Specimen:  Nares Updated:  06/29/19 2327 Special Requests: NO SPECIAL REQUESTS Culture result: MRSA NOT PRESENT Screening of patient nares for MRSA is for surveillance purposes and, if positive, to facilitate isolation considerations in high risk settings. It is not intended for automatic decolonization interventions per se as regimens are not sufficiently effective to warrant routine use. RESPIRATORY PANEL,PCR,NASOPHARYNGEAL [[id number]] Collected:  06/28/19 1829 Order Status:  Completed Specimen:  Nasopharyngeal Updated:  06/28/19 2245 Adenovirus NOT DETECTED Coronavirus 229E NOT DETECTED Coronavirus HKU1 NOT DETECTED Coronavirus CVNL63 NOT DETECTED Coronavirus OC43 NOT DETECTED Metapneumovirus NOT DETECTED Rhinovirus and Enterovirus NOT DETECTED Influenza A NOT DETECTED Influenza A, subtype H1 NOT DETECTED Influenza A, subtype H3 NOT DETECTED   INFLUENZA A H1N1 PCR NOT DETECTED     
 Influenza B NOT DETECTED Parainfluenza 1 NOT DETECTED Parainfluenza 2 NOT DETECTED Parainfluenza 3 NOT DETECTED Parainfluenza virus 4 NOT DETECTED     
  RSV by PCR NOT DETECTED Bordetella pertussis - PCR NOT DETECTED Chlamydophila pneumoniae DNA, QL, PCR NOT DETECTED Mycoplasma pneumoniae DNA, QL, PCR NOT DETECTED     
 CULTURE, MRSA [[id number]] Collected:  06/28/19 1745 Order Status:  Canceled Specimen:  Nares URINE CULTURE HOLD SAMPLE [[id number]] Collected:  06/28/19 1610 Order Status:  Completed Specimen:  Urine from Serum Updated:  06/28/19 1617 Urine culture hold URINE ON HOLD IN MICROBIOLOGY DEPT FOR 3 DAYS. IF UNPRESERVED URINE IS SUBMITTED, IT CANNOT BE USED FOR ADDITIONAL TESTING AFTER 24 HRS, RECOLLECTION WILL BE REQUIRED. I have reviewed notes of prior 24hr. Other pertinent lab: Total time spent with patient: 28 Care Plan discussed with: Patient, Nursing Staff and >50% of time spent in counseling and coordination of care Discussed:  Care Plan Prophylaxis:  SCD's Disposition:  poor prognosis  
        
___________________________________________________ Attending Physician: Stefano Weinberg MD

## 2019-07-06 NOTE — PROGRESS NOTES
[facility] Critical Care, and Sleep Medicine Name: Jessica Boyer MRN: [id number] : 1960 Hospital: [address] [facility] Date: 2019 IMPRESSION:  
· Acute on chronic hypoxemic/hypercapneic respiratory failure · Decompensated heart failure · COPD, not in acute exacerbation · Suspected JEROD/OHS · Pulmonary HTN - group 2,3 
· Hypoglycemia · ASPEN vs CKD · H/o CVA · HTN 
· H/o afib · Recent GIB 
· H/o non-compliance RECOMMENDATIONS:  
Supplemental O2 as needed to keep sats > 88% NIPPV whenever sleeping. Recommend trilogy on discharge Would continue IV diuresis given ongoing O2 requirements, hypercapnia and pitting edema. Bumex/zaroxolyn/spironolactone as per renal 
Follow cultures Continue duonebs, brovana, pulmicort Needs PFTs, sleep study as an outpatient Palliative following Not sure what is left to offer if he continues to refuse bipap. Would otherwise need to consider intubation/trach or hospice (which seems most appropriate). Family seems to be leaning towards comfort measures only Subjective:  
 
Mr. Aubery Hatchet is a 64yo male w/ history of COPD, CKD, HTN, afib, PVD s/p bilateral BKA, HLD, CVA, DM and non-compliance who presented to the ER on  w/ complaints of feeling \"sick. \"  He has been lethargic and weak. Also complains of chest pain, shortness of breath, swelling \"everywhere\" and a cough productive of white sputum. Cough sounds very wet, congested. Thinks several family members have recently had the flu. Admitted to the ICU on bipap. ABG in ED reportedly: . [facility]. Also says that he went to [facility] twice in the past month or so for swelling but that nothing was done. Says that he was diagnosed w/ COPD 35 years ago. No recent PFTs. Followed by his PCP. Uses combivent at home. Also says that he was diagnosed w/ JEROD in  but never treated. No recent evaluation. 6/29 - echo: EF 61-65%, RV not well seen, mild MS, PASP 75 *blood cultures (6/28) - 1/4 w/ CoNS --> suspect contaminant *all other cultures NGTD *MRSA screen and RVP negative 24hr events: 
Continues to refuse bipap Renal function worse Past Medical History:  
Diagnosis Date  CAD (coronary artery disease)   
 pvc  CKD (chronic kidney disease) stage 3, GFR 30-59 ml/min ([facility]) 6/29/2019  COPD  Diabetes ([facility]  GI bleed 6/2019: [place]. Found small bowel ulcer. S/P 3 unit transfusion.  Heart failure ([facility]  Hypertension  Mitral regurgitation  Morbidly obese ([facility]  PUD (peptic ulcer disease) 6/29/2019 Past Surgical History:  
Procedure Laterality Date  HX ORTHOPAEDIC    
 right leg surgery Prior to Admission medications Medication Sig Start Date End Date Taking? Authorizing Provider  
ferrous gluconate 324 mg (38 mg iron) tablet Take 324 mg by mouth Daily (before breakfast). Yes Provider, Historical  
bumetanide (BUMEX) 2 mg tablet Take 4 mg by mouth two (2) times a day. Yes Provider, Historical  
sevelamer carbonate (RENVELA) 800 mg tab tab Take 1,600 mg by mouth three (3) times daily (with meals). Yes Provider, Historical  
pantoprazole (PROTONIX) 40 mg tablet Take 40 mg by mouth two (2) times a day. Yes Provider, Historical  
calcitRIOL (ROCALTROL) 0.25 mcg capsule Take 0.25 mcg by mouth five (5) days a week. Yes Provider, Historical  
insulin aspart U-100 (NOVOLOG U-100 INSULIN ASPART) 100 unit/mL injection by SubCUTAneous route. Yes Provider, Historical  
glimepiride (AMARYL) 2 mg tablet Take 2 mg by mouth two (2) times a day. Yes Provider, Historical  
aspirin (ASPIRIN) 325 mg tablet Take 325 mg by mouth daily. Yes Provider, Historical  
ipratropium-albuterol (COMBIVENT RESPIMAT)  mcg/actuation inhaler Take 1 Puff by inhalation every six (6) hours as needed for Wheezing.    Yes Provider, Historical  
 polyethylene glycol (MIRALAX) 17 gram packet Take 17 g by mouth nightly. Yes Provider, Historical  
docusate sodium (COLACE) 100 mg capsule Take 100 mg by mouth daily as needed for Constipation. Yes Provider, Historical  
 
Allergies Allergen Reactions  Cephalexin Other (comments) Throat starting closing up after 2nd dose  Codeine Anaphylaxis  Insulin Regular Other (comments) Temp blindness and facial droop only with long acting insulin. Has been on short acting insulin without adverse.  Pcn [Penicillins] Anaphylaxis  Victoza [Liraglutide] Other (comments) Fascial droop, temp blindness  Ferrous Sulfate Other (comments) Ferrous sulfate caused significant constipation, able to tolerate ferrous gluconate without problems  Morphine Other (comments)  
  hallucinates  Statins-Hmg-Coa Reductase Inhibitors Hives, Itching and Nausea and Vomiting Social History Tobacco Use  Smoking status: Former Smoker  Smokeless tobacco: Never Used Substance Use Topics  Alcohol use: No  
  
Family History Problem Relation Age of Onset  Hypertension Father Current Facility-Administered Medications Medication Dose Route Frequency  ferrous gluconate 324 mg (38 mg iron) tablet 1 Tab  1 Tab Oral TID WITH MEALS  metOLazone (ZAROXOLYN) tablet 5 mg  5 mg Oral BID  levoFLOXacin (LEVAQUIN) 750 mg in D5W IVPB  750 mg IntraVENous Q48H  calcitRIOL (ROCALTROL) capsule 0.25 mcg  0.25 mcg Oral Once per day on Mon Tue Wed Thu Fri  
 glimepiride (AMARYL) tablet 2 mg  2 mg Oral BID  insulin lispro (HUMALOG) injection   SubCUTAneous AC&HS  isosorbide dinitrate (ISORDIL) tablet 20 mg  20 mg Oral TID  hydrALAZINE (APRESOLINE) tablet 10 mg  10 mg Oral TID  albuterol-ipratropium (DUO-NEB) 2.5 MG-0.5 MG/3 ML  3 mL Nebulization Q4HWA RT  
 pantoprazole (PROTONIX) tablet 40 mg  40 mg Oral ACB&D  
 sodium chloride (NS) flush 5-40 mL  5-40 mL IntraVENous Q8H  
  sodium chloride (NS) flush 5-40 mL  5-40 mL IntraVENous Q8H  
 docusate sodium (COLACE) capsule 100 mg  100 mg Oral BID  arformoterol (BROVANA) neb solution 15 mcg  15 mcg Nebulization BID RT  
 budesonide (PULMICORT) 500 mcg/2 ml nebulizer suspension  500 mcg Nebulization BID RT  
 nystatin (MYCOSTATIN) 100,000 unit/gram powder   Topical BID  polyethylene glycol (MIRALAX) packet 17 g  17 g Oral QHS  sevelamer carbonate (RENVELA) tab 1,600 mg  1,600 mg Oral TID WITH MEALS  
 aspirin delayed-release tablet 81 mg  81 mg Oral DAILY Objective: 
Vital Signs:   
Visit Vitals /78 (BP 1 Location: Left arm, BP Patient Position: At rest) Pulse 71 Temp 97.9 °F (36.6 °C) Resp 18 Ht 6' (1.829 m) Wt (!) 194.9 kg (429 lb 11.2 oz) SpO2 97% BMI 58.28 kg/m² O2 Device: Nasal cannula O2 Flow Rate (L/min): 3 l/min Temp (24hrs), Av.2 °F (36.8 °C), Min:97.3 °F (36.3 °C), Max:98.7 °F (37.1 °C) Intake/Output:  
Last shift:      No intake/output data recorded. Last 3 shifts:  1901 -  0700 In: 220 [P.O.:200; I.V.:20] Out: - Intake/Output Summary (Last 24 hours) at 2019 1239 Last data filed at 2019 3957 Gross per 24 hour Intake 220 ml Output  Net 220 ml Physical Exam:  
General:  Morbidly obese Head:  NCAT Eyes:  PERRL Nose: Throat: Crowded oropharynx Neck: thick Back:     
Lungs:   Diminished breath sounds Chest wall:    
Heart:  Regular rate and rhythm Abdomen:   Soft, non-tender. Bowel sounds normal  
Extremities: Anasarca, bilateral BKA Pulses:   
Skin: No rashes Lymph nodes: No cervical nodes Neurologic: Oriented x 3 Data review:  
 
Recent Results (from the past 24 hour(s)) GLUCOSE, POC Collection Time: 19  4:17 PM  
Result Value Ref Range Glucose (POC) 181 (H) 65 - 100 mg/dL Performed by Lor Hernandez (PCT) GLUCOSE, POC Collection Time: 19  8:41 PM  
Result Value Ref Range Glucose (POC) 170 (H) 65 - 100 mg/dL Performed by Benson Fuchs (PCT) GLUCOSE, POC Collection Time: 07/06/19  2:32 AM  
Result Value Ref Range Glucose (POC) 189 (H) 65 - 100 mg/dL Performed by Niharika Hernandez RENAL FUNCTION PANEL Collection Time: 07/06/19  3:19 AM  
Result Value Ref Range Sodium 130 (L) 136 - 145 mmol/L Potassium 5.6 (H) 3.5 - 5.1 mmol/L Chloride 93 (L) 97 - 108 mmol/L  
 CO2 31 21 - 32 mmol/L Anion gap 6 5 - 15 mmol/L Glucose 165 (H) 65 - 100 mg/dL BUN 80 (H) 6 - 20 MG/DL Creatinine 3.11 (H) 0.70 - 1.30 MG/DL  
 BUN/Creatinine ratio 26 (H) 12 - 20 GFR est AA 25 (L) >60 ml/min/1.73m2 GFR est non-AA 21 (L) >60 ml/min/1.73m2 Calcium 8.6 8.5 - 10.1 MG/DL Phosphorus 5.9 (H) 2.6 - 4.7 MG/DL Albumin 2.2 (L) 3.5 - 5.0 g/dL MAGNESIUM Collection Time: 07/06/19  3:19 AM  
Result Value Ref Range Magnesium 2.2 1.6 - 2.4 mg/dL CBC WITH AUTOMATED DIFF Collection Time: 07/06/19  3:19 AM  
Result Value Ref Range WBC 7.9 4.1 - 11.1 K/uL  
 RBC 2.89 (L) 4.10 - 5.70 M/uL HGB 7.9 (L) 12.1 - 17.0 g/dL HCT 27.2 (L) 36.6 - 50.3 % MCV 94.1 80.0 - 99.0 FL  
 MCH 27.3 26.0 - 34.0 PG  
 MCHC 29.0 (L) 30.0 - 36.5 g/dL  
 RDW 15.5 (H) 11.5 - 14.5 % PLATELET 688 181 - 737 K/uL MPV 9.9 8.9 - 12.9 FL  
 NRBC 0.0 0  WBC ABSOLUTE NRBC 0.00 0.00 - 0.01 K/uL NEUTROPHILS 74 32 - 75 % LYMPHOCYTES 10 (L) 12 - 49 % MONOCYTES 10 5 - 13 % EOSINOPHILS 5 0 - 7 % BASOPHILS 1 0 - 1 % IMMATURE GRANULOCYTES 1 (H) 0.0 - 0.5 % ABS. NEUTROPHILS 5.9 1.8 - 8.0 K/UL  
 ABS. LYMPHOCYTES 0.8 0.8 - 3.5 K/UL  
 ABS. MONOCYTES 0.8 0.0 - 1.0 K/UL  
 ABS. EOSINOPHILS 0.4 0.0 - 0.4 K/UL  
 ABS. BASOPHILS 0.1 0.0 - 0.1 K/UL  
 ABS. IMM. GRANS. 0.1 (H) 0.00 - 0.04 K/UL  
 DF AUTOMATED    
GLUCOSE, POC Collection Time: 07/06/19  7:07 AM  
Result Value Ref Range Glucose (POC) 149 (H) 65 - 100 mg/dL Performed by Grant Reynoso GLUCOSE, POC Collection Time: 07/06/19 12:04 PM  
Result Value Ref Range Glucose (POC) 122 (H) 65 - 100 mg/dL Performed by Lidia Trujillo Imaging: 
I have personally reviewed the patients radiographs and have reviewed the reports: 
  
 
  
Zeynep Roy MD 
 
[facility]

## 2019-07-06 NOTE — PROGRESS NOTES
[facility] [address] YOB: 1960 Assessment & Plan:  
ASPEN/Cardiorenal syndrome - worsening 
- now with Hyperkalemia CKD 3 Severe pulm HTN with Cor Pulmonale Anasarca:not much response to combination diuretics OHS/JEROD likely, poor tolerance of NIPPV B AKA Debility Anemia with Fe def Rec: 
Not really responding to Bumex + metolazone + aldactone DC Aldactone due to hyperkalemia and give SPS As we are making no progress, will need to consider whether to pursue more aggressive options like trach and dialysis vs. Focusing on comfort. Palliative care is involved. Family leaning towards Comfort care Subjective:  
CC: f/u ASPEN, edema HPI: ASPEN is worse,k worse, no urine reported Family is having active discussions about comfort care. ROS: feels bad, stable SOB +edema, no improvement in UOP Current Facility-Administered Medications Medication Dose Route Frequency  sodium polystyrene (KAYEXALATE) 15 gram/60 mL oral suspension 30 g  30 g Oral NOW  ferrous gluconate 324 mg (38 mg iron) tablet 1 Tab  1 Tab Oral TID WITH MEALS  metOLazone (ZAROXOLYN) tablet 5 mg  5 mg Oral BID  levoFLOXacin (LEVAQUIN) 750 mg in D5W IVPB  750 mg IntraVENous Q48H  calcitRIOL (ROCALTROL) capsule 0.25 mcg  0.25 mcg Oral Once per day on Mon Tue Wed Thu Fri  
 0.9% sodium chloride infusion 250 mL  250 mL IntraVENous PRN  
 glimepiride (AMARYL) tablet 2 mg  2 mg Oral BID  dextrose 10% infusion 125-250 mL  125-250 mL IntraVENous PRN  
 insulin lispro (HUMALOG) injection   SubCUTAneous AC&HS  isosorbide dinitrate (ISORDIL) tablet 20 mg  20 mg Oral TID  hydrALAZINE (APRESOLINE) tablet 10 mg  10 mg Oral TID  albuterol-ipratropium (DUO-NEB) 2.5 MG-0.5 MG/3 ML  3 mL Nebulization Q4HWA RT  
 pantoprazole (PROTONIX) tablet 40 mg  40 mg Oral ACB&D  
 sodium chloride (NS) flush 5-40 mL  5-40 mL IntraVENous Q8H  
  sodium chloride (NS) flush 5-40 mL  5-40 mL IntraVENous PRN  
 sodium chloride (NS) flush 5-40 mL  5-40 mL IntraVENous Q8H  
 sodium chloride (NS) flush 5-40 mL  5-40 mL IntraVENous PRN  
 acetaminophen (TYLENOL) tablet 650 mg  650 mg Oral Q4H PRN  
 ondansetron (ZOFRAN) injection 4 mg  4 mg IntraVENous Q4H PRN  
 docusate sodium (COLACE) capsule 100 mg  100 mg Oral BID  albuterol (PROVENTIL VENTOLIN) nebulizer solution 2.5 mg  2.5 mg Nebulization Q2H PRN  
 arformoterol (BROVANA) neb solution 15 mcg  15 mcg Nebulization BID RT  
 budesonide (PULMICORT) 500 mcg/2 ml nebulizer suspension  500 mcg Nebulization BID RT  
 nystatin (MYCOSTATIN) 100,000 unit/gram powder   Topical BID  
 glucose chewable tablet 16 g  4 Tab Oral PRN  
 glucagon (GLUCAGEN) injection 1 mg  1 mg IntraMUSCular PRN  polyethylene glycol (MIRALAX) packet 17 g  17 g Oral QHS  sevelamer carbonate (RENVELA) tab 1,600 mg  1,600 mg Oral TID WITH MEALS  
 aspirin delayed-release tablet 81 mg  81 mg Oral DAILY  zinc oxide-cod liver oil (DESITIN) 40 % paste   Topical PRN  
 haloperidol lactate (HALDOL) injection 2 mg  2 mg IntraVENous Q4H PRN Objective:  
 
Vitals: 
Blood pressure 117/72, pulse 73, temperature 98.7 °F (37.1 °C), resp. rate 18, height 6' (1.829 m), weight (!) 194.9 kg (429 lb 11.2 oz), SpO2 96 %. Temp (24hrs), Av.2 °F (36.8 °C), Min:97.3 °F (36.3 °C), Max:98.7 °F (37.1 °C) Intake and Output: 
No intake/output data recorded.  1901 -  0700 In: 220 [P.O.:200; I.V.:20] Out: - Physical Exam:              
GENERAL ASSESSMENT: Obese NAD 
CHEST: decreased BS, on NC O2 
HEART: S1S2 ABDOMEN: Soft,NT 
: +scrotal edema EXTREMITY: ++edema, B BKA 
 
   
ECG/rhythm: 
 
Data Review No results for input(s): TNIPOC in the last 72 hours. No lab exists for component: ITNL No results for input(s): CPK, CKMB, TROIQ in the last 72 hours. Recent Labs  
  19 
0319 19 96 [id number] 07/04/19 
5956 * 132* 135*  
K 5.6* 4.6 4.8  
CL 93* 94* 97  
CO2 31 34* 34* BUN 80* 66* 60* CREA 3.11* 2.62* 2.45* * 151* 204* PHOS 5.9* 4.5 4.5 MG 2.2 2.1 2.1 CA 8.6 8.3* 8.3* ALB 2.2* 2.2* 2.3* WBC 7.9 6.5 7.5 HGB 7.9* 7.3* 7.7* HCT 27.2* 25.5* 26.8*  
 157 168 No results for input(s): INR, PTP, APTT in the last 72 hours. No lab exists for component: INREXT, INREXT Needs: urine analysis, urine sodium, protein and creatinine Lab Results Component Value Date/Time Sodium,urine random 88 07/15/2018 10:06 PM  
 Creatinine, urine 45.48 07/16/2018 02:27 PM  
 
 
 
 
: Shakir Chinchilla MD 
7/6/2019 [facility]: 
[email or web address] Nadya Mercado office: 
[address] [place] [address], [address] Phone: [phone] Fax :     [phone] [place] office: 
[address] [facility] Phone - [phone] Fax - [phone]

## 2019-07-06 NOTE — PROGRESS NOTES
Music Therapy Assessment [address] [id number]    
1960  60 y.o.  male Patient Telephone Number: [phone] (home) Sabianism Affiliation: No Restorationist Language: [place] Patient Active Problem List  
 Diagnosis Date Noted  Weakness generalized  Physical debility  Goals of care, counseling/discussion  DNR (do not resuscitate) discussion  Cor pulmonale (chronic) ([facility] [address] 06/30/2019  CKD (chronic kidney disease) stage 3, GFR 30-59 ml/min ([facility]) 06/29/2019  Acute metabolic encephalopathy 63/56/3007  PUD (peptic ulcer disease) 06/29/2019  Acute on chronic respiratory failure with hypoxia and hypercapnia ([facility] 06/28/2019  Persistent atrial fibrillation ([facility] [address] 06/28/2019  Mitral regurgitation  Anemia 07/16/2018  Hypertriglyceridemia 07/16/2018  Acute renal failure (ARF) ([facility] 07/15/2018  DM type 2 causing renal disease, not at goal [facility]) 07/15/2018  Bradycardia 07/15/2018  Hyponatremia 07/15/2018  Morbid obesity ([facility] 07/15/2018  COPD (chronic obstructive pulmonary disease) ([facility] 07/15/2018  Acute hyperkalemia 06/18/2017 Date: 7/6/2019            Total Time (in minutes): 5          [facility] [place] [place] Mental Status:   [x] Alert [  ] [place] [  ]  Confused  [  ] Minimally responsive  [  ] Sleeping Communication Status: [  ] Impaired Speech [  ] Nonverbal -N/A Physical Status:   [  ] Oxygen in use  [  ] Hard of Hearing [  ] Vision Impaired [  ] Ambulatory  [  ] Ambulatory with assistance [  ] Non-ambulatory -N/A Music Preferences, Background: [place], including [address] [facility] [address] [facility], some [place], and [place]. Pt used to play guitar in a Gospel band. Pt shared that he always enjoy listening to music. Clinical Problem addressed: N/A: Please see Session Observations below. Goal(s) met in session: N/A: Please see Session Observations below.   
Physical/Pain management (Scale of 1-10):   
 Pre-session rating: N/A: Please see Session Observations below. Post-session rating: N/A: Please see Session Observations below. [  ] Increased relaxation   [  ] Affected breathing patterns [  ] Decreased muscle tension   [  ] Decreased agitation [  ] Affected heart rate    [  ] Increased alertness Emotional/Psychological: 
[  ] Increased self-expression   [  ] Decreased aggressive behavior [  ] Decreased feelings of stress  [  ] Discussed healthy coping skills [  ] Improved mood    [  ] Decreased withdrawn behavior Social: 
[  ] Decreased feelings of isolation/loneliness [  ] Positive social interaction [  ] Provided support and/or comfort for family/friends Spiritual: 
[  ] Spiritual support    [  ] Expressed peace [  ] Expressed cyndy    [  ] Discussed beliefs Techniques Utilized (Check all that apply): N/A: Please see Session Observations below. [  ] Procedural support MT [  ] Music for relaxation [  ] Patient preferred music 
[  ] Diana analysis  [  ] Song choice  [  ] Music for validation [  ] Entrainment  [  ] Movement to music [  ] Guided visualization [  ] Rogers Reddy  [  ] Patient instrument playing [  ] Kaleb Andrews writing [  ] Edu Perez along   [  ] Bernabe Phoenix  [  ] Sensory stimulation [  ] Active Listening  [  ] Music for spiritual support [  ] Making of CDs as gifts Session Observations: F/up visit and referral from Eduar Crow Palliative NP. This music therapy intern (MT intern) attempted to see this patient and learned from pt's family that pt's care team was providing care for pt. MT intern expressed understanding and will follow as able. Peeraporn \"Nicole\" Kim Rebolledo Music Therapy Intern Spiritual Care Department Referral-based service

## 2019-07-06 NOTE — PROGRESS NOTES
Problem: Pressure Injury - Risk of 
Goal: *Prevention of pressure injury Description Document Jesus Scale and appropriate interventions in the flowsheet. Outcome: Progressing Towards Goal 
Note:  
Pressure Injury Interventions: 
Sensory Interventions: Assess changes in LOC, Assess need for specialty bed, Discuss PT/OT consult with provider, Maintain/enhance activity level Moisture Interventions: Absorbent underpads, Assess need for specialty bed, Check for incontinence Q2 hours and as needed, Maintain skin hydration (lotion/cream) Activity Interventions: Assess need for specialty bed, Increase time out of bed, Pressure redistribution bed/mattress(bed type), PT/OT evaluation Mobility Interventions: Assess need for specialty bed, Pressure redistribution bed/mattress (bed type), Turn and reposition approx. every two hours(pillow and wedges) Nutrition Interventions: Document food/fluid/supplement intake, Discuss nutritional consult with provider Friction and Shear Interventions: Apply protective barrier, creams and emollients, Foam dressings/transparent film/skin sealants, Lift team/patient mobility team 
 
 Both lower extremities which are bilateral AKA being put on sheldon  boots to prevent further pressure ulcer which presents on left stump.

## 2019-07-06 NOTE — PROGRESS NOTES
Music Therapy Assessment [address] [id number]    
1960  60 y.o.  male Patient Telephone Number: [phone] (home) Jainism Affiliation: No Hoahaoism Language: [place] Patient Active Problem List  
 Diagnosis Date Noted  Weakness generalized  Physical debility  Goals of care, counseling/discussion  DNR (do not resuscitate) discussion  Cor pulmonale (chronic) ([facility] 06/30/2019  CKD (chronic kidney disease) stage 3, GFR 30-59 ml/min ([facility]) 06/29/2019  Acute metabolic encephalopathy 82/92/2942  PUD (peptic ulcer disease) 06/29/2019  Acute on chronic respiratory failure with hypoxia and hypercapnia ([facility] 06/28/2019  Persistent atrial fibrillation ([facility] 06/28/2019  Mitral regurgitation  Anemia 07/16/2018  Hypertriglyceridemia 07/16/2018  Acute renal failure (ARF) ([facility] 07/15/2018  DM type 2 causing renal disease, not at goal [facility]) 07/15/2018  Bradycardia 07/15/2018  Hyponatremia 07/15/2018  Morbid obesity ([facility] 07/15/2018  COPD (chronic obstructive pulmonary disease) ([facility] 07/15/2018  Acute hyperkalemia 06/18/2017 Date: 7/6/2019            Total Time (in minutes): 5          [facility] 3 [facility] CARE TELE 2 Mental Status:   [  ] Alert [  ] Aro[place]o [place] [  ]  Confused  [  ] Minimally responsive  [x] Sleeping Communication Status: [  ] Impaired Speech [  ] Nonverbal -N/A Physical Status:   [  ] Oxygen in use  [  ] Hard of Hearing [  ] Vision Impaired [  ] Ambulatory  [  ] Ambulatory with assistance [  ] Non-ambulatory -N/A Music Preferences, Background: Country, including [address] [facility] and [address] [facility], some [place], and [place]. Pt used to play guitar in a Gospel band. Pt shared that he always enjoy listening to music. Clinical Problem addressed: N/A: Please see Session Observations below. Goal(s) met in session: N/A: Please see Session Observations below.   
Physical/Pain management (Scale of 1-10):   
 Pre-session rating: N/A: Please see Session Observations below. Post-session rating: N/A: Please see Session Observations below. [  ] Increased relaxation   [  ] Affected breathing patterns [  ] Decreased muscle tension   [  ] Decreased agitation [  ] Affected heart rate    [  ] Increased alertness Emotional/Psychological: 
[  ] Increased self-expression   [  ] Decreased aggressive behavior [  ] Decreased feelings of stress  [  ] Discussed healthy coping skills [  ] Improved mood    [  ] Decreased withdrawn behavior Social: 
[  ] Decreased feelings of isolation/loneliness [  ] Positive social interaction [  ] Provided support and/or comfort for family/friends Spiritual: 
[  ] Spiritual support    [  ] Expressed peace [  ] Expressed cyndy    [  ] Discussed beliefs Techniques Utilized (Check all that apply): N/A: Please see Session Observations below. [  ] Procedural support MT [  ] Music for relaxation [  ] Patient preferred music 
[  ] Diana analysis  [  ] Song choice  [  ] Music for validation [  ] Entrainment  [  ] Movement to music [  ] Guided visualization [  ] Myles Wilson  [  ] Patient instrument playing [  ] Milta Danes writing [  ] Bah Chinmay along   [  ] Louanna Frizzle  [  ] Sensory stimulation [  ] Active Listening  [  ] Music for spiritual support [  ] Making of CDs as gifts Session Observations: F/up visit; Patient was sleeping soundly. Pt's spouse and another female visitor (pt's family) were at bedside. Pt's family declined having music therapy saying pt needed to be sleep at this time. MT intern expressed understanding. Pt's family expressed thanked MT intern for the visit. Will follow as able. Peeradriana \"Nicole\" Kim Rebolledo Music Therapy Intern Spiritual Care Department Referral-based service

## 2019-07-06 NOTE — PROGRESS NOTES
Problem: Falls - Risk of 
Goal: *Absence of Falls Description Document Candida Lightning Fall Risk and appropriate interventions in the flowsheet.  
Outcome: Progressing Towards Goal

## 2019-07-06 NOTE — PROGRESS NOTES
1900 
Bedside and Verbal shift change report given to Roverto Ybarra (oncoming nurse) by Pia Angelo (offgoing nurse). Report included the following information SBAR, Kardex, Procedure Summary, Intake/Output, MAR, Accordion and Recent Results. Patient on bed with family on the bedside. Initial assessment done. Patient is complaining of nausea. PRN Zofran given. Will monitor. Primary Nurse Seth Hernandez and Zane Elizabeth RN performed a dual skin assessment on this patient Impairment noted- see wound doc flow sheet Jesus score is 11 
 
 
2130 Incontinence care done. Had a bowel movement. Put zinc on the bottom. Visit Vitals /56 Pulse 74 Temp 97.9 °F (36.6 °C) Resp 20 Ht 6' (1.829 m) Wt (!) 194.9 kg (429 lb 11.2 oz) SpO2 96% BMI 58.28 kg/m²  
 
 
 
0400 Patient complaining of difficulty of breathing. But refusing CPAP. O2 sat of 96% at 3L NC.  
 
0700 Bedside and Verbal shift change report given to Pia Angelo (oncoming nurse) by Artem Mcdaniel RN (offgoing nurse). Report included the following information SBAR, Kardex, Procedure Summary, Intake/Output, MAR and Accordion.

## 2019-07-06 NOTE — PROGRESS NOTES
Ramses Patel [facility] 79 
380 [facility], [address] [place] [address] 
[phone] Medical Progress Note NAME: Tony Pratt :  1960 MRM:  [id number] Date/Time: 2019  7:28 AM 
 
  
Assessment and Plan: 1. Acute on chronic respiratory failure with hypoxia and hypercapnia: secondary to COPD, cor pulmonale, CHF, super morbid obesity. Not much improvement. Poor prognosis. On chronic 2-3L O2 at home. Non-compliance on bipap. Cont O2, encourage bipap use. Pulm and palliative care following. On going discussion about goal of care  
  
2. Acute on chronic diastolic CHF/cor pulmonale/severe pulm HTN/anasarca: No improvement. Poor prognosis. Would benefit from hospice, but patient not ready for this yet. Appreciated palliative care FU. Was on IV bumex and drip, which were discontinued due to worsening renal function. continue hydralazine, isordil. Monitor I/O, weight. Cardiology following.   
  
3. ASPEN/CKD 3: Cr worsening. Not a candidate for dialysis. Evaluated by Renal. on IV diuretic.  
  
4. COPD: stable. Cont LABA/ICS, nebs prn 
  
5. HTN: cont hydralazine, bumex, isordil 
  
6. DM type 2: A1C 6.5%. Cont glimepiride. Cont SSI 
  
7. PUD/recent GI bleed: cont IV PPI bid 
  
8. Anemia: due to recent GI bleed and Iron def. S/p 1u RBCs, s/p IV iron. Cont to monitor Hgb closely 
  
9. Chronic afib: not on anticoagulation due to GI bleed. HR stable. Not on AV moreno blocker 
  
10. Pressure ulcers: stage 2 decub, stage 2 left lateral BKA. POA. Cont wound care 
  
11. UTI: prior urine Cx w/ proteus, klebsiella. Was on IV aztreonam and now on levaquin. Finished course of ABx  
  
12. Debility: cont PT/OT. Needs hospice. Will continue family discussion 13. Hyponatremia. Likely due to diuretic. Worsening. continue to monitor. Renal following 14. Hyperkalemia. Veltassa and monitor Subjective: Chief Complaint:  Follow up of pt who was admitted with acute on chronic respiratory failure. C/o SOB, fatigue. Had extensive discussion with pt and wife about goal of care. They want to discuss more ROS: 
(bold if positive, if negative) Nausea/ 
Tolerating PT  Tolerating Diet Objective:  
 
Last 24hrs VS reviewed since prior progress note. Most recent are: 
 
Visit Vitals /56 Pulse 74 Temp 97.9 °F (36.6 °C) Resp 20 Ht 6' (1.829 m) Wt (!) 194.9 kg (429 lb 11.2 oz) SpO2 96% BMI 58.28 kg/m² SpO2 Readings from Last 6 Encounters:  
07/06/19 96% 07/18/18 97% 06/23/17 93% 06/18/17 94% 05/06/17 96% 11/17/16 98% O2 Flow Rate (L/min): 3 l/min Intake/Output Summary (Last 24 hours) at 7/6/2019 8850 Last data filed at 7/6/2019 1446 Gross per 24 hour Intake 220 ml Output  Net 220 ml Physical Exam: 
 
Gen:  obese, in mild acute distress HEENT:  Pink conjunctivae, PERRL, hearing intact to voice, moist mucous membranes Neck:  Supple, without masses, thyroid non-tender Resp:  No accessory muscle use, decreased breath sounds Card:  No murmurs, normal S1, S2 without thrills, bruits. ++ peripheral edema Abd:  Soft, non-tender, non-distended, normoactive bowel sounds are present, no palpable organomegaly and no detectable hernias Lymph:  No cervical or inguinal adenopathy Musc:  No cyanosis or clubbing. BL BKA Skin:  No rashes or ulcers, skin turgor is good Neuro:  Cranial nerves are grossly intact, no focal motor weakness, follows commands appropriately Psych:  Good insight, oriented to person, place and time, alert 
__________________________________________________________________ Medications Reviewed: (see below) Medications:  
 
Current Facility-Administered Medications Medication Dose Route Frequency  patiromer calcium sorbitex (VELTASSA) powder 8.4 g  8.4 g Oral NOW  spironolactone (ALDACTONE) tablet 25 mg  25 mg Oral BID  
  ferrous gluconate 324 mg (38 mg iron) tablet 1 Tab  1 Tab Oral TID WITH MEALS  metOLazone (ZAROXOLYN) tablet 5 mg  5 mg Oral BID  levoFLOXacin (LEVAQUIN) 750 mg in D5W IVPB  750 mg IntraVENous Q48H  calcitRIOL (ROCALTROL) capsule 0.25 mcg  0.25 mcg Oral Once per day on Mon Tue Wed Thu Fri  
 0.9% sodium chloride infusion 250 mL  250 mL IntraVENous PRN  
 glimepiride (AMARYL) tablet 2 mg  2 mg Oral BID  dextrose 10% infusion 125-250 mL  125-250 mL IntraVENous PRN  
 insulin lispro (HUMALOG) injection   SubCUTAneous AC&HS  isosorbide dinitrate (ISORDIL) tablet 20 mg  20 mg Oral TID  hydrALAZINE (APRESOLINE) tablet 10 mg  10 mg Oral TID  albuterol-ipratropium (DUO-NEB) 2.5 MG-0.5 MG/3 ML  3 mL Nebulization Q4HWA RT  
 pantoprazole (PROTONIX) tablet 40 mg  40 mg Oral ACB&D  
 sodium chloride (NS) flush 5-40 mL  5-40 mL IntraVENous Q8H  
 sodium chloride (NS) flush 5-40 mL  5-40 mL IntraVENous PRN  
 sodium chloride (NS) flush 5-40 mL  5-40 mL IntraVENous Q8H  
 sodium chloride (NS) flush 5-40 mL  5-40 mL IntraVENous PRN  
 acetaminophen (TYLENOL) tablet 650 mg  650 mg Oral Q4H PRN  
 ondansetron (ZOFRAN) injection 4 mg  4 mg IntraVENous Q4H PRN  
 docusate sodium (COLACE) capsule 100 mg  100 mg Oral BID  albuterol (PROVENTIL VENTOLIN) nebulizer solution 2.5 mg  2.5 mg Nebulization Q2H PRN  
 arformoterol (BROVANA) neb solution 15 mcg  15 mcg Nebulization BID RT  
 budesonide (PULMICORT) 500 mcg/2 ml nebulizer suspension  500 mcg Nebulization BID RT  
 nystatin (MYCOSTATIN) 100,000 unit/gram powder   Topical BID  
 glucose chewable tablet 16 g  4 Tab Oral PRN  
 glucagon (GLUCAGEN) injection 1 mg  1 mg IntraMUSCular PRN  polyethylene glycol (MIRALAX) packet 17 g  17 g Oral QHS  sevelamer carbonate (RENVELA) tab 1,600 mg  1,600 mg Oral TID WITH MEALS  
 aspirin delayed-release tablet 81 mg  81 mg Oral DAILY  zinc oxide-cod liver oil (DESITIN) 40 % paste   Topical PRN  
  haloperidol lactate (HALDOL) injection 2 mg  2 mg IntraVENous Q4H PRN Lab Data Reviewed: (see below) Lab Review:  
 
Recent Labs  
  07/06/19 
0319 07/05/19 
0320 07/04/19 
0240 WBC 7.9 6.5 7.5 HGB 7.9* 7.3* 7.7* HCT 27.2* 25.5* 26.8*  
 157 168 Recent Labs  
  07/06/19 
0319 07/05/19 
0320 07/04/19 
8371 * 132* 135*  
K 5.6* 4.6 4.8  
CL 93* 94* 97  
CO2 31 34* 34* * 151* 204* BUN 80* 66* 60* CREA 3.11* 2.62* 2.45* CA 8.6 8.3* 8.3*  
MG 2.2 2.1 2.1 PHOS 5.9* 4.5 4.5 ALB 2.2* 2.2* 2.3* Lab Results Component Value Date/Time Glucose (POC) 149 (H) 07/06/2019 07:07 AM  
 Glucose (POC) 189 (H) 07/06/2019 02:32 AM  
 Glucose (POC) 170 (H) 07/05/2019 08:41 PM  
 Glucose (POC) 181 (H) 07/05/2019 04:17 PM  
 Glucose (POC) 181 (H) 07/05/2019 11:55 AM  
 
Recent Labs  
  07/04/19 
1654 07/04/19 
1420 07/04/19 
1200 PH 7.35 7.26* 7.23* PCO2 65* 77* 88* PO2 139* 107* 105* HCO3 35* 34* 36* FIO2 40 40  -- No results for input(s): INR in the last 72 hours. No lab exists for component: INREXT, INREXT All Micro Results Procedure Component Value Units Date/Time CULTURE, BLOOD [[id number]] Collected:  07/01/19 1229 Order Status:  Completed Specimen:  Blood Updated:  07/06/19 0539 Special Requests: NO SPECIAL REQUESTS Culture result: NO GROWTH 5 DAYS     
 CULTURE, BLOOD [[id number]] Collected:  07/01/19 1229 Order Status:  Completed Specimen:  Blood Updated:  07/06/19 0539 Special Requests: NO SPECIAL REQUESTS Culture result: NO GROWTH 5 DAYS     
 CULTURE, BLOOD, PERIPHERAL [[id number]]  (Abnormal) Collected:  06/28/19 1446 Order Status:  Completed Specimen:  Blood Updated:  07/05/19 0818 Special Requests: NO SPECIAL REQUESTS   Culture result: MIXED STAPHYLOCOCCUS SPECIES, COAGULASE NEGATIVE GROWING IN 1 OF 2 BOTTLES DRAWN SITE = NO SITE INDICATED PLATES WILL BE HELD 3 DAYS. CALL [phone] IF SENSITIVITIES ARE NEEDED. CALLED TO AND READ BACK BY 
JEANETTE CROSS RN, AT [phone] ON 6.29.19 RB 
  
      
  REMAINING BOTTLE(S) HAS/HAVE NO GROWTH IN 5 DAYS  
     
 CULTURE, BLOOD, PERIPHERAL [[id number]] Collected:  06/28/19 1446 Order Status:  Completed Specimen:  Blood Updated:  07/04/19 0163 Special Requests: NO SPECIAL REQUESTS Culture result: NO GROWTH 6 DAYS     
 CULTURE, URINE [[id number]]  (Abnormal)  (Susceptibility) Collected:  07/01/19 1406 Order Status:  Completed Specimen:  Urine Updated:  07/03/19 1049 Special Requests: --     
  NO SPECIAL REQUESTS Reflexed from [id number] [place] Count --     
  [id number] COLONIES/mL Culture result: KLEBSIELLA OXYTOCA PROTEUS MIRABILIS     
 CULTURE, URINE [[id number]] Collected:  06/28/19 1610 Order Status:  Completed Specimen:  Urine from Clean catch Updated:  06/30/19 2109 Special Requests: NO SPECIAL REQUESTS Culture result:    
  >2 ORGANISMS - CONTAMINATED SPECIMEN. SUGGEST RECOLLECTION  CONSISTING OF AT LEAST 4 COLONY TYPES/STRAINS OF GRAM NEGATIVE RODS  
     
 CULTURE, MRSA [[id number]] Collected:  06/28/19 1829 Order Status:  Completed Specimen:  Nares Updated:  06/29/19 2327 Special Requests: NO SPECIAL REQUESTS Culture result: MRSA NOT PRESENT Screening of patient nares for MRSA is for surveillance purposes and, if positive, to facilitate isolation considerations in high risk settings. It is not intended for automatic decolonization interventions per se as regimens are not sufficiently effective to warrant routine use. RESPIRATORY PANEL,PCR,NASOPHARYNGEAL [[id number]] Collected:  06/28/19 1829 Order Status:  Completed Specimen:  Nasopharyngeal Updated:  06/28/19 2245 Adenovirus NOT DETECTED Coronavirus 229E NOT DETECTED Coronavirus HKU1 NOT DETECTED   Coronavirus CVNL63 NOT DETECTED     
 Coronavirus OC43 NOT DETECTED Metapneumovirus NOT DETECTED Rhinovirus and Enterovirus NOT DETECTED Influenza A NOT DETECTED Influenza A, subtype H1 NOT DETECTED Influenza A, subtype H3 NOT DETECTED INFLUENZA A H1N1 PCR NOT DETECTED Influenza B NOT DETECTED Parainfluenza 1 NOT DETECTED Parainfluenza 2 NOT DETECTED Parainfluenza 3 NOT DETECTED Parainfluenza virus 4 NOT DETECTED     
  RSV by PCR NOT DETECTED Bordetella pertussis - PCR NOT DETECTED Chlamydophila pneumoniae DNA, QL, PCR NOT DETECTED Mycoplasma pneumoniae DNA, QL, PCR NOT DETECTED     
 CULTURE, MRSA [[id number]] Collected:  06/28/19 1745 Order Status:  Canceled Specimen:  Nares URINE CULTURE HOLD SAMPLE [[id number]] Collected:  06/28/19 1610 Order Status:  Completed Specimen:  Urine from Serum Updated:  06/28/19 1617 Urine culture hold URINE ON HOLD IN MICROBIOLOGY DEPT FOR 3 DAYS. IF UNPRESERVED URINE IS SUBMITTED, IT CANNOT BE USED FOR ADDITIONAL TESTING AFTER 24 HRS, RECOLLECTION WILL BE REQUIRED. I have reviewed notes of prior 24hr. Other pertinent lab: Total time spent with patient: 28 Care Plan discussed with: Patient, Nursing Staff and >50% of time spent in counseling and coordination of care Discussed:  Care Plan Prophylaxis:  SCD's Disposition:  poor prognosis  
        
___________________________________________________ Attending Physician: Maurice Bella MD

## 2019-07-07 PROBLEM — Z51.5 HOSPICE CARE: Status: ACTIVE | Noted: 2019-01-01

## 2019-07-07 NOTE — PROGRESS NOTES
Ramses Patel [facility] 79 
9975 [facility], [place], [address] [place] [address] 
[phone] Medical Progress Note NAME: Magno Barkley :  1960 MRM:  [id number] Date/Time: 2019  7:28 AM 
 
  
Assessment and Plan: 1. Acute on chronic respiratory failure with hypoxia and hypercapnia: secondary to COPD, cor pulmonale, CHF, super morbid obesity. Not much improvement. Poor prognosis. On chronic 2-3L O2 at home. Non-compliance on bipap. Cont O2, encourage bipap use. Pulm and palliative care following. Spoke with wife regarding goal of care and after they discussed with ppt and his daughter, they have decided to make him comfortable. Will consult hospice for inpatient admission. Pt is DNR now.  
  
2. Acute on chronic diastolic CHF/cor pulmonale/severe pulm HTN/anasarca: No improvement. Poor prognosis. Would benefit from hospice, but patient not ready for this yet. Appreciated palliative care FU. Was on IV bumex and drip, which were discontinued due to worsening renal function. On hydralazine, isordil. Evaluated by cardiology  
  
3. ASPEN/CKD 3: Cr worsening. Not a candidate for dialysis. Evaluated by Renal.   
  
4. COPD: stable. Cont LABA/ICS, nebs prn 
  
5. HTN: hold hydralazine, bumex, isordil 
  
6. DM type 2: A1C 6.5%/ episode of hypoglycemia. stop glimepiride. Cont SSI 
  
7. PUD/recent GI bleed: cont IV PPI bid 
  
8. Anemia: due to recent GI bleed and Iron def. S/p 1u RBCs, s/p IV iron. Cont to monitor Hgb closely 
  
9. Chronic afib: not on anticoagulation due to GI bleed. HR stable. Not on AV moreno blocker 
  
10. Pressure ulcers: stage 2 decub, stage 2 left lateral BKA. POA. Cont wound care 
  
11. UTI: prior urine Cx w/ proteus, klebsiella. Was on IV aztreonam and now on levaquin. Finished course of ABx  
  
12. Debility: cont PT/OT. Needs hospice. Will continue family discussion 13. Hyponatremia. Likely due to diuretic. Worsening. continue to monitor. Renal following 14. Hyperkalemia. Veltassa and monitor Code status: DNR. Had extensive discussion with his wife, who agreed to make him comfortable. consult hospice. Subjective: Chief Complaint:  Follow up of pt who was admitted with acute on chronic respiratory failure. C/o SOB, fatigue. Had extensive discussion with pt and wife about goal of care. They want to discuss more ROS: 
(bold if positive, if negative) Nausea/ 
Tolerating PT  Tolerating Diet Objective:  
 
Last 24hrs VS reviewed since prior progress note. Most recent are: 
 
Visit Vitals /69 (BP 1 Location: Left arm, BP Patient Position: At rest) Pulse 66 Temp 97.6 °F (36.4 °C) Resp 16 Ht 6' (1.829 m) Wt (!) 193.5 kg (426 lb 9.6 oz) SpO2 91% BMI 57.86 kg/m² SpO2 Readings from Last 6 Encounters:  
07/07/19 91% 07/18/18 97% 06/23/17 93% 06/18/17 94% 05/06/17 96% 11/17/16 98% O2 Flow Rate (L/min): 3 l/min No intake or output data in the 24 hours ending 07/07/19 0730 Physical Exam: 
 
Gen:  obese, in mild acute distress HEENT:  Pink conjunctivae, PERRL, hearing intact to voice, moist mucous membranes Neck:  Supple, without masses, thyroid non-tender Resp:  No accessory muscle use, decreased breath sounds Card:  No murmurs, normal S1, S2 without thrills, bruits. ++ peripheral edema Abd:  Soft, non-tender, non-distended, normoactive bowel sounds are present, no palpable organomegaly and no detectable hernias Lymph:  No cervical or inguinal adenopathy Musc:  No cyanosis or clubbing. BL BKA Skin:  No rashes or ulcers, skin turgor is good Neuro:  Cranial nerves are grossly intact, no focal motor weakness, follows commands appropriately Psych:  Good insight, oriented to person, place and time, alert 
__________________________________________________________________ Medications Reviewed: (see below) Medications:  
 
Current Facility-Administered Medications Medication Dose Route Frequency  glimepiride (AMARYL) tablet 2 mg  2 mg Oral ACB&D  ALPRAZolam (XANAX) tablet 0.5 mg  0.5 mg Oral Q8H PRN  
 ferrous gluconate 324 mg (38 mg iron) tablet 1 Tab  1 Tab Oral TID WITH MEALS  metOLazone (ZAROXOLYN) tablet 5 mg  5 mg Oral BID  calcitRIOL (ROCALTROL) capsule 0.25 mcg  0.25 mcg Oral Once per day on Mon Tue Wed Thu Fri  
 0.9% sodium chloride infusion 250 mL  250 mL IntraVENous PRN  
 dextrose 10% infusion 125-250 mL  125-250 mL IntraVENous PRN  
 insulin lispro (HUMALOG) injection   SubCUTAneous AC&HS  isosorbide dinitrate (ISORDIL) tablet 20 mg  20 mg Oral TID  hydrALAZINE (APRESOLINE) tablet 10 mg  10 mg Oral TID  albuterol-ipratropium (DUO-NEB) 2.5 MG-0.5 MG/3 ML  3 mL Nebulization Q4HWA RT  
 pantoprazole (PROTONIX) tablet 40 mg  40 mg Oral ACB&D  
 sodium chloride (NS) flush 5-40 mL  5-40 mL IntraVENous Q8H  
 sodium chloride (NS) flush 5-40 mL  5-40 mL IntraVENous PRN  
 sodium chloride (NS) flush 5-40 mL  5-40 mL IntraVENous Q8H  
 sodium chloride (NS) flush 5-40 mL  5-40 mL IntraVENous PRN  
 acetaminophen (TYLENOL) tablet 650 mg  650 mg Oral Q4H PRN  
 ondansetron (ZOFRAN) injection 4 mg  4 mg IntraVENous Q4H PRN  
 docusate sodium (COLACE) capsule 100 mg  100 mg Oral BID  albuterol (PROVENTIL VENTOLIN) nebulizer solution 2.5 mg  2.5 mg Nebulization Q2H PRN  
 arformoterol (BROVANA) neb solution 15 mcg  15 mcg Nebulization BID RT  
 budesonide (PULMICORT) 500 mcg/2 ml nebulizer suspension  500 mcg Nebulization BID RT  
 nystatin (MYCOSTATIN) 100,000 unit/gram powder   Topical BID  
 glucose chewable tablet 16 g  4 Tab Oral PRN  
 glucagon (GLUCAGEN) injection 1 mg  1 mg IntraMUSCular PRN  polyethylene glycol (MIRALAX) packet 17 g  17 g Oral QHS  sevelamer carbonate (RENVELA) tab 1,600 mg  1,600 mg Oral TID WITH MEALS  
 aspirin delayed-release tablet 81 mg  81 mg Oral DAILY  zinc oxide-cod liver oil (DESITIN) 40 % paste   Topical PRN  
 haloperidol lactate (HALDOL) injection 2 mg  2 mg IntraVENous Q4H PRN Lab Data Reviewed: (see below) Lab Review:  
 
Recent Labs  
  07/07/19 0323 07/06/19 0319 07/05/19 
0320 WBC 7.4 7.9 6.5 HGB 7.9* 7.9* 7.3* HCT 27.4* 27.2* 25.5*  
 176 157 Recent Labs  
  07/07/19 0323 07/06/19 0319 07/05/19 0320 * 130* 132*  
K 5.2* 5.6* 4.6 CL 93* 93* 94* CO2 33* 31 34* GLU 61* 165* 151* BUN 87* 80* 66* CREA 3.51* 3.11* 2.62* CA 8.3* 8.6 8.3*  
MG 2.1 2.2 2.1 PHOS 6.7* 5.9* 4.5 ALB 2.3* 2.2* 2.2* Lab Results Component Value Date/Time Glucose (POC) 102 (H) 07/07/2019 07:08 AM  
 Glucose (POC) 51 (L) 07/07/2019 06:44 AM  
 Glucose (POC) 56 (L) 07/07/2019 06:43 AM  
 Glucose (POC) 48 (LL) 07/07/2019 06:39 AM  
 Glucose (POC) 95 07/06/2019 09:36 PM  
 
Recent Labs  
  07/04/19 
1654 07/04/19 
1420 07/04/19 
1200 PH 7.35 7.26* 7.23* PCO2 65* 77* 88* PO2 139* 107* 105* HCO3 35* 34* 36* FIO2 40 40  -- No results for input(s): INR in the last 72 hours. No lab exists for component: INREXT, INREXT All Micro Results Procedure Component Value Units Date/Time CULTURE, BLOOD [[id number]] Collected:  07/01/19 1229 Order Status:  Completed Specimen:  Blood Updated:  07/06/19 0539 Special Requests: NO SPECIAL REQUESTS Culture result: NO GROWTH 5 DAYS     
 CULTURE, BLOOD [[id number]] Collected:  07/01/19 1229 Order Status:  Completed Specimen:  Blood Updated:  07/06/19 0539 Special Requests: NO SPECIAL REQUESTS Culture result: NO GROWTH 5 DAYS     
 CULTURE, BLOOD, PERIPHERAL [[id number]]  (Abnormal) Collected:  06/28/19 1446 Order Status:  Completed Specimen:  Blood Updated:  07/05/19 0818 Special Requests: NO SPECIAL REQUESTS   Culture result: MIXED STAPHYLOCOCCUS SPECIES, COAGULASE NEGATIVE GROWING IN 1 OF 2 BOTTLES DRAWN SITE = NO SITE INDICATED PLATES WILL BE HELD 3 DAYS. CALL [phone] IF SENSITIVITIES ARE NEEDED. CALLED TO AND READ BACK BY 
JEANETTE CROSS RN, AT [phone] ON 6.29.19 RB 
  
      
  REMAINING BOTTLE(S) HAS/HAVE NO GROWTH IN 5 DAYS  
     
 CULTURE, BLOOD, PERIPHERAL [[id number]] Collected:  06/28/19 1446 Order Status:  Completed Specimen:  Blood Updated:  07/04/19 8498 Special Requests: NO SPECIAL REQUESTS Culture result: NO GROWTH 6 DAYS     
 CULTURE, URINE [[id number]]  (Abnormal)  (Susceptibility) Collected:  07/01/19 1406 Order Status:  Completed Specimen:  Urine Updated:  07/03/19 1049 Special Requests: --     
  NO SPECIAL REQUESTS Reflexed from [id number] [place] Count --     
  [id number] COLONIES/mL Culture result: KLEBSIELLA OXYTOCA PROTEUS MIRABILIS     
 CULTURE, URINE [[id number]] Collected:  06/28/19 1610 Order Status:  Completed Specimen:  Urine from Clean catch Updated:  06/30/19 6416 Special Requests: NO SPECIAL REQUESTS Culture result:    
  >2 ORGANISMS - CONTAMINATED SPECIMEN. SUGGEST RECOLLECTION  CONSISTING OF AT LEAST 4 COLONY TYPES/STRAINS OF GRAM NEGATIVE RODS  
     
 CULTURE, MRSA [[id number]] Collected:  06/28/19 1829 Order Status:  Completed Specimen:  Nares Updated:  06/29/19 2327 Special Requests: NO SPECIAL REQUESTS Culture result: MRSA NOT PRESENT Screening of patient nares for MRSA is for surveillance purposes and, if positive, to facilitate isolation considerations in high risk settings. It is not intended for automatic decolonization interventions per se as regimens are not sufficiently effective to warrant routine use. RESPIRATORY PANEL,PCR,NASOPHARYNGEAL [[id number]] Collected:  06/28/19 1829 Order Status:  Completed Specimen:  Nasopharyngeal Updated:  06/28/19 2245 Adenovirus NOT DETECTED   Coronavirus 229E NOT DETECTED     
 Coronavirus HKU1 NOT DETECTED Coronavirus CVNL63 NOT DETECTED Coronavirus OC43 NOT DETECTED Metapneumovirus NOT DETECTED Rhinovirus and Enterovirus NOT DETECTED Influenza A NOT DETECTED Influenza A, subtype H1 NOT DETECTED Influenza A, subtype H3 NOT DETECTED INFLUENZA A H1N1 PCR NOT DETECTED Influenza B NOT DETECTED Parainfluenza 1 NOT DETECTED Parainfluenza 2 NOT DETECTED Parainfluenza 3 NOT DETECTED Parainfluenza virus 4 NOT DETECTED     
  RSV by PCR NOT DETECTED Bordetella pertussis - PCR NOT DETECTED Chlamydophila pneumoniae DNA, QL, PCR NOT DETECTED Mycoplasma pneumoniae DNA, QL, PCR NOT DETECTED     
 CULTURE, MRSA [[id number]] Collected:  06/28/19 1745 Order Status:  Canceled Specimen:  Nares URINE CULTURE HOLD SAMPLE [[id number]] Collected:  06/28/19 1610 Order Status:  Completed Specimen:  Urine from Serum Updated:  06/28/19 1617 Urine culture hold URINE ON HOLD IN MICROBIOLOGY DEPT FOR 3 DAYS. IF UNPRESERVED URINE IS SUBMITTED, IT CANNOT BE USED FOR ADDITIONAL TESTING AFTER 24 HRS, RECOLLECTION WILL BE REQUIRED. I have reviewed notes of prior 24hr. Other pertinent lab: Total time spent with patient: 28 Care Plan discussed with: Patient, Nursing Staff and >50% of time spent in counseling and coordination of care Discussed:  Care Plan Prophylaxis:  SCD's Disposition:  poor prognosis  
        
___________________________________________________ Attending Physician: Maurice Bella MD

## 2019-07-07 NOTE — HOSPICE
[facility] 
[phone] Patient Name: Shaka Alexis YOB: 1960 Age: 60 y.o. [address] [facility] RN Note:  Hospice consult noted. Chart reviewed. Plan of care discussed with patients nurse. In to meet with family. Discussed Hospice philosophy, general plan of care, levels of care, services and on call procedures. Family information packet provided & reviewed with patient's spouse and daughter. Family in agreement for hospice care. Order received from Dr. Juan Jose Farah to admit to inpatient hospice GIP for management of respiratory distress related to end stage heart disease. Thank you for the opportunity to be of service to this patient.

## 2019-07-07 NOTE — PROGRESS NOTES
Pt awaits for hospice team for admission. Very lethargic but easily arousable. Wife at side who in emotional state and could benefit to talk to someone so ordered  consult.

## 2019-07-07 NOTE — DISCHARGE SUMMARY
61 yo hx of HTN, DM, CAD, COPD on 2L O2, dCHF, CKD 3, PVD s/p BLE BKA, PUD w/ recent GI bleed, was admitted w/ resp failure, hypoxia, hypercapnea, severe pulm HTN. Patient remains very dyspneic, edematous despite prolonged IV diuretics and later lasix drip, which was discontinued as his creatinine worsened. Pt couldn't wear BiPAP. Daily, pt's renal function and respiratory status worsened. Was seen by palliative care. I have discussed with pt and his wife about worsening of his overall condition and today, they are in agreement for comfort care. Pt is accepted to inpatient hospice care

## 2019-07-07 NOTE — PROGRESS NOTES
7/7/2019  
 
10:44 AM 
CM spoke with Aisha from [facility], advised Jolynn Lala will call the pt's room to speak with the family. 9:50 AM 
Pt has been referred for hospice. CM visited with pt's wife and daughter and confirmed they, as well as the pt, are in agreement with receiving hospice services. CM sent [facility] referral to [facility] and called [facility] Aisha at [phone], no answer, left voicemail message requesting return call.     ADRIEN Perez

## 2019-07-07 NOTE — PROGRESS NOTES
Shift Summary 1930: Bedside and Verbal shift change report given to ZUNILDA Piper (oncoming nurse) by Jerome Alaniz RN (offgoing nurse). Report included the following information SBAR, Kardex, Procedure Summary, Intake/Output, MAR, Accordion and Cardiac Rhythm NSR, PVCs. 2030: WIfe at bedside, patient calling out for help, saying he is in pain repeatedly, asking for head up and down repeatedly. Treated with prn tylenol and alprazolam 
 
2130: HYPOGLYCEMIC EPISODE DOCUMENTATION Patient with hypoglycemic episode(s) at 2104(time) on 7/6/19(date). BG value(s) pre-treatment 62 Was patient symptomatic? [x] yes, [] no 
Patient was treated with the following rescue medications/treatments: [x] D10- 125ml 
              [] Glucose tablets 
              [] Glucagon 
              [] 4oz juice 
              [] 6oz reg soda 
              [] 8oz low fat milk BG value post-treatment: 95 Once BG treated and value greater than 80mg/dl, pt was provided with the following: 
[x] snack 
[] meal 
Name of MD notified:Cecille 2246: Patient agitated, restless, calling out down the hallway. Confused in and out, distressed. Treated with haldol 
 
2300: Patient refusing BIPAP 
 
 
0400: patient sleeping on and off, calling out down the hallway for help. Highly agitated this AM, says he hurts and feels pressure all over. Treated with prn alprazolam, tylenol, repositioned in bed, provided comfort. Patient highly edematous [address] [place] [address] Patient with hypoglycemic episode(s) [id number]) on 7/7/19(date). BG value(s) pre-treatment 51 Was patient symptomatic? [x] yes, [] no 
Patient was treated with the following rescue medications/treatments: [x] D10- 250ml 
              [] Glucose tablets 
              [] Glucagon 
              [] 4oz juice 
              [] 6oz reg soda 
              [] 8oz low fat milk BG value post-treatment: 103 Once BG treated and value greater than 80mg/dl, pt was provided with the following: 
[] snack 
[] meal 
Name of MD notified:Cecille The following orders were received: Hospice Consult Spoke to MD about patient's worsening lethargy/confusion, kidney function, swelling, and glucose and patients continued agitation, pain, and continued refusal of BIPAP. MD to discuss hospice again with family and patient 0730: Bedside and Verbal shift change report given to Caterina Herrera RN (oncoming nurse) by Ayesha Drake RN (offgoing nurse). Report included the following information SBAR, Kardex, Procedure Summary, Accordion, Recent Results and Cardiac Rhythm NSR, PVCs.

## 2019-07-07 NOTE — H&P
[facility] Good Help to Those in Need 
[phone] Patient Name: Rola Aiken YOB: 1960 Date of Provider Hospice Visit: 07/07/19 Level of Care:   [x] General Inpatient (GIP)    [] Routine   [] Respite Current Location of Care: 
[] [facility] [x] [facility] [] [facility] [] [facility] [] Hospice [facility] IF [facility], patient referred from: 
[] [facility] [] [facility] [] [facility] [] [facility] [] Home [] Other:  
 
Date of Original Hospice Admission: 07/07/19 Hospice Medical Director at time of admission: Marianna Alva at time of admission Principle Hospice Diagnosis: Acute Respiratory failure Diagnoses RELATED to the terminal prognosis: CHF with cor pulmonale, COPD, morbid obesity Other Diagnoses: DM type 2, HTN, Atrial Fibrillation Genaro Harrell Do not cut and paste chart information other than imaging findings Rola Aiken is a 60y.o. year old who was admitted to [facility]. The patient's principle diagnosis has resulted in further rapid progression and steady decline in function leading to Acute hypoxic respiratory failure. Pt with multiple chronic medical conditions including advanced COPD, morbid obesity, chronic respiratory failure, cor pulmonale with CHF, PVD s/p bilateral BKA was recently admitted with worsening respiratory distress and in acute respiratory failure. Pt is unable to tolerate BIPAAP for breathing support and refusing to continue using it. Pt has declined quite a lot and currently with symptoms of diffuse anasarca, breathing distress, anxiety, restlessness and increased fatigue with lethargy. Refer to LCD Functionally, the patient's Karnofsky and/or Palliative Performance Scale has declined over a period of days and is estimated at 20%. The patient is dependent on the following ADLs:all Objective information that support this patients limited prognosis includes: ECHO Interpretation Summary · Left Ventricle: Normal cavity size, wall thickness and systolic function (ejection fraction normal). Estimated left ventricular ejection fraction is 61 - 65%. No regional wall motion abnormality noted. · Left Atrium: Mildly dilated left atrium. · Right Ventricle: Not well visualized. · Mitral Valve: Moderate mitral annular calcification. Mild mitral valve stenosis. · Tricuspid Valve: Mild tricuspid valve regurgitation is present. · Pulmonary Artery: Severe pulmonary hypertension. .   
 
The patient/family chose comfort measures with the support of Hospice. HOSPICE DIAGNOSES Active Symptoms: 
1. Labored Breathing 2. Restlessness/anxiety 3. Diffuse anasarca 4. Fatigue/lethargy 5 Diffuse pain 6. Increased airway secretions PLAN 1. Admit to GIP level of care as pt is symptomatic, refusing BiPAAP, expected to decline quickly, needs close monitoring and active management of symptoms. 2. Initiate comfort medications including dilaudid 0.5mg IV scheduled every 3 hours with 1mg IV every 15mts as needed for dyspnea 3. Toradol 15mg IV every 6 hours as needed fever 4. Robinul 0.2mg IV every 4 hours as needed secretions 5. Lorazepam 0.5mg IV scheduled every 4 hours and 0.5mg IV every 15mts as needed for restlessness 6. Haldol 0.5mg oral every 4 hours as needed agitation 7.  and SW to support family needs 8. Disposition: home with hospice if symptoms stable Prognosis estimated based on 07/07/19 clinical assessment is:  
[x] Hours to Days   
[] Days to Weeks   
[] Other: 
 
Communicated plan of care with: Hospice Case Manager; Hospice IDT; Care Team 
 
 GOALS OF CARE Patient/Medical POA stated Goal of Care: comfort [x] I have reviewed and/or updated ACP information in the Advance Care Planning Navigator. This information is available in the [address] link in the patient's chart header. Primary Decision [facility] Agent):   Primary Decision Maker: Mariangel Salas - Spouse - [phone] Secondary Decision Maker: Farooq Nichols - Daughter Resuscitation Status: DNR If DNR is there a Durable DNR on file? : [x] Yes [] No (If no, complete Durable DNR) HISTORY History obtained from: chart, pt, family CHIEF COMPLAINT: OK The patient is:  
[x] Verbal 
[] Nonverbal 
[] Unresponsive HPI/SUBJECTIVE:  Pt lethargic, visibly short of breath, appears slightly restless and anxious. Family at bedside including wife and daughter. Pt being admitted to hospice now REVIEW OF SYSTEMS The following systems were: [x] reviewed  [] unable to be reviewed Positive ROS include: 
Constitutional: fatigue, weakness, in pain, short of breath Ears/nose/mouth/throat: increased airway secretions Respiratory:shortness of breath, wheezing Gastrointestinal:poor appetite, nausea, vomiting, abdominal pain, constipation, diarrhea Musculoskeletal:pain, deformities, swelling legs Neurologic:confusion, hallucinations, weakness Psychiatric:anxiety, feeling depressed, poor sleep Endocrine:  
 
 
 
 FUNCTIONAL ASSESSMENT Palliative Performance Scale (PPS): 20% PSYCHOSOCIAL/SPIRITUAL ASSESSMENT Active Problems: 
  Hospice care (7/7/2019) Past Medical History:  
Diagnosis Date  CAD (coronary artery disease)   
 pvc  CKD (chronic kidney disease) stage 3, GFR 30-59 ml/min ([facility]) 6/29/2019  COPD  Diabetes ([facility]  GI bleed 6/2019: [place]. Found small bowel ulcer. S/P 3 unit transfusion.  Heart failure ([address]  Hypertension  Mitral regurgitation  Morbidly obese ([address]  PUD (peptic ulcer disease) 6/29/2019 Past Surgical History:  
Procedure Laterality Date  HX ORTHOPAEDIC    
 right leg surgery Social History Tobacco Use  Smoking status: Former Smoker  Smokeless tobacco: Never Used Substance Use Topics  Alcohol use: No  
 
Family History Problem Relation Age of Onset  Hypertension Father Allergies Allergen Reactions  Cephalexin Other (comments) Throat starting closing up after 2nd dose  Codeine Anaphylaxis  Insulin Regular Other (comments) Temp blindness and facial droop only with long acting insulin. Has been on short acting insulin without adverse.  Pcn [Penicillins] Anaphylaxis  Victoza [Liraglutide] Other (comments) Fascial droop, temp blindness  Ferrous Sulfate Other (comments) Ferrous sulfate caused significant constipation, able to tolerate ferrous gluconate without problems  Morphine Other (comments)  
  hallucinates  Statins-Hmg-Coa Reductase Inhibitors Hives, Itching and Nausea and Vomiting Current Facility-Administered Medications Medication Dose Route Frequency  scopolamine (TRANSDERM-SCOP) 1 mg over 3 days 1 Patch  1 Patch TransDERmal Q72H  
 HYDROmorphone (PF) (DILAUDID) injection 1 mg  1 mg IntraVENous Q15MIN PRN  
 HYDROmorphone (PF) (DILAUDID) injection 0.5 mg  0.5 mg IntraVENous Q3H  
 LORazepam (ATIVAN) injection 0.5 mg  0.5 mg IntraVENous Q15MIN PRN  
 LORazepam (ATIVAN) injection 0.5 mg  0.5 mg IntraVENous Q4H  
 haloperidol (HALDOL) 2 mg/mL oral solution 0.5 mg  0.5 mg Oral Q4H PRN  
 ketorolac (TORADOL) injection 15 mg  15 mg IntraVENous Q6H PRN  
 glycopyrrolate (ROBINUL) injection 0.2 mg  0.2 mg IntraVENous Q4H PRN PHYSICAL EXAM  
 
Wt Readings from Last 3 Encounters:  
07/07/19 (!) 193.5 kg (426 lb 9.6 oz)  
07/18/18 139 kg (306 lb 8 oz) 06/23/17 151.7 kg (334 lb 7 oz) There were no vitals taken for this visit. Supplemental O2  [x] Yes  [] NO Last bowel movement:  
 
Currently this patient has: 
[x] Peripheral IV [] PICC  [] PORT [] ICD [] Blanco Catheter [] NG Tube   [] PEG Tube   
[] Rectal Tube [] Drain 
[x] Other:  Pure wick catheter Constitutional:morbidly obese, appears in distress, visibly short of breath Eyes:  pale ENMT: increased airway secretions Cardiovascular: distant heart sounds Respiratory: decreased breath sounds, labored breathing Gastrointestinal: protuberant, decreased bowel sounds, firm Musculoskeletal: bilateral BKA Skin:diffuse edema, anasarca, ecchymoses and bruising Neurologic:slightly restless, lethargic Psychiatric: anxious Other:  
 
 
Pertinent Lab and or Imaging Tests: 
Lab Results Component Value Date/Time Sodium 130 (L) 07/07/2019 03:23 AM  
 Potassium 5.2 (H) 07/07/2019 03:23 AM  
 Chloride 93 (L) 07/07/2019 03:23 AM  
 CO2 33 (H) 07/07/2019 03:23 AM  
 Anion gap 4 (L) 07/07/2019 03:23 AM  
 Glucose 61 (L) 07/07/2019 03:23 AM  
 BUN 87 (H) 07/07/2019 03:23 AM  
 Creatinine 3.51 (H) 07/07/2019 03:23 AM  
 BUN/Creatinine ratio 25 (H) 07/07/2019 03:23 AM  
 GFR est AA 22 (L) 07/07/2019 03:23 AM  
 GFR est non-AA 18 (L) 07/07/2019 03:23 AM  
 Calcium 8.3 (L) 07/07/2019 03:23 AM  
 
Lab Results Component Value Date/Time Protein, total 6.5 06/29/2019 02:39 AM  
 Albumin 2.3 (L) 07/07/2019 03:23 AM  
 
   
 
Total time: 70mts Counseling / coordination time: 40mts > 50% counseling / coordination?:yes

## 2019-07-07 NOTE — HOSPICE
[facility] Good Help to Those in Need 
[phone] Inpatient Nursing Admission Patient Name: Alyse Diaz YOB: 1960 Age: 60 y.o. Date of Hospice Admission: 7/7/2019 Hospice Attending Elected by Patient: Martin Robles MD 
Primary Care Physician: Alicia Oneil MD 
Admitting RN: Christy Moore RN : none Level of Care (GIP/Routine/Respite): [facility] Facility of Care: [facility] Patient Room: 329/01 HOSPICE SUMMARY  
ER Visits/ Hospitalizations in past year: multiple Hospice Diagnosis: Hospice care [Z51.5] Onset Date of Hospice Diagnosis: July 2019 Summary of Disease Progression Leading to Hospice Diagnosis:  
 
Patient was admitted with acute respiratory failure, cor pulmonale, decreasing renal function in the setting of chronic CHF and COPD with super morbid obesity. Patient is unable to tolerate Bipap. Family has decided on comfort care. Case reviewed with Dr. Michael Moore. Patient admitted to inpatient hospice [facility] for symptom management. Spouse signed consents. Co-Morbidities:  
Patient Active Problem List  
Diagnosis Code  Acute hyperkalemia E87.5  Acute renal failure (ARF) ([facility]) N17.9  DM type 2 causing renal disease, not at goal [facility]) E11.29  
 Bradycardia R00.1  Hyponatremia E87.1  Morbid obesity ([facility]) E66.01  
 COPD (chronic obstructive pulmonary disease) ([facility]) J44.9  Anemia D64.9  Hypertriglyceridemia E78.1  Mitral regurgitation I34.0  Acute on chronic respiratory failure with hypoxia and hypercapnia ([facility]) J96.21, J96.22  
 Persistent atrial fibrillation ([facility]) I48.1  CKD (chronic kidney disease) stage 3, GFR 30-59 ml/min ([facility]) N18.3  Acute metabolic encephalopathy Y17.34  
 PUD (peptic ulcer disease) K27.9  Cor pulmonale (chronic) ([facility]) I27.81  Weakness generalized R53.1  Physical debility R53.81  
 Goals of care, counseling/discussion Z71.89  
 DNR (do not resuscitate) discussion Z70.80  
  Hospice care Z51.5 Diagnoses RELATED to the terminal prognosis: CHF, COPD, ARF, Acute respiratory failure, acute metabolic encephalopathy Other Diagnoses: PUD Rationale for a prognosis of life expectancy of 6 months or less if the disease follows its normal course (Disease Specific History):  
 
Jeri Grace is a 60 y.o. who was admitted to [address] [place] [address]. The patient's principle diagnosis of end stage heart disease has resulted in decreased functional status. Functionally, the patient's Palliative Performance Scale has declined over a period of days and is estimated at 10. Objective information that support this patients limited prognosis includes: increasing creatinine, decreasing level of consciousness, anasarca, increased work of breathing with difficulty managing secretions. The patient/family chose comfort measures with the support of Hospice. Patient meets for GIP LOC as evidenced by requiring frequent changes in medications to manage symptoms, requires IV medications, requires complicated care. Prognosis estimated based on 07/07/19 clinical assessment is:  
[] Few to Many Hours [x] Hours to Days  
[] Few to Many Days  
[] Days to Weeks  
[] Few to Many Weeks  
[] Weeks to Months  
[] Few to Many Months ASSESSMENT Patient self-reports:  []  Yes    [x] No 
 
SYMPTOMS:  Respiratory distress, edema, pain, shortness of breath, restlessness SIGNS/PHYSICAL FINDINGS:  Patient appears dusky, disoriented, occasional delayed garbled speech, hoarse whisper. Generalized anasarca, BLE amputee. Respirations irregular, increased work of breathing, rhonchorous throughout, no urine output since yesterday. Patient frequently agitated and calling out for help. KARNOFSKY: 20 
 
  
 
Learning Assessment: 
Patient Is patient willing/able to learn? Unable due to disease process Caregiver Is caregiver willing to learn care for patient? Yes but difficult due to difficulty of care. CLINICAL INFORMATION Wt Readings from Last 3 Encounters:  
07/07/19 (!) 193.5 kg (426 lb 9.6 oz)  
07/18/18 139 kg (306 lb 8 oz) 06/23/17 151.7 kg (334 lb 7 oz) Ht Readings from Last 3 Encounters:  
06/29/19 6' (1.829 m)  
07/15/18 6' (1.829 m)  
06/20/17 6' 0.01\" (1.829 m) There is no height or weight on file to calculate BMI. There were no vitals taken for this visit. LAB VALUES No results found for this visit on 07/07/19 (from the past 12 hour(s)). No results found for this visit on 07/07/19 (from the past 6 hour(s)). Lab Results Component Value Date/Time Protein, total 6.5 06/29/2019 02:39 AM  
 Albumin 2.3 (L) 07/07/2019 03:23 AM  
 
 
Currently this patient has: 
[x] Supplemental O2 [x] Peripheral IV  [] PICC    [] PORT [x] pure wick [] NG Tube   [] PEG Tube [] Ostomy   
[] AICD: Has ICD been deactivated? [] Yes [] No:______ PLAN 1. Scheduled dilaudid 0.5mg every 3 hours and every 15min PRN 2. Scheduled lorazepam 0.5mg every 4 hours and every 15min PRN 3. Haldol 0.5mg every 4 hours prn 
4. Scopolamine 1.5mg patch every 72 hours for secretions 5. Robinul 0.2mg IV every 4 hours as needed for excess secretions. 6. toradol 15mg IV every 6 hours as needed for fever/moderate pain. Hospice Team Frequency Orders:Skilled Nurse -  1 Daily x 7 days with 5 PRN visits for symptom control. MSW  1 visit for initial assessment/evaluation for family support and need for volunteer services. Artie Madden  1 visit for initial assessment/evaluation for spiritual support. ADVANCE CARE PLANNING (Complete in ACP Flow Sheet) Code Status: DNR Durable DNR: []  Yes  [x]  No 
Code Status Discussed/Confirmed: Confirmed, DNR/DNI Preference for Other Life Sustaining Treatment Discussed/Confirmed: 
Hospitalization [id number] Ferrera MPOA-spouse Reesa Corpus [phone]  Service: [] Yes  []  No      [x] Unknown Appropriate for Pinning Ceremony:  [] Yes     [] No 
 [employer]: Patient is a   Home: tbd DISCHARGE PLANNING 1. Discharge Plan: discharge to home or snf with hospice if patient stabilizes 2. Patient/Family teaching: Discussed hospice plan of care with spouse and daughter. 3. Response to patient/family teaching: Family verbalized understanding and agreement with the plan of care. SOCIAL/EMOTIONAL/SPIRITUAL NEEDS Spiritual Issues Identified: Patient has a strong cyndy per spouse, is a . Psych/ Social/ Emotional Issues Identified: Rapid decline, patient is young, son Dora Thomas) declined to participate in hospice discussion. Caregiver Support: 
[x] Provided information on End of Life Care CARE COORDINATION  
discharge to hospice order received Dr. Agueda Hernandez contacted,  Dr. Sandi Andre agrees to serve as attending provider for hospice and provided verbal certification of terminal illness with life expectancy of 6 months or less. Orders for hospice admission, medications and plan of treatment received. Medication reconciliation completed. MEDS: See medication list below DME: Per hospital 
Supplies: Per hospital 
IDT communication to include MD, SN, SW, CH and support team 
 
ALLERGIES AND MEDICATIONS Allergies: Allergies Allergen Reactions  Cephalexin Other (comments) Throat starting closing up after 2nd dose  Codeine Anaphylaxis  Insulin Regular Other (comments) Temp blindness and facial droop only with long acting insulin. Has been on short acting insulin without adverse.  Pcn [Penicillins] Anaphylaxis  Victoza [Liraglutide] Other (comments) Fascial droop, temp blindness  Ferrous Sulfate Other (comments) Ferrous sulfate caused significant constipation, able to tolerate ferrous gluconate without problems  Morphine Other (comments)  
  hallucinates  Statins-Hmg-Coa Reductase Inhibitors Hives, Itching and Nausea and Vomiting Current Facility-Administered Medications Medication Dose Route Frequency  scopolamine (TRANSDERM-SCOP) 1 mg over 3 days 1 Patch  1 Patch TransDERmal Q72H  
 HYDROmorphone (PF) (DILAUDID) injection 1 mg  1 mg IntraVENous Q15MIN PRN  
 HYDROmorphone (PF) (DILAUDID) injection 0.5 mg  0.5 mg IntraVENous Q3H  
 LORazepam (ATIVAN) injection 0.5 mg  0.5 mg IntraVENous Q15MIN PRN  
 LORazepam (ATIVAN) 2 mg/mL injection 0.5 mg  0.5 mg IntraVENous Q4H  
 haloperidol (HALDOL) 2 mg/mL oral solution 0.5 mg  0.5 mg Oral Q4H PRN  
 ketorolac (TORADOL) injection 15 mg  15 mg IntraVENous Q6H PRN  
 glycopyrrolate (ROBINUL) injection 0.1 mg  0.1 mg IntraVENous Q4H PRN

## 2019-07-08 NOTE — PROGRESS NOTES
Patient unresponsive. Pupils fixed. No spontaneous respirations. No heart sounds auscultated. Death pronounced at 56

## 2019-07-08 NOTE — HOSPICE
[facility] Good Help to Those in Need 
[phone] Discharge/Death Nursing Note Patient Name: Alyse Diaz YOB: 1960 Age: 60 y.o. Date of Death: 19 Admitted Date: 2019 Time of Death: [facility] Reason Facility of Care: [facility] Level of Care: [facility] Patient Room: [facility]/ Hospice Attending: Dr. Jake Driscoll Hospice Diagnosis: Hospice care [Z51.5] Death Pronouncement Pronouncement of death completed by: Dr. Satish Mercedes Agency staff was not present at the time of death At the time of death the patient was documented as Patient unresponsive. Pupils fixed. No spontaneous respirations. No heart sounds auscultated. Death pronounced at [facility] The pt  within [facility] Room 432 The following were notified of the patient's death:  
Medications were disposed of per facility protocol Discharge Summary Discharge Reason: Death Summary of Care Provided: 
 
[x] Post mortem care provided by RN staff [x] Notification of  home by RN supervisor 
[] Referrals/Community resources provided:  
[] Goals completed 
[] Durable Medical Equipment vendor notified Disciplines involved: [x] RN [x] SW [x]  [] EARL [] Vol [] PT [] OT [] ST [] BC 
 
[x] IDT communication/notification Attending Physician, Dr. Jake Driscoll, notified of death Bereaved Verify bereaved identified with name, address, telephone number and risk level Advance Care Planning 2019 Patient's Healthcare Decision Maker is: Verbal statement (Legal Next of Kin remains as decision maker) Primary Decision Maker Name -  
Primary Decision Maker Phone Number -  
Confirm Advance Directive Yes, not on file Patient Would Like to Complete Advance Directive -  
 
Bell Katrina [phone] Spouse FH TBD

## 2019-07-08 NOTE — PROGRESS NOTES
responded to the Death for patient, Ashley Munoz" on the Post. Surgical Ortho floor. Jose Rafael's wife Margarita Maddox, daughter Micheal, son, Riaz Noe, and other family members were at the bedside.  expressed her condolences and provided emotional support as Margarita Maddox shared her grief through tears and engaged in story telling and remembrance. Margarita Maddox and her family have a strong cyndy in [address] and are thankful that [place] Sergio is in heaven and no longer suffering. They find hope in knowing they will see Gab Alu again.  remained a comforting presence as family shared moments of laughter and moments of sorrow when remembering [place] Alu. They expressed no other needs at this time and thanked the chaplai for her visit. Advised of  Availability. Chaplains will follow as able and/or needed. [place]. Xavier Stallings.  Paging Service: [phone])

## 2019-07-12 NOTE — DISCHARGE SUMMARY
Hospice Discharge Summary [facility] Good Help to Those in Need Date of Admission: 7/7/2019 Date of Discharge: 7/8/2019 Tony Pratt is a 60y.o. year old who was admitted to [facility] at [facility] with a Hospice diagnosis of Hospice care [Z51.5]. The patient's care was focused on comfort and the patient passed away on 7/8/2019.

## 2021-10-03 NOTE — PROGRESS NOTES
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 Feeling ok. No cp or sob. IV infiltrated. Did not get any albumin overnight. UOP stable. Cr up slightly. Current Medications: all current  Medications have been eviewed in EPIC  Review of Systems: Pertinent items are noted in HPI.     Objective:  Vitals:    Vitals:    06/22/17 0002 06/22/17 0400 06/22/17 0824 06/22/17 0841   BP: 121/49 129/56  177/69   Pulse: 70 78  77   Resp: 20 19  18   Temp: 98.9 °F (37.2 °C)   98.8 °F (37.1 °C)   SpO2: 91% 90% 90% 92%   Weight: 152 kg (335 lb 1.6 oz)      Height:         Intake and Output:     06/20 1901 - 06/22 0700  In: 1000 [I.V.:1000]  Out: 3700 [Urine:3700]    Physical Examination:    General: NAD,Conversant, morbidly obese  Neck:  Supple, no mass  Resp:  Lungs CTA B/L, no wheezing , normal respiratory effort  CV:  RRR,  no murmur or rub, 2+ LE edema  GI:  Soft, NT, + Bowel sounds, no hepatosplenomegaly  Neurologic:  Non focal  Psych:             AAO x 3 appropriate affect   Skin:  Chronic venous stasis changes in LEs  :  No uamna, + scrotal edema    []    High complexity decision making was performed  []    Patient is at high-risk of decompensation with multiple organ involvement    Lab Data Personally Reviewed: I have reviewed all the pertinent labs, microbiology data and radiology studies during assessment. Recent Labs      06/22/17 0257 06/21/17 0402 06/20/17 0317   NA  133*  133*  135*   K  4.0  4.0  3.9   CL  93*  93*  92*   CO2  34*  34*  37*   GLU  211*  155*  206*   BUN  33*  29*  28*   CREA  1.15  0.85  0.91   CA  8.4*  8.7  8.3*   PHOS  3.1   --    --    ALB  2.4*   --    --      Recent Labs      06/22/17 0257 06/21/17 0402 06/20/17 0317   WBC  8.2  9.8  9.8   HGB  12.7  14.1  13.9   HCT  39.3  42.8  42.7   PLT  144*  141*  147*     No results found for: SDES  No results found for: CULT  Recent Results (from the past 24 hour(s))   GLUCOSE, POC    Collection Time: 06/21/17 11:41 AM   Result Value Ref Range    Glucose (POC) 254 (H) 65 - 100 mg/dL    Performed by Renate STAFFORD(CON)    GLUCOSE, POC    Collection Time: 06/21/17  4:12 PM   Result Value Ref Range    Glucose (POC) 277 (H) 65 - 100 mg/dL    Performed by Reagan Montes    PROTEIN/CREATININE RATIO, URINE    Collection Time: 06/21/17  7:30 PM   Result Value Ref Range    Protein, urine random 123 (H) 0.0 - 11.9 mg/dL    Creatinine, urine 38.60 mg/dL    Protein/Creat.  urine Ratio 3.2     GLUCOSE, POC    Collection Time: 06/21/17 10:27 PM   Result Value Ref Range    Glucose (POC) 236 (H) 65 - 100 mg/dL    Performed by Polina oJhnson    METABOLIC PANEL, BASIC    Collection Time: 06/22/17  2:57 AM   Result Value Ref Range    Sodium 133 (L) 136 - 145 mmol/L    Potassium 4.0 3.5 - 5.1 mmol/L    Chloride 93 (L) 97 - 108 mmol/L    CO2 34 (H) 21 - 32 mmol/L    Anion gap 6 5 - 15 mmol/L    Glucose 211 (H) 65 - 100 mg/dL    BUN 33 (H) 6 - 20 MG/DL    Creatinine 1.15 0.70 - 1.30 MG/DL    BUN/Creatinine ratio 29 (H) 12 - 20      GFR est AA >60 >60 ml/min/1.73m2    GFR est non-AA >60 >60 ml/min/1.73m2    Calcium 8.4 (L) 8.5 - 10.1 MG/DL   CBC WITH AUTOMATED DIFF    Collection Time: 06/22/17  2:57 AM   Result Value Ref Range    WBC 8.2 4.1 - 11.1 K/uL    RBC 3.98 (L) 4.10 - 5.70 M/uL    HGB 12.7 12.1 - 17.0 g/dL    HCT 39.3 36.6 - 50.3 %    MCV 98.7 80.0 - 99.0 FL    MCH 31.9 26.0 - 34.0 PG    MCHC 32.3 30.0 - 36.5 g/dL    RDW 13.9 11.5 - 14.5 %    PLATELET 919 (L) 723 - 400 K/uL    NEUTROPHILS 63 32 - 75 %    LYMPHOCYTES 23 12 - 49 %    MONOCYTES 11 5 - 13 %    EOSINOPHILS 3 0 - 7 %    BASOPHILS 0 0 - 1 %    ABS. NEUTROPHILS 5.2 1.8 - 8.0 K/UL    ABS. LYMPHOCYTES 1.9 0.8 - 3.5 K/UL    ABS. MONOCYTES 0.9 0.0 - 1.0 K/UL    ABS. EOSINOPHILS 0.2 0.0 - 0.4 K/UL    ABS. BASOPHILS 0.0 0.0 - 0.1 K/UL   ALBUMIN    Collection Time: 06/22/17  2:57 AM   Result Value Ref Range    Albumin 2.4 (L) 3.5 - 5.0 g/dL   PHOSPHORUS    Collection Time: 06/22/17  2:57 AM   Result Value Ref Range    Phosphorus 3.1 2.6 - 4.7 MG/DL             Shani Beckman MD  [facility]   [id number] [place] [address] [address]  Phone - [phone]   Fax - [phone]  www. [facility] Alert & oriented; no sensory, motor or coordination deficits, normal reflexes negative

## 2022-02-11 NOTE — PROGRESS NOTES
Date of Service: 02/10/2022    The patient is a pleasant 72-year-old female who is being seen at the request of Dr. Henry Clinton for back and left leg pain.  She started having symptoms in 08/2021.  Pain is aggravated with standing and walking.  The pain is improved with sitting.  She has been treated thus far with physical therapy and a hip injection, which did not help.  She is quite limited in the walking ability because of the pain.    PAST MEDICAL HISTORY:    Positive for history of breast cancer, hyperlipidemia.    MEDICATIONS AND ALLERGIES:    Reviewed in Epic.    SOCIAL HISTORY:    Denies smoking.    REVIEW OF SYSTEMS:    Denies fever, chills, weight loss or malaise.    PHYSICAL EXAMINATION:    The patient is a pleasant female in no acute distress.  She is alert and oriented x3.  She is walking with a slight limp.  No motor deficits are noted in the lower extremities.  She reports some numbness in the left leg.  Straight leg is negative.  There is no pain with hip range of motion.  Pedal pulses are palpable.  She has got hypoactive reflexes at the patellae and Achilles.    MRI of the lumbar spine shows an L4-L5 spondylolisthesis with spinal stenosis.  She also has significant degenerative joint changes at L1-2.    IMPRESSION AND PLAN:    L4-L5 spondylolisthesis with spinal stenosis.  The patient is complaining of back and left leg pain that is aggravated with walking, improved with sitting.  She has not responded to physical therapy thus far or medications.  Treatment options were discussed with her.  She is interested in proceeding with an epidural injection.  Risks, benefits and alternatives were discussed. Injection will be scheduled.    Thank you for this interesting consultation.      Dictated By: Yasir Orta MD  Signing Provider: Yasir Orta MD    PS/judith ([id number])   DD: 02/10/2022 2:40:16 PM TD: 02/11/2022 9:31:16 AM      Copy Sent To:  MD Yin Sandoval PA-C   Bedside and Verbal shift change report given to Izabella (oncoming nurse) by Wilbur Willams (offgoing nurse). Report included the following information SBAR, ED Summary, Procedure Summary, Intake/Output, MAR, Recent Results and Med Rec Status.

## 2022-07-15 NOTE — ED NOTES
Detail Level: Simple
Pt refused to get out of his wheelchair states that the last time he was here he was able to sit in his chair for care. I informed him that if we needed him to move over the physician would discuss this with him when he evaluates him.
Pt report given to Renata Smith paramedic with [facility] pt remains stable at time of discharge
Spoke with Sherrie Matamoros with transfer center. They will arrange transportation for transfer. Faxed face sheet as requested.
TRANSFER - OUT REPORT:    Verbal report given to Starr Rodriguez RN(name) on Rose Hanna  being transferred to [facility](unit) for routine progression of care       Report consisted of patients Situation, Background, Assessment and   Recommendations(SBAR). Information from the following report(s) ED Summary, MAR, Recent Results and Cardiac Rhythm NSR was reviewed with the receiving nurse. Lines:   Peripheral IV 06/18/17 Right Antecubital (Active)   Site Assessment Clean, dry, & intact 6/18/2017  4:11 PM   Phlebitis Assessment 0 6/18/2017  4:11 PM   Infiltration Assessment 0 6/18/2017  4:11 PM   Dressing Status Clean, dry, & intact 6/18/2017  4:11 PM   Dressing Type Tape;Transparent 6/18/2017  4:11 PM   Hub Color/Line Status Pink;Flushed 6/18/2017  4:11 PM   Alcohol Cap Used Yes 6/18/2017  4:11 PM        Opportunity for questions and clarification was provided.       Patient transported with:   Monitor  O2 @ 2.5 liters, IV saline lock
Additional Notes: Patient consent was obtained to proceed with the visit and recommended plan of care after discussion of all risks and benefits, including the risks of COVID-19 exposure.

## 2024-11-07 NOTE — PROGRESS NOTES
Ramses Jorge dimitri [address] 
[address] [facility], [address] [place] [address] 
[phone] Medical Progress Note NAME: Chencho Rogers :  1960 MRM:  [id number] Date/Time: 2019  8:05 AM 
 
 
  
Subjective: Chief Complaint:  Chest pain: denies currently. Initially stated \"I need help! \" when I first woke him up. After a edgardo discussion in which I pointed out his multiple medical problems which cannot be cured and can only be somewhat compensated for and then ONLY with his cooperation, he stated he didn't have any acute needs ROS: 
(bold if positive, if negative) Tolerating Diet Objective:  
 
 
Vitals:  
 
 
  
Last 24hrs VS reviewed since prior progress note. Most recent are: 
 
Visit Vitals /52 Pulse 75 Temp 97.8 °F (36.6 °C) Resp 20 Ht 6' (1.829 m) Wt (!) 180.5 kg (398 lb) SpO2 95% BMI 53.98 kg/m² SpO2 Readings from Last 6 Encounters:  
19 95% 18 97% 17 93% 17 94% 17 96% 16 98% O2 Flow Rate (L/min): 2 l/min Intake/Output Summary (Last 24 hours) at 2019 0805 Last data filed at 2019 2330 Gross per 24 hour Intake  Output 1000 ml Net -1000 ml Exam:  
 
Physical Exam: 
 
Gen:  Well-developed, super morbidly obese, disheveled, unkempt, with odor from folds, in no acute distress HEENT:  Pink conjunctivae, PERRL, hearing intact to voice, moist mucous membranes Neck:  Supple, without masses, thyroid non-tender Resp:  No accessory muscle use, clear breath sounds without wheezes rales or rhonchi 
Card:  No murmurs, normal S1, S2 without thrills, bruits; 1-2+ pitting, peripheral edema Abd:  Soft, non-tender, non-distended, normoactive bowel sounds are present, no palpable organomegaly and no detectable hernias Lymph:  No cervical or inguinal adenopathy Musc:  No cyanosis or clubbing Skin:  Skin ulcers unchanged from Wound Care note Neuro:  Cranial nerves are grossly intact, no focal motor weakness, follows commands appropriately Psych:  Good insight, oriented to person, place and time, alert Telemetry reviewed:   AFIB Medications Reviewed: (see below) Lab Data Reviewed: (see below) 
 
______________________________________________________________________ Medications:  
 
Current Facility-Administered Medications Medication Dose Route Frequency  perflutren lipid microspheres (DEFINITY) contrast injection 2 mL  2 mL IntraVENous ONCE  
 sodium chloride (NS) flush 5-40 mL  5-40 mL IntraVENous Q8H  
 sodium chloride (NS) flush 5-40 mL  5-40 mL IntraVENous PRN  
 sodium chloride (NS) flush 5-40 mL  5-40 mL IntraVENous Q8H  
 sodium chloride (NS) flush 5-40 mL  5-40 mL IntraVENous PRN  
 acetaminophen (TYLENOL) tablet 650 mg  650 mg Oral Q4H PRN  
 ondansetron (ZOFRAN) injection 4 mg  4 mg IntraVENous Q4H PRN  
 docusate sodium (COLACE) capsule 100 mg  100 mg Oral BID  bumetanide (BUMEX) injection 2 mg  2 mg IntraVENous Q12H  
 albuterol (PROVENTIL VENTOLIN) nebulizer solution 2.5 mg  2.5 mg Nebulization Q2H PRN  
 arformoterol (BROVANA) neb solution 15 mcg  15 mcg Nebulization BID RT  
 budesonide (PULMICORT) 500 mcg/2 ml nebulizer suspension  500 mcg Nebulization BID RT  
 nystatin (MYCOSTATIN) 100,000 unit/gram powder   Topical BID  insulin lispro (HUMALOG) injection   SubCUTAneous AC&HS  
 glucose chewable tablet 16 g  4 Tab Oral PRN  
 dextrose (D50) infusion 12.5-25 g  25-50 mL IntraVENous PRN  
 glucagon (GLUCAGEN) injection 1 mg  1 mg IntraMUSCular PRN  
 albuterol-ipratropium (DUO-NEB) 2.5 MG-0.5 MG/3 ML  3 mL Nebulization Q4H RT  
 pantoprazole (PROTONIX) 40 mg in sodium chloride 0.9% 10 mL injection  40 mg IntraVENous Q12H  
 [START ON 7/1/2019] calcitRIOL (ROCALTROL) capsule 0.25 mcg  0.25 mcg Oral Once per day on Mon Tue Wed Thu Fri  
  ferrous gluconate 324 mg (38 mg iron) tablet 1 Tab  1 Tab Oral ACB  polyethylene glycol (MIRALAX) packet 17 g  17 g Oral QHS  sevelamer carbonate (RENVELA) tab 1,600 mg  1,600 mg Oral TID WITH MEALS  
 aspirin delayed-release tablet 81 mg  81 mg Oral DAILY  zinc oxide-cod liver oil (DESITIN) 40 % paste   Topical PRN  
 haloperidol lactate (HALDOL) injection 2 mg  2 mg IntraVENous Q4H PRN Lab Review:  
 
Recent Labs  
  06/29/19 
0239 06/28/19 
1859 06/28/19 
1419 WBC 4.4 4.9 5.4 HGB 7.5* 7.3* 7.6* HCT 26.2* 25.3* 26.0*  
 153 181 Recent Labs  
  06/29/19 
0239 06/28/19 
1419  140  
K 4.1 4.5  
 102 CO2 36* 35* GLU 75 59* BUN 65* 61* CREA 1.88* 1.99* CA 9.1 8.9 MG 2.0 2.1 PHOS 3.6  --   
ALB 2.6* 2.6* TBILI 0.3 0.3 SGOT 10* 14* ALT 11* 12 INR  --  1.1 Lab Results Component Value Date/Time Glucose (POC) 86 06/29/2019 07:44 AM  
 Glucose (POC) 91 06/29/2019 05:39 AM  
 Glucose (POC) 69 06/29/2019 04:04 AM  
 Glucose (POC) 107 (H) 06/28/2019 10:33 PM  
 Glucose (POC) 75 06/28/2019 09:31 PM  
 
Recent Labs  
  06/28/19 
1935 06/28/19 
1629 PH 7.32* 7.35  
PCO2 71* 66* PO2 100 164* HCO3 36* 35* FIO2  --  50 Recent Labs  
  06/28/19 
1419 INR 1.1 No results found for: SDES Lab Results Component Value Date/Time Culture result: NO GROWTH AFTER 15 HOURS 06/28/2019 02:46 PM  
 Culture result: NO GROWTH AFTER 15 HOURS 06/28/2019 02:46 PM  
 Culture result: MRSA NOT PRESENT 07/16/2018 01:58 AM  
 Culture result:  07/16/2018 01:58 AM  
      Screening of patient nares for MRSA is for surveillance purposes and, if positive, to facilitate isolation considerations in high risk settings. It is not intended for automatic decolonization interventions per se as regimens are not sufficiently effective to warrant routine use. Assessment:  
 
Principal Problem: 
  Diastolic CHF, acute on chronic ([facility] [address] (6/28/2019) Active Problems: 
  DM type 2 causing renal disease, not at goal [facility]) (7/15/2018) COPD (chronic obstructive pulmonary disease) ([address] (7/15/2018) Anemia (7/16/2018) Acute on chronic respiratory failure with hypoxia and hypercapnia ([address] (6/28/2019) Persistent atrial fibrillation ([address] (6/28/2019) CKD (chronic kidney disease) stage 3, GFR 30-59 ml/min ([facility]) (6/29/2019) Acute metabolic encephalopathy (1/41/0077) PUD (peptic ulcer disease) (6/29/2019) Plan:  
 
Principal Problem: 
  Diastolic CHF, acute on chronic ([address] (6/28/2019) - continue diuresis - Cardiology seeing 
 - echo pending - a large component of his current presentation is due to right sided failure from his obesity and underlying pulmonary diseases, there is likely very little we can do to compensate for this and I explained this to him - Palliative Care consulted Active Problems: 
  DM type 2 causing renal disease, not at goal [facility]) (7/15/2018) - BS low, follow on current meds COPD (chronic obstructive pulmonary disease) ([address] (7/15/2018) - no acute exacerbation 
 - continue respiratory meds Anemia (7/16/2018) - Hgb stable, follow Acute on chronic respiratory failure with hypoxia and hypercapnia ([address] (6/28/2019) - POA, due to multiple chronic issues 
 - again, if he won't wear NIPPV there is likely very little we can do to treat all of his problems Persistent atrial fibrillation ([address] (6/28/2019) 
 - no anticoagulation due to recent GI bleed and persistent anemia CKD (chronic kidney disease) stage 3, GFR 30-59 ml/min ([facility]) (6/29/2019) 
 - due to DM 2 
 - follow with diuresis Acute metabolic encephalopathy (4/40/4630) 
 - waxing and waning mental status, POA, due to issues as above 
 
  PUD (peptic ulcer disease) (6/29/2019) - continue PPI, no anticoagulation Total time spent in patient care: 35 minutes Care Plan discussed with: Patient, Family, Care Manager, Nursing Staff and Dr. Gastelum Section Discussed:  Code Status, Care Plan and D/C Planning Prophylaxis:  SCD's Disposition:  HH PT, OT, RN 
        
___________________________________________________ Attending Physician: Lawson Amor MD  
 
 numerical 0-10
